# Patient Record
Sex: FEMALE | Race: WHITE | NOT HISPANIC OR LATINO | Employment: OTHER | URBAN - METROPOLITAN AREA
[De-identification: names, ages, dates, MRNs, and addresses within clinical notes are randomized per-mention and may not be internally consistent; named-entity substitution may affect disease eponyms.]

---

## 2022-10-08 ENCOUNTER — APPOINTMENT (OUTPATIENT)
Dept: CT IMAGING | Facility: HOSPITAL | Age: 67
DRG: 207 | End: 2022-10-08
Attending: EMERGENCY MEDICINE
Payer: COMMERCIAL

## 2022-10-08 ENCOUNTER — APPOINTMENT (OUTPATIENT)
Dept: RADIOLOGY | Facility: HOSPITAL | Age: 67
DRG: 207 | End: 2022-10-08
Attending: EMERGENCY MEDICINE
Payer: COMMERCIAL

## 2022-10-08 ENCOUNTER — HOSPITAL ENCOUNTER (INPATIENT)
Facility: HOSPITAL | Age: 67
LOS: 34 days | Discharge: NURSING, REHAB OR LONG-TERM CARE | DRG: 207 | End: 2022-11-11
Attending: EMERGENCY MEDICINE | Admitting: INTERNAL MEDICINE
Payer: COMMERCIAL

## 2022-10-08 ENCOUNTER — APPOINTMENT (OUTPATIENT)
Dept: RADIOLOGY | Facility: HOSPITAL | Age: 67
DRG: 207 | End: 2022-10-08
Attending: INTERNAL MEDICINE
Payer: COMMERCIAL

## 2022-10-08 DIAGNOSIS — I50.31 ACUTE HEART FAILURE WITH PRESERVED EJECTION FRACTION (HFPEF) (H): ICD-10-CM

## 2022-10-08 DIAGNOSIS — I95.9 HYPOTENSION, UNSPECIFIED HYPOTENSION TYPE: ICD-10-CM

## 2022-10-08 DIAGNOSIS — J44.9 CHRONIC OBSTRUCTIVE PULMONARY DISEASE, UNSPECIFIED COPD TYPE (H): Primary | ICD-10-CM

## 2022-10-08 DIAGNOSIS — F32.A DEPRESSION, UNSPECIFIED DEPRESSION TYPE: ICD-10-CM

## 2022-10-08 DIAGNOSIS — J96.01 ACUTE RESPIRATORY FAILURE WITH HYPOXIA (H): ICD-10-CM

## 2022-10-08 DIAGNOSIS — U07.1 INFECTION DUE TO 2019 NOVEL CORONAVIRUS: ICD-10-CM

## 2022-10-08 DIAGNOSIS — I26.93 SINGLE SUBSEGMENTAL PULMONARY EMBOLISM WITHOUT ACUTE COR PULMONALE (H): ICD-10-CM

## 2022-10-08 LAB
ALBUMIN SERPL BCG-MCNC: 2.6 G/DL (ref 3.5–5.2)
ALP SERPL-CCNC: 90 U/L (ref 35–104)
ALT SERPL W P-5'-P-CCNC: 193 U/L (ref 10–35)
ANION GAP SERPL CALCULATED.3IONS-SCNC: 13 MMOL/L (ref 7–15)
APTT PPP: 23 SECONDS (ref 22–38)
AST SERPL W P-5'-P-CCNC: 290 U/L (ref 10–35)
BASE EXCESS BLDA CALC-SCNC: 0.7 MMOL/L
BASE EXCESS BLDV CALC-SCNC: 3.9 MMOL/L
BASOPHILS # BLD AUTO: 0 10E3/UL (ref 0–0.2)
BASOPHILS NFR BLD AUTO: 0 %
BILIRUB DIRECT SERPL-MCNC: 0.8 MG/DL (ref 0–0.3)
BILIRUB SERPL-MCNC: 1.1 MG/DL
BUN SERPL-MCNC: 46.2 MG/DL (ref 8–23)
CALCIUM SERPL-MCNC: 7.6 MG/DL (ref 8.8–10.2)
CHLORIDE SERPL-SCNC: 100 MMOL/L (ref 98–107)
COHGB MFR BLD: >100 % (ref 95–96)
CREAT SERPL-MCNC: 0.92 MG/DL (ref 0.51–0.95)
D DIMER PPP FEU-MCNC: 6.94 UG/ML FEU (ref 0–0.5)
DEPRECATED HCO3 PLAS-SCNC: 29 MMOL/L (ref 22–29)
EOSINOPHIL # BLD AUTO: 0 10E3/UL (ref 0–0.7)
EOSINOPHIL NFR BLD AUTO: 0 %
ERYTHROCYTE [DISTWIDTH] IN BLOOD BY AUTOMATED COUNT: 13.9 % (ref 10–15)
FLUAV RNA SPEC QL NAA+PROBE: NEGATIVE
FLUBV RNA RESP QL NAA+PROBE: NEGATIVE
GFR SERPL CREATININE-BSD FRML MDRD: 67 ML/MIN/1.73M2
GLUCOSE SERPL-MCNC: 153 MG/DL (ref 70–99)
HBA1C MFR BLD: 6.5 %
HCO3 BLD-SCNC: ABNORMAL MMOL/L
HCO3 BLDV-SCNC: 25 MMOL/L (ref 24–30)
HCT VFR BLD AUTO: 51.8 % (ref 35–47)
HGB BLD-MCNC: 16.9 G/DL (ref 11.7–15.7)
IMM GRANULOCYTES # BLD: 0.1 10E3/UL
IMM GRANULOCYTES NFR BLD: 1 %
INR PPP: 1.41 (ref 0.85–1.15)
LACTATE SERPL-SCNC: 3.2 MMOL/L (ref 0.7–2)
LYMPHOCYTES # BLD AUTO: 0.5 10E3/UL (ref 0.8–5.3)
LYMPHOCYTES NFR BLD AUTO: 3 %
MAGNESIUM SERPL-MCNC: 1.8 MG/DL (ref 1.7–2.3)
MCH RBC QN AUTO: 31.5 PG (ref 26.5–33)
MCHC RBC AUTO-ENTMCNC: 32.6 G/DL (ref 31.5–36.5)
MCV RBC AUTO: 97 FL (ref 78–100)
MONOCYTES # BLD AUTO: 1.3 10E3/UL (ref 0–1.3)
MONOCYTES NFR BLD AUTO: 9 %
NEUTROPHILS # BLD AUTO: 12.4 10E3/UL (ref 1.6–8.3)
NEUTROPHILS NFR BLD AUTO: 87 %
NRBC # BLD AUTO: 0 10E3/UL
NRBC BLD AUTO-RTO: 0 /100
NT-PROBNP SERPL-MCNC: ABNORMAL PG/ML (ref 0–900)
O2/TOTAL GAS SETTING VFR VENT: 80 %
OXYHGB MFR BLD: >98.5 % (ref 95–96)
OXYHGB MFR BLDV: 64.3 % (ref 70–75)
PCO2 BLD: 43 MM HG (ref 35–45)
PCO2 BLDV: 57 MM HG (ref 35–50)
PEEP: 5 CM H2O
PH BLD: 7.39 [PH] (ref 7.37–7.44)
PH BLDV: 7.33 [PH] (ref 7.35–7.45)
PLATELET # BLD AUTO: 297 10E3/UL (ref 150–450)
PO2 BLD: 404 MM HG (ref 75–85)
PO2 BLDV: 38 MM HG (ref 25–47)
POTASSIUM SERPL-SCNC: 3.5 MMOL/L (ref 3.4–5.3)
PROT SERPL-MCNC: 5.1 G/DL (ref 6.4–8.3)
RADIOLOGIST FLAGS: ABNORMAL
RATE: 22 RR/MIN
RBC # BLD AUTO: 5.37 10E6/UL (ref 3.8–5.2)
RSV RNA SPEC NAA+PROBE: NEGATIVE
SAO2 % BLDV: 65.3 % (ref 70–75)
SARS-COV-2 RNA RESP QL NAA+PROBE: POSITIVE
SODIUM SERPL-SCNC: 142 MMOL/L (ref 136–145)
TEMPERATURE: 37 DEGREES C
TROPONIN T SERPL HS-MCNC: 52 NG/L
TROPONIN T SERPL HS-MCNC: 75 NG/L
UFH PPP CHRO-ACNC: <0.1 IU/ML
VENTILATION MODE: ABNORMAL
VENTILATOR TIDAL VOLUME: 400 ML
WBC # BLD AUTO: 14.2 10E3/UL (ref 4–11)

## 2022-10-08 PROCEDURE — 36556 INSERT NON-TUNNEL CV CATH: CPT

## 2022-10-08 PROCEDURE — 82248 BILIRUBIN DIRECT: CPT | Performed by: EMERGENCY MEDICINE

## 2022-10-08 PROCEDURE — 250N000011 HC RX IP 250 OP 636: Performed by: EMERGENCY MEDICINE

## 2022-10-08 PROCEDURE — 999N000065 XR CHEST PORT 1 VIEW

## 2022-10-08 PROCEDURE — 258N000003 HC RX IP 258 OP 636: Performed by: EMERGENCY MEDICINE

## 2022-10-08 PROCEDURE — 96368 THER/DIAG CONCURRENT INF: CPT

## 2022-10-08 PROCEDURE — 74018 RADEX ABDOMEN 1 VIEW: CPT

## 2022-10-08 PROCEDURE — 200N000001 HC R&B ICU

## 2022-10-08 PROCEDURE — 85730 THROMBOPLASTIN TIME PARTIAL: CPT | Performed by: EMERGENCY MEDICINE

## 2022-10-08 PROCEDURE — 87181 SC STD AGAR DILUTION PER AGT: CPT | Performed by: EMERGENCY MEDICINE

## 2022-10-08 PROCEDURE — 87185 SC STD ENZYME DETCJ PER NZM: CPT | Performed by: INTERNAL MEDICINE

## 2022-10-08 PROCEDURE — 999N000185 HC STATISTIC TRANSPORT TIME EA 15 MIN

## 2022-10-08 PROCEDURE — 250N000011 HC RX IP 250 OP 636: Performed by: INTERNAL MEDICINE

## 2022-10-08 PROCEDURE — 93005 ELECTROCARDIOGRAM TRACING: CPT | Performed by: EMERGENCY MEDICINE

## 2022-10-08 PROCEDURE — 87149 DNA/RNA DIRECT PROBE: CPT | Performed by: EMERGENCY MEDICINE

## 2022-10-08 PROCEDURE — 36620 INSERTION CATHETER ARTERY: CPT | Performed by: INTERNAL MEDICINE

## 2022-10-08 PROCEDURE — 82805 BLOOD GASES W/O2 SATURATION: CPT | Performed by: EMERGENCY MEDICINE

## 2022-10-08 PROCEDURE — 999N000157 HC STATISTIC RCP TIME EA 10 MIN

## 2022-10-08 PROCEDURE — 70450 CT HEAD/BRAIN W/O DYE: CPT

## 2022-10-08 PROCEDURE — 84484 ASSAY OF TROPONIN QUANT: CPT | Performed by: EMERGENCY MEDICINE

## 2022-10-08 PROCEDURE — 85610 PROTHROMBIN TIME: CPT | Performed by: EMERGENCY MEDICINE

## 2022-10-08 PROCEDURE — 83735 ASSAY OF MAGNESIUM: CPT | Performed by: EMERGENCY MEDICINE

## 2022-10-08 PROCEDURE — C9113 INJ PANTOPRAZOLE SODIUM, VIA: HCPCS | Performed by: INTERNAL MEDICINE

## 2022-10-08 PROCEDURE — 96376 TX/PRO/DX INJ SAME DRUG ADON: CPT

## 2022-10-08 PROCEDURE — 250N000009 HC RX 250: Performed by: INTERNAL MEDICINE

## 2022-10-08 PROCEDURE — 96365 THER/PROPH/DIAG IV INF INIT: CPT | Mod: 59

## 2022-10-08 PROCEDURE — 94002 VENT MGMT INPAT INIT DAY: CPT

## 2022-10-08 PROCEDURE — 76937 US GUIDE VASCULAR ACCESS: CPT

## 2022-10-08 PROCEDURE — 36415 COLL VENOUS BLD VENIPUNCTURE: CPT | Performed by: EMERGENCY MEDICINE

## 2022-10-08 PROCEDURE — 94640 AIRWAY INHALATION TREATMENT: CPT

## 2022-10-08 PROCEDURE — 71275 CT ANGIOGRAPHY CHEST: CPT

## 2022-10-08 PROCEDURE — 83605 ASSAY OF LACTIC ACID: CPT | Performed by: EMERGENCY MEDICINE

## 2022-10-08 PROCEDURE — 3E043XZ INTRODUCTION OF VASOPRESSOR INTO CENTRAL VEIN, PERCUTANEOUS APPROACH: ICD-10-PCS | Performed by: INTERNAL MEDICINE

## 2022-10-08 PROCEDURE — 99223 1ST HOSP IP/OBS HIGH 75: CPT | Mod: 25 | Performed by: GENERAL ACUTE CARE HOSPITAL

## 2022-10-08 PROCEDURE — 31500 INSERT EMERGENCY AIRWAY: CPT

## 2022-10-08 PROCEDURE — 85520 HEPARIN ASSAY: CPT | Performed by: EMERGENCY MEDICINE

## 2022-10-08 PROCEDURE — 99285 EMERGENCY DEPT VISIT HI MDM: CPT | Mod: 25

## 2022-10-08 PROCEDURE — 96375 TX/PRO/DX INJ NEW DRUG ADDON: CPT

## 2022-10-08 PROCEDURE — 85025 COMPLETE CBC W/AUTO DIFF WBC: CPT | Performed by: EMERGENCY MEDICINE

## 2022-10-08 PROCEDURE — 83880 ASSAY OF NATRIURETIC PEPTIDE: CPT | Performed by: EMERGENCY MEDICINE

## 2022-10-08 PROCEDURE — 250N000009 HC RX 250: Performed by: EMERGENCY MEDICINE

## 2022-10-08 PROCEDURE — 250N000013 HC RX MED GY IP 250 OP 250 PS 637: Performed by: EMERGENCY MEDICINE

## 2022-10-08 PROCEDURE — C9803 HOPD COVID-19 SPEC COLLECT: HCPCS

## 2022-10-08 PROCEDURE — 85379 FIBRIN DEGRADATION QUANT: CPT | Performed by: INTERNAL MEDICINE

## 2022-10-08 PROCEDURE — 96361 HYDRATE IV INFUSION ADD-ON: CPT

## 2022-10-08 PROCEDURE — 83036 HEMOGLOBIN GLYCOSYLATED A1C: CPT | Performed by: INTERNAL MEDICINE

## 2022-10-08 PROCEDURE — 87637 SARSCOV2&INF A&B&RSV AMP PRB: CPT | Performed by: EMERGENCY MEDICINE

## 2022-10-08 PROCEDURE — 96366 THER/PROPH/DIAG IV INF ADDON: CPT

## 2022-10-08 PROCEDURE — 82310 ASSAY OF CALCIUM: CPT | Performed by: EMERGENCY MEDICINE

## 2022-10-08 PROCEDURE — 250N000013 HC RX MED GY IP 250 OP 250 PS 637: Performed by: INTERNAL MEDICINE

## 2022-10-08 PROCEDURE — 87040 BLOOD CULTURE FOR BACTERIA: CPT | Performed by: EMERGENCY MEDICINE

## 2022-10-08 PROCEDURE — 5A1955Z RESPIRATORY VENTILATION, GREATER THAN 96 CONSECUTIVE HOURS: ICD-10-PCS | Performed by: INTERNAL MEDICINE

## 2022-10-08 PROCEDURE — XW043E5 INTRODUCTION OF REMDESIVIR ANTI-INFECTIVE INTO CENTRAL VEIN, PERCUTANEOUS APPROACH, NEW TECHNOLOGY GROUP 5: ICD-10-PCS | Performed by: INTERNAL MEDICINE

## 2022-10-08 PROCEDURE — 99291 CRITICAL CARE FIRST HOUR: CPT | Mod: 25 | Performed by: INTERNAL MEDICINE

## 2022-10-08 PROCEDURE — 85520 HEPARIN ASSAY: CPT | Performed by: INTERNAL MEDICINE

## 2022-10-08 RX ORDER — DEXTROSE MONOHYDRATE 25 G/50ML
25-50 INJECTION, SOLUTION INTRAVENOUS
Status: DISCONTINUED | OUTPATIENT
Start: 2022-10-08 | End: 2022-11-11 | Stop reason: HOSPADM

## 2022-10-08 RX ORDER — DEXMEDETOMIDINE HYDROCHLORIDE 4 UG/ML
.1-1.2 INJECTION, SOLUTION INTRAVENOUS CONTINUOUS
Status: DISCONTINUED | OUTPATIENT
Start: 2022-10-08 | End: 2022-10-08

## 2022-10-08 RX ORDER — NALOXONE HYDROCHLORIDE 0.4 MG/ML
0.2 INJECTION, SOLUTION INTRAMUSCULAR; INTRAVENOUS; SUBCUTANEOUS
Status: DISCONTINUED | OUTPATIENT
Start: 2022-10-08 | End: 2022-11-11 | Stop reason: HOSPADM

## 2022-10-08 RX ORDER — CHLORHEXIDINE GLUCONATE ORAL RINSE 1.2 MG/ML
15 SOLUTION DENTAL EVERY 12 HOURS
Status: DISCONTINUED | OUTPATIENT
Start: 2022-10-08 | End: 2022-10-20

## 2022-10-08 RX ORDER — NOREPINEPHRINE BITARTRATE 0.02 MG/ML
.01-.6 INJECTION, SOLUTION INTRAVENOUS CONTINUOUS
Status: DISCONTINUED | OUTPATIENT
Start: 2022-10-08 | End: 2022-10-17

## 2022-10-08 RX ORDER — NALOXONE HYDROCHLORIDE 0.4 MG/ML
0.4 INJECTION, SOLUTION INTRAMUSCULAR; INTRAVENOUS; SUBCUTANEOUS
Status: DISCONTINUED | OUTPATIENT
Start: 2022-10-08 | End: 2022-11-11 | Stop reason: HOSPADM

## 2022-10-08 RX ORDER — PROPOFOL 10 MG/ML
5-75 INJECTION, EMULSION INTRAVENOUS CONTINUOUS
Status: DISCONTINUED | OUTPATIENT
Start: 2022-10-08 | End: 2022-10-17

## 2022-10-08 RX ORDER — ETOMIDATE 2 MG/ML
10 INJECTION INTRAVENOUS ONCE
Status: COMPLETED | OUTPATIENT
Start: 2022-10-08 | End: 2022-10-08

## 2022-10-08 RX ORDER — NICOTINE POLACRILEX 4 MG
15-30 LOZENGE BUCCAL
Status: DISCONTINUED | OUTPATIENT
Start: 2022-10-08 | End: 2022-11-11 | Stop reason: HOSPADM

## 2022-10-08 RX ORDER — NOREPINEPHRINE BITARTRATE 0.02 MG/ML
INJECTION, SOLUTION INTRAVENOUS
Status: DISCONTINUED
Start: 2022-10-08 | End: 2022-10-08 | Stop reason: HOSPADM

## 2022-10-08 RX ORDER — DEXMEDETOMIDINE HYDROCHLORIDE 4 UG/ML
.1-1.2 INJECTION, SOLUTION INTRAVENOUS CONTINUOUS
Status: DISCONTINUED | OUTPATIENT
Start: 2022-10-08 | End: 2022-10-20

## 2022-10-08 RX ORDER — HEPARIN SODIUM 10000 [USP'U]/100ML
0-5000 INJECTION, SOLUTION INTRAVENOUS CONTINUOUS
Status: DISCONTINUED | OUTPATIENT
Start: 2022-10-08 | End: 2022-10-13

## 2022-10-08 RX ORDER — PIPERACILLIN SODIUM, TAZOBACTAM SODIUM 3; .375 G/15ML; G/15ML
3.38 INJECTION, POWDER, LYOPHILIZED, FOR SOLUTION INTRAVENOUS EVERY 8 HOURS
Status: DISCONTINUED | OUTPATIENT
Start: 2022-10-08 | End: 2022-10-11

## 2022-10-08 RX ORDER — PIPERACILLIN SODIUM, TAZOBACTAM SODIUM 3; .375 G/15ML; G/15ML
3.38 INJECTION, POWDER, LYOPHILIZED, FOR SOLUTION INTRAVENOUS ONCE
Status: COMPLETED | OUTPATIENT
Start: 2022-10-08 | End: 2022-10-08

## 2022-10-08 RX ORDER — IOPAMIDOL 755 MG/ML
75 INJECTION, SOLUTION INTRAVASCULAR ONCE
Status: COMPLETED | OUTPATIENT
Start: 2022-10-08 | End: 2022-10-08

## 2022-10-08 RX ORDER — DEXAMETHASONE SODIUM PHOSPHATE 4 MG/ML
6 INJECTION, SOLUTION INTRA-ARTICULAR; INTRALESIONAL; INTRAMUSCULAR; INTRAVENOUS; SOFT TISSUE DAILY
Status: COMPLETED | OUTPATIENT
Start: 2022-10-08 | End: 2022-10-17

## 2022-10-08 RX ORDER — ASPIRIN 300 MG/1
300 SUPPOSITORY RECTAL ONCE
Status: COMPLETED | OUTPATIENT
Start: 2022-10-08 | End: 2022-10-08

## 2022-10-08 RX ORDER — PROPOFOL 10 MG/ML
5-75 INJECTION, EMULSION INTRAVENOUS CONTINUOUS
Status: DISCONTINUED | OUTPATIENT
Start: 2022-10-08 | End: 2022-10-08

## 2022-10-08 RX ORDER — DEXMEDETOMIDINE HYDROCHLORIDE 4 UG/ML
INJECTION, SOLUTION INTRAVENOUS
Status: DISCONTINUED
Start: 2022-10-08 | End: 2022-10-08 | Stop reason: HOSPADM

## 2022-10-08 RX ADMIN — ASPIRIN 300 MG: 300 SUPPOSITORY RECTAL at 16:22

## 2022-10-08 RX ADMIN — MIDAZOLAM HYDROCHLORIDE 2 MG: 1 INJECTION, SOLUTION INTRAMUSCULAR; INTRAVENOUS at 15:33

## 2022-10-08 RX ADMIN — MIDAZOLAM HYDROCHLORIDE 2 MG: 1 INJECTION, SOLUTION INTRAMUSCULAR; INTRAVENOUS at 13:27

## 2022-10-08 RX ADMIN — VANCOMYCIN HYDROCHLORIDE 750 MG: 5 INJECTION, POWDER, LYOPHILIZED, FOR SOLUTION INTRAVENOUS at 14:18

## 2022-10-08 RX ADMIN — Medication 80 MG: at 09:43

## 2022-10-08 RX ADMIN — SODIUM CHLORIDE 1000 ML: 9 INJECTION, SOLUTION INTRAVENOUS at 15:12

## 2022-10-08 RX ADMIN — MIDAZOLAM HYDROCHLORIDE 2 MG: 1 INJECTION, SOLUTION INTRAMUSCULAR; INTRAVENOUS at 10:59

## 2022-10-08 RX ADMIN — Medication 0.03 MCG/KG/MIN: at 09:58

## 2022-10-08 RX ADMIN — IOPAMIDOL 75 ML: 755 INJECTION, SOLUTION INTRAVENOUS at 16:05

## 2022-10-08 RX ADMIN — MIDAZOLAM HYDROCHLORIDE 2 MG: 1 INJECTION, SOLUTION INTRAMUSCULAR; INTRAVENOUS at 10:11

## 2022-10-08 RX ADMIN — ALTEPLASE 2 MG: 2.2 INJECTION, POWDER, LYOPHILIZED, FOR SOLUTION INTRAVENOUS at 21:32

## 2022-10-08 RX ADMIN — IPRATROPIUM BROMIDE 0.5 MG: 0.5 SOLUTION RESPIRATORY (INHALATION) at 19:28

## 2022-10-08 RX ADMIN — DEXAMETHASONE SODIUM PHOSPHATE 6 MG: 4 INJECTION, SOLUTION INTRA-ARTICULAR; INTRALESIONAL; INTRAMUSCULAR; INTRAVENOUS; SOFT TISSUE at 20:25

## 2022-10-08 RX ADMIN — PROPOFOL 5 MCG/KG/MIN: 10 INJECTION, EMULSION INTRAVENOUS at 15:44

## 2022-10-08 RX ADMIN — HEPARIN SODIUM AND DEXTROSE 650 UNITS/HR: 10000; 5 INJECTION INTRAVENOUS at 16:41

## 2022-10-08 RX ADMIN — PIPERACILLIN AND TAZOBACTAM 3.38 G: 3; .375 INJECTION, POWDER, LYOPHILIZED, FOR SOLUTION INTRAVENOUS at 21:14

## 2022-10-08 RX ADMIN — SODIUM CHLORIDE 1000 ML: 9 INJECTION, SOLUTION INTRAVENOUS at 12:20

## 2022-10-08 RX ADMIN — DEXMEDETOMIDINE HYDROCHLORIDE 0.2 MCG/KG/HR: 400 INJECTION INTRAVENOUS at 10:11

## 2022-10-08 RX ADMIN — CHLORHEXIDINE GLUCONATE 15 ML: 1.2 SOLUTION ORAL at 21:25

## 2022-10-08 RX ADMIN — PANTOPRAZOLE SODIUM 40 MG: 40 INJECTION, POWDER, FOR SOLUTION INTRAVENOUS at 21:20

## 2022-10-08 RX ADMIN — PIPERACILLIN AND TAZOBACTAM 3.38 G: 3; .375 INJECTION, POWDER, LYOPHILIZED, FOR SOLUTION INTRAVENOUS at 14:17

## 2022-10-08 RX ADMIN — ETOMIDATE 10 MG: 2 INJECTION, SOLUTION INTRAVENOUS at 09:43

## 2022-10-08 RX ADMIN — DEXMEDETOMIDINE HYDROCHLORIDE 0.5 MCG/KG/HR: 400 INJECTION INTRAVENOUS at 21:19

## 2022-10-08 ASSESSMENT — ACTIVITIES OF DAILY LIVING (ADL)
ADLS_ACUITY_SCORE: 35

## 2022-10-08 NOTE — PROGRESS NOTES
Patient transported to ct and back to ed without incident. Patient placed back on vent withvbelow settings:  Vent Mode: CMV/AC  (Continuous Mandatory Ventilation/ Assist Control)  Resp Rate (Set): 22 breaths/min  Tidal Volume (Set, mL): 400 mL  PEEP (cm H2O): 5 cmH2O  Resp: 22    Toro Menchaca, RT

## 2022-10-08 NOTE — PHARMACY-ADMISSION MEDICATION HISTORY
Pharmacy Note - Admission Medication History    Pertinent Provider Information: Unable to communicate with patient as she is intubated.  RN spoke with child and they stated that she is on lasix, ASA, and sometimes takes caffeine.  When RN stated this the patient was able to acknowledge this that she does take them.  We are unable to determine her doses.     ______________________________________________________________________    Prior To Admission (PTA) med list completed and updated in EMR.       No outpatient medications have been marked as taking for the 10/8/22 encounter (Hospital Encounter).       Information source(s): Patient and Family member  Method of interview communication: in-person    Summary of Changes to PTA Med List  New: n/a  Discontinued: n/a  Changed: n/a    Patient was asked about OTC/herbal products specifically.  PTA med list reflects this.    In the past week, patient estimated taking medication this percent of the time:  Unable to assess this.    Allergies were reviewed, assessed, and updated with the patient.      Patient did not bring any medications to the hospital and can't retrieve from home. No multi-dose medications are available for use during hospital stay.     The information provided in this note is only as accurate as the sources available at the time of the update(s).    Thank you for the opportunity to participate in the care of this patient.    Sammie Wilkerson RPH  10/8/2022 12:25 PM

## 2022-10-08 NOTE — CONSULTS
"     We have been asked to see Lavonne Talbot at [facility] by Dr. Erich Claudio for evaluation of an abnormal electrocardiogram.    Impression and Plan     Assessment:  1. Abnormal electrocardiogram suggesting myocardial injury. Her initial presentation seems more respiratory in etiology. Her initial high-sensitivity troponin is only mild. Unclear significance of her markedly elevated brain natriuretic peptide as she does not appear to be in decompensated congestive heart failure. Myocarditis from COVID-19 infection or stress-induced cardiomyopathy are also possible.  2. Acute respiratory failure with hypoxemia. This may be precipitated by COVID-19 infection but she likely has underlying chronic obstructive pulmonary disease as well.  3. Shock, unspecified.   4. Tobacco use.    Plan:    We attempted twice to perform transthoracic echocardiography, but the patient was combative and did not allow for this. We will try again tomorrow    Recommend head CT to rule out intracranial hemorrhage given her unresponsiveness and CT angiogram of the chest to evaluate for pulmonary embolism    If she has no evidence of intracranial hemorrhage, empiric intravenous heparin could be initiated for possible acute coronary syndrome    She likely needs intravenous fluids despite her elevated brain natriuretic peptide and hypoxic respiratory failure, as she appears hypovolemic on examination    Primary Cardiologist: none    Clinically Significant Risk Factors Present on Admission      # Coagulation Defect: INR = 1.41 (Ref range: 0.85 - 1.15) and/or PTT = 23 Seconds (Ref range: 22 - 38 Seconds) on admission, will monitor for bleeding    # Circulatory Shock: currently requiring pressors for blood pressure support  # Cachexia: Estimated body mass index is 12.09 kg/m  as calculated from the following:    Height as of this encounter: 1.702 m (5' 7\").    Weight as of this encounter: 35 kg (77 lb 2.6 oz).      History of Present " Illness      Ms. Lavonne Talbot is a 67 year old female with no known significant past medical history besides being a heavy smoker who called EMS this morning for worsened shortness of breath. She reportedly tested positive for COVID-19 last week. She has been home alone, as her  is currently hospitalized for a stroke and his having some cognitive difficulties.    On arrival to the ED, she was unresponsive and subsequently intubated. She has become hypotensive and required vasopressors. ECG suggested anterior T wave inversions. Labs showed borderline elevated high-sensitive troponin of 75 and [place] [address]. CXR showed hyperinflated lungs but no evidence of fluid-overload. She has been agitated despite receiving sedation and had not allowed for two separate attempts at TTE.    Her son Frantz says that her and her  are very secretive about their health. He is unaware of any significant medical problems but says she was recently undergoing some medical testing. For the past week since the COVID-19 diagnosis, she has been more run down and irritable. No mention of chest pain.     Review of Systems:  Further review of systems is otherwise negative/noncontributory (based on review of medical record (admission H&P) and 13 point review of systems reviewed. Pertinent positives noted).    Cardiac Diagnostics     ECG 10/8/2022 (personally reviewed and interpreted): sinus tachycardia, incomplete RBBB, prolonged QTc 528 ms, anterior T wave inversions    Telemetry (personally reviewed): sinus tachycardia, no ventricular arrhythmias    Medical History  Surgical History Family History Social History   Past Medical History:   Diagnosis Date     Tobacco abuse      Past Surgical History:   Procedure Laterality Date     NO HISTORY OF SURGERY       Family History   Problem Relation Age of Onset     Diabetes Mother            Social History     Socioeconomic History     Marital status:      Spouse  "name: Not on file     Number of children: Not on file     Years of education: Not on file     Highest education level: Not on file   Occupational History     Not on file   Tobacco Use     Smoking status: Current Every Day Smoker     Types: Cigarettes     Smokeless tobacco: Not on file   Substance and Sexual Activity     Alcohol use: Not on file     Drug use: Not on file     Sexual activity: Not on file   Other Topics Concern     Not on file   Social History Narrative     Not on file     Social Determinants of Health     Financial Resource Strain: Not on file   Food Insecurity: Not on file   Transportation Needs: Not on file   Physical Activity: Not on file   Stress: Not on file   Social Connections: Not on file   Intimate Partner Violence: Not on file   Housing Stability: Not on file             Physical Examination   VITALS: /87   Pulse 76   Resp 22   Ht 1.702 m (5' 7\")   Wt 35 kg (77 lb 2.6 oz)   SpO2 100%   BMI 12.09 kg/m    BMI: Body mass index is 12.09 kg/m .  Wt Readings from Last 3 Encounters:   10/08/22 35 kg (77 lb 2.6 oz)     General Appearance:  Intubated, sedated.   Head:  Normocephalic, without obvious abnormality, atraumatic   Eyes:  Closed   Ears:  Normal external ear canals bilaterally   Nose: Nares normal, septum midline, no drainage   Throat: ET tube in place.   Neck: Supple, symmetrical, trachea midline, no adenopathy, no carotid bruit or JVD.   Back:   Symmetric, no abnormal curvature, ROM normal   Lungs:   Mechanical ventilation, respirations unlabored   Chest Wall:  No tenderness or deformity   Heart:  Regular rate and rhythm, S1, S2 normal,no murmur, rub or gallop   Abdomen:   Soft, non-tender, bowel sounds active all four quadrants,  no masses, no organomegaly   Extremities: Extremities normal, atraumatic, no cyanosis or edema   Skin: Skin color, texture, turgor normal, no rashes or lesions   Psychiatric: Sedated.   Neurologic: Sedated.            Imaging      CXR 10/8/2022 " (report reviewed):  EXAM: XR CHEST PORTABLE 1 VIEW  LOCATION: [facility]  DATE/TIME: 10/08/2022, 11:10 AM     INDICATION: Status post central line, right IJ.  COMPARISON: 10/08/2022 at 0953 hours.                                                                      IMPRESSION: New right IJ central venous catheter with tip in the mid SVC. No pneumothorax. Endotracheal and nasogastric tubes are unchanged. Lungs hyperinflated. Mild infiltrate left lung base.      This result has not been signed. Information might be incomplete.            Lab Results    Chemistry/lipid CBC Cardiac Enzymes/BNP/TSH/INR     Recent Labs   Lab Test 10/08/22  1132      POTASSIUM 3.5   CHLORIDE 100   CO2 29   *   BUN 46.2*   CR 0.92   GFRESTIMATED 67   JASON 7.6*     Recent Labs   Lab Test 10/08/22  1132   CR 0.92            Recent Labs   Lab Test 10/08/22  1132   WBC 14.2*   HGB 16.9*   HCT 51.8*   MCV 97        Recent Labs   Lab Test 10/08/22  1132   HGB 16.9*      Recent Labs   Lab Test 10/08/22  1132   NTBNPI 12,629*     No results for input(s): TSH in the last 01987 hours.  Recent Labs   Lab Test 10/08/22  1132   INR 1.41*           Current Inpatient Scheduled Medications   Scheduled Meds:    sodium chloride 0.9%  1,000 mL Intravenous Once     aspirin  300 mg Rectal Once     dexmedetomidine         norepinephrine         piperacillin-tazobactam  3.375 g Intravenous Once     vancomycin  750 mg Intravenous Once     Continuous Infusions:    dexmedetomidine 0.9 mcg/kg/hr (10/08/22 1301)     norepinephrine 0.09 mcg/kg/min (10/08/22 1300)          Medications Prior to Admission        Alton Grant MD [facility]  Non-Invasive Cardiologist  [facility]  Pager [phone]

## 2022-10-08 NOTE — ED PROVIDER NOTES
Emergency Department Encounter     Evaluation Date & Time:   10/8/2022  9:38 AM    CHIEF COMPLAINT:  Respiratory Distress      Triage Note:              ED COURSE & MEDICAL DECISION MAKING:     ED Course as of 10/08/22 1408   Sat Oct 08, 2022   1008 I spoke with cardiologist, Dr. Grant, who reviewed history, EKG with me.  Will not make STEMI alert at this point given uncertainty, recommends CT head to rule out ICH, CTA chest and if CT head neg, start heparin.     1200 Nursing to send over for CT imaging. Still waiting on this.     1207 Lactate 3.2.  WBC 14+. Broad spectrum antibx started empirically given presentation and hypotension.     1230 Awaiting imaging to call for hospitalization.     1349 Awaiting call from intensivist.   1401 I spoke with intensive, Dr. Mae, who accepted for hospitalization to ICU.  She's aware we are still waiting on CT head and CTA chest.  No heparin started until CT head returned.     Pt presenting by EMS from home for respiratory failure with associated recent covid diagnosis.  History limited as pt became unresponsive on arrival here, still breathing with pulse.  She was initially tried on CPAP by EMS, but quickly failed, so we proceeded with intubation shortly after arrival.  Pt's pressures low initially.  Resuscitation started on arrival. EKG concerning for possible ACS vs type 2 secondary to resp failure/hypoxia and covid.  EKG pattern consistent with a wellen's wave syndrome, so I spoke with cardiology regarding this. Will not activate cath lab at this point.      9:37 AM Patient was unresponsive when arrived. PPE worn includes N95 mask and gloves.   11:00 AM RN informed patient woke up. RN asked patient if she wants chest tube, she nod her head no. I contacted family through patients cell phone.     At the conclusion of the encounter I discussed the results of all the tests and the disposition. The questions were answered. The patient or family acknowledged understanding and  was agreeable with the care plan.      MEDICATIONS GIVEN IN THE EMERGENCY DEPARTMENT:  Medications   norepinephrine (LEVOPHED) 0.016 mg/mL 4 mg in  mL infusion PREMIX (  Not Given 10/8/22 1226)   aspirin (ASA) Suppository 300 mg (has no administration in time range)   dexmedetomidine (PRECEDEX) 400 MCG/100ML infusion (  Not Given 10/8/22 1226)   norepinephrine (LEVOPHED) 4 mg in  mL infusion PREMIX (0 mcg/kg/min × 35 kg Intravenous Paused 10/8/22 1333)   dexmedetomidine (PRECEDEX) 400 mcg in 0.9% sodium chloride 100 mL (0.9 mcg/kg/hr × 35 kg Intravenous Rate/Dose Change 10/8/22 1301)   0.9% sodium chloride BOLUS (has no administration in time range)   piperacillin-tazobactam (ZOSYN) 3.375 g vial to attach to  mL bag (has no administration in time range)   vancomycin (VANCOCIN) 750 mg in 0.9% NaCl 250 mL intermittent infusion (has no administration in time range)   fentaNYL (SUBLIMAZE) infusion (has no administration in time range)   etomidate (AMIDATE) injection 10 mg (10 mg Intravenous Given 10/8/22 0943)   succinylcholine (ANECTINE) injection 80 mg (80 mg Intravenous Given 10/8/22 0943)   0.9% sodium chloride BOLUS (1,000 mLs Intravenous New Bag 10/8/22 1220)   midazolam (VERSED) injection 2 mg (2 mg Intravenous Given 10/8/22 1011)   midazolam (VERSED) injection 2 mg (2 mg Intravenous Given 10/8/22 1059)   midazolam (VERSED) injection 2 mg (2 mg Intravenous Given 10/8/22 1327)       NEW PRESCRIPTIONS STARTED AT TODAY'S ED VISIT:  New Prescriptions    No medications on file       HPI   Per EMS, patient is positive for COVID-19. Patient was in respiratory distress. Patients O2 stat ws in the 50, EMS attempted CPAP. On arrival to ED patient was unresponsive.    Per son, patient had COVID-19 for about a week. He got COVID a week before patient. Stated that patient is a heavy chain smoker. Reports patient has been having trouble getting around.     The history is provided by medical records and the  "EMS personnel. No  was used.        Lavonne Talbot is a 67 year old female with no pertinent history who presents to this ED via EMS for evaluation of shortness of breath and hypoxia.     REVIEW OF SYSTEMS:  Review of Systems   Unable to perform ROS: Patient unresponsive       Medical History     Past Medical History:   Diagnosis Date     Tobacco abuse        Past Surgical History:   Procedure Laterality Date     NO HISTORY OF SURGERY         Family History   Problem Relation Age of Onset     Diabetes Mother        Social History     Tobacco Use     Smoking status: Current Every Day Smoker     Types: Cigarettes       No current outpatient medications on file.      Physical Exam     Vitals:  /70   Pulse 72   Resp 22   Ht 1.702 m (5' 7\")   Wt 35 kg (77 lb 2.6 oz)   SpO2 100%   BMI 12.09 kg/m      PHYSICAL EXAM:   Physical Exam  Vitals and nursing note reviewed.   Constitutional:       General: She is in acute distress.      Appearance: Normal appearance. She is ill-appearing.      Comments: Unresponsive arrived to ED  Frail appearing   HENT:      Head: Normocephalic and atraumatic.      Nose: Nose normal.      Mouth/Throat:      Mouth: Mucous membranes are moist.   Eyes:      Pupils: Pupils are equal, round, and reactive to light.   Neck:      Comments: No JVD  Cardiovascular:      Rate and Rhythm: Regular rhythm. Tachycardia present.      Pulses: Normal pulses.           Radial pulses are 2+ on the right side and 2+ on the left side.        Dorsalis pedis pulses are 2+ on the right side and 2+ on the left side.   Pulmonary:      Effort: Respiratory distress present.      Breath sounds: Rhonchi present.      Comments: Diminish breathing sound bilaterally   Abdominal:      Palpations: Abdomen is soft.      Tenderness: There is no abdominal tenderness.   Musculoskeletal:      Cervical back: Full passive range of motion without pain and neck supple.      Comments: No calf tenderness or " swelling b/l   Skin:     General: Skin is warm.      Capillary Refill: Capillary refill takes less than 2 seconds.      Findings: No rash.   Neurological:      General: No focal deficit present.      Mental Status: She is alert. Mental status is at baseline.      Comments: Fluent speech, no acute lateralizing deficits   Psychiatric:      Comments: Unresponsive         Results     LAB:  All pertinent labs reviewed and interpreted  Labs Ordered and Resulted from Time of ED Arrival to Time of ED Departure   BASIC METABOLIC PANEL - Abnormal       Result Value    Sodium 142      Potassium 3.5      Chloride 100      Carbon Dioxide (CO2) 29      Anion Gap 13      Urea Nitrogen 46.2 (*)     Creatinine 0.92      Calcium 7.6 (*)     Glucose 153 (*)     GFR Estimate 67     TROPONIN T, HIGH SENSITIVITY - Abnormal    Troponin T, High Sensitivity 75 (*)    NT PROBNP INPATIENT - Abnormal    N terminal Pro BNP Inpatient 12,629 (*)    HEPATIC FUNCTION PANEL - Abnormal    Protein Total 5.1 (*)     Albumin 2.6 (*)     Bilirubin Total 1.1      Alkaline Phosphatase 90       (*)      (*)     Bilirubin Direct 0.80 (*)    INR - Abnormal    INR 1.41 (*)    CBC WITH PLATELETS AND DIFFERENTIAL - Abnormal    WBC Count 14.2 (*)     RBC Count 5.37 (*)     Hemoglobin 16.9 (*)     Hematocrit 51.8 (*)     MCV 97      MCH 31.5      MCHC 32.6      RDW 13.9      Platelet Count 297      % Neutrophils 87      % Lymphocytes 3      % Monocytes 9      % Eosinophils 0      % Basophils 0      % Immature Granulocytes 1      NRBCs per 100 WBC 0      Absolute Neutrophils 12.4 (*)     Absolute Lymphocytes 0.5 (*)     Absolute Monocytes 1.3      Absolute Eosinophils 0.0      Absolute Basophils 0.0      Absolute Immature Granulocytes 0.1      Absolute NRBCs 0.0     LACTIC ACID WHOLE BLOOD - Abnormal    Lactic Acid 3.2 (*)    MAGNESIUM - Normal    Magnesium 1.8     PARTIAL THROMBOPLASTIN TIME - Normal    aPTT 23     BLOOD GAS ARTERIAL   INFLUENZA A/B  & SARS-COV2 PCR MULTIPLEX   TROPONIN T, HIGH SENSITIVITY   BLOOD CULTURE   BLOOD CULTURE       RADIOLOGY:  XR Chest Port 1 View   Preliminary Result   IMPRESSION: New right IJ central venous catheter with tip in the mid SVC. No pneumothorax. Endotracheal and nasogastric tubes are unchanged. Lungs hyperinflated. Mild infiltrate left lung base.         XR Chest Port 1 View   Final Result   IMPRESSION: ETT is 6.2 cm from the izabela. Enteric tube enters the stomach and out of the field-of-view. The lungs are hyperinflated. There may be trace pleural effusions versus pleural thickening. Patchy bibasilar reticular opacities likely represent    atelectasis and scarring. Superimposed infection is also in the differential. Heart size is normal. No acute osseous findings.      CT Head w/o Contrast    (Results Pending)   CT Chest Pulmonary Embolism w Contrast    (Results Pending)   Echocardiogram Complete    (Results Pending)                ECG:  Sinus tach, rate 116, ST elevation V3 and V4 with deeply inverted T waves, concerning for wellen's wave.  No priors for comparison    I have independently reviewed and interpreted the EKG(s) documented above     PROCEDURES:  [facility]    -Intubation    Date/Time: 10/8/2022 10:02 AM  Performed by: Erich Claudio MD  Authorized by: Erich Claudio MD     Emergent condition/consent implied      PRE-PROCEDURE DETAILS     Patient status:  Unresponsive    Mallampati score:  I    Pretreatment meds: etomidate.    Paralytics:  Succinylcholine      PROCEDURE DETAILS     Preoxygenation:  Bag valve mask    CPR in progress: no      Intubation method:  Oral    Oral intubation technique:  Video-assisted    Laryngoscope blade:  Mac 4    Tube size (mm):  7.5    Tube type:  Cuffed    Number of attempts:  1    Ventilation between attempts: no      Cricoid pressure: no      Tube visualized through cords: yes      PLACEMENT ASSESSMENT     ETT to teeth:  22    Tube secured  with:  ETT pastrana    Breath sounds:  Equal and absent over the epigastrium    Placement verification: chest rise, condensation, CXR verification, direct visualization, equal breath sounds and ETCO2 detector      CXR findings:  ETT in proper place    PROCEDURE    Patient Tolerance:  Patient tolerated the procedure well with no immediate complications  :      PROCEDURE: Central Venous Line Placement   TYPE: Triple Lumen   INDICATIONS: Central vascular access for Vasoactive medication administration   PROCEDURE PROVIDER: Dr Frantz Claudio   CONSENT: Emergent nature, consent implied   TIME OUT: Universal protocol was followed. TIME OUT conducted just prior to starting procedure confirmed patient identity, site/side, procedure, patient position, and availability of correct equipment. Yes   SITE: Right internal jugular   MEDICATIONS: Intubated, sedated   NOTE: Central line bundle elements were completed including preparatory hand cleansing, donning full barrier precautions, use of a full body drape and chlorhexidine gluconate skin prep.  The line insertion site was selected as lowest risk for mechanical complication after reviewing patient anatomy. A vascular ultrasound device was used to locate the vein. A finder needle was used to enter the vein, through which a guidewire was inserted without resistance. Venous placement of guidewire was confirmed again with ultrasound. The finder needle was then removed and the tract was dilated with a dilator. The central venous catheter was then inserted over the guidewire without difficulty. The guidewire was removed and the catheter was secured to the underlying skin. I verified removal of the wire and dilator: Yes. Blood was withdrawn from the each port and then flushed with sterile saline.  The line was secured in place and a dressing was placed over the site.    A post-procedure chest radiograph was ordered, reviewed, no pneumothorax seen and tip of line in good position near the  cavoatrial junction     COMPLICATIONS: Patient tolerated procedure well, without complication           FINAL IMPRESSION:    ICD-10-CM    1. Acute respiratory failure with hypoxia (H)  J96.01    2. Hypotension, unspecified hypotension type  I95.9    3. Infection due to 2019 novel coronavirus  U07.1      CRITICAL CARE  90 minutes of critical care time spent managing respiratory failure with hypoxia and hypotension.  Time spent interpreting labs, vitals, imaging.  Valerio spent documenting.  Independent of any procedures performed.        I, Krista Guzman, am serving as a scribe to document services personally performed by Dr. Erich Claudio, based on my observations and the provider's statements to me. I, Erich Claudio, DO attest that Krista Guzman is acting in a scribe capacity, has observed my performance of the services and has documented them in accordance with my direction.      Erich Claudio DO  Emergency Medicine  [facility] EMERGENCY DEPARTMENT  10/8/2022  9:50 AM        Erich Claudio MD  10/08/22 0732

## 2022-10-08 NOTE — ED NOTES
EMERGENCY DEPARTMENT SIGN OUT NOTE        ED COURSE AND MEDICAL DECISION MAKING  Patient was signed out to me by Dr Frantz Claudio at 1430.    In brief, Lavonne Talbot is a 67 year old female who initially presented with respiratory distress and altered mental status. Patient's oxygen saturations were in the 50s, and EMS attempted CPAP. Upon arrival, patient was unresponsive. Per son, patient had COVID-19 for about a week, and he stated that patient is a heavy chain smoker. Reports patient has been having trouble getting around.     At time of sign out, patient admitted to intensivist.     Repeat EKG had been performed and demonstrated sinus bradycardia with deep T wave inversion lead V3 and biphasic T waves leads V4 and V5.    I spoke with Dr. Grant with Cardiology who advised heparin drip if head CT unremarkable.    Spoke with Charge RN to expedite head CT imaging.    Head CT negative.    CTA chest demonstrated   1.  A single segmental left lower lobe pulmonary embolus. No evidence of right heart strain.  2.  Small left lower lobe consolidations which either represent pneumonia or aspiration (patient has been given broad-spectrum antibiotics - Zosyn and Vancomycin).  3.  Multiple foci of mucus plugging.  4.  Severe emphysema.  5.  Right internal jugular central venous catheter with a small amount of thrombus around the tip.    High intensity heparin ordered.    After patient returned from Radiology, she had worsening bradycardia into the 30s; T deep wave inversions and biphasic T waves V3-V5 still present, similar to prior.      FINAL IMPRESSION    1. Acute respiratory failure with hypoxia (H)    2. Hypotension, unspecified hypotension type    3. Infection due to 2019 novel coronavirus    4. Single subsegmental pulmonary embolism without acute cor pulmonale (H)        ED MEDS  Medications   norepinephrine (LEVOPHED) 0.016 mg/mL 4 mg in  mL infusion PREMIX (  Not Given 10/8/22 1226)   dexmedetomidine (PRECEDEX)  400 MCG/100ML infusion (  Not Given 10/8/22 1226)   norepinephrine (LEVOPHED) 4 mg in  mL infusion PREMIX (0 mcg/kg/min × 35 kg Intravenous Paused 10/8/22 1333)   dexmedetomidine (PRECEDEX) 400 mcg in 0.9% sodium chloride 100 mL (1 mcg/kg/hr × 35 kg Intravenous Rate/Dose Change 10/8/22 1535)   fentaNYL (SUBLIMAZE) infusion (has no administration in time range)   propofol (DIPRIVAN) infusion (5 mcg/kg/min × 35 kg Intravenous New Bag 10/8/22 1544)   aspirin (ASA) Suppository 300 mg (300 mg Rectal Given 10/8/22 1622)   etomidate (AMIDATE) injection 10 mg (10 mg Intravenous Given 10/8/22 0943)   succinylcholine (ANECTINE) injection 80 mg (80 mg Intravenous Given 10/8/22 0943)   0.9% sodium chloride BOLUS (0 mLs Intravenous Stopped 10/8/22 1513)   midazolam (VERSED) injection 2 mg (2 mg Intravenous Given 10/8/22 1011)   midazolam (VERSED) injection 2 mg (2 mg Intravenous Given 10/8/22 1059)   0.9% sodium chloride BOLUS (1,000 mLs Intravenous New Bag 10/8/22 1512)   piperacillin-tazobactam (ZOSYN) 3.375 g vial to attach to  mL bag (0 g Intravenous Stopped 10/8/22 1510)   vancomycin (VANCOCIN) 750 mg in 0.9% NaCl 250 mL intermittent infusion (750 mg Intravenous Given 10/8/22 1418)   midazolam (VERSED) injection 2 mg (2 mg Intravenous Given 10/8/22 1327)   midazolam (VERSED) injection 2 mg (2 mg Intravenous Given 10/8/22 1533)   iopamidol (ISOVUE-370) solution 75 mL (75 mLs Intravenous Given 10/8/22 1605)       LAB  Labs Ordered and Resulted from Time of ED Arrival to Time of ED Departure   BASIC METABOLIC PANEL - Abnormal       Result Value    Sodium 142      Potassium 3.5      Chloride 100      Carbon Dioxide (CO2) 29      Anion Gap 13      Urea Nitrogen 46.2 (*)     Creatinine 0.92      Calcium 7.6 (*)     Glucose 153 (*)     GFR Estimate 67     TROPONIN T, HIGH SENSITIVITY - Abnormal    Troponin T, High Sensitivity 75 (*)    NT PROBNP INPATIENT - Abnormal    N terminal Pro BNP Inpatient 12,629 (*)    HEPATIC  FUNCTION PANEL - Abnormal    Protein Total 5.1 (*)     Albumin 2.6 (*)     Bilirubin Total 1.1      Alkaline Phosphatase 90       (*)      (*)     Bilirubin Direct 0.80 (*)    INR - Abnormal    INR 1.41 (*)    CBC WITH PLATELETS AND DIFFERENTIAL - Abnormal    WBC Count 14.2 (*)     RBC Count 5.37 (*)     Hemoglobin 16.9 (*)     Hematocrit 51.8 (*)     MCV 97      MCH 31.5      MCHC 32.6      RDW 13.9      Platelet Count 297      % Neutrophils 87      % Lymphocytes 3      % Monocytes 9      % Eosinophils 0      % Basophils 0      % Immature Granulocytes 1      NRBCs per 100 WBC 0      Absolute Neutrophils 12.4 (*)     Absolute Lymphocytes 0.5 (*)     Absolute Monocytes 1.3      Absolute Eosinophils 0.0      Absolute Basophils 0.0      Absolute Immature Granulocytes 0.1      Absolute NRBCs 0.0     LACTIC ACID WHOLE BLOOD - Abnormal    Lactic Acid 3.2 (*)    MAGNESIUM - Normal    Magnesium 1.8     PARTIAL THROMBOPLASTIN TIME - Normal    aPTT 23     BLOOD GAS ARTERIAL   INFLUENZA A/B & SARS-COV2 PCR MULTIPLEX   TROPONIN T, HIGH SENSITIVITY   BLOOD CULTURE   BLOOD CULTURE       EKG  10/8/2022, 15:11; sinus bradycardia with rate of 52 bpm; short MA; deep T wave inversion lead V3 and biphasic T waves in leads V3-V4; no ST-T wave elevation    EKG independently reviewed and interpreted by Helena Willis MD    10/8/2022, 16:03; sinus bradycardia with rate of 39 bpm; short MA interval; deep T wave inversion lead V3 with biphasic T waves leads V3-V4 and inferior leads, no ST-T wave elevaion    EKG independently reviewed and interpreted by Helena Willis MD      RADIOLOGY    CT Head w/o Contrast   Final Result   IMPRESSION:   1.  No acute intracranial process.      XR Chest Port 1 View   Final Result   IMPRESSION: New right IJ central venous catheter with tip in the mid SVC. No pneumothorax. Endotracheal and nasogastric tubes are unchanged. Lungs hyperinflated. Mild infiltrate left lung base.         XR Chest Port  1 View   Final Result   IMPRESSION: ETT is 6.2 cm from the izabela. Enteric tube enters the stomach and out of the field-of-view. The lungs are hyperinflated. There may be trace pleural effusions versus pleural thickening. Patchy bibasilar reticular opacities likely represent    atelectasis and scarring. Superimposed infection is also in the differential. Heart size is normal. No acute osseous findings.      CT Chest Pulmonary Embolism w Contrast    (Results Pending)   Echocardiogram Complete    (Results Pending)       DISCHARGE MEDS  New Prescriptions    No medications on file     I, Johana Rouse, am serving as a scribe to document services personally performed by Dr. Willis, based on my observations and the provider's statements to me. I, Dr. Helena Willis MD attest that Johana Rouse is acting in a scribe capacity, has observed my performance of the services and has documented them in accordance with my direction.     Helena Willis M.D.  [facility] EMERGENCY DEPARTMENT  [address] [place] [id number] [phone]     Helena Willis MD  10/08/22 6910

## 2022-10-08 NOTE — ED NOTES
Writer spoke with son Frantz and daughter Jess on phone. They went through pt's purse and found bottles for lasix 20 mg, ASA 325mg, caffeine pills 200 mg. Also found evidence of current daily smoking. Both children planning to visit today. Frantz: [phone] Jess [phone]

## 2022-10-08 NOTE — ED NOTES
Son Frantz called with a message that on 10/21/21 the patient received a letter from [facility] stating that there was an order placed for an echo. Unclear if that order was ever completed.

## 2022-10-08 NOTE — H&P
[facility]:  CRITICAL CARE HISTORY & PHYSICAL NOTE     Date / Time of Admission:  10/8/2022  9:38 AM    ID: Lavonne Talbot is a 67 year old cachectic female (BMI 12.09 kg/m ), active smoker, with history of lymphedema on furosemide since 2021, metabolic alkalosis, polycythemia, and recent diagnosis of COVID-19 viral infection 1 week ago who presented to [facility] ED on 10/8/2022 due to respiratory distress now admitted to the ICU with acute respiratory failure requiring mechanical ventilation in the setting of possible community-acquired pneumonia or aspiration pneumonia with mucous plugging, small subsegmental pulmonary embolism, circulatory shock on vasopressors, and possible myocardial injury.         Changes for today: 1.   Admitted to the ICU for continued management..  2. Check ABG, modify vent settings.  3. Heparin gtt for pulmonary embolism care  4. Check ET aspirate.  Continue Zosyn IV for now.  5. Start dexamethasone IV for COVID19 infection, no Remdesivir given LFTs.        Assessment/Plan:   Neurologic: Acute metabolic encephalopathy.  In the setting of respiratory failure, suspected CO2 retention and metabolic derangements.  Agitation/delirium.  Son reports patient does have challenges with restlessness.    Propofol, dexmedetomidine, fentanyl gtt as needed for sedation.  RASS goal -1/-2.    Pain meds: PRNs     Pulmonary: Acute respiratory failure with hypoxia.  CT scan not typical of COVID-19 pneumonitis.  Has LLL consolidation and mucous plugging, suspect possible community-acquired pneumonia or aspiration pneumonia.  Possible also complications of emphysema/OVD exacerbation.  Not using inhalers at baseline.  Community-acquired pneumonia vs aspiration pneumonia.  CT scan not typical of COVID-19 pneumonitis.  Given left lower lobe consolidation, concern for community-acquired pneumonia.  In the setting of areas of mucous plugging, may also have aspiration pneumonia.  Severe  emphysema.  Centrilobular emphysema noted on CT scan.  Does not use inhalers at baseline.    Wean supplemental O2 as tolerated; goal O2 sat > 92%.  HOB > 30 degrees to limit aspiration risk.      Vent: CMV, RR 22,  mL (height pending), PEEP 5, FiO2 100%.     Check ABG, verify height. Adjust TV pending settings and IBW.     Given concern for possible ongoing mycocardial injury, will hold off on JENSEN/LABA therapy    Receiving dexmedetomidine for COVID 19 diagnosis, will consider switching to Solu-Medrol IV if develops severe COPD exacerbation.    Initiate atrovent nebs Q6H.      Check ET aspirate.  Abx as below.     Heparin infusion for pulmonary embolism care.    Encourage tobacco cessation.       Cardiovascular: Circulatory shock.  Hypotension likely vasoplegia induced with therapeutic sedation. No evidence of volume overload to suggest decompensated CHF, although NTproBNP and troponin T both elevated.  Per notes in ED (primary team, Cardiology) concern for hypovolemia, given IV fluids. Possible mycocardial injury.  NTproBNP 12,629. Troponin T 75, 52. Multiple attempts at echocardiogram made, due to combative nature unable to get images.     Cardiac monitoring.  SBP >= 90 mmHg, MAP >= 65 mmHg.  EKG PRN.    Norepinephrine gtt as needed for BP goals.    Follow-up echocardiogram.  Repeat lactic acid.    Heparin infusion for possible MI.    Appreciate cardiology recommendations.     GI/: Abnormal LFTs, transaminitis.  May be perfusion related, will trend.  No concern for biliary involvement, bilirubin and alkaline phosphatase are normal.  Hypoalbuminemia with severe malnutrition, cachexia.  BMI 12.09 kg/m .    Follow-up LFTs in the morning.    Nutrition: N.p.o. for now.  Plan for enteral feeding tomorrow if unable to liberate from ventilator.     Last BM: None.  Meds: None.    GI prophylaxis: PPI daily.     Renal: Elevated BUN.  Lactic acidosis.      Monitor I/O's.  Electrolyte repletion PRN.  Avoid/limit  "nephrotoxic agents.    IVFs: Saline lock.    We will give intermittent IV fluid boluses pending clinical status.     Heme/Onc: Leukocytosis, unspecified.  Polycythemia..  Coagulopathy    Check D-dimer.    DVT prophylaxis: Heparin infusion.     Endocrine: Hyperglycemia.  Likely stress-induced.  No history of diabetes.    FSBG Q4H, Aspart insulin SS (high resistance).  Hypoglycemia protocol.    Check hemoglobin A1c     Infectious diseases: COVID-19 viral infection.  Patient tested +10 days ago.  Pneumonia, organism unspecified.  Concern for community-acquired pneumonia versus aspiration event.    Check ET aspirate, viral panel.    Initiate dexamethasone 6mg IV daily x 10 day (start 10/8)    Initiate Zosyn IV empiric antibiotics for pneumonia.    Rheum/Musculoskeletal:     Activity:  Bedrest    Risk Factors Present on Admission:  Clinically Significant Risk Factors Present on Admission             # Hypoalbuminemia: Albumin = 2.6 g/dL (Ref range: 3.5 - 5.2 g/dL) on admission, will monitor as appropriate   # Coagulation Defect: INR = 1.41 (Ref range: 0.85 - 1.15) and/or PTT = 23 Seconds (Ref range: 22 - 38 Seconds) on admission, will monitor for bleeding    # Circulatory Shock: currently requiring pressors for blood pressure support   # Cachexia: Estimated body mass index is 12.09 kg/m  as calculated from the following:    Height as of this encounter: 1.702 m (5' 7\").    Weight as of this encounter: 35 kg (77 lb 2.6 oz).          Lines/Drains/Tubes:  7.5 mm endotracheal tube, placed 10/8  RIJ TLC central line, placed 10/8     Code Status:  Full Code.  Discussed with son, he is the primary contact.     The patient and/or the family was educated about the above plan of care and indicated understanding.  Updated the patient's son, Frantz Talbot, #[phone].     This patient is considered critically ill and requires ICU level of care due to acute respiratory failure requiring mechanical ventilation, circulatory shock on " vasopressors.     Total Critical Care time, not including separate billable procedure time: 60 minutes.    Monica Mae MD, MPH  Pulmonary/Critical Care Medicine  10/08/2022   6:32 PM         ICU DAILY CHECKLIST:   ICU DAILY CHECKLIST           Can patient transfer out of MICU? no    FAST HUG:    Feeding:  no.  Patient is receiving NPO    [place]: no  Analgesia/Sedation: yes; Propofol, Dexmedetomidine and Fentanyl   Thromboembolic prophylaxis: yes; Mode:  Heparin  HOB>30:  yes  Stress Ulcer Protocol Active: yes; Mode: PPI  Glycemic Control: Any glucose > 180 no; Mode of Insulin Therapy: Sliding Scale Insulin    INTUBATED:  Can patient have daily waking:  Yes  Can patient have spontaneous breathing trial:  Yes    Restraints? yes    PHYSICAL THERAPY AND MOBILITY:  Can patient have PT and mobility trial: no  Activity: Bedrest     Chief Complaint Chief Complaint   Patient presents with     Respiratory Distress             HPI:   Lavonne Talbot is a 67 year old cachectic female (BMI 12.09 kg/m ), active smoker, with history of lymphedema on furosemide since 2021, metabolic alkalosis, polycythemia, and recent diagnosis of COVID-19 viral infection 1 week ago who presented to [facility] ED on 10/8/2022 due to respiratory distress now admitted to the ICU with acute respiratory failure requiring mechanical ventilation in the setting of possible community-acquired pneumonia or aspiration pneumonia with mucous plugging, small subsegmental pulmonary embolism, circulatory shock on vasopressors, and possible myocardial injury.  History is provided by ED physician (Dr. Claudio) and review of medical records.    In brief, the patient was diagnosed with COVID about 10 days ago. Her  was diagnosed with COVID19 prior to this, hospitalized 10 days ago for a stroke.  This was the first time she was living alone, has been stressed with 's hospitalization.  She has had limited mobility and is a heavy smoker.  Last  "night, son spoke with mother, said she wasn't feeling well, and didn't really want to speak with him (at baseline, she is quite talkative). On 10/8/2022, EMS called to the home (by the patient) for shortness of breath and hypoxia.  EMS attempted CPAP, with limited improvement and intubated following ED arrival.  Initial vitals: BP 84/54, , RR 22, O2 sat 59%.  Exam demonstrated patient ill-appearing, in acute distress, frail, unresponsive upon ED arrival, diminished breath sounds bilaterally with rhonchi present.  Labs with serum CO2 29, albumin 2.6, , AST was 293, lactic acid 3.2, NT proBNP 12,629, troponin T: 75, then 52; WBC 14.2, hemoglobin 16.9, INR 1.41.  Post intubation chest x-ray demonstrated trace pleural effusions versus pleural thickening; patchy by basilar reticular opacities.  Head CT unremarkable.  CTA chest with single subsegmental LLL embolism, left lower lobe consolidation, mucous plugging, severe emphysema, and small amount of thrombus around central line tip.    EKG showing sinus bradycardia, deep T wave inversions.  Cardiology team consulted.  Ultimately, initiated on heparin infusion for cardiac disease and PE.    While in the ED, patient received: NS 2000 mL,  mg rectal, etomidate 10 mg, succinylcholine 80 mg IV, Versed 2 mg IV x4, Zosyn 3.375 g IV x1, vancomycin 750 mg IV.  Also initiated on dexmedetomidine gtt., fentanyl gtt., propofol gtt., and heparin gtt. Transiently required norepinephrine gtt.    Transferred to the ICU for continued care.   Son reports that patient lives at home with . Son uncertain of her clinical issues as \"parents are secretive about difficult\" diseases.         Review of Systems:   Review of Systems:  Review of systems is limited by patient factors - intubation and sedation        Medical/Surgical History:     Past Medical History:   Diagnosis Date     Tobacco abuse       Past Surgical History:   Procedure Laterality Date     NO HISTORY OF " "SURGERY              Allergies/Medications:   Allergies:   No Known Allergies    OUTPATIENT Medications:  (Not in a hospital admission)     INPATIENT Medications: Continuous Infusions:    dexmedetomidine 0.8 mcg/kg/hr (10/08/22 1712)     fentaNYL 25 mcg/hr (10/08/22 1636)     heparin 650 Units/hr (10/08/22 1641)     norepinephrine Stopped (10/08/22 1333)     propofol 10 mcg/kg/min (10/08/22 1739)     INPATIENT Medications: Scheduled Medications:  -    Family History:     Social History:   Reviewed:    Parents: Father was diabetic.     Siblings: 3 sisters, only 1 still living. One sister with severe depression, stabbed herself to death.    Children: 2 kids, son with diabetes.    Other: None.  Family History   Problem Relation Age of Onset     Diabetes Mother     Reviewed:    Tobacco: Heavy smoker, uncertain quantity.     Alcohol: Rare use.     Drugs: Son is not aware.     Other:  hospitalized with stroke (leaving hospital today 10/8).   Social History     Socioeconomic History     Marital status:      Spouse name: Not on file     Number of children: Not on file     Years of education: Not on file     Highest education level: Not on file   Occupational History     Not on file   Tobacco Use     Smoking status: Current Every Day Smoker     Types: Cigarettes     Smokeless tobacco: Not on file   Substance and Sexual Activity     Alcohol use: Not on file     Drug use: Not on file     Sexual activity: Not on file   Other Topics Concern     Not on file   Social History Narrative     Not on file     Social Determinants of Health     Financial Resource Strain: Not on file   Food Insecurity: Not on file   Transportation Needs: Not on file   Physical Activity: Not on file   Stress: Not on file   Social Connections: Not on file   Intimate Partner Violence: Not on file   Housing Stability: Not on file              Objective:   Vitals:  /81   Pulse (!) 45   Resp 22   Ht 1.702 m (5' 7\")   Wt 35 kg (77 lb 2.6 " oz)   SpO2 100%   BMI 12.09 kg/m    Vent: Vent Mode: CMV/AC  (Continuous Mandatory Ventilation/ Assist Control)  Resp Rate (Set): 22 breaths/min  Tidal Volume (Set, mL): 400 mL  PEEP (cm H2O): 5 cmH2O  Resp: 22    GEN: Intubated, sedated.   HEENT: Normocephalic, atraumatic.  Extraoccular eye movements intact, anicteric sclera. Moist mucous membranes. Endotracheal tube.  NECK: Supple.  RIJ TLC.   PULM: On mechanical ventilation. No use of accessory muscles.  Coarse breath sounds bilateral.  Barrel-chested.  CVS: Regular rate and rhythm.  Normal S1, S2.  No rubs, murmurs, or gallops.    ABDOMEN: Normoactive bowel sounds.  Non-tender to palpation.  Non-distended.    EXTREMITES:  Mild clubbing. Trace pitting edema at ankles.  No cyanosis.    NEURO:  Intubated, sedated     Intake/Output: No intake/output data recorded.        Pertinent Studies:   All laboratory data reviewed  Serum Glucose range:   Recent Labs   Lab 10/08/22  1132   *     ABG: No lab results found in last 7 days.  CBC:   Recent Labs   Lab 10/08/22  1132   WBC 14.2*   HGB 16.9*   HCT 51.8*   MCV 97      NEUTROPHIL 87   LYMPH 3   MONOCYTE 9   EOSINOPHIL 0     Chemistry:   Recent Labs   Lab 10/08/22  1132      POTASSIUM 3.5   CHLORIDE 100   CO2 29   BUN 46.2*   CR 0.92   GFRESTIMATED 67   JASON 7.6*   MAG 1.8   PROTTOTAL 5.1*   ALBUMIN 2.6*   *   *   ALKPHOS 90   BILITOTAL 1.1     Coags:  Recent Labs   Lab 10/08/22  1132   INR 1.41*   PTT 23     Cardiac Markers:  No results for input(s): CKTOTAL, TROPONINI in the last 168 hours.     Microbiology:  Blood cultures x2, 10/8: Collected          Cardiology/Radiology:   Cardiac: All cardiac studies reviewed by me.    EKG: Reviewed    TTE: Pending study    Radiology:  All imaging studies reviewed by me.    Chest X-Ray, 10/8/22:  New right IJ central venous catheter with tip in the mid SVC. No pneumothorax. Endotracheal and nasogastric tubes are unchanged. Lungs hyperinflated. Mild  infiltrate left lung base.    CTA Chest PE study, 10/8/22:  FINDINGS: Mild motion artifact. ANGIOGRAM CHEST: A single acute segmental pulmonary embolus in the left lower lobe, image 190:5.  Thoracic aorta is not well opacified and is indeterminate for dissection. No CT evidence of right heart strain.  LUNGS AND PLEURA: The endotracheal tube is 5.6 cm superior to the izabela. Severe emphysema. Mild diffuse bronchial wall thickening. Multiple foci of mucus plugging in the lower lobes, lingula and right middle lobe. Small consolidations in the left lower lobe. No pleural effusion.  MEDIASTINUM/AXILLAE: A right internal jugular central venous catheter with the tip in the superior vena cava. A small amount of thrombus around the tip of the catheter. No lymphadenopathy. Heterogeneous thyroid.  CORONARY ARTERY CALCIFICATION: Mild. UPPER ABDOMEN: Normal. MUSCULOSKELETAL: Mild degenerative changes of the spine. Osseous demineralization. A mild remote superior endplate compression fracture of L1.  IMPRESSION: 1.  A single segmental left lower lobe pulmonary embolus. No evidence of right heart strain. 2.  Small left lower lobe consolidations which either represent pneumonia or aspiration. 3.  Multiple foci of mucus plugging. 4.  Severe emphysema. 5.  Right internal jugular central venous catheter with a small amount of thrombus around the tip.    Head CT without contrast, 10/8/2022:   FINDINGS: INTRACRANIAL CONTENTS: No intracranial hemorrhage, extraaxial collection, or mass effect.  No CT evidence of acute infarct. Normal parenchymal attenuation. Normal ventricles and sulci.   VISUALIZED ORBITS/SINUSES/MASTOIDS: No intraorbital abnormality. No paranasal sinus mucosal disease. No middle ear or mastoid effusion.  BONES/SOFT TISSUES: No acute abnormality.  IMPRESSION: 1.  No acute intracranial process.

## 2022-10-08 NOTE — PHARMACY-VANCOMYCIN DOSING SERVICE
Pharmacy Vancomycin Initial Note  Date of Service 2022  Patient's  1955  67 year old, female    Indication: Sepsis    Current estimated CrCl = Estimated Creatinine Clearance: 32.3 mL/min (based on SCr of 0.92 mg/dL).    Creatinine for last 3 days  10/8/2022: 11:32 AM Creatinine 0.92 mg/dL    Recent Vancomycin Level(s) for last 3 days  No results found for requested labs within last 72 hours.      Vancomycin IV Administrations (past 72 hours)      No vancomycin orders with administrations in past 72 hours.                Nephrotoxins and other renal medications (From now, onward)    Start     Dose/Rate Route Frequency Ordered Stop    10/08/22 1230  piperacillin-tazobactam (ZOSYN) 3.375 g vial to attach to  mL bag         3.375 g  over 30 Minutes Intravenous ONCE 10/08/22 1207      10/08/22 1230  vancomycin (VANCOCIN) 750 mg in 0.9% NaCl 250 mL intermittent infusion         750 mg  over 60 Minutes Intravenous ONCE 10/08/22 1218      10/08/22 1000  norepinephrine (LEVOPHED) 4 mg in  mL infusion PREMIX         0.01-0.6 mcg/kg/min × 35 kg  1.3-78.8 mL/hr  Intravenous CONTINUOUS 10/08/22 0956            Contrast Orders - past 72 hours (72h ago, onward)    None                    Plan:  1. Give vancomycin  750 mg IV once in ER  2. Please consult pharmacy to dose Vancomycin for further dosing    Sammie Wilkerson RPH

## 2022-10-08 NOTE — ED NOTES
Writer asked to give IV versed to patient.  Entered room and patient awake but tolerating ET tube.  Patient with spontaneous eye opening and able to use hands to communicate.  Patient states she is hungry and wants to know if her 2 kids have been called.  Advised patient that she cannot eat at this time due to having tube in.  She shook her head as acknowledgement.  Patient informed that her children were updated on plan of care but unable to visit due to COVID restrictions.  Patient seemed upset but again shook her head as acknowledgement.    Patient on Precedex and Levophed gtt-- BP improved but patient not sedated at this time.  Dr. Claudio made aware- verbal order to turn off Levophed gtt and will reassess sedation options.  Awaiting new orders- primary RN made aware.

## 2022-10-08 NOTE — PROGRESS NOTES
Patient transported to [facility] on transport vent without problems. Placed back to vent.  Vent Mode: CMV/AC  (Continuous Mandatory Ventilation/ Assist Control)  Resp Rate (Set): 22 breaths/min  Tidal Volume (Set, mL): 400 mL  PEEP (cm H2O): 5 cmH2O  Resp: 22    Toro Menchaca, RT

## 2022-10-09 ENCOUNTER — APPOINTMENT (OUTPATIENT)
Dept: RADIOLOGY | Facility: HOSPITAL | Age: 67
DRG: 207 | End: 2022-10-09
Attending: INTERNAL MEDICINE
Payer: COMMERCIAL

## 2022-10-09 ENCOUNTER — APPOINTMENT (OUTPATIENT)
Dept: CARDIOLOGY | Facility: HOSPITAL | Age: 67
DRG: 207 | End: 2022-10-09
Attending: INTERNAL MEDICINE
Payer: COMMERCIAL

## 2022-10-09 LAB
ANION GAP SERPL CALCULATED.3IONS-SCNC: 10 MMOL/L (ref 7–15)
BASE EXCESS BLDA CALC-SCNC: 2.7 MMOL/L
BUN SERPL-MCNC: 42.1 MG/DL (ref 8–23)
CALCIUM SERPL-MCNC: 7.6 MG/DL (ref 8.8–10.2)
CHLORIDE SERPL-SCNC: 108 MMOL/L (ref 98–107)
COHGB MFR BLD: 91.6 % (ref 95–96)
CREAT SERPL-MCNC: 0.77 MG/DL (ref 0.51–0.95)
DEPRECATED HCO3 PLAS-SCNC: 26 MMOL/L (ref 22–29)
ERYTHROCYTE [DISTWIDTH] IN BLOOD BY AUTOMATED COUNT: 14.2 % (ref 10–15)
GFR SERPL CREATININE-BSD FRML MDRD: 84 ML/MIN/1.73M2
GLUCOSE BLDC GLUCOMTR-MCNC: 104 MG/DL (ref 70–99)
GLUCOSE BLDC GLUCOMTR-MCNC: 115 MG/DL (ref 70–99)
GLUCOSE BLDC GLUCOMTR-MCNC: 117 MG/DL (ref 70–99)
GLUCOSE BLDC GLUCOMTR-MCNC: 119 MG/DL (ref 70–99)
GLUCOSE BLDC GLUCOMTR-MCNC: 127 MG/DL (ref 70–99)
GLUCOSE BLDC GLUCOMTR-MCNC: 131 MG/DL (ref 70–99)
GLUCOSE BLDC GLUCOMTR-MCNC: 148 MG/DL (ref 70–99)
GLUCOSE SERPL-MCNC: 125 MG/DL (ref 70–99)
HCO3 BLD-SCNC: 26 MMOL/L (ref 23–29)
HCT VFR BLD AUTO: 49.8 % (ref 35–47)
HGB BLD-MCNC: 16.6 G/DL (ref 11.7–15.7)
LVEF ECHO: NORMAL
MAGNESIUM SERPL-MCNC: 1 MG/DL (ref 1.7–2.3)
MCH RBC QN AUTO: 31.4 PG (ref 26.5–33)
MCHC RBC AUTO-ENTMCNC: 33.3 G/DL (ref 31.5–36.5)
MCV RBC AUTO: 94 FL (ref 78–100)
O2/TOTAL GAS SETTING VFR VENT: 40 %
OXYHGB MFR BLD: 90.5 % (ref 95–96)
PCO2 BLD: 43 MM HG (ref 35–45)
PEEP: 5 CM H2O
PH BLD: 7.41 [PH] (ref 7.37–7.44)
PLATELET # BLD AUTO: 282 10E3/UL (ref 150–450)
PO2 BLD: 63 MM HG (ref 75–85)
POTASSIUM SERPL-SCNC: 3.3 MMOL/L (ref 3.4–5.3)
POTASSIUM SERPL-SCNC: 4.1 MMOL/L (ref 3.4–5.3)
RATE: 20 RR/MIN
RBC # BLD AUTO: 5.29 10E6/UL (ref 3.8–5.2)
SODIUM SERPL-SCNC: 144 MMOL/L (ref 136–145)
TEMPERATURE: 37 DEGREES C
TROPONIN T SERPL HS-MCNC: 29 NG/L
UFH PPP CHRO-ACNC: 0.18 IU/ML
UFH PPP CHRO-ACNC: 0.31 IU/ML
UFH PPP CHRO-ACNC: 0.62 IU/ML
UFH PPP CHRO-ACNC: <0.1 IU/ML
VENTILATION MODE: ABNORMAL
VENTILATOR TIDAL VOLUME: 400 ML
WBC # BLD AUTO: 13.2 10E3/UL (ref 4–11)

## 2022-10-09 PROCEDURE — 85027 COMPLETE CBC AUTOMATED: CPT | Performed by: INTERNAL MEDICINE

## 2022-10-09 PROCEDURE — 258N000003 HC RX IP 258 OP 636: Performed by: INTERNAL MEDICINE

## 2022-10-09 PROCEDURE — 84484 ASSAY OF TROPONIN QUANT: CPT | Performed by: INTERNAL MEDICINE

## 2022-10-09 PROCEDURE — 80048 BASIC METABOLIC PNL TOTAL CA: CPT | Performed by: INTERNAL MEDICINE

## 2022-10-09 PROCEDURE — 94640 AIRWAY INHALATION TREATMENT: CPT | Mod: 76

## 2022-10-09 PROCEDURE — 250N000013 HC RX MED GY IP 250 OP 250 PS 637: Performed by: INTERNAL MEDICINE

## 2022-10-09 PROCEDURE — 255N000002 HC RX 255 OP 636: Performed by: INTERNAL MEDICINE

## 2022-10-09 PROCEDURE — 82805 BLOOD GASES W/O2 SATURATION: CPT | Performed by: INTERNAL MEDICINE

## 2022-10-09 PROCEDURE — 250N000011 HC RX IP 250 OP 636: Performed by: INTERNAL MEDICINE

## 2022-10-09 PROCEDURE — 99291 CRITICAL CARE FIRST HOUR: CPT | Mod: 25 | Performed by: GENERAL ACUTE CARE HOSPITAL

## 2022-10-09 PROCEDURE — 999N000157 HC STATISTIC RCP TIME EA 10 MIN

## 2022-10-09 PROCEDURE — 94003 VENT MGMT INPAT SUBQ DAY: CPT

## 2022-10-09 PROCEDURE — 99291 CRITICAL CARE FIRST HOUR: CPT | Performed by: INTERNAL MEDICINE

## 2022-10-09 PROCEDURE — 85014 HEMATOCRIT: CPT | Performed by: INTERNAL MEDICINE

## 2022-10-09 PROCEDURE — 94640 AIRWAY INHALATION TREATMENT: CPT

## 2022-10-09 PROCEDURE — 93306 TTE W/DOPPLER COMPLETE: CPT | Mod: 26 | Performed by: GENERAL ACUTE CARE HOSPITAL

## 2022-10-09 PROCEDURE — 85520 HEPARIN ASSAY: CPT | Performed by: INTERNAL MEDICINE

## 2022-10-09 PROCEDURE — 93005 ELECTROCARDIOGRAM TRACING: CPT

## 2022-10-09 PROCEDURE — 999N000208 ECHOCARDIOGRAM COMPLETE

## 2022-10-09 PROCEDURE — 71045 X-RAY EXAM CHEST 1 VIEW: CPT | Mod: 77

## 2022-10-09 PROCEDURE — C9113 INJ PANTOPRAZOLE SODIUM, VIA: HCPCS | Performed by: INTERNAL MEDICINE

## 2022-10-09 PROCEDURE — 83735 ASSAY OF MAGNESIUM: CPT | Performed by: INTERNAL MEDICINE

## 2022-10-09 PROCEDURE — 71045 X-RAY EXAM CHEST 1 VIEW: CPT

## 2022-10-09 PROCEDURE — 250N000009 HC RX 250: Performed by: INTERNAL MEDICINE

## 2022-10-09 PROCEDURE — 93010 ELECTROCARDIOGRAM REPORT: CPT | Performed by: INTERNAL MEDICINE

## 2022-10-09 PROCEDURE — 250N000012 HC RX MED GY IP 250 OP 636 PS 637: Performed by: INTERNAL MEDICINE

## 2022-10-09 PROCEDURE — 200N000001 HC R&B ICU

## 2022-10-09 PROCEDURE — 93005 ELECTROCARDIOGRAM TRACING: CPT | Performed by: INTERNAL MEDICINE

## 2022-10-09 PROCEDURE — 84132 ASSAY OF SERUM POTASSIUM: CPT | Performed by: INTERNAL MEDICINE

## 2022-10-09 RX ORDER — DEXTROSE MONOHYDRATE 100 MG/ML
INJECTION, SOLUTION INTRAVENOUS CONTINUOUS PRN
Status: DISCONTINUED | OUTPATIENT
Start: 2022-10-09 | End: 2022-11-11 | Stop reason: HOSPADM

## 2022-10-09 RX ORDER — MAGNESIUM SULFATE 4 G/50ML
4 INJECTION INTRAVENOUS ONCE
Status: COMPLETED | OUTPATIENT
Start: 2022-10-09 | End: 2022-10-09

## 2022-10-09 RX ORDER — POTASSIUM CHLORIDE 7.45 MG/ML
10 INJECTION INTRAVENOUS
Status: COMPLETED | OUTPATIENT
Start: 2022-10-09 | End: 2022-10-09

## 2022-10-09 RX ORDER — IPRATROPIUM BROMIDE AND ALBUTEROL SULFATE 2.5; .5 MG/3ML; MG/3ML
3 SOLUTION RESPIRATORY (INHALATION)
Status: DISCONTINUED | OUTPATIENT
Start: 2022-10-09 | End: 2022-11-11 | Stop reason: HOSPADM

## 2022-10-09 RX ADMIN — IPRATROPIUM BROMIDE AND ALBUTEROL SULFATE 3 ML: 2.5; .5 SOLUTION RESPIRATORY (INHALATION) at 15:55

## 2022-10-09 RX ADMIN — POTASSIUM CHLORIDE 10 MEQ: 7.46 INJECTION, SOLUTION INTRAVENOUS at 10:57

## 2022-10-09 RX ADMIN — Medication 10 MG: at 10:19

## 2022-10-09 RX ADMIN — HEPARIN SODIUM AND DEXTROSE 1200 UNITS/HR: 10000; 5 INJECTION INTRAVENOUS at 15:40

## 2022-10-09 RX ADMIN — PIPERACILLIN AND TAZOBACTAM 3.38 G: 3; .375 INJECTION, POWDER, LYOPHILIZED, FOR SOLUTION INTRAVENOUS at 04:00

## 2022-10-09 RX ADMIN — IPRATROPIUM BROMIDE AND ALBUTEROL SULFATE 3 ML: 2.5; .5 SOLUTION RESPIRATORY (INHALATION) at 20:26

## 2022-10-09 RX ADMIN — Medication 50 MCG: at 10:15

## 2022-10-09 RX ADMIN — THIAMINE HCL TAB 100 MG 100 MG: 100 TAB at 15:40

## 2022-10-09 RX ADMIN — CHLORHEXIDINE GLUCONATE 15 ML: 1.2 SOLUTION ORAL at 20:02

## 2022-10-09 RX ADMIN — MAGNESIUM SULFATE HEPTAHYDRATE 4 G: 80 INJECTION, SOLUTION INTRAVENOUS at 19:53

## 2022-10-09 RX ADMIN — VASOPRESSIN 2.4 UNITS/HR: 20 INJECTION INTRAVENOUS at 19:34

## 2022-10-09 RX ADMIN — IPRATROPIUM BROMIDE 0.5 MG: 0.5 SOLUTION RESPIRATORY (INHALATION) at 07:53

## 2022-10-09 RX ADMIN — PERFLUTREN 3 ML: 6.52 INJECTION, SUSPENSION INTRAVENOUS at 09:32

## 2022-10-09 RX ADMIN — IPRATROPIUM BROMIDE 0.5 MG: 0.5 SOLUTION RESPIRATORY (INHALATION) at 01:34

## 2022-10-09 RX ADMIN — PROPOFOL 30 MCG/KG/MIN: 10 INJECTION, EMULSION INTRAVENOUS at 19:03

## 2022-10-09 RX ADMIN — POTASSIUM CHLORIDE 10 MEQ: 7.46 INJECTION, SOLUTION INTRAVENOUS at 09:53

## 2022-10-09 RX ADMIN — PIPERACILLIN AND TAZOBACTAM 3.38 G: 3; .375 INJECTION, POWDER, LYOPHILIZED, FOR SOLUTION INTRAVENOUS at 12:02

## 2022-10-09 RX ADMIN — PROPOFOL 30 MCG/KG/MIN: 10 INJECTION, EMULSION INTRAVENOUS at 06:43

## 2022-10-09 RX ADMIN — PANTOPRAZOLE SODIUM 40 MG: 40 INJECTION, POWDER, FOR SOLUTION INTRAVENOUS at 06:35

## 2022-10-09 RX ADMIN — INSULIN ASPART 1 UNITS: 100 INJECTION, SOLUTION INTRAVENOUS; SUBCUTANEOUS at 19:52

## 2022-10-09 RX ADMIN — DEXAMETHASONE SODIUM PHOSPHATE 6 MG: 4 INJECTION, SOLUTION INTRA-ARTICULAR; INTRALESIONAL; INTRAMUSCULAR; INTRAVENOUS; SOFT TISSUE at 08:13

## 2022-10-09 RX ADMIN — CHLORHEXIDINE GLUCONATE 15 ML: 1.2 SOLUTION ORAL at 08:13

## 2022-10-09 RX ADMIN — PIPERACILLIN AND TAZOBACTAM 3.38 G: 3; .375 INJECTION, POWDER, LYOPHILIZED, FOR SOLUTION INTRAVENOUS at 20:00

## 2022-10-09 RX ADMIN — Medication 0.03 MCG/KG/MIN: at 01:09

## 2022-10-09 ASSESSMENT — ACTIVITIES OF DAILY LIVING (ADL)
CHANGE_IN_FUNCTIONAL_STATUS_SINCE_ONSET_OF_CURRENT_ILLNESS/INJURY: YES
ADLS_ACUITY_SCORE: 41
DIFFICULTY_EATING/SWALLOWING: NO
TRANSFERRING: 0-->INDEPENDENT
ADLS_ACUITY_SCORE: 30
ADLS_ACUITY_SCORE: 30
TRANSFERRING: 0-->INDEPENDENT
ADLS_ACUITY_SCORE: 41
DOING_ERRANDS_INDEPENDENTLY_DIFFICULTY: YES
ADLS_ACUITY_SCORE: 39
FALL_HISTORY_WITHIN_LAST_SIX_MONTHS: NO
ADLS_ACUITY_SCORE: 41
WALKING_OR_CLIMBING_STAIRS_DIFFICULTY: YES
WALKING_OR_CLIMBING_STAIRS: OTHER (SEE COMMENTS)
ADLS_ACUITY_SCORE: 41
DRESSING/BATHING_DIFFICULTY: NO
ADLS_ACUITY_SCORE: 30
WEAR_GLASSES_OR_BLIND: NO
TOILETING_ISSUES: NO
ADLS_ACUITY_SCORE: 30
ADLS_ACUITY_SCORE: 30
CONCENTRATING,_REMEMBERING_OR_MAKING_DECISIONS_DIFFICULTY: NO
ADLS_ACUITY_SCORE: 30
ADLS_ACUITY_SCORE: 30

## 2022-10-09 NOTE — PLAN OF CARE
Problem: Plan of Care - These are the overarching goals to be used throughout the patient stay.    Goal: Absence of Hospital-Acquired Illness or Injury  Intervention: Prevent Skin Injury  Recent Flowsheet Documentation  Taken 10/9/2022 1400 by Alejandra Earl RN  Body Position:   turned   right  Taken 10/9/2022 1200 by Alejandra Earl RN  Body Position:   turned   supine, legs elevated   supine, head elevated  Taken 10/9/2022 1000 by Alejandra Earl RN  Body Position:   left   turned  Taken 10/9/2022 0800 by Alejandra Earl RN  Body Position:   turned   right     Problem: Plan of Care - These are the overarching goals to be used throughout the patient stay.    Goal: Absence of Hospital-Acquired Illness or Injury  Intervention: Prevent and Manage VTE (Venous Thromboembolism) Risk  Recent Flowsheet Documentation  Taken 10/9/2022 1200 by Alejandra Earl RN  VTE Prevention/Management: SCDs (sequential compression devices) on  Taken 10/9/2022 0800 by Alejandra Earl RN  VTE Prevention/Management: SCDs (sequential compression devices) on     [facility] - ICU    RN Progress Note:            Pertinent Assessments:      Please refer to flowsheet rows for full assessment     Lungs remain very diminished.  50% FIO2 with sats in the mid to upper 90%'s.  Hypothermic this morning and cooling blanket applied but then warmed up quickly to a temp of 100.  [place] off since noon and temps normalizing.          Mobility Level:     1        Key Events - This Shift:     Did have an episode of agitation this morning, titrated drips to get back to goal and patient has been calm throughout the day.  Intermittently following commands such as squeezing hands and wiggling toes.  Opening eyes spontaneously.  Replaced potassium per orders and after bumps within desired range and recheck in AM.  AntiXa out of range at recheck this afternoon, bolused and increased rate.  Recheck ordered for 1945.   Tube feeding to start this afternoon. ECHO completed this morning with EF 30-35%. No plans for breathing trials today.              Plan:     Continue current management per ICU team and cardiology.          Point of Contact Update YES-OR-NO: Yes  If No, reason:   Name:Frantz  Phone Number:see chart   Summary of Conversation: gave update on mom and plan of care for the day.  He is currently moving his father into transitional care following a stroke 10 days ago and very concerned what the discharge plan will be for his mom once we get to that stage.  Will need care management/social work assistance.

## 2022-10-09 NOTE — PROGRESS NOTES
Impression and Plan     Impression:   1. Acute respiratory failure with hypoxemia. This may be due to community-acquired pneumonia and/or COVID-19 pneumonitis superimposed on chronic obstructive pulmonary disease. She does not appear to be in decompensated heart failure.  2. Shock of unclear etiology. This seems to be improving.  3. Segmental left lower lobe pulmonary embolism of unclear chronicity, probably not a major contributor to her acute illness.   4. Cardiomyopathy with left ventricular ejection fraction 30-35%. This may be stress-induced from acute illness. Mild degree of troponin elevation makes myocarditis or an acute coronary syndrome unlikely. She does not appear fluid-overloaded.  5. Mild coronary artery calcification seen on chest CT 10/8/2022.  6. History of extensive smoking.    Plan:    Continue intravenous heparin    If she remains hemodynamically stable and no longer requires vasopressors, we may be able to add a low dose of oral metoprolol    Once extubated, she should have an ischemic evaluation possibly with a stress cardiac MRI which will also allow for further evaluation of her cardiomyopathy    If she has established enteral access and her liver transaminases normalize, I would start rosuvastatin 40 mg daily    Aspirin 81 mg daily if she is able to take oral medication    Primary Cardiologist: none. Can establish with me if needed    Total critical care time 45 minutes    Subjective     - intubated, sedated    Cardiac Diagnostics   Telemetry (personally reviewed): SR, no arrhythmias    ECG 10/8/2022 (personally reviewed and interpreted): sinus bradycardia HR 39 bpm, anterior and inferior T wave inversions    Echocardiogram 10/9/2022 (results reviewed):   1. Left ventricular size and wall thickness are normal. Systolic function is moderately reduced with global hypokinesis. The estimated left ventricular ejection fraction is 30-35%.  2. Right ventricle is not well visualized. Limited  "views suggest possible enlargement and reduced systolic function.  3. No hemodynamically significant valvular abnormalities.  4. No prior study available for comparison.    Physical Examination       /77   Pulse 61   Temp 97.2  F (36.2  C)   Resp 20   Ht 1.702 m (5' 7\")   Wt 43 kg (94 lb 11.2 oz)   SpO2 100%   BMI 14.83 kg/m          Wt Readings from Last 3 Encounters:   10/09/22 43 kg (94 lb 11.2 oz)     Intake/Output Summary (Last 24 hours) at 10/9/2022 1013  Last data filed at 10/9/2022 1000  Gross per 24 hour   Intake 678.62 ml   Output 672 ml   Net 6.62 ml       General: intubated, sedated.  HENT: ET tube in place  Eyes: closed  Neck: No JVD  Lungs: mechanical breath sounds  COR: regular rate and rhythm, No murmurs, rubs, or gallops  Abd: nondistended, BS present  Extrem: No edema         Imaging      CTA chest 10/8/2022 (report reviewed):  1.  A single segmental left lower lobe pulmonary embolus. No evidence of right heart strain.  2.  Small left lower lobe consolidations which either represent pneumonia or aspiration.  3.  Multiple foci of mucus plugging.  4.  Severe emphysema.  5.  Right internal jugular central venous catheter with a small amount of thrombus around the tip.  6.  Mild coronary artery calcification.    Lab Results   Lab Results   Component Value Date     10/09/2022    CO2 26 10/09/2022    BUN 42.1 10/09/2022     Lab Results   Component Value Date    WBC 13.2 10/09/2022    HGB 16.6 10/09/2022    HCT 49.8 10/09/2022    MCV 94 10/09/2022     10/09/2022       Lab Results   Component Value Date    INR 1.41 10/08/2022           Current Inpatient Scheduled Medications   Scheduled Meds:    chlorhexidine  15 mL Mouth/Throat Q12H     dexamethasone  6 mg Intravenous Daily     insulin aspart  1-12 Units Subcutaneous Q4H     ipratropium  0.5 mg Nebulization Q6H     pantoprazole  40 mg Per Feeding Tube QAM AC    Or     pantoprazole  40 mg Intravenous QAM AC     " piperacillin-tazobactam  3.375 g Intravenous Q8H     potassium chloride  10 mEq Intravenous Q1H     Continuous Infusions:    dexmedetomidine 0.3 mcg/kg/hr (10/09/22 1000)     fentaNYL 50 mcg/hr (10/09/22 1000)     heparin 1,050 Units/hr (10/09/22 1000)     propofol 30 mcg/kg/min (10/09/22 1000)    And     - MEDICATION INSTRUCTIONS -       norepinephrine 0.03 mcg/kg/min (10/09/22 1000)            Medications Prior to Admission        Alton Grant MD [facility]  Non-Invasive Cardiologist  [facility]  Pager [phone]

## 2022-10-09 NOTE — PROGRESS NOTES
Occupational Therapy:     10/09/22 0900   Appointment Info   Signing Clinician's Name / Credentials (OT) Caro Frias OTR/L   Appointment Canceled Reason (OT) Medical status  (Pt intubated; not appropriate to for OT eval/tx. Spoke w/pt's nurse & will discontinue current OT order; please re-order if/when pt is appropriate to see. Thank you.)

## 2022-10-09 NOTE — PROGRESS NOTES
Cardiology on-call  Called by nursing secondary to some R on T and PVCs.  Levo dose has been increased.  Heart rates have been in the 80s to 90s.  Chart note reviewed.  Seen by Dr. Grant earlier in the day.  Echocardiogram completed a reported EF of 30 to 35%.  EKGs from yesterday show significant bradycardia and marked T wave changes.  Question stress-induced cardiomyopathy.  We will plan to check an EKG and a stat magnesium.  Creatinine earlier in the day adequate with marked elevation of proBNP and elevated high-sensitivity troponin x2.  Question myocarditis from COVID noted in the notes from Dr. Grant.  Head CT completed yesterday was negative for acute intracranial process by report.  CT PE run demonstrated mild coronary calcification by report.  Positive segmental pulmonary embolism of the left lower lobe was noted.  Significant emphysema was described as well.     ECG reviewed sinus rhythm, QTC increased, anterior lateral ST-T changes with anterior T wave inversion.  When compared to the prior EKG from October 8 heart rate has increased, anterior ST-T changes were noted on that examination as well.  Recheck troponin  Magnesium returned at 1.0 with plans to replace.  Nursing will ask intensivist to place the order for the appropriate mag replacement protocol.Trop trending down.

## 2022-10-09 NOTE — CONSULTS
CLINICAL NUTRITION SERVICES - ASSESSMENT NOTE     Nutrition Prescription    RECOMMENDATIONS FOR MDs/PROVIDERS TO ORDER:  Monitor electrolytes and replace if low    Malnutrition Status:    Severe in acute and chronic.    Recommendations already ordered by Registered Dietitian (RD):  Initiate Promote with fiber at 15 ml/hr increase 10 ml every 12 hrs to goal 45 ml/hr continuous.  FWF 45 ml every 6 hrs.  Promote with Fiber @ goal of  45ml/hr  (1080ml/day)  will provide: 1080 kcals, 67 g PRO, 897 ml free H20, with 180 ml flushes, 149 g CHO, 15 g fiber daily.  Pt is at risk for refeeding syndrome, will order phos and mag check.  Start 100 mg thiamine/day with malnutrition and suspected deficiency.  Future/Additional Recommendations:  TF tolerance     REASON FOR ASSESSMENT  Lavonne Talbot is a/an 67 year old female assessed by the dietitian for Provider Order - Registered Dietitian to Assess and Order TF per Medical Nutrition Therapy Protocol    NUTRITION HISTORY  Pt admitted after COVID diagnosis a week ago.  Hx smoker, lymphedema.  Pt intubated 10/8 for respiratory failure.  RN states that pt's spouse recently discharged from the hospital so patient was alone at home for a time.      CURRENT NUTRITION ORDERS  Diet: NPO  Propofol at current rate provides ~195 calories/day.    LABS  ROUTINE ICU LABS (Last four results)  CMPRecent Labs   Lab 10/09/22  1302 10/09/22  1203 10/09/22  0814 10/09/22  0535 10/09/22  0442 10/08/22  2016 10/08/22  1132   NA  --   --   --  144  --   --  142   POTASSIUM 4.1  --   --  3.3*  --   --  3.5   CHLORIDE  --   --   --  108*  --   --  100   CO2  --   --   --  26  --   --  29   ANIONGAP  --   --   --  10  --   --  13   GLC  --  119* 104* 125* 115*   < > 153*   BUN  --   --   --  42.1*  --   --  46.2*   CR  --   --   --  0.77  --   --  0.92   GFRESTIMATED  --   --   --  84  --   --  67   JASON  --   --   --  7.6*  --   --  7.6*   MAG  --   --   --   --   --   --  1.8   PROTTOTAL  --   --   --    "--   --   --  5.1*   ALBUMIN  --   --   --   --   --   --  2.6*   BILITOTAL  --   --   --   --   --   --  1.1   ALKPHOS  --   --   --   --   --   --  90   AST  --   --   --   --   --   --  290*   ALT  --   --   --   --   --   --  193*    < > = values in this interval not displayed.     CBC  Recent Labs   Lab 10/09/22  0535 10/08/22  1132   WBC 13.2* 14.2*   RBC 5.29* 5.37*   HGB 16.6* 16.9*   HCT 49.8* 51.8*   MCV 94 97   MCH 31.4 31.5   MCHC 33.3 32.6   RDW 14.2 13.9    297     INR  Recent Labs   Lab 10/08/22  1132   INR 1.41*     Arterial Blood Gas  Recent Labs   Lab 10/09/22  0536 10/08/22  2015   PH 7.41 7.39   PCO2 43 43   PO2 63* 404*   HCO3 26  --    O2PER 40 80     Labs reviewed    MEDICATIONS    chlorhexidine  15 mL Mouth/Throat Q12H     dexamethasone  6 mg Intravenous Daily     [START ON 10/10/2022] influenza vac high-dose quad  0.7 mL Intramuscular Prior to discharge     insulin aspart  1-12 Units Subcutaneous Q4H     ipratropium - albuterol 0.5 mg/2.5 mg/3 mL  3 mL Nebulization 4x daily     pantoprazole  40 mg Per Feeding Tube QAM AC    Or     pantoprazole  40 mg Intravenous QAM AC     piperacillin-tazobactam  3.375 g Intravenous Q8H        dexmedetomidine 0.4 mcg/kg/hr (10/09/22 1200)     fentaNYL 100 mcg/hr (10/09/22 1200)     heparin 1,200 Units/hr (10/09/22 1350)     propofol 35 mcg/kg/min (10/09/22 1200)    And     - MEDICATION INSTRUCTIONS -       norepinephrine 0.035 mcg/kg/min (10/09/22 1254)      glucose **OR** dextrose **OR** glucagon, fentaNYL, propofol **AND** propofol **AND** CK total **AND** Triglycerides **AND** - MEDICATION INSTRUCTIONS - **AND** Notify Physician, naloxone **OR** naloxone **OR** naloxone **OR** naloxone   Medications reviewed    ANTHROPOMETRICS  Height: 170.2 cm (5' 7\")  Most Recent Weight: 43 kg (94 lb 11.2 oz)    BMI: Underweight BMI <18.5  Weight History:   Wt Readings from Last 8 Encounters:   10/09/22 43 kg (94 lb 11.2 oz)   131 lb 9/3/21, clinic note " indicates pt with 2+ pitting edema at this wt. 144 lb 8/22/21, clinic note indicates LE edema.. Chart review    Dosing Weight: 43 kg    ASSESSED NUTRITION NEEDS  Estimated Energy Needs: 6319-5983 kcals/day (25 - 30 kcals/kg)  Justification: Critically ill, Underweight and Vented  Estimated Protein Needs: 51-86 grams protein/day (1.2-2)  Justification: critically ill, COVID 19.  Estimated Fluid Needs: 1075+ mL/day (25+)   Justification: Maintenance    PHYSICAL FINDINGS  See malnutrition section below.  Pt intubated.  Pt has OG, confirmed by x-ray 10/8. 100 ml out OG x 1 shift (LIS)  Pt in COVID isolation precautions.  RN reports pt very thin, MD notes pt cachectic.    MALNUTRITION:  % Weight Loss:  > 20% in 1 year (severe malnutrition) 28% loss in 13 months.  (some fluid wt loss with LE edema. Previously)   % Intake:  </= 50% for >/= 5 days (severe malnutrition) suspect decreased intake with COVID x 1 week, maybe decreased longer.  Subcutaneous Fat Loss:  Pt in COVID isolation precautions though MD documents pt cachectic  Muscle Loss:  Pt in isolation precautions though MD documents pt Cachectic  Fluid Retention:  None noted per chart    Malnutrition Diagnosis: Severe malnutrition  In Context of:  Acute illness or injury  Chronic illness or disease    NUTRITION DIAGNOSIS  Swallowing difficulty related to acute illness as evidenced by intubation    INTERVENTIONS  Implementation  Nutrition Education: Not appropriate at this time due to patient condition   Enteral Nutrition - Initiate Promote with fiber at 15 ml/hr increase 10 ml every 12 hrs to goal 45 ml/hr continuous.  FWF 45 ml every 6 hrs.  Promote with Fiber @ goal of  45ml/hr  (1080ml/day)  will provide: 1080 kcals, 67 g PRO, 897 ml free H20, with 180 ml flushes, 149 g CHO, 15 g fiber daily.  Gradual increase to goal d/t malnutrition and refeeding risk.    Pt is at risk for refeeding syndrome, will order phos and mag check.  Start 100 mg thiamine/day with  malnutrition and suspected deficiency.    Goals  Tolerate tube feeding  Meet nutrition needs  Electrolytes WNL     Monitoring/Evaluation  Progress toward goals will be monitored and evaluated per protocol.

## 2022-10-09 NOTE — PROGRESS NOTES
10/09/22 0900   Appointment Info   Signing Clinician's Name / Credentials (PT) Raul Danielle, PT   Appointment Canceled Reason (PT) Medical status   Appointment Cancel Comments (PT) Per OT and RN, pt intubated and not medically appropriate. Please re-order PT when pt is able to participate.

## 2022-10-09 NOTE — PROGRESS NOTES
RT PROGRESS NOTE    VENT DAY# 2    CURRENT SETTINGS:   Vent Mode: CMV/AC  (Continuous Mandatory Ventilation/ Assist Control)  FiO2 (%): 40 %  Resp Rate (Set): 20 breaths/min  Tidal Volume (Set, mL): 400 mL  PEEP (cm H2O): 5 cmH2O  Resp: 20      PATIENT PARAMETERS:  PIP 24  Pplat:  14  Pmean:  9.2  Secretions:  Scant to small yellow thin  02 Sats:  95%  BS: Diminished    ETT SIZE 7.5 Secured at 22 cm at teeth/gums    Respiratory Medications: Atrovent Q6      Latest Reference Range & Units 10/09/22 05:36   pH Arterial 7.37 - 7.44  7.41   pCO2 Arterial 35 - 45 mm Hg 43   PO2 Arterial 75 - 85 mm Hg 63 (L)   Bicarbonate Arterial 23 - 29 mmol/L 26   Base Excess Art mmol/L 2.7   FIO2  40   Oxyhemoglobin 95.0 - 96.0 % 90.5 (L)       NOTE / SHIFT SUMMARY:   Pt remained on full vent support overnight. Rate decreased from 22 to 20 after 2015 ABG. Fi02 weaned from 100 to 40%. No other ventilator changes were made. Sputum sample sent. Pt stayed synchronous with the vent riding the set rate.     Matty Ronquillo, RRT

## 2022-10-09 NOTE — PROGRESS NOTES
Patient arrived to ICU on full vent support- 100%/400/22/5. Patient was agitated and attempting to grab at IV pole; titrated drips-see MAR and bilateral wrist restraints in place.  Patient now resting comfortably; has been bradycardic as low as 40 bpm.  -3 for RASS.      Report given to oncoming RNLary.

## 2022-10-09 NOTE — PROGRESS NOTES
"Critical Care Progress Note      10/09/2022    Name: Lavonne Talbot MRN#: [id number]   Age: 67 year old YOB: 1955     Hsptl Day# 1  ICU DAY #    MV DAY #             Problem List:   Active Problems:    Acute respiratory failure with hypoxia (H)    Hypotension, unspecified hypotension type    Infection due to 2019 novel coronavirus    Clinically Significant Risk Factors Present on Admission             # Hypoalbuminemia: Albumin = 2.6 g/dL (Ref range: 3.5 - 5.2 g/dL) on admission, will monitor as appropriate   # Coagulation Defect: INR = 1.41 (Ref range: 0.85 - 1.15) and/or PTT = 23 Seconds (Ref range: 22 - 38 Seconds) on admission, will monitor for bleeding    # Circulatory Shock: currently requiring pressors for blood pressure support   # Cachexia: Estimated body mass index is 14.83 kg/m  as calculated from the following:    Height as of this encounter: 1.702 m (5' 7\").    Weight as of this encounter: 43 kg (94 lb 11.2 oz).                       Summary/Hospital Course:     Lavonne Talbot is a 67 year old cachectic female (BMI 12.09 kg/m ), active smoker, with history of lymphedema on furosemide since 2021, metabolic alkalosis, polycythemia, and recent diagnosis of COVID-19 viral infection 1 week ago who presented to [facility] ED on 10/8/2022 due to respiratory distress now admitted to the ICU with acute respiratory failure requiring mechanical ventilation in the setting of possible community-acquired pneumonia or aspiration pneumonia with mucous plugging, small subsegmental pulmonary embolism, circulatory shock on vasopressors.      Assessment and plan :       I have personally reviewed the daily labs, imaging studies, cultures and discussed the case with referring physician and consulting physicians.     My assessment and plan by system for this patient is as follows:    Neurology/Psychiatry:   Sedated with Precedex, fentanyl and propofol    Cardiovascular:   Vasoplegia secondary to " sedation.  Requiring norepinephrine, 0.02 mcg/kg/min  Slightly elevated troponin, echo shows stress-induced cardiomyopathy global dysfunction and EF of 30%  Small PE  Patient continues on heparin drip.  Cardiology following.      Pulmonary/Ventilator Management:   Acute respiratory failure in a patient with severe emphysema  Concern for community-acquired pneumonia with left lower lobe consolidation.  Patient is also been diagnosed with COVID-19 but she does not appear to be having COVID-pneumonia.    On tidal volume 400 mL, rate of 20, FiO2 50% and PEEP of 5.  Switch Atrovent to DuoNeb's  Continue steroids, she is on dexamethasone for COVID  Sputum culture pending.  Infiltrate is not that impressive      GI and Nutrition :   RD consult and initiate tube feeds      Renal/Fluids/Electrolytes:     - monitor function and electrolytes as needed with replacement per ICU protocols.  - generally avoid nephrotoxic agents such as NSAID, IV contrast unless specifically required  - adjust medications as needed for renal clearance  - follow I/O's as appropriate.    Infectious Disease:   COVID-19.  Currently on dexamethasone.  CT does not look like COVID-pneumonia.  On antibiotics for possible aspiration versus community-acquired pneumonia.      Endocrine:     - ICU insulin protocol, goal sugar <180      ICU Prophylaxis:   1. DVT: Hep drip  2. VAP: HOB 30 degrees, chlorhexidine rinse  3. Stress Ulcer: PPI  4. Restraints: Nonviolent soft two point restraints required and necessary for patient safety and continued cares and good effect as patient continues to pull at necessary lines, tubes despite education and distraction. Will readdress daily.     Category: Non-violent   Type of Restraint: Soft limb x2.   Behavior: Pulling at IVand black catheter tubings.   Root cause of behavior: Critical illness.   Less-restrictive methods that have failed: Redirection, reorientation. 1:1 NA at bedside.   Response to restraint: Not actively  pulling atcurrent restraints.   Criteria for release from restraint: Responds to redirection. Leaves medical devices in place.           Key Medications:       chlorhexidine  15 mL Mouth/Throat Q12H     dexamethasone  6 mg Intravenous Daily     [START ON 10/10/2022] influenza vac high-dose quad  0.7 mL Intramuscular Prior to discharge     insulin aspart  1-12 Units Subcutaneous Q4H     ipratropium  0.5 mg Nebulization Q6H     pantoprazole  40 mg Per Feeding Tube QAM AC    Or     pantoprazole  40 mg Intravenous QAM AC     piperacillin-tazobactam  3.375 g Intravenous Q8H       dexmedetomidine 0.4 mcg/kg/hr (10/09/22 1200)     fentaNYL 100 mcg/hr (10/09/22 1200)     heparin 1,050 Units/hr (10/09/22 1200)     propofol 35 mcg/kg/min (10/09/22 1200)    And     - MEDICATION INSTRUCTIONS -       norepinephrine 0.02 mcg/kg/min (10/09/22 1200)               Physical Examination:   Temp:  [96.8  F (36  C)-99.3  F (37.4  C)] 98.2  F (36.8  C)  Pulse:  [38-87] 61  Resp:  [18-27] 20  BP: ()/(64-89) 116/77  MAP:  [65 mmHg-175 mmHg] 77 mmHg  Arterial Line BP: ()/() 104/59  FiO2 (%):  [40 %-100 %] 50 %  SpO2:  [90 %-100 %] 92 %    Intake/Output Summary (Last 24 hours) at 10/9/2022 1224  Last data filed at 10/9/2022 1200  Gross per 24 hour   Intake 947.77 ml   Output 902 ml   Net 45.77 ml     Wt Readings from Last 4 Encounters:   10/09/22 43 kg (94 lb 11.2 oz)     Arterial Line BP: ()/() 104/59  MAP:  [65 mmHg-175 mmHg] 77 mmHg  BP - Mean:  [] 91  Vent Mode: CMV/AC  (Continuous Mandatory Ventilation/ Assist Control)  FiO2 (%): 50 %  Resp Rate (Set): 20 breaths/min  Tidal Volume (Set, mL): 400 mL  PEEP (cm H2O): 5 cmH2O  Resp: 20    Recent Labs   Lab 10/09/22  0536 10/08/22  2015   PH 7.41 7.39   PCO2 43 43   PO2 63* 404*   HCO3 26  --    O2PER 40 80       GEN: no acute distress   HEENT: head ncat, sclera anicteric, OP patent, trachea midline   PULM: unlabored synchronous with vent, clear anteriorly     CV/COR: RRR S1S2 no gallop,  No rub, no murmur  ABD: soft nontender, hypoactive bowel sounds, no mass  EXT: No significant edema  NEURO: Sedated  SKIN: no obvious rash  LINES: clean, dry intact         Data:   All data and imaging reviewed     ROUTINE ICU LABS (Last four results)  CMP  Recent Labs   Lab 10/09/22  0814 10/09/22  0535 10/09/22  0442 10/09/22  0023 10/08/22  2016 10/08/22  1132   NA  --  144  --   --   --  142   POTASSIUM  --  3.3*  --   --   --  3.5   CHLORIDE  --  108*  --   --   --  100   CO2  --  26  --   --   --  29   ANIONGAP  --  10  --   --   --  13   * 125* 115* 117*   < > 153*   BUN  --  42.1*  --   --   --  46.2*   CR  --  0.77  --   --   --  0.92   GFRESTIMATED  --  84  --   --   --  67   JASON  --  7.6*  --   --   --  7.6*   MAG  --   --   --   --   --  1.8   PROTTOTAL  --   --   --   --   --  5.1*   ALBUMIN  --   --   --   --   --  2.6*   BILITOTAL  --   --   --   --   --  1.1   ALKPHOS  --   --   --   --   --  90   AST  --   --   --   --   --  290*   ALT  --   --   --   --   --  193*    < > = values in this interval not displayed.     CBC  Recent Labs   Lab 10/09/22  0535 10/08/22  1132   WBC 13.2* 14.2*   RBC 5.29* 5.37*   HGB 16.6* 16.9*   HCT 49.8* 51.8*   MCV 94 97   MCH 31.4 31.5   MCHC 33.3 32.6   RDW 14.2 13.9    297     INR  Recent Labs   Lab 10/08/22  1132   INR 1.41*     Arterial Blood Gas  Recent Labs   Lab 10/09/22  0536 10/08/22  2015   PH 7.41 7.39   PCO2 43 43   PO2 63* 404*   HCO3 26  --    O2PER 40 80       All cultures:  No results for input(s): CULT in the last 168 hours.  Recent Results (from the past 24 hour(s))   CT Head w/o Contrast    Narrative    EXAM: CT HEAD W/O CONTRAST  LOCATION: [facility]  DATE/TIME: 10/8/2022 4:04 PM    INDICATION: Confusion  COMPARISON: None.  TECHNIQUE: Routine CT Head without IV contrast. Multiplanar reformats. Dose reduction techniques were used.    FINDINGS:  INTRACRANIAL CONTENTS: No  intracranial hemorrhage, extraaxial collection, or mass effect.  No CT evidence of acute infarct. Normal parenchymal attenuation. Normal ventricles and sulci.     VISUALIZED ORBITS/SINUSES/MASTOIDS: No intraorbital abnormality. No paranasal sinus mucosal disease. No middle ear or mastoid effusion.    BONES/SOFT TISSUES: No acute abnormality.      Impression    IMPRESSION:  1.  No acute intracranial process.   CT Chest Pulmonary Embolism w Contrast   Result Value    Radiologist flags New diagnosis of pulmonary embolism (AA)    Narrative    EXAM: CT CHEST PULMONARY EMBOLISM W CONTRAST  LOCATION: [facility]  DATE/TIME: 10/8/2022 4:04 PM    INDICATION: respiratory failure, hypoxia, Covid  COMPARISON: None.  TECHNIQUE: CT chest pulmonary angiogram during arterial phase injection of IV contrast. Multiplanar reformats and MIP reconstructions were performed. Dose reduction techniques were used.   CONTRAST: isovue 370  75ml    FINDINGS: Mild motion artifact.    ANGIOGRAM CHEST: A single acute segmental pulmonary embolus in the left lower lobe, image 190:5.  Thoracic aorta is not well opacified and is indeterminate for dissection. No CT evidence of right heart strain.    LUNGS AND PLEURA: The endotracheal tube is 5.6 cm superior to the izabela. Severe emphysema. Mild diffuse bronchial wall thickening. Multiple foci of mucus plugging in the lower lobes, lingula and right middle lobe. Small consolidations in the left lower   lobe. No pleural effusion.    MEDIASTINUM/AXILLAE: A right internal jugular central venous catheter with the tip in the superior vena cava. A small amount of thrombus around the tip of the catheter. No lymphadenopathy. Heterogeneous thyroid.    CORONARY ARTERY CALCIFICATION: Mild.    UPPER ABDOMEN: Normal.    MUSCULOSKELETAL: Mild degenerative changes of the spine. Osseous demineralization. A mild remote superior endplate compression fracture of L1.      Impression    IMPRESSION:  1.   A single segmental left lower lobe pulmonary embolus. No evidence of right heart strain.  2.  Small left lower lobe consolidations which either represent pneumonia or aspiration.  3.  Multiple foci of mucus plugging.  4.  Severe emphysema.  5.  Right internal jugular central venous catheter with a small amount of thrombus around the tip.      [Critical Result: New diagnosis of pulmonary embolism]    Finding was identified on 10/8/2022 4:26 PM.     Dr. Simons was contacted by me on 10/8/2022 4:26 PM and verbalized understanding of the critical result.    XR Abdomen Port 1 View    Narrative    EXAM: XR ABDOMEN PORT 1 VIEW  LOCATION: [facility]  DATE/TIME: 10/8/2022 10:03 PM    INDICATION: evaluate og tube position  COMPARISON: None.      Impression    IMPRESSION: Orogastric tube terminates over the stomach. Side-port is above the GE junction and advancement by approximately 7 cm is suggested. Excreted contrast is noted in the renal collecting systems.   XR Chest Port 1 View    Narrative    EXAM: XR CHEST PORT 1 VIEW  LOCATION: [facility]  DATE/TIME: 10/9/2022 6:45 AM    INDICATION: eval ET tube position.  COMPARISON: 10/8/2022      Impression    IMPRESSION: The ET tube, right IJ, and NG tube are all unchanged in position. The lungs are hyperinflated bilaterally, unchanged from prior exam. Persistent bibasilar reticular opacities are again noted, favored reflect fibrotic changes   Echocardiogram Complete   Result Value    LVEF  30-35%    Narrative    [id number]  [id number]  [id number]  [id number]^JUSTIN^MARIO^[place] [address]     Name: ANGEL FORTUNE  MRN: [id number]  : 1955  Study Date: 10/09/2022 09:03 AM  Age: 67 yrs  Gender: Female  Patient Location: [facility]  Reason For Study: Shock  Ordering Physician: MARIO ANDERSON  Performed By: ACE     BSA: 1.5 m2  Height: 67 in  Weight: 97 lb  HR: 61  BP: 129/74  mmHg  ______________________________________________________________________________  Procedure  Complete Echo Adult. Definity (NDC #38531-135) given intravenously. 3mL given.  Technically difficult study.Extremely difficult acoustic windows despite the  use of contrast for endcardial border definition. There is no comparison study  available.  ______________________________________________________________________________  Interpretation Summary     1. Left ventricular size and wall thickness are normal. Systolic function is  moderately reduced with global hypokinesis. The estimated left ventricular  ejection fraction is 30-35%.  2. Right ventricle is not well visualized. Limited views suggest possible  enlargement and reduced systolic function.  3. No hemodynamically significant valvular abnormalities.  4. No prior study available for comparison.  ______________________________________________________________________________  I      WMSI = 2.00     % Normal = 0     X - Cannot   0 -                      (2) - Mildly 2 -          Segments  Size  Interpret    Hyperkinetic 1 - Normal  Hypokinetic  Hypokinetic  1-2     small                                                     7 -          3-5      moderate  3 - Akinetic 4 -          5 -         6 - Akinetic Dyskinetic   6-14    large               Dyskinetic   Aneurysmal  w/scar       w/scar       15-16   diffuse     Left Ventricle  The left ventricle is normal in size. There is normal left ventricular wall  thickness. The visual ejection fraction is 30-35%. Left ventricular diastolic  function is indeterminate. There is moderate global hypokinesia of the left  ventricle.     Right Ventricle  The right ventricle is not well visualized. Limited views suggest possible  enlargement and reduced systolic function.     Atria  Normal left atrial size. Right atrial size is normal.     Mitral Valve  Mitral valve leaflets appear normal. There is trace mitral regurgitation.  There  is no mitral valve stenosis.     Tricuspid Valve  Tricuspid valve leaflets appear normal. There is trace tricuspid  regurgitation. Right ventricular systolic pressure could not be approximated  due to inadequate tricuspid regurgitation. There is no tricuspid stenosis.     Aortic Valve  The aortic valve is not well visualized. Mild aortic valve calcification is  present. No aortic regurgitation is present. No aortic stenosis is present.     Pulmonic Valve  Normal pulmonic valve. There is trace pulmonic valvular regurgitation. There  is no pulmonic valvular stenosis.     Vessels  The aorta root is normal. The thoracic aorta cannot be assessed. Unable to  assess mean RA pressure given the patient is on a ventilator.     Pericardium  There is no pericardial effusion.     Rhythm  Sinus rhythm was noted.     ______________________________________________________________________________  MMode/2D Measurements & Calculations  IVSd: 0.56 cm  LVIDd: 3.7 cm  LVIDs: 2.8 cm  LVPWd: 0.59 cm  FS: 25.1 %     LV mass(C)d: 53.3 grams  LV mass(C)dI: 35.8 grams/m2  Ao root diam: 3.0 cm  LA dimension: 2.6 cm  LA/Ao: 0.87  LVOT diam: 1.8 cm  LVOT area: 2.6 cm2  RWT: 0.32     Doppler Measurements & Calculations  MV E max jamarcus: 58.7 cm/sec  MV A max jamarcus: 54.3 cm/sec  MV E/A: 1.1  MV dec slope: 238.1 cm/sec2  MV dec time: 0.25 sec  Ao V2 max: 124.3 cm/sec  Ao max P.0 mmHg  Ao V2 mean: 77.2 cm/sec  Ao mean P.9 mmHg  Ao V2 VTI: 22.8 cm  JOLANTA(I,D): 1.5 cm2  JOLANTA(V,D): 1.5 cm2  LV V1 max P.2 mmHg  LV V1 max: 74.2 cm/sec  LV V1 VTI: 13.2 cm  SV(LVOT): 34.1 ml  SI(LVOT): 23.0 ml/m2  PA acc time: 0.08 sec  AV Jamarcus Ratio (DI): 0.60  JOLANTA Index (cm2/m2): 1.0  E/E': 11.7  E/E' av.0     Lateral E/e': 11.7  Medial E/e': 12.3  Peak E' Jamarcus: 5.0 cm/sec     ______________________________________________________________________________  Report approved by: Ruperto Hahn 10/09/2022 10:12 AM               Billing: This patient is critically  ill: Yes. Total critical care time today 38 min exclusive of procedures or teaching.

## 2022-10-09 NOTE — SIGNIFICANT EVENT
Patient has been having some R on T phenomenon and some PVC's.  Notified intensivist and paged out to give cardiology an FYI if they want any further actions taken.

## 2022-10-09 NOTE — PROCEDURES
ARTERIAL LINE INSERTION PROCEDURE NOTE  (NON-OR)    Procedure Date:  10/8/2022   Performing Physician:  Clovis Correa MD    Pre-Procedure Diagnosis:     RESPIRATORY FAILURE  Post-Procedure Diagnosis:  Same as Pre-Procedure Diagnosis    Procedure:  Arterial line insertion  Right axillary  Indications:  HYPOTENSION, RESPIRATORY FAILURE and HEMODYNAMIC SHOCK      Estimated Blood Loss: Minimal   Complications: none      Procedure Details: emergent procedure due to inability to get an ABG and need for vent setting confirmation.   The site of the procedure was properly noted/marked. The patient was identified as Lavonne Talbot with Date of Birth 1955 and the procedure verified as Arterial Line Insertion.  A Time Out was held and the above information confirmed.    An Solis test was, was not and not indicated  done before the procedure.  The Solis test results were  n/a .  In sterile fashion, the line site was prepped with Chlorhexidine.  Strict sterile conditions were maintained,  Cap, mask, and sterile gloves were worn by all participants.  The arterial line was placed in the Right axillary artery percutaneously,  without  difficulty.  The linewas  sutured in place  and an occlusive sterile dressing was applied.  The total number of needle stick attempts was 2.      Condition: critical and unchanged    Clovis Correa MD, 10/8/2022, 8:13 PM

## 2022-10-09 NOTE — PLAN OF CARE
Problem: Mechanical Ventilation Invasive  Goal: Optimal Nutrition Delivery  10/9/2022 1841 by Alejandra Earl RN  Outcome: Progressing  10/9/2022 1440 by Alejandra Earl RN  Outcome: Progressing     Problem: Restraint, Nonbehavioral (Nonviolent)  Goal: Absence of Harm or Injury  10/9/2022 1841 by Alejandra Earl RN  Outcome: Progressing  10/9/2022 1440 by Alejandra Earl RN  Outcome: Progressing  Intervention: Implement Least Restrictive Safety Strategies  Recent Flowsheet Documentation  Taken 10/9/2022 1600 by Alejandra Earl RN  Medical Device Protection: IV pole/bag removed from visual field  Taken 10/9/2022 1200 by Alejandra Earl RN  Medical Device Protection: IV pole/bag removed from visual field  Taken 10/9/2022 0800 by Alejandra Earl RN  Medical Device Protection: IV pole/bag removed from visual field  Intervention: Protect Dignity, Rights, and Personal Wellbeing  Recent Flowsheet Documentation  Taken 10/9/2022 1600 by Alejandra Earl RN  Trust Relationship/Rapport:   reassurance provided   care explained  Taken 10/9/2022 1200 by Alejandra Earl RN  Trust Relationship/Rapport:   reassurance provided   care explained  Taken 10/9/2022 0800 by Alejandra Earl RN  Trust Relationship/Rapport:   reassurance provided   care explained  Intervention: Protect Skin and Joint Integrity  Recent Flowsheet Documentation  Taken 10/9/2022 1615 by Alejandra Earl RN  Body Position:   turned   supine, head elevated   supine, legs elevated  Taken 10/9/2022 1600 by Alejandra Earl RN  Body Position:   turned   supine, legs elevated   supine, head elevated  Range of Motion: ROM (range of motion) performed  Taken 10/9/2022 1400 by Alejandra Earl RN  Body Position:   turned   right  Taken 10/9/2022 1200 by Alejandra Earl RN  Body Position:   turned   supine, legs elevated   supine, head elevated  Range of Motion: ROM (range of motion) performed  Taken 10/9/2022 1000 by Alejandra Earl  R, RN  Body Position:   left   turned  Taken 10/9/2022 0800 by Alejandra Earl RN  Body Position:   turned   right  Range of Motion: ROM (range of motion) performed     [facility] - ICU    RN Progress Note:            Pertinent Assessments:      Please refer to flowsheet rows for full assessment     See flowsheet.         Mobility Level:     1        Key Events - This Shift:     Blood pressures dropped this afternoon and patient has needed increased dosing of levophed, currently at 0.15.  started on tube feedings at 1630.  Patient started experiencing R on T, MD notified and stat EKG, and magnesium drawn.              Plan:     Continue current regimen with ICU team and cardiology.          Point of Contact Update YES-OR-NO: Yes and No  If No, reason: spoke with son earlier in the day and stated he would call back later tonight.  Name:  Phone Number:  Summary of Conversation:

## 2022-10-10 LAB
ANION GAP SERPL CALCULATED.3IONS-SCNC: 9 MMOL/L (ref 7–15)
ATRIAL RATE - MUSE: 95 BPM
BUN SERPL-MCNC: 38.6 MG/DL (ref 8–23)
CALCIUM SERPL-MCNC: 8.3 MG/DL (ref 8.8–10.2)
CALCIUM, IONIZED MEASURED: 1.14 MMOL/L (ref 1.11–1.3)
CHLORIDE SERPL-SCNC: 108 MMOL/L (ref 98–107)
CREAT SERPL-MCNC: 0.66 MG/DL (ref 0.51–0.95)
DEPRECATED HCO3 PLAS-SCNC: 28 MMOL/L (ref 22–29)
DIASTOLIC BLOOD PRESSURE - MUSE: NORMAL MMHG
ENTEROCOCCUS FAECALIS: NOT DETECTED
ENTEROCOCCUS FAECIUM: NOT DETECTED
ERYTHROCYTE [DISTWIDTH] IN BLOOD BY AUTOMATED COUNT: 14.6 % (ref 10–15)
GFR SERPL CREATININE-BSD FRML MDRD: >90 ML/MIN/1.73M2
GLUCOSE BLDC GLUCOMTR-MCNC: 127 MG/DL (ref 70–99)
GLUCOSE BLDC GLUCOMTR-MCNC: 156 MG/DL (ref 70–99)
GLUCOSE BLDC GLUCOMTR-MCNC: 173 MG/DL (ref 70–99)
GLUCOSE BLDC GLUCOMTR-MCNC: 178 MG/DL (ref 70–99)
GLUCOSE BLDC GLUCOMTR-MCNC: 185 MG/DL (ref 70–99)
GLUCOSE BLDC GLUCOMTR-MCNC: 195 MG/DL (ref 70–99)
GLUCOSE SERPL-MCNC: 180 MG/DL (ref 70–99)
HCT VFR BLD AUTO: 50 % (ref 35–47)
HGB BLD-MCNC: 16.3 G/DL (ref 11.7–15.7)
INTERPRETATION ECG - MUSE: NORMAL
ION CA PH 7.4: 1.12 MMOL/L (ref 1.11–1.3)
LISTERIA SPECIES (DETECTED/NOT DETECTED): NOT DETECTED
MAGNESIUM SERPL-MCNC: 2.7 MG/DL (ref 1.7–2.3)
MAGNESIUM SERPL-MCNC: 3.1 MG/DL (ref 1.7–2.3)
MCH RBC QN AUTO: 31.3 PG (ref 26.5–33)
MCHC RBC AUTO-ENTMCNC: 32.6 G/DL (ref 31.5–36.5)
MCV RBC AUTO: 96 FL (ref 78–100)
P AXIS - MUSE: 85 DEGREES
PH: 7.37 (ref 7.35–7.45)
PHOSPHATE SERPL-MCNC: 2.7 MG/DL (ref 2.5–4.5)
PHOSPHATE SERPL-MCNC: 3.7 MG/DL (ref 2.5–4.5)
PLATELET # BLD AUTO: 305 10E3/UL (ref 150–450)
POTASSIUM SERPL-SCNC: 3.9 MMOL/L (ref 3.4–5.3)
POTASSIUM SERPL-SCNC: 3.9 MMOL/L (ref 3.4–5.3)
PR INTERVAL - MUSE: 114 MS
QRS DURATION - MUSE: 98 MS
QT - MUSE: 420 MS
QTC - MUSE: 527 MS
R AXIS - MUSE: 97 DEGREES
RBC # BLD AUTO: 5.2 10E6/UL (ref 3.8–5.2)
SODIUM SERPL-SCNC: 145 MMOL/L (ref 136–145)
STAPHYLOCOCCUS AUREUS: NOT DETECTED
STAPHYLOCOCCUS EPIDERMIDIS: NOT DETECTED
STAPHYLOCOCCUS LUGDUNENSIS: NOT DETECTED
STAPHYLOCOCCUS SPECIES: NOT DETECTED
STREPTOCOCCUS AGALACTIAE: NOT DETECTED
STREPTOCOCCUS ANGINOSUS GROUP: NOT DETECTED
STREPTOCOCCUS PNEUMONIAE: NOT DETECTED
STREPTOCOCCUS PYOGENES: NOT DETECTED
STREPTOCOCCUS SPECIES: NOT DETECTED
SYSTOLIC BLOOD PRESSURE - MUSE: NORMAL MMHG
T AXIS - MUSE: -72 DEGREES
UFH PPP CHRO-ACNC: 0.6 IU/ML
VENTRICULAR RATE- MUSE: 95 BPM
WBC # BLD AUTO: 15.6 10E3/UL (ref 4–11)

## 2022-10-10 PROCEDURE — 258N000003 HC RX IP 258 OP 636: Performed by: INTERNAL MEDICINE

## 2022-10-10 PROCEDURE — 99291 CRITICAL CARE FIRST HOUR: CPT | Performed by: INTERNAL MEDICINE

## 2022-10-10 PROCEDURE — 84100 ASSAY OF PHOSPHORUS: CPT | Performed by: INTERNAL MEDICINE

## 2022-10-10 PROCEDURE — 250N000011 HC RX IP 250 OP 636: Performed by: INTERNAL MEDICINE

## 2022-10-10 PROCEDURE — 80048 BASIC METABOLIC PNL TOTAL CA: CPT | Performed by: INTERNAL MEDICINE

## 2022-10-10 PROCEDURE — 82330 ASSAY OF CALCIUM: CPT | Performed by: INTERNAL MEDICINE

## 2022-10-10 PROCEDURE — 94003 VENT MGMT INPAT SUBQ DAY: CPT

## 2022-10-10 PROCEDURE — 250N000013 HC RX MED GY IP 250 OP 250 PS 637: Performed by: INTERNAL MEDICINE

## 2022-10-10 PROCEDURE — 83735 ASSAY OF MAGNESIUM: CPT | Performed by: INTERNAL MEDICINE

## 2022-10-10 PROCEDURE — 200N000001 HC R&B ICU

## 2022-10-10 PROCEDURE — 250N000009 HC RX 250: Performed by: INTERNAL MEDICINE

## 2022-10-10 PROCEDURE — 999N000009 HC STATISTIC AIRWAY CARE

## 2022-10-10 PROCEDURE — 84132 ASSAY OF SERUM POTASSIUM: CPT | Performed by: INTERNAL MEDICINE

## 2022-10-10 PROCEDURE — 999N000157 HC STATISTIC RCP TIME EA 10 MIN

## 2022-10-10 PROCEDURE — 94640 AIRWAY INHALATION TREATMENT: CPT | Mod: 76

## 2022-10-10 PROCEDURE — 85520 HEPARIN ASSAY: CPT | Performed by: INTERNAL MEDICINE

## 2022-10-10 PROCEDURE — 85027 COMPLETE CBC AUTOMATED: CPT | Performed by: INTERNAL MEDICINE

## 2022-10-10 PROCEDURE — 94640 AIRWAY INHALATION TREATMENT: CPT

## 2022-10-10 RX ORDER — POTASSIUM CHLORIDE 7.45 MG/ML
10 INJECTION INTRAVENOUS ONCE
Status: COMPLETED | OUTPATIENT
Start: 2022-10-10 | End: 2022-10-10

## 2022-10-10 RX ADMIN — INSULIN ASPART 1 UNITS: 100 INJECTION, SOLUTION INTRAVENOUS; SUBCUTANEOUS at 08:03

## 2022-10-10 RX ADMIN — INSULIN ASPART 2 UNITS: 100 INJECTION, SOLUTION INTRAVENOUS; SUBCUTANEOUS at 15:12

## 2022-10-10 RX ADMIN — Medication 40 MG: at 06:30

## 2022-10-10 RX ADMIN — INSULIN ASPART 3 UNITS: 100 INJECTION, SOLUTION INTRAVENOUS; SUBCUTANEOUS at 21:35

## 2022-10-10 RX ADMIN — Medication 50 MCG: at 04:45

## 2022-10-10 RX ADMIN — IPRATROPIUM BROMIDE AND ALBUTEROL SULFATE 3 ML: 2.5; .5 SOLUTION RESPIRATORY (INHALATION) at 19:38

## 2022-10-10 RX ADMIN — INSULIN ASPART 2 UNITS: 100 INJECTION, SOLUTION INTRAVENOUS; SUBCUTANEOUS at 04:42

## 2022-10-10 RX ADMIN — CHLORHEXIDINE GLUCONATE 15 ML: 1.2 SOLUTION ORAL at 07:43

## 2022-10-10 RX ADMIN — PROPOFOL 20 MCG/KG/MIN: 10 INJECTION, EMULSION INTRAVENOUS at 07:56

## 2022-10-10 RX ADMIN — THIAMINE HCL TAB 100 MG 100 MG: 100 TAB at 08:21

## 2022-10-10 RX ADMIN — PIPERACILLIN AND TAZOBACTAM 3.38 G: 3; .375 INJECTION, POWDER, LYOPHILIZED, FOR SOLUTION INTRAVENOUS at 11:05

## 2022-10-10 RX ADMIN — Medication 0.09 MCG/KG/MIN: at 08:36

## 2022-10-10 RX ADMIN — VASOPRESSIN 2.4 UNITS/HR: 20 INJECTION INTRAVENOUS at 10:19

## 2022-10-10 RX ADMIN — CHLORHEXIDINE GLUCONATE 15 ML: 1.2 SOLUTION ORAL at 21:35

## 2022-10-10 RX ADMIN — POTASSIUM CHLORIDE 10 MEQ: 7.46 INJECTION, SOLUTION INTRAVENOUS at 22:55

## 2022-10-10 RX ADMIN — PIPERACILLIN AND TAZOBACTAM 3.38 G: 3; .375 INJECTION, POWDER, LYOPHILIZED, FOR SOLUTION INTRAVENOUS at 04:33

## 2022-10-10 RX ADMIN — IPRATROPIUM BROMIDE AND ALBUTEROL SULFATE 3 ML: 2.5; .5 SOLUTION RESPIRATORY (INHALATION) at 11:19

## 2022-10-10 RX ADMIN — DEXAMETHASONE SODIUM PHOSPHATE 6 MG: 4 INJECTION, SOLUTION INTRA-ARTICULAR; INTRALESIONAL; INTRAMUSCULAR; INTRAVENOUS; SOFT TISSUE at 08:20

## 2022-10-10 RX ADMIN — Medication 50 MCG/HR: at 07:58

## 2022-10-10 RX ADMIN — IPRATROPIUM BROMIDE AND ALBUTEROL SULFATE 3 ML: 2.5; .5 SOLUTION RESPIRATORY (INHALATION) at 15:59

## 2022-10-10 RX ADMIN — VASOPRESSIN 2.4 UNITS/HR: 20 INJECTION INTRAVENOUS at 16:39

## 2022-10-10 RX ADMIN — IPRATROPIUM BROMIDE AND ALBUTEROL SULFATE 3 ML: 2.5; .5 SOLUTION RESPIRATORY (INHALATION) at 08:04

## 2022-10-10 RX ADMIN — INSULIN ASPART 2 UNITS: 100 INJECTION, SOLUTION INTRAVENOUS; SUBCUTANEOUS at 00:24

## 2022-10-10 RX ADMIN — VASOPRESSIN 2.4 UNITS/HR: 20 INJECTION INTRAVENOUS at 03:37

## 2022-10-10 RX ADMIN — HEPARIN SODIUM AND DEXTROSE 1200 UNITS/HR: 10000; 5 INJECTION INTRAVENOUS at 11:05

## 2022-10-10 RX ADMIN — PIPERACILLIN AND TAZOBACTAM 3.38 G: 3; .375 INJECTION, POWDER, LYOPHILIZED, FOR SOLUTION INTRAVENOUS at 21:35

## 2022-10-10 ASSESSMENT — ACTIVITIES OF DAILY LIVING (ADL)
ADLS_ACUITY_SCORE: 30
DEPENDENT_IADLS:: INDEPENDENT
ADLS_ACUITY_SCORE: 30

## 2022-10-10 NOTE — PROGRESS NOTES
"Critical Care Progress Note      10/10/2022    Name: Lavonne Talbot MRN#: [id number]   Age: 67 year old YOB: 1955     Hsptl Day# 2  ICU DAY #    MV DAY #             Problem List:   Active Problems:    Acute respiratory failure with hypoxia (H)    Hypotension, unspecified hypotension type    Infection due to 2019 novel coronavirus    Clinically Significant Risk Factors Present on Admission               # Severe Malnutrition: based on nutrition assessment  # Cachexia: Estimated body mass index is 14.83 kg/m  as calculated from the following:    Height as of this encounter: 1.702 m (5' 7\").    Weight as of this encounter: 43 kg (94 lb 11.2 oz).                       Summary/Hospital Course:     Lavonne Talbot is a 67 year old cachectic female (BMI 12.09 kg/m ), active smoker, with history of lymphedema on furosemide since 2021, metabolic alkalosis, polycythemia, and recent diagnosis of COVID-19 viral infection 1 week ago who presented to [facility] ED on 10/8/2022 due to respiratory distress now admitted to the ICU with acute respiratory failure requiring mechanical ventilation in the setting of possible community-acquired pneumonia or aspiration pneumonia with mucous plugging, small subsegmental pulmonary embolism, circulatory shock on vasopressors.      Assessment and plan :       I have personally reviewed the daily labs, imaging studies, cultures and discussed the case with referring physician and consulting physicians.     My assessment and plan by system for this patient is as follows:    Neurology/Psychiatry:   Sedated with Precedex, fentanyl and propofol    Cardiovascular:   Vasoplegia secondary to sedation.  Requiring norepinephrine, 0.02 mcg/kg/min  Slightly elevated troponin, echo shows stress-induced cardiomyopathy global dysfunction and EF of 30%  Small PE  Patient continues on heparin drip.  Cardiology following.    Hematuria today.  We might have to stop her heparin " drip.  Ultrasound of lower extremities to evaluate for DVT.      Pulmonary/Ventilator Management:   Acute respiratory failure in a patient with severe emphysema  Concern for community-acquired pneumonia with left lower lobe consolidation.  Patient is also been diagnosed with COVID-19 but she does not appear to be having COVID-pneumonia.    On tidal volume 400 mL, rate of 20, FiO2 50% and PEEP of 5.  Switch Atrovent to DuoNeb's  Continue steroids, she is on dexamethasone for COVID    Sputum growing haemophilus so we will treat as a haemophilus pneumonia      GI and Nutrition :   Increase tube feeds as tolerated      Renal/Fluids/Electrolytes:   1 L positive  - monitor function and electrolytes as needed with replacement per ICU protocols.  - generally avoid nephrotoxic agents such as NSAID, IV contrast unless specifically required  - adjust medications as needed for renal clearance  - follow I/O's as appropriate.    Infectious Disease:   COVID-19.  Currently on dexamethasone.  CT does not look like COVID-pneumonia.  Haemophilus pneumonia.  We will continue Zosyn for now and then de-escalate probably tomorrow to Rocephin.      Endocrine:     - ICU insulin protocol, goal sugar <180      ICU Prophylaxis:   1. DVT: Hep drip  2. VAP: HOB 30 degrees, chlorhexidine rinse  3. Stress Ulcer: PPI  4. Restraints: Nonviolent soft two point restraints required and necessary for patient safety and continued cares and good effect as patient continues to pull at necessary lines, tubes despite education and distraction. Will readdress daily.     Category: Non-violent   Type of Restraint: Soft limb x2.   Behavior: Pulling at IVand black catheter tubings.   Root cause of behavior: Critical illness.   Less-restrictive methods that have failed: Redirection, reorientation. 1:1 NA at bedside.   Response to restraint: Not actively pulling atcurrent restraints.   Criteria for release from restraint: Responds to redirection. Leaves medical  devices in place.           Key Medications:       chlorhexidine  15 mL Mouth/Throat Q12H     dexamethasone  6 mg Intravenous Daily     influenza vac high-dose quad  0.7 mL Intramuscular Prior to discharge     insulin aspart  1-12 Units Subcutaneous Q4H     ipratropium - albuterol 0.5 mg/2.5 mg/3 mL  3 mL Nebulization 4x daily     pantoprazole  40 mg Per Feeding Tube QAM AC    Or     pantoprazole  40 mg Intravenous QAM AC     piperacillin-tazobactam  3.375 g Intravenous Q8H     thiamine  100 mg Per Feeding Tube Daily       dexmedetomidine 0.4 mcg/kg/hr (10/10/22 0800)     dextrose       fentaNYL 50 mcg/hr (10/10/22 0800)     heparin 1,200 Units/hr (10/10/22 1105)     propofol 20 mcg/kg/min (10/10/22 0800)    And     - MEDICATION INSTRUCTIONS -       norepinephrine 0.1 mcg/kg/min (10/10/22 1110)     vasopressin 2.4 Units/hr (10/10/22 1019)               Physical Examination:   Temp:  [95.9  F (35.5  C)-99.7  F (37.6  C)] 97.3  F (36.3  C)  Pulse:  [59-92] 66  Resp:  [16-27] 20  BP: ()/(51-80) 121/80  MAP:  [32 mmHg-268 mmHg] 83 mmHg  Arterial Line BP: (1-301)/(-) 111/65  FiO2 (%):  [45 %-70 %] 50 %  SpO2:  [86 %-99 %] 92 %    Intake/Output Summary (Last 24 hours) at 10/9/2022 1224  Last data filed at 10/9/2022 1200  Gross per 24 hour   Intake 947.77 ml   Output 902 ml   Net 45.77 ml     Wt Readings from Last 4 Encounters:   10/09/22 43 kg (94 lb 11.2 oz)     Arterial Line BP: (1-301)/(-) 111/65  MAP:  [32 mmHg-268 mmHg] 83 mmHg  BP - Mean:  [59-95] 95  Vent Mode: CMV/AC  (Continuous Mandatory Ventilation/ Assist Control)  FiO2 (%): 50 %  Resp Rate (Set): 20 breaths/min  Tidal Volume (Set, mL): 400 mL  PEEP (cm H2O): 8 cmH2O  Resp: 20    Recent Labs   Lab 10/09/22  0536 10/08/22  2015   PH 7.41 7.39   PCO2 43 43   PO2 63* 404*   HCO3 26  --    O2PER 40 80       GEN: no acute distress   HEENT: head ncat, sclera anicteric, OP patent, trachea midline   PULM: unlabored synchronous with vent, clear  anteriorly    CV/COR: RRR S1S2 no gallop,  No rub, no murmur  ABD: soft nontender, hypoactive bowel sounds, no mass  EXT: No significant edema  NEURO: Sedated  SKIN: no obvious rash  LINES: clean, dry intact         Data:   All data and imaging reviewed     ROUTINE ICU LABS (Last four results)  CMP  Recent Labs   Lab 10/10/22  1108 10/10/22  0802 10/10/22  0441 10/10/22  0440 10/10/22  0130 10/09/22  1951 10/09/22  1812 10/09/22  1624 10/09/22  1302 10/09/22  0814 10/09/22  0535 10/08/22  2016 10/08/22  1132   NA  --   --   --  145  --   --   --   --   --   --  144  --  142   POTASSIUM  --   --   --  3.9  --   --   --   --  4.1  --  3.3*  --  3.5   CHLORIDE  --   --   --  108*  --   --   --   --   --   --  108*  --  100   CO2  --   --   --  28  --   --   --   --   --   --  26  --  29   ANIONGAP  --   --   --  9  --   --   --   --   --   --  10  --  13   * 156* 173* 180*  --    < >  --    < >  --    < > 125*   < > 153*   BUN  --   --   --  38.6*  --   --   --   --   --   --  42.1*  --  46.2*   CR  --   --   --  0.66  --   --   --   --   --   --  0.77  --  0.92   GFRESTIMATED  --   --   --  >90  --   --   --   --   --   --  84  --  67   JASON  --   --   --  8.3*  --   --   --   --   --   --  7.6*  --  7.6*   MAG  --   --   --   --  3.1*  --  1.0*  --   --   --   --   --  1.8   PHOS  --   --   --  3.7  --   --   --   --   --   --   --   --   --    PROTTOTAL  --   --   --   --   --   --   --   --   --   --   --   --  5.1*   ALBUMIN  --   --   --   --   --   --   --   --   --   --   --   --  2.6*   BILITOTAL  --   --   --   --   --   --   --   --   --   --   --   --  1.1   ALKPHOS  --   --   --   --   --   --   --   --   --   --   --   --  90   AST  --   --   --   --   --   --   --   --   --   --   --   --  290*   ALT  --   --   --   --   --   --   --   --   --   --   --   --  193*    < > = values in this interval not displayed.     CBC  Recent Labs   Lab 10/10/22  7210 10/09/22  0535 10/08/22  1132   WBC 15.6*  13.2* 14.2*   RBC 5.20 5.29* 5.37*   HGB 16.3* 16.6* 16.9*   HCT 50.0* 49.8* 51.8*   MCV 96 94 97   MCH 31.3 31.4 31.5   MCHC 32.6 33.3 32.6   RDW 14.6 14.2 13.9    282 297     INR  Recent Labs   Lab 10/08/22  1132   INR 1.41*     Arterial Blood Gas  Recent Labs   Lab 10/09/22  0536 10/08/22  2015   PH 7.41 7.39   PCO2 43 43   PO2 63* 404*   HCO3 26  --    O2PER 40 80       All cultures:  No results for input(s): CULT in the last 168 hours.  Recent Results (from the past 24 hour(s))   XR Chest Port 1 View    Narrative    EXAM: XR CHEST PORT 1 VIEW  LOCATION: [facility]  DATE/TIME: 10/9/2022 9:31 PM    INDICATION: desaturating on vent. eval for interval changes.  COMPARISON: Portable one view chest x-ray 10/09/2022 at 6:45 AM      Impression    IMPRESSION: The ET tube, right IJ, and NG tube are all unchanged in position. The lungs remain hyperinflated bilaterally, unchanged. The lower most right lung base is excluded by collimation, however there are no obvious pleural effusions. Persistent   bibasilar opacities are again noted, favored to represent fibrotic changes.         Billing: This patient is critically ill: Yes. Total critical care time today 35 min exclusive of procedures or teaching.

## 2022-10-10 NOTE — PLAN OF CARE
Problem: Mechanical Ventilation Invasive  Goal: Mechanical Ventilation Liberation  Outcome: Progressing  Goal: Absence of Ventilator-Induced Lung Injury  Outcome: Progressing    Catrina Lewis RT

## 2022-10-10 NOTE — PLAN OF CARE
Problem: Plan of Care - These are the overarching goals to be used throughout the patient stay.    Goal: Plan of Care Review/Shift Note  Description: The Plan of Care Review/Shift note should be completed every shift.  The Outcome Evaluation is a brief statement about your assessment that the patient is improving, declining, or no change.  This information will be displayed automatically on your shift note.  Outcome: Progressing     Problem: Malnutrition  Goal: Improved Nutritional Intake  Outcome: Progressing     Problem: Enteral Nutrition  Goal: Feeding Tolerance  Outcome: Progressing   Goal Outcome Evaluation:    Promote with fiber currently at 25 ml/hr and increasing 10 ml every 12 hrs to goal 45 ml/hr continuous.  FWF 45 ml Q6hrs. Pt tolerating TF well, however, will not advance rate past 30 mL/hr due to current rate of propofol. RN notified. RD will reassess ability to advance TF rate on 10/11 pending propofol rate. Propofol at 20 mL/hr provides an additional 528 kcal daily.    TF @ 30 mL/hr + Propofol @ 20 mL/hr provides 1248 kcals, 45 gm protein.    Estimated Energy Needs: 3460-1767 kcals/day (25 - 30 kcals/kg)  Justification: Critically ill, Underweight and Vented  Estimated Protein Needs: 51-86 grams protein/day (1.2-2)  Justification: critically ill, COVID 19.    Labs 10/10  K: 3.9  Mg: 3.1 (H) - received Mg replacement 10/9  P: 3.7  Glucose: 104-185 last 24 hrs - receives Novolog    Pt receiving 100 mg Thiamine daily.

## 2022-10-10 NOTE — PROGRESS NOTES
RT PROGRESS NOTE    VENT DAY# 3    CURRENT SETTINGS:   Vent Mode: CMV/AC  (Continuous Mandatory Ventilation/ Assist Control)  FiO2 (%): 50 %  Resp Rate (Set): 20 breaths/min  Tidal Volume (Set, mL): 400 mL  PEEP (cm H2O): 8 cmH2O  Resp: 20      PATIENT PARAMETERS:  PIP 31  Pplat:  21  Pmean:  13  Secretions:  Scant white thick  02 Sats:  94%  BS: Diminished/occasional wheezes    ETT SIZE 7.5 Secured at 22 cm at teeth/gums    Respiratory Medications: Duoneb QID      NOTE / SHIFT SUMMARY:   Pt remained on full vent support overnight. Had a handful of desats to the high 80's without repositioning or coughing. Fi02 increased from 50 to 70% and then PEEP increased from 5 to 8. Pt has not had further desats per RN and Fi02 weaned back to 50%. No other ventilator changes were made.    Matty Ronquillo, RRT

## 2022-10-10 NOTE — PLAN OF CARE
[facility] - ICU    RN Progress Note:            Pertinent Assessments:      Please refer to flowsheet rows for full assessment     Labile blood pressure (70 to 80 systolic, mean arterial pressure between 60-65)  On and off following commands (squeezing the writer's hand, open eyes spontaneously)  Hematuria while on heparin drip         Mobility Level:     2         Key Events - This Shift:     Tried hold off the vasopressin, but the systolic went down after 2 minutes (70s) and mean arterial blood pressure is at 60s.  At 1409H, the patient had hematuria. Dr. Romo (intensivist) and Dr. Amin (cardiologist) were informed. Dr. Romo held the heparin infusion.                 Plan:     Try weaning off the vasopressin if systolic and mean arterial pressure will permit.  Will wait for the official ultrasound of bilateral legs to check if there will be blood clots and will decide if we need to restart heparin or not.          Point of Contact Update Name: Frantz (son)  Summary of Conversation: updated regarding the blood pressure, breathing tube and current IV medications (sedation, vasopressors and antibiotics)        [facility] SAT / SBT Record:      SAT Safety Screen FAILED   If FAILED why? Unstable hemodynamics, labile blood pressure   SAT Performed    If FAILED why? Not performed   SBT Safety Screen FAILED   If FAILED why? Failed SAT        Problem: Gas Exchange Impaired  Goal: Optimal Gas Exchange  Outcome: Not Progressing  Intervention: Optimize Oxygenation and Ventilation  Recent Flowsheet Documentation  Taken 10/10/2022 0800 by Kanika Rees, RN  Head of Bed (HOB) Positioning: HOB at 30 degrees     Problem: Mechanical Ventilation Invasive  Goal: Mechanical Ventilation Liberation  Outcome: Not Progressing  Intervention: Promote Extubation and Mechanical Ventilation Liberation  Recent Flowsheet Documentation  Taken 10/10/2022 0800 by Kanika Rees, RN  Medication Review/Management:  medications reviewed     Problem: Enteral Nutrition  Goal: Absence of Aspiration Signs and Symptoms  Outcome: Not Progressing  Intervention: Minimize Aspiration Risk  Recent Flowsheet Documentation  Taken 10/10/2022 0800 by Kanika Rees, RN  Oral Care:    lip/mouth moisturizer applied    oral rinse provided    suction provided    swabbed with antiseptic solution    teeth brushed    tongue brushed  Head of Bed (HOB) Positioning: HOB at 30 degrees  Goal: Safe, Effective Therapy Delivery  Outcome: Not Progressing  Goal: Feeding Tolerance  Outcome: Not Progressing

## 2022-10-10 NOTE — PLAN OF CARE
Patient remains on full vent support. No weaning or vent changes made this shift. Scheduled nebs given via inline. B.S diminished suctioned scant via ETT.    Peak press 26  Plat press   15    Current vent settings,    Vent Mode: CMV/AC  (Continuous Mandatory Ventilation/ Assist Control)  FiO2 (%): 50 %  Resp Rate (Set): 20 breaths/min  Tidal Volume (Set, mL): 400 mL  PEEP (cm H2O): 5 cmH2O  Resp: 20      Ramakrishna Bahena, RT

## 2022-10-10 NOTE — PROGRESS NOTES
RT PROGRESS NOTE    VENT DAY# 3    CURRENT SETTINGS:   Vent Mode: CMV/AC  (Continuous Mandatory Ventilation/ Assist Control)  FiO2 (%): 50 %  Resp Rate (Set): 20 breaths/min  Tidal Volume (Set, mL): 400 mL  PEEP (cm H2O): 8 cmH2O  Resp: 20      PATIENT PARAMETERS:  PIP 29  Pplat:  21  Pmean:  13  Compliance: 35  SBT: no   Secretions:  Small amount of white/creamy secretions via ETT  02 Sats:  94%  BS: diminished     ETT SIZE 7.5 Secured at 22 cm at teeth/gums    Respiratory Medications: Duonebs QID     NOTE / SHIFT SUMMARY:   Pt. remains on full vent support, settings above. Nebulizer treatment given as scheduled, ETT repositioned Q2 for skin integrity, wean on hold, titrate oxygen as tolerated. RT following      Catrina Lewis RT

## 2022-10-10 NOTE — PROGRESS NOTES
HEART CARE NOTE          Assessment/Recommendations     1. HFrEF c/b shock  Assessment / Plan    New diagnosis - ? Stress induced in the setting of hypoxic respiratory failure - repeat echo once underlying stressors have been resolved; will likely need ischemic evaluation for further assessment     Near euvolemia on physical exam - no changes to regimen at this time    GDMT on hold given the above    2. Shock  Assessment / Plan    Likely mixed picture of cardiogenic and distributive      Continue pressor support and supportive care    3. PE  Assessment / Plan    ?signs of RV strain -  heparin gtt and management per primary team      4. COVID-19 positive  Assessment / Plan    Supportive care per primary team    Patient remains critically ill in the ICU requiring mechanical ventilation support as well as multiple vasopressor requirements for hemodynamic support. 60 minutes spent on critical care.    History of Present Illness/Subjective    Ms. Lavonne Talbot is a 67 year old female with a PMHx significant for (per Tu Price's note) active smoker, with history of lymphedema on furosemide since 2021, metabolic alkalosis, polycythemia, and recent diagnosis of COVID-19 viral infection 1 week ago who presented to [facility] ED on 10/8/2022 due to respiratory distress now admitted to the ICU with acute respiratory failure requiring mechanical ventilation in the setting of possible community-acquired pneumonia or aspiration pneumonia with mucous plugging, small subsegmental pulmonary embolism, circulatory shock on vasopressors, and possible myocardial injury.    Today, Patient is intubated and sedated; Management plan as detailed above    ECG: Personally reviewed. normal sinus rhythm, nonspecific ST and T waves changes.    ECHO (personnaly Reviewed):  1. Left ventricular size and wall thickness are normal. Systolic function is  moderately reduced with global hypokinesis. The estimated left ventricular  ejection  "fraction is 30-35%.  2. Right ventricle is not well visualized. Limited views suggest possible  enlargement and reduced systolic function.  3. No hemodynamically significant valvular abnormalities.  4. No prior study available for comparison.        Physical Examination Review of Systems   /80 (BP Location: Left arm)   Pulse 65   Temp 99  F (37.2  C)   Resp 20   Ht 1.702 m (5' 7\")   Wt 43 kg (94 lb 11.2 oz)   SpO2 94%   BMI 14.83 kg/m    Body mass index is 14.83 kg/m .  Wt Readings from Last 3 Encounters:   10/09/22 43 kg (94 lb 11.2 oz)     General Appearance:   no distress, normal body habitus   ENT/Mouth: membranes moist, no oral lesions or bleeding gums.      EYES:  no scleral icterus, normal conjunctivae   Neck: no carotid bruits or thyromegaly   Chest/Lungs:   lungs are clear to auscultation, no rales or wheezing, equal chest wall expansion    Cardiovascular:   Regular. Normal first and second heart sounds with no murmurs, rubs, or gallops; the carotid, radial and posterior tibial pulses are intact, no JVD or LE edema bilaterally    Abdomen:  no organomegaly, masses, bruits, or tenderness; bowel sounds are present   Extremities: no cyanosis or clubbing   Skin: no xanthelasma, warm.    Neurologic: Intubated/sedated     Psychiatric: Intubated/sedated     A complete 10 systems ROS was reviewed  And is negative except what is listed in the HPI.          Medical History  Surgical History Family History Social History   Past Medical History:   Diagnosis Date     Tobacco abuse     Past Surgical History:   Procedure Laterality Date     NO HISTORY OF SURGERY      no family history of premature coronary artery disease Social History     Socioeconomic History     Marital status:      Spouse name: Not on file     Number of children: Not on file     Years of education: Not on file     Highest education level: Not on file   Occupational History     Not on file   Tobacco Use     Smoking status: Every Day "     Types: Cigarettes     Smokeless tobacco: Not on file   Substance and Sexual Activity     Alcohol use: Not on file     Drug use: Not on file     Sexual activity: Not on file   Other Topics Concern     Not on file   Social History Narrative     Not on file     Social Determinants of Health     Financial Resource Strain: Not on file   Food Insecurity: Not on file   Transportation Needs: Not on file   Physical Activity: Not on file   Stress: Not on file   Social Connections: Not on file   Intimate Partner Violence: Not on file   Housing Stability: Not on file           Lab Results    Chemistry/lipid CBC Cardiac Enzymes/BNP/TSH/INR   Lab Results   Component Value Date    BUN 38.6 (H) 10/10/2022     10/10/2022    CO2 28 10/10/2022    Lab Results   Component Value Date    WBC 15.6 (H) 10/10/2022    HGB 16.3 (H) 10/10/2022    HCT 50.0 (H) 10/10/2022    MCV 96 10/10/2022     10/10/2022    Lab Results   Component Value Date    INR 1.41 (H) 10/08/2022     No results found for: CKTOTAL, CKMB, TROPONINI       Weight:    Wt Readings from Last 3 Encounters:   10/09/22 43 kg (94 lb 11.2 oz)       Allergies  No Known Allergies      Surgical History  Past Surgical History:   Procedure Laterality Date     NO HISTORY OF SURGERY         Social History  Tobacco:   History   Smoking Status     Every Day     Types: Cigarettes   Smokeless Tobacco     Not on file    Alcohol:   Social History    Substance and Sexual Activity      Alcohol use: Not on file   Illicit Drugs:   History   Drug Use Not on file       Family History  Family History   Problem Relation Age of Onset     Diabetes Mother           Nisreen Amin MD on 10/10/2022      cc: No Ref-Primary, Physician

## 2022-10-10 NOTE — CONSULTS
Care Management Initial Consult    General Information  Assessment completed with: Children, son-Dan  Type of CM/SW Visit: Initial Assessment    Primary Care Provider verified and updated as needed: Yes   Readmission within the last 30 days: no previous admission in last 30 days         Advance Care Planning: Advance Care Planning Reviewed:  (No HCD)          Communication Assessment  Patient's communication style: spoken language (English or Bilingual)    Hearing Difficulty or Deaf: no   Wear Glasses or Blind: no    Cognitive  Cognitive/Neuro/Behavioral: .WDL except, speech, orientation, motor response, mood/behavior, level of consciousness  Level of Consciousness: sedated  Arousal Level: opens eyes spontaneously  Orientation:  (ALYSON = intubated + sedated)  Mood/Behavior: calm  Best Language:  (ALYSON = intubated + sedated)  Speech: endotracheal tube    Living Environment:   People in home: spouse (spouse currently in long term due to stroke)     Current living Arrangements: house      Able to return to prior arrangements: yes       Family/Social Support:  Care provided by: self  Provides care for: no one  Marital Status:   Children          Description of Support System: Supportive, Involved         Current Resources:   Patient receiving home care services: No     Community Resources: None  Equipment currently used at home: none  Supplies currently used at home: None    Employment/Financial:  Employment Status:          Financial Concerns:             Lifestyle & Psychosocial Needs:  Social Determinants of Health     Tobacco Use: High Risk     Smoking Tobacco Use: Every Day     Smokeless Tobacco Use: Unknown     Passive Exposure: Not on file   Alcohol Use: Not on file   Financial Resource Strain: Not on file   Food Insecurity: Not on file   Transportation Needs: Not on file   Physical Activity: Not on file   Stress: Not on file   Social Connections: Not on file   Intimate Partner Violence: Not on file   Depression:  Not on file   Housing Stability: Not on file       Functional Status:  Prior to admission patient needed assistance:   Dependent ADLs:: Independent  Dependent IADLs:: Independent                Additional Information:    Assessment completed with patient's son Frantz. Patient lives in a split level house with spouse. Spouse suffered a recent stroke and was discharged from [facility] to [facility]. Cognitively he is not able to participate in decision making or planning for patient. Patient has been independent with ADLs and IADLs.  She ambulates without devices and drives. Frantz and pcp clinic state no HCD on file. Frantz will be primary family contact.     Covid vaccinated times 3.     Final discharge plan pending progression and recommendations.     Trixie Joshi RN

## 2022-10-10 NOTE — PLAN OF CARE
[facility] - ICU    RN Progress Note:            Pertinent Assessments:      Please refer to flowsheet rows for full assessment     VS labile much of shift. Temps 95.9-99.8 this shift, use of citlaly hugger as needed. Levophed titrated per orders for BP, vasopressin added per order. Pt noted to desat at times despite increase in FiO2. Intensivist noted, see changed vent orders, chest xray obtained.          Mobility Level:     2         Key Events - This Shift:     See above              Plan:     Continue current orders

## 2022-10-11 ENCOUNTER — APPOINTMENT (OUTPATIENT)
Dept: ULTRASOUND IMAGING | Facility: HOSPITAL | Age: 67
DRG: 207 | End: 2022-10-11
Attending: INTERNAL MEDICINE
Payer: COMMERCIAL

## 2022-10-11 LAB
ANION GAP SERPL CALCULATED.3IONS-SCNC: 5 MMOL/L (ref 7–15)
ATRIAL RATE - MUSE: 116 BPM
ATRIAL RATE - MUSE: 52 BPM
BACTERIA SPT CULT: ABNORMAL
BACTERIA SPT CULT: ABNORMAL
BUN SERPL-MCNC: 35.9 MG/DL (ref 8–23)
CALCIUM SERPL-MCNC: 8.3 MG/DL (ref 8.8–10.2)
CHLORIDE SERPL-SCNC: 111 MMOL/L (ref 98–107)
CREAT SERPL-MCNC: 0.48 MG/DL (ref 0.51–0.95)
DEPRECATED HCO3 PLAS-SCNC: 29 MMOL/L (ref 22–29)
DIASTOLIC BLOOD PRESSURE - MUSE: 50 MMHG
DIASTOLIC BLOOD PRESSURE - MUSE: 81 MMHG
ERYTHROCYTE [DISTWIDTH] IN BLOOD BY AUTOMATED COUNT: 15.2 % (ref 10–15)
GFR SERPL CREATININE-BSD FRML MDRD: >90 ML/MIN/1.73M2
GLUCOSE BLDC GLUCOMTR-MCNC: 113 MG/DL (ref 70–99)
GLUCOSE BLDC GLUCOMTR-MCNC: 147 MG/DL (ref 70–99)
GLUCOSE BLDC GLUCOMTR-MCNC: 156 MG/DL (ref 70–99)
GLUCOSE BLDC GLUCOMTR-MCNC: 184 MG/DL (ref 70–99)
GLUCOSE BLDC GLUCOMTR-MCNC: 196 MG/DL (ref 70–99)
GLUCOSE BLDC GLUCOMTR-MCNC: 203 MG/DL (ref 70–99)
GLUCOSE SERPL-MCNC: 211 MG/DL (ref 70–99)
HCT VFR BLD AUTO: 48.9 % (ref 35–47)
HGB BLD-MCNC: 15.8 G/DL (ref 11.7–15.7)
INTERPRETATION ECG - MUSE: NORMAL
INTERPRETATION ECG - MUSE: NORMAL
MAGNESIUM SERPL-MCNC: 2.2 MG/DL (ref 1.7–2.3)
MCH RBC QN AUTO: 31.5 PG (ref 26.5–33)
MCHC RBC AUTO-ENTMCNC: 32.3 G/DL (ref 31.5–36.5)
MCV RBC AUTO: 97 FL (ref 78–100)
P AXIS - MUSE: 41 DEGREES
P AXIS - MUSE: 79 DEGREES
PHOSPHATE SERPL-MCNC: 2.4 MG/DL (ref 2.5–4.5)
PLATELET # BLD AUTO: 244 10E3/UL (ref 150–450)
POTASSIUM SERPL-SCNC: 4.2 MMOL/L (ref 3.4–5.3)
PR INTERVAL - MUSE: 126 MS
PR INTERVAL - MUSE: 94 MS
QRS DURATION - MUSE: 102 MS
QRS DURATION - MUSE: 90 MS
QT - MUSE: 380 MS
QT - MUSE: 552 MS
QTC - MUSE: 513 MS
QTC - MUSE: 528 MS
R AXIS - MUSE: 100 DEGREES
R AXIS - MUSE: 106 DEGREES
RBC # BLD AUTO: 5.02 10E6/UL (ref 3.8–5.2)
SODIUM SERPL-SCNC: 145 MMOL/L (ref 136–145)
SYSTOLIC BLOOD PRESSURE - MUSE: 111 MMHG
SYSTOLIC BLOOD PRESSURE - MUSE: 75 MMHG
T AXIS - MUSE: -62 DEGREES
T AXIS - MUSE: 268 DEGREES
VENTRICULAR RATE- MUSE: 116 BPM
VENTRICULAR RATE- MUSE: 52 BPM
WBC # BLD AUTO: 14.2 10E3/UL (ref 4–11)

## 2022-10-11 PROCEDURE — 250N000013 HC RX MED GY IP 250 OP 250 PS 637: Performed by: INTERNAL MEDICINE

## 2022-10-11 PROCEDURE — 250N000009 HC RX 250: Performed by: INTERNAL MEDICINE

## 2022-10-11 PROCEDURE — 999N000009 HC STATISTIC AIRWAY CARE

## 2022-10-11 PROCEDURE — 93970 EXTREMITY STUDY: CPT

## 2022-10-11 PROCEDURE — 250N000011 HC RX IP 250 OP 636: Performed by: INTERNAL MEDICINE

## 2022-10-11 PROCEDURE — 80048 BASIC METABOLIC PNL TOTAL CA: CPT | Performed by: INTERNAL MEDICINE

## 2022-10-11 PROCEDURE — 83735 ASSAY OF MAGNESIUM: CPT | Performed by: INTERNAL MEDICINE

## 2022-10-11 PROCEDURE — 94003 VENT MGMT INPAT SUBQ DAY: CPT

## 2022-10-11 PROCEDURE — 84100 ASSAY OF PHOSPHORUS: CPT | Performed by: INTERNAL MEDICINE

## 2022-10-11 PROCEDURE — 85027 COMPLETE CBC AUTOMATED: CPT | Performed by: INTERNAL MEDICINE

## 2022-10-11 PROCEDURE — 99291 CRITICAL CARE FIRST HOUR: CPT | Performed by: INTERNAL MEDICINE

## 2022-10-11 PROCEDURE — 94640 AIRWAY INHALATION TREATMENT: CPT | Mod: 76

## 2022-10-11 PROCEDURE — 200N000001 HC R&B ICU

## 2022-10-11 PROCEDURE — 999N000157 HC STATISTIC RCP TIME EA 10 MIN

## 2022-10-11 PROCEDURE — 258N000003 HC RX IP 258 OP 636: Performed by: INTERNAL MEDICINE

## 2022-10-11 PROCEDURE — 94640 AIRWAY INHALATION TREATMENT: CPT

## 2022-10-11 RX ORDER — HEPARIN SODIUM 5000 [USP'U]/.5ML
5000 INJECTION, SOLUTION INTRAVENOUS; SUBCUTANEOUS EVERY 8 HOURS
Status: DISCONTINUED | OUTPATIENT
Start: 2022-10-11 | End: 2022-10-13

## 2022-10-11 RX ORDER — CEFTRIAXONE 1 G/1
1 INJECTION, POWDER, FOR SOLUTION INTRAMUSCULAR; INTRAVENOUS EVERY 24 HOURS
Status: DISCONTINUED | OUTPATIENT
Start: 2022-10-11 | End: 2022-10-13

## 2022-10-11 RX ADMIN — PROPOFOL 20 MCG/KG/MIN: 10 INJECTION, EMULSION INTRAVENOUS at 21:34

## 2022-10-11 RX ADMIN — CHLORHEXIDINE GLUCONATE 15 ML: 1.2 SOLUTION ORAL at 20:02

## 2022-10-11 RX ADMIN — DEXMEDETOMIDINE HYDROCHLORIDE 1.2 MCG/KG/HR: 400 INJECTION INTRAVENOUS at 15:32

## 2022-10-11 RX ADMIN — HEPARIN SODIUM 5000 UNITS: 10000 INJECTION, SOLUTION INTRAVENOUS; SUBCUTANEOUS at 21:34

## 2022-10-11 RX ADMIN — INSULIN ASPART 1 UNITS: 100 INJECTION, SOLUTION INTRAVENOUS; SUBCUTANEOUS at 20:02

## 2022-10-11 RX ADMIN — DEXMEDETOMIDINE HYDROCHLORIDE 0.5 MCG/KG/HR: 400 INJECTION INTRAVENOUS at 06:47

## 2022-10-11 RX ADMIN — PROPOFOL 15 MCG/KG/MIN: 10 INJECTION, EMULSION INTRAVENOUS at 04:51

## 2022-10-11 RX ADMIN — IPRATROPIUM BROMIDE AND ALBUTEROL SULFATE 3 ML: 2.5; .5 SOLUTION RESPIRATORY (INHALATION) at 07:58

## 2022-10-11 RX ADMIN — VASOPRESSIN 2.4 UNITS/HR: 20 INJECTION INTRAVENOUS at 02:07

## 2022-10-11 RX ADMIN — Medication 50 MCG: at 08:05

## 2022-10-11 RX ADMIN — INSULIN ASPART 2 UNITS: 100 INJECTION, SOLUTION INTRAVENOUS; SUBCUTANEOUS at 00:55

## 2022-10-11 RX ADMIN — HEPARIN SODIUM 5000 UNITS: 10000 INJECTION, SOLUTION INTRAVENOUS; SUBCUTANEOUS at 15:33

## 2022-10-11 RX ADMIN — VASOPRESSIN 2.4 UNITS/HR: 20 INJECTION INTRAVENOUS at 08:46

## 2022-10-11 RX ADMIN — PIPERACILLIN AND TAZOBACTAM 3.38 G: 3; .375 INJECTION, POWDER, LYOPHILIZED, FOR SOLUTION INTRAVENOUS at 04:49

## 2022-10-11 RX ADMIN — INSULIN ASPART 1 UNITS: 100 INJECTION, SOLUTION INTRAVENOUS; SUBCUTANEOUS at 23:48

## 2022-10-11 RX ADMIN — PIPERACILLIN AND TAZOBACTAM 3.38 G: 3; .375 INJECTION, POWDER, LYOPHILIZED, FOR SOLUTION INTRAVENOUS at 11:25

## 2022-10-11 RX ADMIN — IPRATROPIUM BROMIDE AND ALBUTEROL SULFATE 3 ML: 2.5; .5 SOLUTION RESPIRATORY (INHALATION) at 11:25

## 2022-10-11 RX ADMIN — IPRATROPIUM BROMIDE AND ALBUTEROL SULFATE 3 ML: 2.5; .5 SOLUTION RESPIRATORY (INHALATION) at 15:32

## 2022-10-11 RX ADMIN — CEFTRIAXONE SODIUM 1 G: 1 INJECTION, POWDER, FOR SOLUTION INTRAMUSCULAR; INTRAVENOUS at 15:32

## 2022-10-11 RX ADMIN — Medication 40 MG: at 06:47

## 2022-10-11 RX ADMIN — DEXAMETHASONE SODIUM PHOSPHATE 6 MG: 4 INJECTION, SOLUTION INTRA-ARTICULAR; INTRALESIONAL; INTRAMUSCULAR; INTRAVENOUS; SOFT TISSUE at 08:26

## 2022-10-11 RX ADMIN — INSULIN ASPART 1 UNITS: 100 INJECTION, SOLUTION INTRAVENOUS; SUBCUTANEOUS at 15:48

## 2022-10-11 RX ADMIN — CHLORHEXIDINE GLUCONATE 15 ML: 1.2 SOLUTION ORAL at 08:26

## 2022-10-11 RX ADMIN — Medication 50 MCG/HR: at 21:34

## 2022-10-11 RX ADMIN — IPRATROPIUM BROMIDE AND ALBUTEROL SULFATE 3 ML: 2.5; .5 SOLUTION RESPIRATORY (INHALATION) at 19:43

## 2022-10-11 RX ADMIN — DEXMEDETOMIDINE HYDROCHLORIDE 1.2 MCG/KG/HR: 400 INJECTION INTRAVENOUS at 21:34

## 2022-10-11 RX ADMIN — Medication 10 MG: at 08:10

## 2022-10-11 RX ADMIN — INSULIN ASPART 3 UNITS: 100 INJECTION, SOLUTION INTRAVENOUS; SUBCUTANEOUS at 08:27

## 2022-10-11 RX ADMIN — INSULIN ASPART 3 UNITS: 100 INJECTION, SOLUTION INTRAVENOUS; SUBCUTANEOUS at 05:03

## 2022-10-11 RX ADMIN — Medication 0.04 MCG/KG/MIN: at 06:47

## 2022-10-11 RX ADMIN — THIAMINE HCL TAB 100 MG 100 MG: 100 TAB at 08:27

## 2022-10-11 ASSESSMENT — ACTIVITIES OF DAILY LIVING (ADL)
ADLS_ACUITY_SCORE: 34
ADLS_ACUITY_SCORE: 30

## 2022-10-11 NOTE — PLAN OF CARE
[facility] - ICU    RN Progress Note:            Pertinent Assessments:      Please refer to flowsheet rows for full assessment     RASS of -1 to -2. Tolerating t.feeding at 30ml/hr. Able to titrated down levophed drip and fi02 with steady vital signs. Fi02 now at 50% with oxygen saturation of 93% to 95%.          Mobility Level:     2         Key Events - This Shift:     Fi02 of 45%. RASS of -1 to -2. Able to titrated down levophed drip. Negative for DVT. M.D aware. No hematuria noted.               Plan:     Maintain vital signs stable. Titrate down pressors as able.

## 2022-10-11 NOTE — PLAN OF CARE
Problem: Malnutrition  Goal: Improved Nutritional Intake  Outcome: Progressing     Problem: Enteral Nutrition  Goal: Feeding Tolerance  Outcome: Progressing   Goal Outcome Evaluation:     Adjusted TF goal rate up to 45 mL/hr, continuing same advancement schedule.  Propofol rate is 4.2 mL/hr which provides 111 kcal/day.     Promote with fiber at 45 mL/hr provides: 1080 kcal, 67 gm protein, 149 gm cho, 30 gm fat, 15 gm fiber, and 897 mL free water + 180 mL water flushes = 1077 mL water/day.    Added phosphorous to tomorrow morning labs - may need replacement - was slightly low today at 2.4.

## 2022-10-11 NOTE — PLAN OF CARE
Problem: Gas Exchange Impaired  Goal: Optimal Gas Exchange  Outcome: Progressing     Problem: Mechanical Ventilation Invasive  Goal: Absence of Ventilator-Induced Lung Injury  Outcome: Progressing    Catrina Lewis RT

## 2022-10-11 NOTE — PROGRESS NOTES
HEART CARE NOTE          Assessment/Recommendations     1. HFrEF c/b shock  Assessment / Plan    New diagnosis + mildly positive troponin - ? Stress induced in the setting of hypoxic respiratory failure - repeat echo once underlying stressors have been resolved; will likely need ischemic evaluation for further assessment     Near euvolemia on physical exam - no changes to regimen at this time; continue to wean pressors as tolerated    GDMT on hold given the above     2. Shock  Assessment / Plan    Likely mixed picture of cardiogenic and distributive      Continue pressor support and supportive care     3. PE  Assessment / Plan    ?signs of RV strain -  heparin gtt and management per primary team        4. COVID-19 positive  Assessment / Plan    Supportive care per primary team     Patient remains critically ill in the ICU requiring mechanical ventilation support as well as multiple vasopressor requirements for hemodynamic support. 40 minutes spent on critical care.    History of Present Illness/Subjective      Ms. Lavonne Talbot is a 67 year old female with a PMHx significant for (per Tu Price's note) active smoker, with history of lymphedema on furosemide since 2021, metabolic alkalosis, polycythemia, and recent diagnosis of COVID-19 viral infection 1 week ago who presented to [facility] ED on 10/8/2022 due to respiratory distress now admitted to the ICU with acute respiratory failure requiring mechanical ventilation in the setting of possible community-acquired pneumonia or aspiration pneumonia with mucous plugging, small subsegmental pulmonary embolism, circulatory shock on vasopressors, and possible myocardial injury.     Today, Patient is intubated and sedated; Management plan as detailed above     ECG: Personally reviewed. normal sinus rhythm, nonspecific ST and T waves changes.     ECHO (personnaly Reviewed):  1. Left ventricular size and wall thickness are normal. Systolic function is  moderately  "reduced with global hypokinesis. The estimated left ventricular  ejection fraction is 30-35%.  2. Right ventricle is not well visualized. Limited views suggest possible  enlargement and reduced systolic function.  3. No hemodynamically significant valvular abnormalities.  4. No prior study available for comparison.          Physical Examination Review of Systems   /85 (BP Location: Left arm)   Pulse 65   Temp 97.7  F (36.5  C)   Resp 20   Ht 1.702 m (5' 7\")   Wt 43 kg (94 lb 11.2 oz)   SpO2 93%   BMI 14.83 kg/m    Body mass index is 14.83 kg/m .  Wt Readings from Last 3 Encounters:   10/09/22 43 kg (94 lb 11.2 oz)     General Appearance:   Intubated/sedated   ENT/Mouth: membranes moist, no oral lesions or bleeding gums.      EYES:  no scleral icterus, normal conjunctivae   Neck: no carotid bruits or thyromegaly   Chest/Lungs:   lungs are clear to auscultation, no rales or wheezing, equal chest wall expansion    Cardiovascular:   Regular. Normal first and second heart sounds with no murmurs, rubs, or gallops; the carotid, radial and posterior tibial pulses are intact, no JVD or LE edema bilaterally    Abdomen:  no organomegaly, masses, bruits, or tenderness; bowel sounds are present   Extremities: no cyanosis or clubbing   Skin: no xanthelasma, warm.    Neurologic: Intubated/sedated     Psychiatric: Intubated/sedated     A complete 10 systems ROS was reviewed  And is negative except what is listed in the HPI.          Medical History  Surgical History Family History Social History   Past Medical History:   Diagnosis Date     Tobacco abuse     Past Surgical History:   Procedure Laterality Date     NO HISTORY OF SURGERY      no family history of premature coronary artery disease Social History     Socioeconomic History     Marital status:      Spouse name: Not on file     Number of children: Not on file     Years of education: Not on file     Highest education level: Not on file   Occupational " History     Not on file   Tobacco Use     Smoking status: Every Day     Types: Cigarettes     Smokeless tobacco: Not on file   Substance and Sexual Activity     Alcohol use: Not on file     Drug use: Not on file     Sexual activity: Not on file   Other Topics Concern     Not on file   Social History Narrative     Not on file     Social Determinants of Health     Financial Resource Strain: Not on file   Food Insecurity: Not on file   Transportation Needs: Not on file   Physical Activity: Not on file   Stress: Not on file   Social Connections: Not on file   Intimate Partner Violence: Not on file   Housing Stability: Not on file           Lab Results    Chemistry/lipid CBC Cardiac Enzymes/BNP/TSH/INR   Lab Results   Component Value Date    BUN 35.9 (H) 10/11/2022     10/11/2022    CO2 29 10/11/2022    Lab Results   Component Value Date    WBC 14.2 (H) 10/11/2022    HGB 15.8 (H) 10/11/2022    HCT 48.9 (H) 10/11/2022    MCV 97 10/11/2022     10/11/2022    Lab Results   Component Value Date    INR 1.41 (H) 10/08/2022     No results found for: CKTOTAL, CKMB, TROPONINI       Weight:    Wt Readings from Last 3 Encounters:   10/09/22 43 kg (94 lb 11.2 oz)       Allergies  No Known Allergies      Surgical History  Past Surgical History:   Procedure Laterality Date     NO HISTORY OF SURGERY         Social History  Tobacco:   History   Smoking Status     Every Day     Types: Cigarettes   Smokeless Tobacco     Not on file    Alcohol:   Social History    Substance and Sexual Activity      Alcohol use: Not on file   Illicit Drugs:   History   Drug Use Not on file       Family History  Family History   Problem Relation Age of Onset     Diabetes Mother           Nisreen Amin MD on 10/11/2022      cc: Cinthya Cabral

## 2022-10-11 NOTE — PROGRESS NOTES
RT PROGRESS NOTE    VENT DAY# 4    CURRENT SETTINGS:   Vent Mode: CMV/AC  (Continuous Mandatory Ventilation/ Assist Control)  FiO2 (%): 55 %  Resp Rate (Set): 20 breaths/min  Tidal Volume (Set, mL): 400 mL  PEEP (cm H2O): 8 cmH2O  Resp: 20      PATIENT PARAMETERS:  PIP 30  Pplat:  19  Pmean:  14  Compliance: 55  SBT: No     Secretions:  Small amount of white secretions via ETT  02 Sats:  91-94%  BS: diminished     ETT SIZE 7.5 Secured at 22 cm at teeth/gums    Respiratory Medications: Duoneb QID     NOTE / SHIFT SUMMARY:   Pt. remains on full vent support, settings above. BS diminished, nebulizer treatment given as scheduled, ETT repositioned Q2 for skin integrity, titrate oxygen as tolerated, wean on hold, RT following       Catrina Lewis RT

## 2022-10-11 NOTE — PROGRESS NOTES
PROVIDER RESTRAINT FOR NON-VIOLENT BEHAVIOR FACE TO FACE EVALUATION    Patient's Immediate Situation:  Patient demonstrated the following behaviors: impulsive; pulls at critical lines and tubes. Does not respond to redirection.     Patient's Reaction to the intervention:  Does patient understand the reason for restraint/seclusion? Unable to express     Medical Condition:  Is there any evidence of compromise of Skin integrity, Respiratory, Cardiovascular, Musculoskeletal, Hydration?   No    Behavioral Condition:  In consultation with the RN, is there a need to continue this restraint or seclusion? Yes    See Restraint Flowsheet for complete restraint documentation and assessment.    Kiera Hale, CNP  [facility]

## 2022-10-11 NOTE — PLAN OF CARE
[facility] - ICU    RN Progress Note:            Pertinent Assessments:      Please refer to flowsheet rows for full assessment     Very agitated when sedation were paused. Kicking, sat up and tried to grab for the endotracheal tube.           Mobility Level:     2         Key Events - This Shift:     [facility] SAT / SBT Record: Delete if N/A    SAT Safety Screen {PASSED   If FAILED why?    SAT Performed FAILED   If FAILED why? Combative, kicking, sat up and tried to grab the endotracheal tube   SBT Safety Screen FAILED   If FAILED why? SAT failed      Restarted the fentanyl and propofol infusions. Vasopressin was placed on hold and titrated Levophed to maintain mean arterial pressure goal.              Plan:     Monitor for restlessness.         Point of Contact Update Name: Fratnz (son)  Summary of Conversation: patient's status and reaction when sedation was held

## 2022-10-11 NOTE — PROGRESS NOTES
RT PROGRESS NOTE     VENT DAY# 4     CURRENT SETTINGS:  Vent Mode: CMV/AC  (Continuous Mandatory Ventilation/ Assist Control)  FiO2 (%): 50 %  Resp Rate (Set): 20 breaths/min  Tidal Volume (Set, mL): 400 mL  PEEP (cm H2O): 8 cmH2O  Resp: 20        PATIENT PARAMETERS:  PIP 28  Pplat:  19  Pmean:  13  Compliance: 35.3  SBT: no             Secretions:  Scant amount of clear secretions via ETT  02 Sats:  96%  BS: diminished      ETT SIZE 7.5 Secured at 22 cm at teeth/gums     Respiratory Medications: Duonebs QID      NOTE / SHIFT SUMMARY:   Pt. remains on full vent support, settings above. Nebulizer treatment given as scheduled, ETT repositioned Q2 for skin integrity, wean on hold, titrate oxygen as tolerated. RT following      Paul Nolasco RT on 10/11/2022 at 3:12 AM

## 2022-10-11 NOTE — PROGRESS NOTES
"Critical Care Progress Note      10/11/2022    Name: Lavonne Talbot MRN#: [id number]   Age: 67 year old YOB: 1955     Hsptl Day# 3  ICU DAY #    MV DAY #             Problem List:   Active Problems:    Acute respiratory failure with hypoxia (H)    Hypotension, unspecified hypotension type    Infection due to 2019 novel coronavirus    Clinically Significant Risk Factors Present on Admission               # Severe Malnutrition: based on nutrition assessment  # Cachexia: Estimated body mass index is 14.83 kg/m  as calculated from the following:    Height as of this encounter: 1.702 m (5' 7\").    Weight as of this encounter: 43 kg (94 lb 11.2 oz).                       Summary/Hospital Course:     Lavonne Talbot is a 67 year old cachectic female (BMI 12.09 kg/m ), active smoker, with history of lymphedema on furosemide since 2021, metabolic alkalosis, polycythemia, and recent diagnosis of COVID-19 viral infection 1 week ago who presented to [facility] ED on 10/8/2022 due to respiratory distress now admitted to the ICU with acute respiratory failure requiring mechanical ventilation in the setting of possible community-acquired pneumonia or aspiration pneumonia with mucous plugging, small subsegmental pulmonary embolism, circulatory shock on vasopressors.      Assessment and plan :       I have personally reviewed the daily labs, imaging studies, cultures and discussed the case with referring physician and consulting physicians.     My assessment and plan by system for this patient is as follows:    Neurology/Psychiatry:   Sedated with Precedex, fentanyl and propofol    Cardiovascular:   Vasoplegia secondary to sedation.  Requiring norepinephrine  Slightly elevated troponin, echo shows stress-induced cardiomyopathy global dysfunction and EF of 30%  Small PE, subsegmental.  No DVT.  Hematuria with heparin drip so stopped.  Currently will start subcutaneous heparin and then maybe we can transition back " to heparin drip in a day or 2    Cardiology following.  Patient is elevated troponin.  Will need work-up for ischemic heart disease once extubated.      Pulmonary/Ventilator Management:   Acute respiratory failure in a patient with severe emphysema  Concern for community-acquired pneumonia with left lower lobe consolidation.  Patient is also been diagnosed with COVID-19 but she does not appear to be having COVID-pneumonia.    On tidal volume 400 mL, rate of 20, FiO2 50% and PEEP of 5.  Switch Atrovent to DuoNeb's  Continue steroids, she is on dexamethasone for COVID    Haemophilus pneumonia.  Switch Zosyn to Rocephin      GI and Nutrition :   Increase tube feeds as tolerated      Renal/Fluids/Electrolytes:   2 L positive  - monitor function and electrolytes as needed with replacement per ICU protocols.  - generally avoid nephrotoxic agents such as NSAID, IV contrast unless specifically required  - adjust medications as needed for renal clearance  - follow I/O's as appropriate.    Infectious Disease:   COVID-19.  Currently on dexamethasone.  CT does not look like COVID-pneumonia.  Haemophilus pneumonia.  Switch Zosyn to Rocephin    Endocrine:     - ICU insulin protocol, goal sugar <180      ICU Prophylaxis:   1. DVT: Hep drip  2. VAP: HOB 30 degrees, chlorhexidine rinse  3. Stress Ulcer: PPI  4. Restraints: Nonviolent soft two point restraints required and necessary for patient safety and continued cares and good effect as patient continues to pull at necessary lines, tubes despite education and distraction. Will readdress daily.     Category: Non-violent   Type of Restraint: Soft limb x2.   Behavior: Pulling at IVand black catheter tubings.   Root cause of behavior: Critical illness.   Less-restrictive methods that have failed: Redirection, reorientation. 1:1 NA at bedside.   Response to restraint: Not actively pulling atcurrent restraints.   Criteria for release from restraint: Responds to redirection. Leaves  medical devices in place.           Key Medications:       cefTRIAXone  1 g Intravenous Q24H     chlorhexidine  15 mL Mouth/Throat Q12H     dexamethasone  6 mg Intravenous Daily     heparin ANTICOAGULANT  5,000 Units Subcutaneous Q8H     influenza vac high-dose quad  0.7 mL Intramuscular Prior to discharge     insulin aspart  1-12 Units Subcutaneous Q4H     ipratropium - albuterol 0.5 mg/2.5 mg/3 mL  3 mL Nebulization 4x daily     pantoprazole  40 mg Per Feeding Tube QAM AC    Or     pantoprazole  40 mg Intravenous QAM AC     thiamine  100 mg Per Feeding Tube Daily       dexmedetomidine 1.2 mcg/kg/hr (10/11/22 1157)     dextrose       fentaNYL 75 mcg/hr (10/11/22 1215)     [Held by provider] heparin Stopped (10/10/22 1409)     propofol 20 mcg/kg/min (10/11/22 1210)    And     - MEDICATION INSTRUCTIONS -       norepinephrine 0.14 mcg/kg/min (10/11/22 1224)     vasopressin Stopped (10/11/22 0920)               Physical Examination:   Temp:  [96.8  F (36  C)-99  F (37.2  C)] 98.2  F (36.8  C)  Pulse:  [] 67  Resp:  [19-24] 20  BP: (109-128)/(67-85) 111/67  MAP:  [73 mmHg-165 mmHg] 92 mmHg  Arterial Line BP: ()/() 105/80  FiO2 (%):  [45 %-60 %] 55 %  SpO2:  [84 %-100 %] 94 %    Intake/Output Summary (Last 24 hours) at 10/9/2022 1224  Last data filed at 10/9/2022 1200  Gross per 24 hour   Intake 947.77 ml   Output 902 ml   Net 45.77 ml     Wt Readings from Last 4 Encounters:   10/09/22 43 kg (94 lb 11.2 oz)     Arterial Line BP: ()/() 105/80  MAP:  [73 mmHg-165 mmHg] 92 mmHg  BP - Mean:  [] 85  Vent Mode: CMV/AC  (Continuous Mandatory Ventilation/ Assist Control)  FiO2 (%): 55 %  Resp Rate (Set): 20 breaths/min  Tidal Volume (Set, mL): 400 mL  PEEP (cm H2O): 8 cmH2O  Resp: 20    Recent Labs   Lab 10/10/22  1904 10/09/22  0536 10/08/22  2015   PH 7.37 7.41 7.39   PCO2  --  43 43   PO2  --  63* 404*   HCO3  --  26  --    O2PER  --  40 80       GEN: no acute distress   HEENT: head ncat,  sclera anicteric, OP patent, trachea midline   PULM: unlabored synchronous with vent, clear anteriorly    CV/COR: RRR S1S2 no gallop,  No rub, no murmur  ABD: soft nontender, hypoactive bowel sounds, no mass  EXT: No significant edema  NEURO: Sedated  SKIN: no obvious rash  LINES: clean, dry intact         Data:   All data and imaging reviewed     ROUTINE ICU LABS (Last four results)  CMP  Recent Labs   Lab 10/11/22  1128 10/11/22  0741 10/11/22  0503 10/11/22  0501 10/10/22  2134 10/10/22  1904 10/10/22  0441 10/10/22  0440 10/10/22  0130 10/09/22  1951 10/09/22  1812 10/09/22  1624 10/09/22  1302 10/09/22  0814 10/09/22  0535 10/08/22  2016 10/08/22  1132   NA  --   --   --  145  --   --   --  145  --   --   --   --   --   --  144  --  142   POTASSIUM  --   --   --  4.2  --  3.9  --  3.9  --   --   --   --  4.1  --  3.3*  --  3.5   CHLORIDE  --   --   --  111*  --   --   --  108*  --   --   --   --   --   --  108*  --  100   CO2  --   --   --  29  --   --   --  28  --   --   --   --   --   --  26  --  29   ANIONGAP  --   --   --  5*  --   --   --  9  --   --   --   --   --   --  10  --  13   * 203* 196* 211*   < >  --    < > 180*  --    < >  --    < >  --    < > 125*   < > 153*   BUN  --   --   --  35.9*  --   --   --  38.6*  --   --   --   --   --   --  42.1*  --  46.2*   CR  --   --   --  0.48*  --   --   --  0.66  --   --   --   --   --   --  0.77  --  0.92   GFRESTIMATED  --   --   --  >90  --   --   --  >90  --   --   --   --   --   --  84  --  67   JASON  --   --   --  8.3*  --   --   --  8.3*  --   --   --   --   --   --  7.6*  --  7.6*   MAG  --   --   --  2.2  --  2.7*  --   --  3.1*  --  1.0*  --   --   --   --   --  1.8   PHOS  --   --   --  2.4*  --  2.7  --  3.7  --   --   --   --   --   --   --   --   --    PROTTOTAL  --   --   --   --   --   --   --   --   --   --   --   --   --   --   --   --  5.1*   ALBUMIN  --   --   --   --   --   --   --   --   --   --   --   --   --   --   --   --  2.6*    BILITOTAL  --   --   --   --   --   --   --   --   --   --   --   --   --   --   --   --  1.1   ALKPHOS  --   --   --   --   --   --   --   --   --   --   --   --   --   --   --   --  90   AST  --   --   --   --   --   --   --   --   --   --   --   --   --   --   --   --  290*   ALT  --   --   --   --   --   --   --   --   --   --   --   --   --   --   --   --  193*    < > = values in this interval not displayed.     CBC  Recent Labs   Lab 10/11/22  0501 10/10/22  0440 10/09/22  0535 10/08/22  1132   WBC 14.2* 15.6* 13.2* 14.2*   RBC 5.02 5.20 5.29* 5.37*   HGB 15.8* 16.3* 16.6* 16.9*   HCT 48.9* 50.0* 49.8* 51.8*   MCV 97 96 94 97   MCH 31.5 31.3 31.4 31.5   MCHC 32.3 32.6 33.3 32.6   RDW 15.2* 14.6 14.2 13.9    305 282 297     INR  Recent Labs   Lab 10/08/22  1132   INR 1.41*     Arterial Blood Gas  Recent Labs   Lab 10/10/22  1904 10/09/22  0536 10/08/22  2015   PH 7.37 7.41 7.39   PCO2  --  43 43   PO2  --  63* 404*   HCO3  --  26  --    O2PER  --  40 80       All cultures:  No results for input(s): CULT in the last 168 hours.  Recent Results (from the past 24 hour(s))   US Lower Extremity Venous Duplex Bilateral    Narrative    EXAM: US LOWER EXTREMITY VENOUS DUPLEX BILATERAL  LOCATION: [facility]  DATE/TIME: 10/11/2022 4:46 AM    INDICATION: Pulmonary embolism on CTA.  COMPARISON: None.  TECHNIQUE: Venous Duplex ultrasound of bilateral lower extremities with and without compression, augmentation and duplex. Color flow and spectral Doppler with waveform analysis performed.    FINDINGS: Exam includes the common femoral, femoral, popliteal veins as well as segmentally visualized deep calf veins and greater saphenous vein.     RIGHT: No deep vein thrombosis. No superficial thrombophlebitis. No popliteal cyst.    LEFT: No deep vein thrombosis. No superficial thrombophlebitis. No popliteal cyst.      Impression    IMPRESSION:  1.  No deep venous thrombosis in the bilateral lower  extremities.         Billing: This patient is critically ill: Yes. Total critical care time today 36 min exclusive of procedures or teaching.

## 2022-10-12 LAB
ALBUMIN SERPL BCG-MCNC: 2.7 G/DL (ref 3.5–5.2)
ALP SERPL-CCNC: 86 U/L (ref 35–104)
ALT SERPL W P-5'-P-CCNC: 55 U/L (ref 10–35)
ANION GAP SERPL CALCULATED.3IONS-SCNC: 5 MMOL/L (ref 7–15)
AST SERPL W P-5'-P-CCNC: 18 U/L (ref 10–35)
ATRIAL RATE - MUSE: 55 BPM
BASE EXCESS BLDA CALC-SCNC: 8 MMOL/L
BILIRUB DIRECT SERPL-MCNC: 0.27 MG/DL (ref 0–0.3)
BILIRUB SERPL-MCNC: 0.6 MG/DL
BUN SERPL-MCNC: 30.5 MG/DL (ref 8–23)
CALCIUM SERPL-MCNC: 8.4 MG/DL (ref 8.8–10.2)
CHLORIDE SERPL-SCNC: 112 MMOL/L (ref 98–107)
CK SERPL-CCNC: 70 U/L (ref 26–192)
COHGB MFR BLD: 91.5 % (ref 95–96)
CREAT SERPL-MCNC: 0.4 MG/DL (ref 0.51–0.95)
DEPRECATED HCO3 PLAS-SCNC: 31 MMOL/L (ref 22–29)
DIASTOLIC BLOOD PRESSURE - MUSE: NORMAL MMHG
ERYTHROCYTE [DISTWIDTH] IN BLOOD BY AUTOMATED COUNT: 15 % (ref 10–15)
GFR SERPL CREATININE-BSD FRML MDRD: >90 ML/MIN/1.73M2
GLUCOSE BLDC GLUCOMTR-MCNC: 149 MG/DL (ref 70–99)
GLUCOSE BLDC GLUCOMTR-MCNC: 157 MG/DL (ref 70–99)
GLUCOSE BLDC GLUCOMTR-MCNC: 172 MG/DL (ref 70–99)
GLUCOSE BLDC GLUCOMTR-MCNC: 172 MG/DL (ref 70–99)
GLUCOSE BLDC GLUCOMTR-MCNC: 192 MG/DL (ref 70–99)
GLUCOSE BLDC GLUCOMTR-MCNC: 198 MG/DL (ref 70–99)
GLUCOSE BLDC GLUCOMTR-MCNC: 70 MG/DL (ref 70–99)
GLUCOSE SERPL-MCNC: 177 MG/DL (ref 70–99)
HCO3 BLD-SCNC: 30 MMOL/L (ref 23–29)
HCT VFR BLD AUTO: 45.6 % (ref 35–47)
HGB BLD-MCNC: 14.8 G/DL (ref 11.7–15.7)
INTERPRETATION ECG - MUSE: NORMAL
MAGNESIUM SERPL-MCNC: 2.2 MG/DL (ref 1.7–2.3)
MCH RBC QN AUTO: 31.4 PG (ref 26.5–33)
MCHC RBC AUTO-ENTMCNC: 32.5 G/DL (ref 31.5–36.5)
MCV RBC AUTO: 97 FL (ref 78–100)
O2/TOTAL GAS SETTING VFR VENT: 50 %
OXYHGB MFR BLD: 90.5 % (ref 95–96)
P AXIS - MUSE: 82 DEGREES
PCO2 BLD: 52 MM HG (ref 35–45)
PEEP: 8 CM H2O
PH BLD: 7.41 [PH] (ref 7.37–7.44)
PHOSPHATE SERPL-MCNC: 2.4 MG/DL (ref 2.5–4.5)
PLATELET # BLD AUTO: 190 10E3/UL (ref 150–450)
PO2 BLD: 60 MM HG (ref 75–85)
POTASSIUM SERPL-SCNC: 4.4 MMOL/L (ref 3.4–5.3)
POTASSIUM SERPL-SCNC: 4.5 MMOL/L (ref 3.4–5.3)
PR INTERVAL - MUSE: 124 MS
PROT SERPL-MCNC: 6.3 G/DL (ref 6.4–8.3)
QRS DURATION - MUSE: 98 MS
QT - MUSE: 446 MS
QTC - MUSE: 426 MS
R AXIS - MUSE: 98 DEGREES
RATE: 20 RR/MIN
RBC # BLD AUTO: 4.71 10E6/UL (ref 3.8–5.2)
SODIUM SERPL-SCNC: 145 MMOL/L (ref 136–145)
SODIUM SERPL-SCNC: 148 MMOL/L (ref 136–145)
SYSTOLIC BLOOD PRESSURE - MUSE: NORMAL MMHG
T AXIS - MUSE: 81 DEGREES
TEMPERATURE: 37 DEGREES C
TRIGL SERPL-MCNC: 113 MG/DL
VENTILATION MODE: ABNORMAL
VENTILATOR TIDAL VOLUME: 400 ML
VENTRICULAR RATE- MUSE: 55 BPM
WBC # BLD AUTO: 12.2 10E3/UL (ref 4–11)

## 2022-10-12 PROCEDURE — 250N000011 HC RX IP 250 OP 636: Performed by: INTERNAL MEDICINE

## 2022-10-12 PROCEDURE — 82550 ASSAY OF CK (CPK): CPT | Performed by: INTERNAL MEDICINE

## 2022-10-12 PROCEDURE — 84478 ASSAY OF TRIGLYCERIDES: CPT | Performed by: NURSE PRACTITIONER

## 2022-10-12 PROCEDURE — 82805 BLOOD GASES W/O2 SATURATION: CPT | Performed by: INTERNAL MEDICINE

## 2022-10-12 PROCEDURE — 250N000009 HC RX 250: Performed by: INTERNAL MEDICINE

## 2022-10-12 PROCEDURE — 87040 BLOOD CULTURE FOR BACTERIA: CPT | Performed by: INTERNAL MEDICINE

## 2022-10-12 PROCEDURE — 36415 COLL VENOUS BLD VENIPUNCTURE: CPT | Performed by: INTERNAL MEDICINE

## 2022-10-12 PROCEDURE — 999N000158 HC STATISTIC RCP TIME ED VENT EA 10 MIN

## 2022-10-12 PROCEDURE — 83735 ASSAY OF MAGNESIUM: CPT | Performed by: INTERNAL MEDICINE

## 2022-10-12 PROCEDURE — 93005 ELECTROCARDIOGRAM TRACING: CPT | Performed by: INTERNAL MEDICINE

## 2022-10-12 PROCEDURE — 82248 BILIRUBIN DIRECT: CPT | Performed by: INTERNAL MEDICINE

## 2022-10-12 PROCEDURE — 999N000157 HC STATISTIC RCP TIME EA 10 MIN

## 2022-10-12 PROCEDURE — 93005 ELECTROCARDIOGRAM TRACING: CPT

## 2022-10-12 PROCEDURE — 85027 COMPLETE CBC AUTOMATED: CPT | Performed by: INTERNAL MEDICINE

## 2022-10-12 PROCEDURE — 250N000013 HC RX MED GY IP 250 OP 250 PS 637: Performed by: INTERNAL MEDICINE

## 2022-10-12 PROCEDURE — 84295 ASSAY OF SERUM SODIUM: CPT | Performed by: INTERNAL MEDICINE

## 2022-10-12 PROCEDURE — 94003 VENT MGMT INPAT SUBQ DAY: CPT

## 2022-10-12 PROCEDURE — 99291 CRITICAL CARE FIRST HOUR: CPT | Performed by: INTERNAL MEDICINE

## 2022-10-12 PROCEDURE — 94640 AIRWAY INHALATION TREATMENT: CPT | Mod: 76

## 2022-10-12 PROCEDURE — 84132 ASSAY OF SERUM POTASSIUM: CPT | Performed by: INTERNAL MEDICINE

## 2022-10-12 PROCEDURE — 94640 AIRWAY INHALATION TREATMENT: CPT

## 2022-10-12 PROCEDURE — C9113 INJ PANTOPRAZOLE SODIUM, VIA: HCPCS | Performed by: INTERNAL MEDICINE

## 2022-10-12 PROCEDURE — 200N000001 HC R&B ICU

## 2022-10-12 PROCEDURE — 999N000009 HC STATISTIC AIRWAY CARE

## 2022-10-12 PROCEDURE — 93010 ELECTROCARDIOGRAM REPORT: CPT | Performed by: INTERNAL MEDICINE

## 2022-10-12 PROCEDURE — 84100 ASSAY OF PHOSPHORUS: CPT | Performed by: INTERNAL MEDICINE

## 2022-10-12 PROCEDURE — 82310 ASSAY OF CALCIUM: CPT | Performed by: INTERNAL MEDICINE

## 2022-10-12 RX ORDER — FUROSEMIDE 10 MG/ML
40 INJECTION INTRAMUSCULAR; INTRAVENOUS ONCE
Status: COMPLETED | OUTPATIENT
Start: 2022-10-12 | End: 2022-10-12

## 2022-10-12 RX ORDER — QUETIAPINE FUMARATE 25 MG/1
25 TABLET, FILM COATED ORAL 2 TIMES DAILY
Status: DISCONTINUED | OUTPATIENT
Start: 2022-10-12 | End: 2022-10-15

## 2022-10-12 RX ORDER — AMOXICILLIN 250 MG
1 CAPSULE ORAL 2 TIMES DAILY
Status: DISCONTINUED | OUTPATIENT
Start: 2022-10-12 | End: 2022-10-16

## 2022-10-12 RX ORDER — FUROSEMIDE 10 MG/ML
20 INJECTION INTRAMUSCULAR; INTRAVENOUS ONCE
Status: COMPLETED | OUTPATIENT
Start: 2022-10-12 | End: 2022-10-12

## 2022-10-12 RX ORDER — METOLAZONE 5 MG/1
5 TABLET ORAL
Status: DISCONTINUED | OUTPATIENT
Start: 2022-10-12 | End: 2022-10-17

## 2022-10-12 RX ORDER — POLYETHYLENE GLYCOL 3350 17 G/17G
17 POWDER, FOR SOLUTION ORAL DAILY PRN
Status: DISCONTINUED | OUTPATIENT
Start: 2022-10-12 | End: 2022-10-13

## 2022-10-12 RX ADMIN — QUETIAPINE FUMARATE 25 MG: 25 TABLET ORAL at 20:31

## 2022-10-12 RX ADMIN — Medication 50 MCG: at 08:13

## 2022-10-12 RX ADMIN — PROPOFOL 20 MCG/KG/MIN: 10 INJECTION, EMULSION INTRAVENOUS at 20:01

## 2022-10-12 RX ADMIN — PANTOPRAZOLE SODIUM 40 MG: 40 INJECTION, POWDER, FOR SOLUTION INTRAVENOUS at 06:57

## 2022-10-12 RX ADMIN — SENNOSIDES AND DOCUSATE SODIUM 1 TABLET: 50; 8.6 TABLET ORAL at 20:31

## 2022-10-12 RX ADMIN — QUETIAPINE FUMARATE 25 MG: 25 TABLET ORAL at 12:59

## 2022-10-12 RX ADMIN — Medication 0.08 MCG/KG/MIN: at 14:31

## 2022-10-12 RX ADMIN — DEXMEDETOMIDINE HYDROCHLORIDE 1.2 MCG/KG/HR: 400 INJECTION INTRAVENOUS at 14:31

## 2022-10-12 RX ADMIN — METOLAZONE 5 MG: 5 TABLET ORAL at 11:23

## 2022-10-12 RX ADMIN — HEPARIN SODIUM 5000 UNITS: 10000 INJECTION, SOLUTION INTRAVENOUS; SUBCUTANEOUS at 06:12

## 2022-10-12 RX ADMIN — IPRATROPIUM BROMIDE AND ALBUTEROL SULFATE 3 ML: 2.5; .5 SOLUTION RESPIRATORY (INHALATION) at 12:11

## 2022-10-12 RX ADMIN — HEPARIN SODIUM 5000 UNITS: 10000 INJECTION, SOLUTION INTRAVENOUS; SUBCUTANEOUS at 21:45

## 2022-10-12 RX ADMIN — METOLAZONE 5 MG: 5 TABLET ORAL at 18:20

## 2022-10-12 RX ADMIN — IPRATROPIUM BROMIDE AND ALBUTEROL SULFATE 3 ML: 2.5; .5 SOLUTION RESPIRATORY (INHALATION) at 19:09

## 2022-10-12 RX ADMIN — INSULIN ASPART 3 UNITS: 100 INJECTION, SOLUTION INTRAVENOUS; SUBCUTANEOUS at 08:09

## 2022-10-12 RX ADMIN — IPRATROPIUM BROMIDE AND ALBUTEROL SULFATE 3 ML: 2.5; .5 SOLUTION RESPIRATORY (INHALATION) at 08:07

## 2022-10-12 RX ADMIN — INSULIN ASPART 1 UNITS: 100 INJECTION, SOLUTION INTRAVENOUS; SUBCUTANEOUS at 20:23

## 2022-10-12 RX ADMIN — DEXMEDETOMIDINE HYDROCHLORIDE 1.2 MCG/KG/HR: 400 INJECTION INTRAVENOUS at 06:31

## 2022-10-12 RX ADMIN — THIAMINE HCL TAB 100 MG 100 MG: 100 TAB at 08:10

## 2022-10-12 RX ADMIN — CHLORHEXIDINE GLUCONATE 15 ML: 1.2 SOLUTION ORAL at 08:10

## 2022-10-12 RX ADMIN — HEPARIN SODIUM 5000 UNITS: 10000 INJECTION, SOLUTION INTRAVENOUS; SUBCUTANEOUS at 14:30

## 2022-10-12 RX ADMIN — INSULIN ASPART 2 UNITS: 100 INJECTION, SOLUTION INTRAVENOUS; SUBCUTANEOUS at 04:22

## 2022-10-12 RX ADMIN — DEXAMETHASONE SODIUM PHOSPHATE 6 MG: 4 INJECTION, SOLUTION INTRA-ARTICULAR; INTRALESIONAL; INTRAMUSCULAR; INTRAVENOUS; SOFT TISSUE at 08:10

## 2022-10-12 RX ADMIN — INSULIN ASPART 3 UNITS: 100 INJECTION, SOLUTION INTRAVENOUS; SUBCUTANEOUS at 16:17

## 2022-10-12 RX ADMIN — FUROSEMIDE 40 MG: 10 INJECTION, SOLUTION INTRAMUSCULAR; INTRAVENOUS at 11:24

## 2022-10-12 RX ADMIN — CHLORHEXIDINE GLUCONATE 15 ML: 1.2 SOLUTION ORAL at 20:25

## 2022-10-12 RX ADMIN — DEXMEDETOMIDINE HYDROCHLORIDE 1.2 MCG/KG/HR: 400 INJECTION INTRAVENOUS at 22:50

## 2022-10-12 RX ADMIN — CEFTRIAXONE SODIUM 1 G: 1 INJECTION, POWDER, FOR SOLUTION INTRAMUSCULAR; INTRAVENOUS at 14:31

## 2022-10-12 RX ADMIN — IPRATROPIUM BROMIDE AND ALBUTEROL SULFATE 3 ML: 2.5; .5 SOLUTION RESPIRATORY (INHALATION) at 15:58

## 2022-10-12 RX ADMIN — FUROSEMIDE 20 MG: 10 INJECTION, SOLUTION INTRAMUSCULAR; INTRAVENOUS at 10:30

## 2022-10-12 ASSESSMENT — ACTIVITIES OF DAILY LIVING (ADL)
ADLS_ACUITY_SCORE: 30

## 2022-10-12 NOTE — PROGRESS NOTES
Respiratory Care    Vent day 5    Vent Mode: CMV/AC  (Continuous Mandatory Ventilation/ Assist Control)  FiO2 (%): 50 %  Resp Rate (Set): 20 breaths/min  Tidal Volume (Set, mL): 400 mL  PEEP (cm H2O): 8 cmH2O  Resp: 20    PIP: 24  Pplat: 19  Secretions: small tan     ABG:      Latest Reference Range & Units 10/12/22 09:42   pH Arterial 7.37 - 7.44  7.41   pCO2 Arterial 35 - 45 mm Hg 52 (H)   PO2 Arterial 75 - 85 mm Hg 60 (L)   Bicarbonate Arterial 23 - 29 mmol/L 30 (H)   (H): Data is abnormally high  (L): Data is abnormally low    Notes/plan: Nebs given. BS diminished/coarse pre and post. Weaning O2 as able. Sats have been 91-93% on 50% FiO2 . No SBT yet due to not following commands. Will continue to follow.      Paulo Delgado, RT

## 2022-10-12 NOTE — PLAN OF CARE
[facility] - ICU    RN Progress Note:            Pertinent Assessments:      Please refer to flowsheet rows for full assessment     Very agitated when spontaneous awakening trial was performed.  Dark bonnie urine output           Mobility Level:     2         Key Events - This Shift:     [facility] SAT / SBT Record: Delete if N/A    SAT Safety Screen Failed   If FAILED why? Agitated, not following commands   SAT Performed FAILED   If FAILED why? Failed SAT screen   SBT Safety Screen FAILED   If FAILED why? Failed SAT screen      After 5 minutes of pausing the fentanyl and Propofol, patient opened her eyes, squeezed the writer's hands. But, all of the sudden, the patient sat up, very agitated, restless, non redirectable. She was trying to reach for the endotracheal tube. Fentanyl and Propofol were restarted. Fentanyl bolus was given too. Dr. Chu was informed and ordered to hold off the awakening trial.   Blood cultures were sent.  Total of 60 mg of IV lasix were given, total of 1,250ml post lasix output.              Plan:     Check potassium and sodium level. Monitor for restlessness and agitation     Problem: COPD (Chronic Obstructive Pulmonary Disease) Comorbidity  Goal: Maintenance of COPD Symptom Control  Outcome: Progressing  Intervention: Maintain COPD-Symptom Control  Recent Flowsheet Documentation  Taken 10/12/2022 0800 by Kanika Rees, RN  Medication Review/Management: medications reviewed     Problem: Gas Exchange Impaired  Goal: Optimal Gas Exchange  Outcome: Progressing  Intervention: Optimize Oxygenation and Ventilation  Recent Flowsheet Documentation  Taken 10/12/2022 0800 by Kanika Rees, RN  Head of Bed (HOB) Positioning: HOB at 30 degrees     Problem: Anxiety Signs/Symptoms  Goal: Optimized Energy Level (Anxiety Signs/Symptoms)  Outcome: Progressing  Goal: Optimized Cognitive Function (Anxiety Signs/Symptoms)  Outcome: Progressing  Goal: Improved Mood Symptoms  (Anxiety Signs/Symptoms)  Outcome: Progressing  Goal: Improved Sleep (Anxiety Signs/Symptoms)  Outcome: Progressing  Goal: Enhanced Social, Occupational or Functional Skills (Anxiety Signs/Symptoms)  Outcome: Progressing  Goal: Improved Somatic Symptoms (Anxiety Signs/Symptoms)  Outcome: Progressing   Goal Outcome Evaluation:

## 2022-10-12 NOTE — PROGRESS NOTES
Care Management Follow Up    Length of Stay (days): 4    Expected Discharge Date: pending     Concerns to be Addressed: pulm status, intubated        Patient plan of care discussed at interdisciplinary rounds: Yes    Anticipated Discharge Disposition:  TBD     Anticipated Discharge Services:  TBD    Anticipated Discharge DME:  TBD         Additional Information:  Patient admitted for respiratory distress, acute respiratory failure requiring intubation 10/8 in the setting of community-acquired pneumonia or aspiration pneumonia with mucous plugging, small subsegmental pulmonary embolism, circulatory shock on vasopressors.      Social history:  Patient lives in a split level house with spouse. Spouse suffered a recent stroke and was discharged from [facility] to [facility]. Cognitively he is not able to participate in decision making or planning for patient. Patient has been independent with ADLs and IADLs.  She ambulates without devices and drives. Frantz and pcp clinic state no HCD on file. Frantz will be primary family contact.      Covid vaccinated times 3.      Final discharge plan pending progression and recommendations.       Trixie Joshi RN

## 2022-10-12 NOTE — PLAN OF CARE
[facility] - ICU    RN Progress Note:            Pertinent Assessments:      Please refer to flowsheet rows for full assessment     Pt RASS - 2 (goal -1 to -2). Lung sounds diminished & coarse. SpO2 92%+. Map >75 (goal >65). Telemetry reads Normal Sinus Rhythm. Urine output for my shift: 2100 mL. Total urine output for day 3540 mL.    Infusions running continuously:    Norepinephrine    Dexmedetomidine    Propofol    Fentanyl    Protocols:  K+: 4.5, am recheck, 10/13.  Magnesium: 2.2, am recheck, 10/13.           Mobility Level:     Turn and Reposition q 2 hr.          Key Events - This Shift:     Reduced levophed from 0.08 to 0.06 mcg/kg/min, Then had to move it back up to 0.08 r/t SBP 78 to 82 (map >67).              Plan:     Continue to reduce vasopressors, extubate, cardiac workup when extubated.         Point of Contact Update YES-OR-NO: Yes  If No, reason:   Name: Jess  Phone Number:[phone]  Summary of Conversation: Updated daughter on pts vital signs stable, pt comfortable and plan. She asked when they can visit, I informed that we keep covid19+ patients on precautions, ID doctors/physicians decide when it is removed. Informed her we can set up Ipad if she wanted, she declined for now.            Problem: Mechanical Ventilation Invasive  Goal: Effective Communication  Outcome: Progressing  Intervention: Ensure Effective Communication  Recent Flowsheet Documentation  Taken 10/12/2022 1600 by Elida Castro RN  Trust Relationship/Rapport:    care explained    reassurance provided     Problem: Mechanical Ventilation Invasive  Goal: Optimal Device Function  Intervention: Optimize Device Care and Function  Recent Flowsheet Documentation  Taken 10/12/2022 1600 by Elida Castro, RN  Airway Safety Measures:    all equipment/monitors on and audible    oxygen flowmeter    suction at bedside    suction equipment  Oral Care:    lip/mouth moisturizer applied    suction provided      Problem: Mechanical Ventilation Invasive  Goal: Mechanical Ventilation Liberation  Intervention: Promote Extubation and Mechanical Ventilation Liberation  Recent Flowsheet Documentation  Taken 10/12/2022 1600 by Elida Castro RN  Medication Review/Management: medications reviewed     Problem: Enteral Nutrition  Goal: Absence of Aspiration Signs and Symptoms  Outcome: Progressing  Intervention: Minimize Aspiration Risk  Recent Flowsheet Documentation  Taken 10/12/2022 1600 by Elida Castro RN  Oral Care:    lip/mouth moisturizer applied    suction provided  Head of Bed (HOB) Positioning: HOB at 30 degrees     Problem: Malnutrition  Goal: Improved Nutritional Intake  Outcome: Progressing

## 2022-10-12 NOTE — PROGRESS NOTES
RT PROGRESS NOTE     VENT DAY# 5     CURRENT SETTINGS:  Vent Mode: CMV/AC  (Continuous Mandatory Ventilation/ Assist Control)  FiO2 (%): 45 %  Resp Rate (Set): 20 breaths/min  Tidal Volume (Set, mL): 400 mL  PEEP (cm H2O): 8 cmH2O  Resp: 20        PATIENT PARAMETERS:  PIP 26  Pplat:  19  Pmean:  13  Compliance: 31  SBT: No              Secretions:  Small amount of thick yellow secretions via ETT  02 Sats:  93-95%  BS: diminished      ETT SIZE 7.5 Secured at 22 cm at teeth/gums     Respiratory Medications: Duoneb QID      NOTE / SHIFT SUMMARY:   Pt. remains on full vent support, settings above. BS diminished, nebulizer treatment given as scheduled, ETT repositioned Q2 for skin integrity, titrate oxygen as tolerated, wean on hold, RT following       Paul Nolasco, RT on 10/12/2022 at 4:48 AM

## 2022-10-12 NOTE — PLAN OF CARE
Problem: Malnutrition  Goal: Improved Nutritional Intake  Outcome: Progressing     Problem: Enteral Nutrition  Goal: Feeding Tolerance  Outcome: Progressing   Goal Outcome Evaluation:       TF at goal rate, pt tolerating.  Hypernatremia noted, pt on diuretic.  MD increased FWF this am to 150 ml every 4 hrs.  Will increase further to 200 ml every 4 hrs and monitor hydration.

## 2022-10-12 NOTE — PLAN OF CARE
[facility] - ICU    RN Progress Note:            Pertinent Assessments:      Please refer to flowsheet rows for full assessment     RASS -1 to -2. Pt at goal rate of 45ml/hr on tube feeding. Gastric residuals were 220 and 180 this shift. Urine output total this shift 247. Creatinine lab result 0.40, BUN 30.5, and sodium 148. Phosphorus was 2.4, however pt not on protocol.          Mobility Level:     1; pt desat with mobility level 2 activities.          Key Events - This Shift:     Pt on FiO2 of 45% for most of the night. At 0500 pt began to desat to 87. At 0530 went up to FiO2 of 50%, after repositioning did not help to raise O2 sat. Came down on propofol to 15 and O2 sat recovered to low 90s.   Titrated down on levo to 0.01. BP and MAP remained above goal. HR slowed to mid to low 50s.               Plan:     I updated  on urine output and phosphorus labs. Continue to titrate levo and sedation meds down as tolerated. Change levo and propofol tubing with next bag change.          Point of Contact Update YES-OR-NO: No  If No, reason: No update this shift           Goal Outcome Evaluation:         Problem: Mechanical Ventilation Invasive  Goal: Mechanical Ventilation Liberation  Intervention: Promote Extubation and Mechanical Ventilation Liberation  Recent Flowsheet Documentation  Taken 10/12/2022 0400 by Yuki Goel, RN  Sleep/Rest Enhancement:    awakenings minimized    noise level reduced    room darkened  Medication Review/Management:    medications reviewed    infusion titrated  Taken 10/12/2022 0000 by Yuki Goel, RN  Sleep/Rest Enhancement:    awakenings minimized    noise level reduced    room darkened  Medication Review/Management:    medications reviewed    infusion titrated   Pt needed additional FiO2 support. Decreased FiO2 to 45% at 0000 and increased again at 0500 to 50%.

## 2022-10-12 NOTE — PROGRESS NOTES
ICU PROGRESS NOTE:    Assessment/Plan:  67 year old cachectic female (BMI 12.09 kg/m ), active smoker, with history of lymphedema on furosemide since 2021, metabolic alkalosis, polycythemia, and recent diagnosis of COVID-19 viral infection 1 week ago who presented to [facility] ED on 10/8/2022 due to respiratory distress now admitted to the ICU with acute respiratory failure requiring mechanical ventilation in the setting of community-acquired pneumonia or aspiration pneumonia with mucous plugging, small subsegmental pulmonary embolism, circulatory shock on vasopressors.    CV:  Vasoplegia secondary to sedation.  Requiring norepinephrine  Cardiology following.  Patient is elevated troponin.  Will need work-up for ischemic heart disease once extubated.    RESP:  Acute respiratory failure in a patient with severe COPD/emphysema  Concern for community-acquired pneumonia with left lower lobe consolidation.  Patient is also been diagnosed with COVID-19 but she does not appear to be having COVID-pneumonia.  Continue duoneb  Continue steroids, she is on dexamethasone for COVID  Haemophilus pneumonia on abx  Small PE, subsegmental.  No DVT. Anti-coagulation complicated by hematuria and on hold.  Moderate vent settings still, has not tolerated sedation vacation  Need to try to keep I/O in negative balance    RENAL:  2 L positive  Hypernatremia, uptrending  Free water flushes increased. Needs ongoing diuresis, but also Na management. Will resume lasix at low dose and start metolazone given hyperNa    ID:  COVID-19.  Currently on dexamethasone.  CT does not look like COVID-pneumonia.  Haemophilus pneumonia, on ceftriaxone  Positive Bcx on 10/8 - repeat     GI:  Tolerating tube feedings  Repeat LFTs as these were abnormal 10/8  Add bowel regimen    NEURO:  Sedated with Precedex, fentanyl and propofol  Has been unable to tolerate any sedation vacation due to agitation.  QTc improved, start BID seroquel today and titrate  "up   Check TG level     HEMATOLOGIC:  Stable counts, continue to monitor  Tolerating HSQ without recurrence of hematuria. Will re-attempt heparin drip tomorrow    ENDOCRINE:  - ICU insulin protocol, goal sugar <180      ICU Checklist:  Feeding: tube feeds    Lines/Tubes:  [place] 10/8  Arterial line 10/8  R internal jugular TLC 10/7  ETT (10/7)  PIVs    Prophylaxis:    Thromboembolic: HSQ     Stress Ulcer: PPI    VAP: peridex    Restraints? yes    DISPOSITION: ICU    CODE STATUS: Full Code:    FAMILY COMMUNICATION: Will update later    Total Critical Care Time: 50 minutes    Upon evaluation, this patient is critically ill with a high probability of imminent life threatening deterioration which required my direct personal management.      Paradise Chu MD  Pulmonary and Critical Care Medicine  [facility]  Office: [phone]      Clinically Significant Risk Factors Present on Admission              # Severe Malnutrition: based on nutrition assessment  # Cachexia: Estimated body mass index is 16.12 kg/m  as calculated from the following:    Height as of this encounter: 1.702 m (5' 7\").    Weight as of this encounter: 46.7 kg (102 lb 14.4 oz).        ----------------------------    Overnight events:  No acute events  Low UOP overnight  Increasing Na    Subjective:  Unable to obtain    Objective:  Physical Exam:  Vent Mode: CMV/AC  (Continuous Mandatory Ventilation/ Assist Control)  FiO2 (%): 45 %  Resp Rate (Set): 20 breaths/min  Tidal Volume (Set, mL): 400 mL  PEEP (cm H2O): 8 cmH2O  Resp: 20    /72 (BP Location: Left arm)   Pulse 58   Temp 97.5  F (36.4  C)   Resp 20   Ht 1.702 m (5' 7\")   Wt 46.7 kg (102 lb 14.4 oz)   SpO2 96%   BMI 16.12 kg/m      Intake/Output last 3 shifts:  I/O last 3 completed shifts:  In: 1909.2 [I.V.:861.2; NG/GT:260]  Out: 1622 [Urine:1622]  Intake/Output this shift:  I/O this shift:  In: 317.1 [I.V.:32.1; NG/GT:195]  Out: 30 [Urine:30]    Physical Exam  Gen: Intubated, " sedated, no distress, cachectic  HEENT: no OP lesions, no TARUN  CV: RRR, no m/g/r  Resp: Diminished, clear  Abd: soft, nontender, BS+  Neuro: PERRL, nonfocal  Ext: no edema, warm    LABS:  Reviewed in detail    IMAGING/STUDIES:    CT PE:  ANGIOGRAM CHEST: A single acute segmental pulmonary embolus in the left lower lobe, image 190:5.  Thoracic aorta is not well opacified and is indeterminate for dissection. No CT evidence of right heart strain.     LUNGS AND PLEURA: The endotracheal tube is 5.6 cm superior to the izabela. Severe emphysema. Mild diffuse bronchial wall thickening. Multiple foci of mucus plugging in the lower lobes, lingula and right middle lobe. Small consolidations in the left lower   lobe. No pleural effusion.     MEDIASTINUM/AXILLAE: A right internal jugular central venous catheter with the tip in the superior vena cava. A small amount of thrombus around the tip of the catheter. No lymphadenopathy. Heterogeneous thyroid.     CORONARY ARTERY CALCIFICATION: Mild.     UPPER ABDOMEN: Normal.     MUSCULOSKELETAL: Mild degenerative changes of the spine. Osseous demineralization. A mild remote superior endplate compression fracture of L1.                                                                IMPRESSION:  1.  A single segmental left lower lobe pulmonary embolus. No evidence of right heart strain.  2.  Small left lower lobe consolidations which either represent pneumonia or aspiration.  3.  Multiple foci of mucus plugging.  4.  Severe emphysema.  5.  Right internal jugular central venous catheter with a small amount of thrombus around the tip.      PROVIDER RESTRAINT FOR NON-VIOLENT BEHAVIOR FACE TO FACE EVALUATION    Patient's Immediate Situation:  Patient demonstrated the following behaviors: pulling lines/tubes    Patient's Reaction to the intervention:  Does patient understand the reason for restraint/seclusion? Unable to understand    Medical Condition:  Is there any evidence of compromise of  Skin integrity, Respiratory, Cardiovascular, Musculoskeletal, Hydration? No    Behavioral Condition:  In consultation with the RN, is there a need to continue this restraint or seclusion? Yes    See Restraint Flowsheet for complete restraint documentation and assessment.

## 2022-10-13 ENCOUNTER — APPOINTMENT (OUTPATIENT)
Dept: RADIOLOGY | Facility: HOSPITAL | Age: 67
DRG: 207 | End: 2022-10-13
Attending: NURSE PRACTITIONER
Payer: COMMERCIAL

## 2022-10-13 ENCOUNTER — APPOINTMENT (OUTPATIENT)
Dept: CT IMAGING | Facility: HOSPITAL | Age: 67
DRG: 207 | End: 2022-10-13
Attending: INTERNAL MEDICINE
Payer: COMMERCIAL

## 2022-10-13 LAB
ANION GAP SERPL CALCULATED.3IONS-SCNC: 8 MMOL/L (ref 7–15)
ATRIAL RATE - MUSE: 126 BPM
BACTERIA BLD CULT: NO GROWTH
BASE EXCESS BLDA CALC-SCNC: 16.4 MMOL/L
BASE EXCESS BLDA CALC-SCNC: 18.1 MMOL/L
BUN SERPL-MCNC: 28 MG/DL (ref 8–23)
CALCIUM SERPL-MCNC: 8.6 MG/DL (ref 8.8–10.2)
CHLORIDE SERPL-SCNC: 99 MMOL/L (ref 98–107)
COHGB MFR BLD: 87.1 % (ref 95–96)
COHGB MFR BLD: 96.1 % (ref 95–96)
CREAT SERPL-MCNC: 0.42 MG/DL (ref 0.51–0.95)
DEPRECATED HCO3 PLAS-SCNC: 35 MMOL/L (ref 22–29)
DIASTOLIC BLOOD PRESSURE - MUSE: NORMAL MMHG
ERYTHROCYTE [DISTWIDTH] IN BLOOD BY AUTOMATED COUNT: 15.2 % (ref 10–15)
ERYTHROCYTE [DISTWIDTH] IN BLOOD BY AUTOMATED COUNT: 15.9 % (ref 10–15)
GFR SERPL CREATININE-BSD FRML MDRD: >90 ML/MIN/1.73M2
GLUCOSE BLDC GLUCOMTR-MCNC: 125 MG/DL (ref 70–99)
GLUCOSE BLDC GLUCOMTR-MCNC: 148 MG/DL (ref 70–99)
GLUCOSE BLDC GLUCOMTR-MCNC: 151 MG/DL (ref 70–99)
GLUCOSE BLDC GLUCOMTR-MCNC: 177 MG/DL (ref 70–99)
GLUCOSE BLDC GLUCOMTR-MCNC: 184 MG/DL (ref 70–99)
GLUCOSE BLDC GLUCOMTR-MCNC: 229 MG/DL (ref 70–99)
GLUCOSE SERPL-MCNC: 136 MG/DL (ref 70–99)
HCO3 BLD-SCNC: 37 MMOL/L (ref 23–29)
HCO3 BLD-SCNC: 39 MMOL/L (ref 23–29)
HCT VFR BLD AUTO: 45.1 % (ref 35–47)
HCT VFR BLD AUTO: 45.8 % (ref 35–47)
HGB BLD-MCNC: 14.9 G/DL (ref 11.7–15.7)
HGB BLD-MCNC: 15.6 G/DL (ref 11.7–15.7)
INTERPRETATION ECG - MUSE: NORMAL
LACTATE SERPL-SCNC: 0.7 MMOL/L (ref 0.7–2)
MAGNESIUM SERPL-MCNC: 2 MG/DL (ref 1.7–2.3)
MCH RBC QN AUTO: 31.8 PG (ref 26.5–33)
MCH RBC QN AUTO: 33.3 PG (ref 26.5–33)
MCHC RBC AUTO-ENTMCNC: 33 G/DL (ref 31.5–36.5)
MCHC RBC AUTO-ENTMCNC: 34.1 G/DL (ref 31.5–36.5)
MCV RBC AUTO: 96 FL (ref 78–100)
MCV RBC AUTO: 98 FL (ref 78–100)
O2/TOTAL GAS SETTING VFR VENT: 0.55 %
O2/TOTAL GAS SETTING VFR VENT: 60 %
OXYHGB MFR BLD: 86.2 % (ref 95–96)
OXYHGB MFR BLD: 95.4 % (ref 95–96)
P AXIS - MUSE: 86 DEGREES
PCO2 BLD: 55 MM HG (ref 35–45)
PCO2 BLD: 58 MM HG (ref 35–45)
PEEP: 12 CM H2O
PEEP: 8 CM H2O
PH BLD: 7.47 [PH] (ref 7.37–7.44)
PH BLD: 7.47 [PH] (ref 7.37–7.44)
PHOSPHATE SERPL-MCNC: 3.5 MG/DL (ref 2.5–4.5)
PLATELET # BLD AUTO: 180 10E3/UL (ref 150–450)
PLATELET # BLD AUTO: 184 10E3/UL (ref 150–450)
PO2 BLD: 52 MM HG (ref 75–85)
PO2 BLD: 81 MM HG (ref 75–85)
POTASSIUM SERPL-SCNC: 4.2 MMOL/L (ref 3.4–5.3)
PR INTERVAL - MUSE: 118 MS
QRS DURATION - MUSE: 84 MS
QT - MUSE: 320 MS
QTC - MUSE: 463 MS
R AXIS - MUSE: 266 DEGREES
RATE: 20 RR/MIN
RATE: 20 RR/MIN
RBC # BLD AUTO: 4.68 10E6/UL (ref 3.8–5.2)
RBC # BLD AUTO: 4.69 10E6/UL (ref 3.8–5.2)
SODIUM SERPL-SCNC: 142 MMOL/L (ref 136–145)
SYSTOLIC BLOOD PRESSURE - MUSE: NORMAL MMHG
T AXIS - MUSE: 83 DEGREES
TEMPERATURE: 37 DEGREES C
TEMPERATURE: 37 DEGREES C
UFH PPP CHRO-ACNC: 0.29 IU/ML
VENTILATION MODE: AC
VENTILATOR TIDAL VOLUME: 400 ML
VENTILATOR TIDAL VOLUME: 400 ML
VENTRICULAR RATE- MUSE: 126 BPM
WBC # BLD AUTO: 11.2 10E3/UL (ref 4–11)
WBC # BLD AUTO: 11.2 10E3/UL (ref 4–11)

## 2022-10-13 PROCEDURE — 250N000013 HC RX MED GY IP 250 OP 250 PS 637: Performed by: INTERNAL MEDICINE

## 2022-10-13 PROCEDURE — 200N000001 HC R&B ICU

## 2022-10-13 PROCEDURE — 83735 ASSAY OF MAGNESIUM: CPT | Performed by: INTERNAL MEDICINE

## 2022-10-13 PROCEDURE — 93005 ELECTROCARDIOGRAM TRACING: CPT

## 2022-10-13 PROCEDURE — 250N000011 HC RX IP 250 OP 636: Performed by: INTERNAL MEDICINE

## 2022-10-13 PROCEDURE — 80048 BASIC METABOLIC PNL TOTAL CA: CPT | Performed by: INTERNAL MEDICINE

## 2022-10-13 PROCEDURE — 83605 ASSAY OF LACTIC ACID: CPT | Performed by: NURSE PRACTITIONER

## 2022-10-13 PROCEDURE — 85027 COMPLETE CBC AUTOMATED: CPT | Performed by: NURSE PRACTITIONER

## 2022-10-13 PROCEDURE — 94003 VENT MGMT INPAT SUBQ DAY: CPT

## 2022-10-13 PROCEDURE — 999N000009 HC STATISTIC AIRWAY CARE

## 2022-10-13 PROCEDURE — C9113 INJ PANTOPRAZOLE SODIUM, VIA: HCPCS | Performed by: INTERNAL MEDICINE

## 2022-10-13 PROCEDURE — 94640 AIRWAY INHALATION TREATMENT: CPT | Mod: 76

## 2022-10-13 PROCEDURE — 84100 ASSAY OF PHOSPHORUS: CPT | Performed by: INTERNAL MEDICINE

## 2022-10-13 PROCEDURE — 99291 CRITICAL CARE FIRST HOUR: CPT | Performed by: INTERNAL MEDICINE

## 2022-10-13 PROCEDURE — 250N000009 HC RX 250: Performed by: INTERNAL MEDICINE

## 2022-10-13 PROCEDURE — 85520 HEPARIN ASSAY: CPT | Performed by: NURSE PRACTITIONER

## 2022-10-13 PROCEDURE — 71275 CT ANGIOGRAPHY CHEST: CPT

## 2022-10-13 PROCEDURE — 82805 BLOOD GASES W/O2 SATURATION: CPT | Performed by: NURSE PRACTITIONER

## 2022-10-13 PROCEDURE — 85027 COMPLETE CBC AUTOMATED: CPT | Performed by: INTERNAL MEDICINE

## 2022-10-13 PROCEDURE — 87070 CULTURE OTHR SPECIMN AEROBIC: CPT | Performed by: INTERNAL MEDICINE

## 2022-10-13 PROCEDURE — 999N000157 HC STATISTIC RCP TIME EA 10 MIN

## 2022-10-13 PROCEDURE — 94640 AIRWAY INHALATION TREATMENT: CPT

## 2022-10-13 PROCEDURE — 82805 BLOOD GASES W/O2 SATURATION: CPT | Performed by: INTERNAL MEDICINE

## 2022-10-13 PROCEDURE — 93010 ELECTROCARDIOGRAM REPORT: CPT | Performed by: INTERNAL MEDICINE

## 2022-10-13 PROCEDURE — 258N000003 HC RX IP 258 OP 636: Performed by: INTERNAL MEDICINE

## 2022-10-13 PROCEDURE — 71045 X-RAY EXAM CHEST 1 VIEW: CPT

## 2022-10-13 RX ORDER — POLYETHYLENE GLYCOL 3350 17 G/17G
17 POWDER, FOR SOLUTION ORAL DAILY
Status: DISCONTINUED | OUTPATIENT
Start: 2022-10-13 | End: 2022-11-11 | Stop reason: HOSPADM

## 2022-10-13 RX ORDER — PIPERACILLIN SODIUM, TAZOBACTAM SODIUM 3; .375 G/15ML; G/15ML
3.38 INJECTION, POWDER, LYOPHILIZED, FOR SOLUTION INTRAVENOUS EVERY 8 HOURS
Status: DISCONTINUED | OUTPATIENT
Start: 2022-10-13 | End: 2022-10-18

## 2022-10-13 RX ORDER — FUROSEMIDE 10 MG/ML
40 INJECTION INTRAMUSCULAR; INTRAVENOUS EVERY 12 HOURS
Status: DISCONTINUED | OUTPATIENT
Start: 2022-10-13 | End: 2022-10-17

## 2022-10-13 RX ORDER — HEPARIN SODIUM 10000 [USP'U]/100ML
0-5000 INJECTION, SOLUTION INTRAVENOUS CONTINUOUS
Status: DISCONTINUED | OUTPATIENT
Start: 2022-10-13 | End: 2022-10-23

## 2022-10-13 RX ORDER — IOPAMIDOL 755 MG/ML
100 INJECTION, SOLUTION INTRAVASCULAR ONCE
Status: COMPLETED | OUTPATIENT
Start: 2022-10-13 | End: 2022-10-13

## 2022-10-13 RX ORDER — FUROSEMIDE 10 MG/ML
40 INJECTION INTRAMUSCULAR; INTRAVENOUS 2 TIMES DAILY
Status: DISCONTINUED | OUTPATIENT
Start: 2022-10-13 | End: 2022-10-13

## 2022-10-13 RX ORDER — MAGNESIUM SULFATE HEPTAHYDRATE 40 MG/ML
2 INJECTION, SOLUTION INTRAVENOUS ONCE
Status: COMPLETED | OUTPATIENT
Start: 2022-10-13 | End: 2022-10-13

## 2022-10-13 RX ORDER — PIPERACILLIN SODIUM, TAZOBACTAM SODIUM 3; .375 G/15ML; G/15ML
3.38 INJECTION, POWDER, LYOPHILIZED, FOR SOLUTION INTRAVENOUS ONCE
Status: COMPLETED | OUTPATIENT
Start: 2022-10-13 | End: 2022-10-13

## 2022-10-13 RX ORDER — VANCOMYCIN HYDROCHLORIDE 1 G/200ML
1000 INJECTION, SOLUTION INTRAVENOUS EVERY 12 HOURS
Status: DISCONTINUED | OUTPATIENT
Start: 2022-10-14 | End: 2022-10-15

## 2022-10-13 RX ORDER — VANCOMYCIN HYDROCHLORIDE 1 G/200ML
1000 INJECTION, SOLUTION INTRAVENOUS ONCE
Status: COMPLETED | OUTPATIENT
Start: 2022-10-13 | End: 2022-10-13

## 2022-10-13 RX ADMIN — MAGNESIUM SULFATE HEPTAHYDRATE 2 G: 40 INJECTION, SOLUTION INTRAVENOUS at 07:02

## 2022-10-13 RX ADMIN — PROPOFOL 30 MCG/KG/MIN: 10 INJECTION, EMULSION INTRAVENOUS at 18:30

## 2022-10-13 RX ADMIN — PIPERACILLIN AND TAZOBACTAM 3.38 G: 3; .375 INJECTION, POWDER, LYOPHILIZED, FOR SOLUTION INTRAVENOUS at 14:38

## 2022-10-13 RX ADMIN — Medication 50 MCG: at 08:23

## 2022-10-13 RX ADMIN — VANCOMYCIN HYDROCHLORIDE 1000 MG: 1 INJECTION, SOLUTION INTRAVENOUS at 14:51

## 2022-10-13 RX ADMIN — DEXAMETHASONE SODIUM PHOSPHATE 6 MG: 4 INJECTION, SOLUTION INTRA-ARTICULAR; INTRALESIONAL; INTRAMUSCULAR; INTRAVENOUS; SOFT TISSUE at 08:07

## 2022-10-13 RX ADMIN — Medication 50 MCG: at 16:35

## 2022-10-13 RX ADMIN — IOPAMIDOL 100 ML: 755 INJECTION, SOLUTION INTRAVENOUS at 13:04

## 2022-10-13 RX ADMIN — THIAMINE HCL TAB 100 MG 100 MG: 100 TAB at 08:09

## 2022-10-13 RX ADMIN — IPRATROPIUM BROMIDE AND ALBUTEROL SULFATE 3 ML: 2.5; .5 SOLUTION RESPIRATORY (INHALATION) at 08:13

## 2022-10-13 RX ADMIN — SENNOSIDES AND DOCUSATE SODIUM 1 TABLET: 50; 8.6 TABLET ORAL at 08:08

## 2022-10-13 RX ADMIN — DEXMEDETOMIDINE HYDROCHLORIDE 1.2 MCG/KG/HR: 400 INJECTION INTRAVENOUS at 07:01

## 2022-10-13 RX ADMIN — INSULIN ASPART 1 UNITS: 100 INJECTION, SOLUTION INTRAVENOUS; SUBCUTANEOUS at 12:11

## 2022-10-13 RX ADMIN — INSULIN ASPART 1 UNITS: 100 INJECTION, SOLUTION INTRAVENOUS; SUBCUTANEOUS at 04:26

## 2022-10-13 RX ADMIN — IPRATROPIUM BROMIDE AND ALBUTEROL SULFATE 3 ML: 2.5; .5 SOLUTION RESPIRATORY (INHALATION) at 19:59

## 2022-10-13 RX ADMIN — IPRATROPIUM BROMIDE AND ALBUTEROL SULFATE 3 ML: 2.5; .5 SOLUTION RESPIRATORY (INHALATION) at 11:19

## 2022-10-13 RX ADMIN — PANTOPRAZOLE SODIUM 40 MG: 40 INJECTION, POWDER, FOR SOLUTION INTRAVENOUS at 07:04

## 2022-10-13 RX ADMIN — DEXMEDETOMIDINE HYDROCHLORIDE 1.2 MCG/KG/HR: 400 INJECTION INTRAVENOUS at 16:05

## 2022-10-13 RX ADMIN — VASOPRESSIN 2.4 UNITS/HR: 20 INJECTION INTRAVENOUS at 10:46

## 2022-10-13 RX ADMIN — HEPARIN SODIUM 5000 UNITS: 10000 INJECTION, SOLUTION INTRAVENOUS; SUBCUTANEOUS at 07:04

## 2022-10-13 RX ADMIN — SENNOSIDES AND DOCUSATE SODIUM 1 TABLET: 50; 8.6 TABLET ORAL at 20:48

## 2022-10-13 RX ADMIN — INSULIN ASPART 2 UNITS: 100 INJECTION, SOLUTION INTRAVENOUS; SUBCUTANEOUS at 20:49

## 2022-10-13 RX ADMIN — CHLORHEXIDINE GLUCONATE 15 ML: 1.2 SOLUTION ORAL at 07:02

## 2022-10-13 RX ADMIN — QUETIAPINE FUMARATE 25 MG: 25 TABLET ORAL at 20:48

## 2022-10-13 RX ADMIN — QUETIAPINE FUMARATE 25 MG: 25 TABLET ORAL at 08:08

## 2022-10-13 RX ADMIN — CHLORHEXIDINE GLUCONATE 15 ML: 1.2 SOLUTION ORAL at 20:48

## 2022-10-13 RX ADMIN — POLYETHYLENE GLYCOL 3350 17 G: 17 POWDER, FOR SOLUTION ORAL at 13:24

## 2022-10-13 RX ADMIN — METOLAZONE 5 MG: 5 TABLET ORAL at 08:09

## 2022-10-13 RX ADMIN — FUROSEMIDE 40 MG: 10 INJECTION, SOLUTION INTRAMUSCULAR; INTRAVENOUS at 08:07

## 2022-10-13 RX ADMIN — CEFTRIAXONE SODIUM 1 G: 1 INJECTION, POWDER, FOR SOLUTION INTRAMUSCULAR; INTRAVENOUS at 13:24

## 2022-10-13 RX ADMIN — INSULIN ASPART 4 UNITS: 100 INJECTION, SOLUTION INTRAVENOUS; SUBCUTANEOUS at 16:30

## 2022-10-13 RX ADMIN — PIPERACILLIN AND TAZOBACTAM 3.38 G: 3; .375 INJECTION, POWDER, LYOPHILIZED, FOR SOLUTION INTRAVENOUS at 20:49

## 2022-10-13 RX ADMIN — HEPARIN SODIUM 5000 UNITS: 10000 INJECTION, SOLUTION INTRAVENOUS; SUBCUTANEOUS at 13:24

## 2022-10-13 RX ADMIN — Medication 50 MCG/HR: at 18:30

## 2022-10-13 RX ADMIN — INSULIN ASPART 2 UNITS: 100 INJECTION, SOLUTION INTRAVENOUS; SUBCUTANEOUS at 08:05

## 2022-10-13 RX ADMIN — IPRATROPIUM BROMIDE AND ALBUTEROL SULFATE 3 ML: 2.5; .5 SOLUTION RESPIRATORY (INHALATION) at 16:27

## 2022-10-13 ASSESSMENT — ACTIVITIES OF DAILY LIVING (ADL)
ADLS_ACUITY_SCORE: 30

## 2022-10-13 NOTE — PROGRESS NOTES
RT PROGRESS NOTE     VENT DAY# 6     CURRENT SETTINGS:  Vent Mode: CMV/AC  (Continuous Mandatory Ventilation/ Assist Control)  FiO2 (%): 50 %  Resp Rate (Set): 20 breaths/min  Tidal Volume (Set, mL): 400 mL  PEEP (cm H2O): 8 cmH2O  Resp: 20        PATIENT PARAMETERS:  PIP 24  Pplat:  13  Pmean:  13  Compliance: 48  SBT: No              Secretions:  Scant amount of thick clear secretions via ETT  02 Sats:  91-93%  BS: diminished      ETT SIZE 7.5 Secured at 22 cm at teeth/gums     Respiratory Medications: Duoneb QID      NOTE / SHIFT SUMMARY:   Pt. remains on full vent support, settings above. BS diminished, nebulizer treatment given as scheduled, ETT repositioned Q2 for skin integrity, titrate oxygen as tolerated, wean on hold, RT following

## 2022-10-13 NOTE — PROGRESS NOTES
HEART CARE NOTE          Assessment/Recommendations     1. HFrEF c/b shock  Assessment / Plan    New diagnosis + mildly positive troponin - ? Stress induced in the setting of hypoxic respiratory failure - repeat echo once underlying stressors have been resolved; will likely need ischemic evaluation for further assessment if persistent.    Good response to furosemide bolus - will start standing dosing and continue to monitor     GDMT on hold given the above     2. Shock  Assessment / Plan    Likely mixed picture of cardiogenic and distributive      Continue pressor support (wean as tolerated) and supportive care     3. PE  Assessment / Plan    ?signs of RV strain -  heparin gtt and management per primary team        4. COVID-19 positive  Assessment / Plan    Supportive care per primary team     Patient remains critically ill in the ICU requiring mechanical ventilation support as well as multiple vasopressor requirements for hemodynamic support. 40 minutes spent on critical care.    History of Present Illness/Subjective      Ms. Lavonne Talbot is a 67 year old female with a PMHx significant for (per Tu Price's note) active smoker, with history of lymphedema on furosemide since 2021, metabolic alkalosis, polycythemia, and recent diagnosis of COVID-19 viral infection 1 week ago who presented to [facility] ED on 10/8/2022 due to respiratory distress now admitted to the ICU with acute respiratory failure requiring mechanical ventilation in the setting of possible community-acquired pneumonia or aspiration pneumonia with mucous plugging, small subsegmental pulmonary embolism, circulatory shock on vasopressors, and possible myocardial injury.     Today, Patient is intubated and sedated; Management plan as detailed above     ECG: Personally reviewed. normal sinus rhythm, nonspecific ST and T waves changes.     ECHO (personnaly Reviewed):  1. Left ventricular size and wall thickness are normal. Systolic function  "is  moderately reduced with global hypokinesis. The estimated left ventricular  ejection fraction is 30-35%.  2. Right ventricle is not well visualized. Limited views suggest possible  enlargement and reduced systolic function.  3. No hemodynamically significant valvular abnormalities.  4. No prior study available for comparison.          Physical Examination Review of Systems   /75   Pulse 57   Temp 97  F (36.1  C)   Resp 20   Ht 1.702 m (5' 7\")   Wt 47.2 kg (104 lb 1.6 oz)   SpO2 91%   BMI 16.30 kg/m    Body mass index is 16.3 kg/m .  Wt Readings from Last 3 Encounters:   10/13/22 47.2 kg (104 lb 1.6 oz)     General Appearance:   Intubated/sedated   ENT/Mouth: membranes moist, no oral lesions or bleeding gums.      EYES:  no scleral icterus, normal conjunctivae   Neck: no carotid bruits or thyromegaly   Chest/Lungs:   lungs are clear to auscultation, no rales or wheezing, equal chest wall expansion    Cardiovascular:   Regular. Normal first and second heart sounds with no murmurs, rubs, or gallops; the carotid, radial and posterior tibial pulses are intact, no JVD or LE edema bilaterally    Abdomen:  no organomegaly, masses, bruits, or tenderness; bowel sounds are present   Extremities: no cyanosis or clubbing   Skin: no xanthelasma, warm.    Neurologic: Intubated/sedated     Psychiatric: Intubated/sedated     A complete 10 systems ROS was reviewed  And is negative except what is listed in the HPI.          Medical History  Surgical History Family History Social History   Past Medical History:   Diagnosis Date     Tobacco abuse     Past Surgical History:   Procedure Laterality Date     NO HISTORY OF SURGERY      no family history of premature coronary artery disease Social History     Socioeconomic History     Marital status:      Spouse name: Not on file     Number of children: Not on file     Years of education: Not on file     Highest education level: Not on file   Occupational History     " Not on file   Tobacco Use     Smoking status: Every Day     Types: Cigarettes     Smokeless tobacco: Not on file   Substance and Sexual Activity     Alcohol use: Not on file     Drug use: Not on file     Sexual activity: Not on file   Other Topics Concern     Not on file   Social History Narrative     Not on file     Social Determinants of Health     Financial Resource Strain: Not on file   Food Insecurity: Not on file   Transportation Needs: Not on file   Physical Activity: Not on file   Stress: Not on file   Social Connections: Not on file   Intimate Partner Violence: Not on file   Housing Stability: Not on file           Lab Results    Chemistry/lipid CBC Cardiac Enzymes/BNP/TSH/INR   Lab Results   Component Value Date    TRIG 113 10/12/2022    BUN 28.0 (H) 10/13/2022     10/13/2022    CO2 35 (H) 10/13/2022    Lab Results   Component Value Date    WBC 11.2 (H) 10/13/2022    HGB 14.9 10/13/2022    HCT 45.1 10/13/2022    MCV 96 10/13/2022     10/13/2022    Lab Results   Component Value Date    INR 1.41 (H) 10/08/2022     No results found for: CKTOTAL, CKMB, TROPONINI       Weight:    Wt Readings from Last 3 Encounters:   10/13/22 47.2 kg (104 lb 1.6 oz)       Allergies  No Known Allergies      Surgical History  Past Surgical History:   Procedure Laterality Date     NO HISTORY OF SURGERY         Social History  Tobacco:   History   Smoking Status     Every Day     Types: Cigarettes   Smokeless Tobacco     Not on file    Alcohol:   Social History    Substance and Sexual Activity      Alcohol use: Not on file   Illicit Drugs:   History   Drug Use Not on file       Family History  Family History   Problem Relation Age of Onset     Diabetes Mother           Nisreen Amin MD on 10/13/2022      cc: Cinthya Cabral

## 2022-10-13 NOTE — PROGRESS NOTES
ICU PROGRESS NOTE:    Assessment/Plan:  67 year old cachectic female (BMI 12.09 kg/m ), active smoker, with history of lymphedema on furosemide since 2021, metabolic alkalosis, polycythemia, and recent diagnosis of COVID-19 viral infection 1 week ago who presented to [facility] ED on 10/8/2022 due to respiratory distress now admitted to the ICU with acute respiratory failure requiring mechanical ventilation in the setting of community-acquired pneumonia or aspiration pneumonia with mucous plugging, small subsegmental pulmonary embolism, circulatory shock on vasopressors.    CV:  Vasoplegia secondary to sedation. Requiring norepinephrine  Cardiology following.  Patient is elevated troponin.  Will need work-up for ischemic heart disease once extubated.  Increasing pressor needs this am on levophed. Add vasopressin  Hold further lasix doses  Check lactate    RESP: Vent day 6  Acute respiratory failure in a patient with severe COPD/emphysema  Concern for community-acquired pneumonia with left lower lobe consolidation.  Patient is also been diagnosed with COVID-19 but she does not appear to be having severe COVID-pneumonia.  Continue duoneb, continue steroids, she is on dexamethasone for COVID, no evidence of AECOPD.  Haemophilus pneumonia on abx  Small PE, subsegmental.  No DVT. Anti-coagulation complicated by hematuria and on hold.   Acutely worsening hypoxemia and dyssynchrony this am. PaO2 50s on ABG  Stat CXR without PNX and no changes  PEEP/FIO2 increased. PEEP limit reached at 12 in terms of airway pressures  Has been off anticoagulation on ppx due to hematuria. Planned to resume today. Will re-scan given worsening clinical status.  If no other contraindications/neede procedures, start heparin without bolus today    RENAL:  Have been attempting to keep net neg. Hold lasix this am due to hypotension  Hypernatremia  Free water flushes increased. Needs ongoing diuresis, but also Na management.   Na  improving  Back down on free water tomorrow    ID:  COVID-19.  Currently on dexamethasone.  CT does not look like COVID-pneumonia.  Haemophilus pneumonia, on ceftriaxone. Tomorrow is day 7  Positive Bcx on 10/8 - repeat NGTD  No evidence for new infectious process     GI:  Tolerating tube feedings  Last BM 10/11 - increase regimen    NEURO:  Sedated with Precedex, fentanyl and propofol  Has been unable to tolerate any sedation vacation due to agitation.  QTc improved, started BID seroquel today and titrate up   Check TG level - 113     HEMATOLOGIC:  Stable counts, continue to monitor  Tolerating HSQ without recurrence of hematuria. Will re-attempt heparin drip    ENDOCRINE:  - ICU insulin protocol, goal sugar <180      ICU Checklist:  Feeding: tube feeds    Lines/Tubes:  [place] 10/8  Arterial line 10/8  R internal jugular TLC 10/7  ETT (10/7)  PIVs    Prophylaxis:    Thromboembolic: HSQ     Stress Ulcer: PPI    VAP: peridex    Restraints? yes    DISPOSITION: ICU    CODE STATUS: Full Code    FAMILY COMMUNICATION: Son was called today with medical update. Almost 1 week on vent without much progress certainly a decline today. Reviewed these concerns with him today.    Total Critical Care Time: 60 minutes    Upon evaluation, this patient is critically ill with a high probability of imminent life threatening deterioration which required my direct personal management.      Paradise Chu MD  Pulmonary and Critical Care Medicine  [facility]  Office: [phone]      Clinically Significant Risk Factors Present on Admission               # Severe Malnutrition: based on nutrition assessment       ----------------------------    Overnight events:  No overnight events    This am, worsening hypoxemia, sinus tachycardia and increased pressor needs   Appears more dyssynchronous with the vent     Subjective:  Unable to obtain    Objective:  Physical Exam:  Vent Mode: CMV/AC  (Continuous Mandatory Ventilation/ Assist  "Control)  FiO2 (%): 50 %  Resp Rate (Set): 20 breaths/min  Tidal Volume (Set, mL): 400 mL  PEEP (cm H2O): 8 cmH2O  Resp: 20    BP 97/52   Pulse 67   Temp 99.1  F (37.3  C)   Resp 20   Ht 1.702 m (5' 7\")   Wt 47.2 kg (104 lb 1.6 oz)   SpO2 91%   BMI 16.30 kg/m      Intake/Output last 3 shifts:  I/O last 3 completed shifts:  In: 2891.21 [I.V.:548.21; NG/GT:1309]  Out: 4390 [Urine:4390]  Intake/Output this shift:  No intake/output data recorded.    Physical Exam  Gen: Intubated, sedated, cachectic  HEENT: no OP lesions, no TARUN  CV: RRR, no m/g/r  Resp: Diminished, clear  Abd: soft, nontender, BS+  Neuro: PERRL, nonfocal  Ext: no edema, warm    LABS:  Reviewed in detail    IMAGING/STUDIES:    CT PE:  ANGIOGRAM CHEST: A single acute segmental pulmonary embolus in the left lower lobe, image 190:5.  Thoracic aorta is not well opacified and is indeterminate for dissection. No CT evidence of right heart strain.     LUNGS AND PLEURA: The endotracheal tube is 5.6 cm superior to the izabela. Severe emphysema. Mild diffuse bronchial wall thickening. Multiple foci of mucus plugging in the lower lobes, lingula and right middle lobe. Small consolidations in the left lower   lobe. No pleural effusion.     MEDIASTINUM/AXILLAE: A right internal jugular central venous catheter with the tip in the superior vena cava. A small amount of thrombus around the tip of the catheter. No lymphadenopathy. Heterogeneous thyroid.     CORONARY ARTERY CALCIFICATION: Mild.     UPPER ABDOMEN: Normal.     MUSCULOSKELETAL: Mild degenerative changes of the spine. Osseous demineralization. A mild remote superior endplate compression fracture of L1.                                                                IMPRESSION:  1.  A single segmental left lower lobe pulmonary embolus. No evidence of right heart strain.  2.  Small left lower lobe consolidations which either represent pneumonia or aspiration.  3.  Multiple foci of mucus plugging.  4.  Severe " emphysema.  5.  Right internal jugular central venous catheter with a small amount of thrombus around the tip.      PROVIDER RESTRAINT FOR NON-VIOLENT BEHAVIOR FACE TO FACE EVALUATION    Patient's Immediate Situation:  Patient demonstrated the following behaviors: pulling lines/tubes    Patient's Reaction to the intervention:  Does patient understand the reason for restraint/seclusion? Unable to understand    Medical Condition:  Is there any evidence of compromise of Skin integrity, Respiratory, Cardiovascular, Musculoskeletal, Hydration? No    Behavioral Condition:  In consultation with the RN, is there a need to continue this restraint or seclusion? Yes    See Restraint Flowsheet for complete restraint documentation and assessment.

## 2022-10-13 NOTE — PLAN OF CARE
[facility] - ICU    RN Progress Note:            Pertinent Assessments:      Please refer to flowsheet rows for full assessment     Pt RASS - 2 (goal -1 to -2). Lung sounds diminished & coarse. SpO2 96%+. FiO2 at 60% at 2015 from 80% all day. PRN fentanyl 50 mcg given at 1635 for +2 agitation with RT in room giving duoneb, pt trying to sit up and pulling at lines. Urine output for my shift: 1350 mL. Total urine output for day 3450 mL. Pt maintaining temp of 99.  Telemetry reads Normal Sinus Rhythm.   0940 ABG's: pH 7.47, pCO2 55, pO2 52, Bicarb 37.  2100 ABG's: pH 7.47, pCO2 58, pO2 881, Bicarb 39      Infusions running continuously:    Norepinephrine    Dexmedetomidine    Propofol    Fentanyl    Heparin (started 1438; 10/13 r/t CT chest new PE's, worsening emphysema and opacities)     Protocols:  K+: 4.2, am recheck, 10/14.  Magnesium: 2.0, am recheck, 10/14.  AntiXa: 0.29, 850 units + 150 units = 1000 units/hr, recheck at 0430, 10/14.         Mobility Level:     Bedrest, turn and reposition q 2 hr.         Key Events - This Shift:     Pt became agitated at 1635, gave prn fentanyl 50 mcg bolus. At 2000 SBP <80, increased levo up to 0.2 mcg, then able to titrate back down to 0.08 at 2120. Levo at 0.10 r/t sbp.              Plan:     Continue to monitor pt VS, stabilize SBP and Map, reduce vasopressors.         Point of Contact Update YES-OR-NO: Yes and No  If No, reason: Son updated in am and at 1430.  Name:  Phone Number:  Summary of Conversation:              Problem: Gas Exchange Impaired  Goal: Optimal Gas Exchange  Intervention: Optimize Oxygenation and Ventilation  Recent Flowsheet Documentation  Taken 10/13/2022 1600 by Elida Castro RN  Head of Bed (HOB) Positioning: HOB at 30 degrees     Problem: COPD (Chronic Obstructive Pulmonary Disease) Comorbidity  Goal: Maintenance of COPD Symptom Control  Outcome: Progressing  Intervention: Maintain COPD-Symptom Control  Recent Flowsheet  Documentation  Taken 10/13/2022 1600 by Elida Castro RN  Supportive Measures: positive reinforcement provided  Medication Review/Management: high-risk medications identified     Problem: Heart Failure Comorbidity  Goal: Maintenance of Heart Failure Symptom Control  Outcome: Progressing  Intervention: Maintain Heart Failure Management  Recent Flowsheet Documentation  Taken 10/13/2022 1600 by Elida Castro RN  Medication Review/Management: high-risk medications identified     Problem: Mechanical Ventilation Invasive  Goal: Mechanical Ventilation Liberation  Intervention: Promote Extubation and Mechanical Ventilation Liberation  Recent Flowsheet Documentation  Taken 10/13/2022 1600 by Elida Castro RN  Medication Review/Management: high-risk medications identified     Problem: Malnutrition  Goal: Improved Nutritional Intake  Outcome: Progressing     Problem: Enteral Nutrition  Goal: Absence of Aspiration Signs and Symptoms  Outcome: Progressing  Intervention: Minimize Aspiration Risk  Recent Flowsheet Documentation  Taken 10/13/2022 1600 by Elida Castro RN  Oral Care: lip/mouth moisturizer applied  Head of Bed (HOB) Positioning: HOB at 30 degrees

## 2022-10-13 NOTE — PROGRESS NOTES
RT PROGRESS NOTE    VENT DAY # 6    CURRENT SETTINGS:   Vent Mode: CMV/AC  (Continuous Mandatory Ventilation/ Assist Control)  FiO2 (%): 50 %  Resp Rate (Set): 20 breaths/min  Tidal Volume (Set, mL): 400 mL  PEEP (cm H2O): 8 cmH2O  Resp: 20      PATIENT PARAMETERS:  PIP: 30  Pplat: 21  Pmean:  15  Compliance: 35  SBT: No   Secretions: Mod thick white secretions.   02 Sats: 90-93%%  BS: coarse rhonchi and decreased @ bases.     ETT SIZE 7.5 Secured at 22 cm at teeth/gums    Respiratory Medications: Duo neb QID     ABG: @09:03 am, 7.47,55, 52,37, 86.2% on 80%    NOTE / SHIFT SUMMARY:  Pt continue on vent full support with no weaning trial scheduled at this time. Changes have been made on vent settings based on recent blood gas values. Peep has been augmented to 12 and FIO2 80% for better oxygenations. Will continue to monitor closely.     Joisah Mcmahon, RRT

## 2022-10-13 NOTE — PROGRESS NOTES
RT PROGRESS NOTE    VENT DAY# 6    CURRENT SETTINGS:  Vent Mode: CMV/AC  (Continuous Mandatory Ventilation/ Assist Control)  FiO2 (%): 60 %  Resp Rate (Set): 20 breaths/min  Tidal Volume (Set, mL): 400 mL  PEEP (cm H2O): 12 cmH2O  Resp: 20     PATIENT PARAMETERS:  PIP 28  Pplat:  22  Pmean:  17  Compliance: 40.5  SBT: no     Secretions:  Small amount white  02 Sats:  99%  BS: diminished    ETT SIZE 7.5 Secured at 22 cm at teeth/gums    Respiratory Medications: Duoneb QID      Latest Reference Range & Units 10/13/22 20:31   pH Arterial 7.37 - 7.44  7.47 (H)   pCO2 Arterial 35 - 45 mm Hg 58 (H)   PO2 Arterial 75 - 85 mm Hg 81   Bicarbonate Arterial 23 - 29 mmol/L 39 (H)   Base Excess Art mmol/L 18.1   FIO2  60   Oxyhemoglobin 95.0 - 96.0 % 95.4   (H): Data is abnormally high    NOTE / SHIFT SUMMARY:   Pt remains on full vent support.  Duoneb given X2 on schedule; FiO2 reduced from 80% to 60%    Lazaro Sequeira, RT

## 2022-10-13 NOTE — PLAN OF CARE
[facility] - ICU    RN Progress Note:    SaO2 maintained within ordered limits with breathing assist via mechanical ventilator set to 50 % FiO2 with a PEEP of 8 cmH2O.Mechanical ventilation well tolerated with sedation (propofol, fentanyl, Precedex) titrated to RASS Goal -1 to -2. With sedation vacation, Pt follows commands (squeezing hands, and nodding appropriately). Blood pressure maintained within ordered parameters with intermittent low-dose norepinephrine. Blood Cxs (line) collected 10/12 show no growth x 12 hrs. Tube feeding infusing at goal appears weill tolerated. Pt continues free water flushes 200 mL Q4H. Na 142. Blood glucose well managed with ordered sliding scale insulin. Below the knee sequential compression devices in place bilaterally. Will continue to monitor. Brody Vera RN          Pertinent Assessments:      Please refer to flowsheet rows for full assessment     Vital signs          Mobility Level:     2         Key Events - This Shift:     No overnight events             Plan:     Ween sedation and advance activity, as tolerated.

## 2022-10-13 NOTE — PROGRESS NOTES
ICU Follow Up Note:    New subsegmental PE and worsening lung infiltrate. Start heparin drip, no boluses as planned. Broaden abx to hospital coverage with vanc/zosyn, repeat sputum cx. Repeat ABG and titrate down FiO2 as able.        Paradise Chu MD  Pulmonary and Critical Care Medicine  [facility]  Office: [phone]

## 2022-10-13 NOTE — PHARMACY-VANCOMYCIN DOSING SERVICE
"Pharmacy Vancomycin Initial Note  Date of Service 2022  Patient's  1955  67 year old, female    Indication: Ventilator-Associated Pneumonia    Current estimated CrCl = Estimated Creatinine Clearance: 95.5 mL/min (A) (based on SCr of 0.42 mg/dL (L)).    Creatinine for last 3 days  10/11/2022:  5:01 AM Creatinine 0.48 mg/dL  10/12/2022:  4:22 AM Creatinine 0.40 mg/dL  10/13/2022:  4:13 AM Creatinine 0.42 mg/dL    Recent Vancomycin Level(s) for last 3 days  No results found for requested labs within last 72 hours.      Vancomycin IV Administrations (past 72 hours)      No vancomycin orders with administrations in past 72 hours.                Nephrotoxins and other renal medications (From now, onward)    Start     Dose/Rate Route Frequency Ordered Stop    10/14/22 0300  vancomycin (VANCOCIN) 1000 mg in dextrose 5% 200 mL PREMIX         1,000 mg  200 mL/hr over 1 Hours Intravenous EVERY 12 HOURS 10/13/22 1427      10/13/22 2100  piperacillin-tazobactam (ZOSYN) 3.375 g vial to attach to  mL bag        Note to Pharmacy: For [facility], [facility] and [facility]: For Zosyn-naive patients, use the \"Zosyn initial dose + extended infusion\" order panel.    3.375 g  over 240 Minutes Intravenous EVERY 8 HOURS 10/13/22 1417      10/13/22 1430  vancomycin (VANCOCIN) 1000 mg in dextrose 5% 200 mL PREMIX         1,000 mg  200 mL/hr over 1 Hours Intravenous ONCE 10/13/22 1423      10/13/22 1430  piperacillin-tazobactam (ZOSYN) 3.375 g vial to attach to  mL bag        Note to Pharmacy: For [facility], [facility] and [facility]: For Zosyn-naive patients, use the \"Zosyn initial dose + extended infusion\" order panel.    3.375 g  over 30 Minutes Intravenous ONCE 10/13/22 1424      10/13/22 1000  vasopressin (VASOSTRICT) 20 Units in sodium chloride 0.9 % 100 mL standard conc infusion         2.4 Units/hr  12 mL/hr  Intravenous CONTINUOUS 10/13/22 0954      10/13/22 0800  [Held by provider]  furosemide (LASIX) injection 40 mg        (Held by provider " since Thu 10/13/2022 at 1209 by Paradise Chu MD.Hold Reason: Other)    40 mg  over 1-3 Minutes Intravenous EVERY 12 HOURS 10/13/22 0741      10/08/22 1000  norepinephrine (LEVOPHED) 4 mg in  mL infusion PREMIX         0.01-0.6 mcg/kg/min × 35 kg  1.3-78.8 mL/hr  Intravenous CONTINUOUS 10/08/22 0956            Contrast Orders - past 72 hours (72h ago, onward)    Start     Dose/Rate Route Frequency Stop    10/13/22 1300  iopamidol (ISOVUE-370) solution 100 mL         100 mL Intravenous ONCE 10/13/22 1304          Insight[employer] Prediction of Planned Initial Vancomycin Regimen  Regimen: 1000 mg IV every 12 hours.  Start time: 15:26 on 10/13/2022  Exposure target: AUC24 (range)400-600 mg/L.hr   AUC24,ss: 528 mg/L.hr  Probability of AUC24 > 400: 78 %  Ctrough,ss: 14.8 mg/L  Probability of Ctrough,ss > 20: 28 %  Probability of nephrotoxicity (Lodise KALEB 2009): 10 %          Plan:  1. Start vancomycin  1000 mg IV q12h.   2. Vancomycin monitoring method: AUC  3. Vancomycin therapeutic monitoring goal: 400-600 mg*h/L  4. Pharmacy will check vancomycin levels as appropriate in 1-3 Days.    5. Serum creatinine levels will be ordered every 48 hours.      Caro Fox [facility]

## 2022-10-14 LAB
ANION GAP SERPL CALCULATED.3IONS-SCNC: 7 MMOL/L (ref 7–15)
BACTERIA BLD CULT: ABNORMAL
BACTERIA BLD CULT: ABNORMAL
BASE EXCESS BLDA CALC-SCNC: 15.4 MMOL/L
BUN SERPL-MCNC: 21.8 MG/DL (ref 8–23)
CALCIUM SERPL-MCNC: 8.9 MG/DL (ref 8.8–10.2)
CHLORIDE SERPL-SCNC: 91 MMOL/L (ref 98–107)
COHGB MFR BLD: 95 % (ref 95–96)
CREAT SERPL-MCNC: 0.44 MG/DL (ref 0.51–0.95)
DEPRECATED HCO3 PLAS-SCNC: 37 MMOL/L (ref 22–29)
ERYTHROCYTE [DISTWIDTH] IN BLOOD BY AUTOMATED COUNT: 14.7 % (ref 10–15)
GFR SERPL CREATININE-BSD FRML MDRD: >90 ML/MIN/1.73M2
GLUCOSE BLDC GLUCOMTR-MCNC: 141 MG/DL (ref 70–99)
GLUCOSE BLDC GLUCOMTR-MCNC: 145 MG/DL (ref 70–99)
GLUCOSE BLDC GLUCOMTR-MCNC: 151 MG/DL (ref 70–99)
GLUCOSE BLDC GLUCOMTR-MCNC: 170 MG/DL (ref 70–99)
GLUCOSE BLDC GLUCOMTR-MCNC: 177 MG/DL (ref 70–99)
GLUCOSE BLDC GLUCOMTR-MCNC: 230 MG/DL (ref 70–99)
GLUCOSE SERPL-MCNC: 131 MG/DL (ref 70–99)
HCO3 BLD-SCNC: 37 MMOL/L (ref 23–29)
HCT VFR BLD AUTO: 45.3 % (ref 35–47)
HGB BLD-MCNC: 15.1 G/DL (ref 11.7–15.7)
MAGNESIUM SERPL-MCNC: 2.2 MG/DL (ref 1.7–2.3)
MCH RBC QN AUTO: 31.7 PG (ref 26.5–33)
MCHC RBC AUTO-ENTMCNC: 33.3 G/DL (ref 31.5–36.5)
MCV RBC AUTO: 95 FL (ref 78–100)
O2/TOTAL GAS SETTING VFR VENT: 0.55 %
OXYHGB MFR BLD: 93.7 % (ref 95–96)
PCO2 BLD: 54 MM HG (ref 35–45)
PEEP: 12 CM H2O
PH BLD: 7.47 [PH] (ref 7.37–7.44)
PHOSPHATE SERPL-MCNC: 4.6 MG/DL (ref 2.5–4.5)
PLATELET # BLD AUTO: 187 10E3/UL (ref 150–450)
PO2 BLD: 69 MM HG (ref 75–85)
POTASSIUM SERPL-SCNC: 4.2 MMOL/L (ref 3.4–5.3)
RATE: 20 RR/MIN
RBC # BLD AUTO: 4.76 10E6/UL (ref 3.8–5.2)
SODIUM SERPL-SCNC: 135 MMOL/L (ref 136–145)
TEMPERATURE: 37 DEGREES C
UFH PPP CHRO-ACNC: 0.17 IU/ML
UFH PPP CHRO-ACNC: 0.35 IU/ML
UFH PPP CHRO-ACNC: 0.45 IU/ML
VENTILATION MODE: AC
VENTILATOR TIDAL VOLUME: 400 ML
WBC # BLD AUTO: 11.3 10E3/UL (ref 4–11)

## 2022-10-14 PROCEDURE — 250N000011 HC RX IP 250 OP 636: Performed by: INTERNAL MEDICINE

## 2022-10-14 PROCEDURE — 99233 SBSQ HOSP IP/OBS HIGH 50: CPT | Performed by: INTERNAL MEDICINE

## 2022-10-14 PROCEDURE — 250N000013 HC RX MED GY IP 250 OP 250 PS 637: Performed by: INTERNAL MEDICINE

## 2022-10-14 PROCEDURE — 94640 AIRWAY INHALATION TREATMENT: CPT

## 2022-10-14 PROCEDURE — 85520 HEPARIN ASSAY: CPT | Performed by: NURSE PRACTITIONER

## 2022-10-14 PROCEDURE — 94003 VENT MGMT INPAT SUBQ DAY: CPT

## 2022-10-14 PROCEDURE — 999N000157 HC STATISTIC RCP TIME EA 10 MIN

## 2022-10-14 PROCEDURE — 84100 ASSAY OF PHOSPHORUS: CPT | Performed by: INTERNAL MEDICINE

## 2022-10-14 PROCEDURE — 99291 CRITICAL CARE FIRST HOUR: CPT | Performed by: INTERNAL MEDICINE

## 2022-10-14 PROCEDURE — 94640 AIRWAY INHALATION TREATMENT: CPT | Mod: 76

## 2022-10-14 PROCEDURE — 85520 HEPARIN ASSAY: CPT | Performed by: INTERNAL MEDICINE

## 2022-10-14 PROCEDURE — 83735 ASSAY OF MAGNESIUM: CPT | Performed by: INTERNAL MEDICINE

## 2022-10-14 PROCEDURE — 82805 BLOOD GASES W/O2 SATURATION: CPT | Performed by: INTERNAL MEDICINE

## 2022-10-14 PROCEDURE — 250N000009 HC RX 250: Performed by: INTERNAL MEDICINE

## 2022-10-14 PROCEDURE — 80048 BASIC METABOLIC PNL TOTAL CA: CPT | Performed by: INTERNAL MEDICINE

## 2022-10-14 PROCEDURE — 200N000001 HC R&B ICU

## 2022-10-14 PROCEDURE — 85027 COMPLETE CBC AUTOMATED: CPT | Performed by: INTERNAL MEDICINE

## 2022-10-14 RX ADMIN — Medication 50 MCG: at 14:47

## 2022-10-14 RX ADMIN — Medication 0.09 MCG/KG/MIN: at 01:30

## 2022-10-14 RX ADMIN — VANCOMYCIN HYDROCHLORIDE 1000 MG: 1 INJECTION, SOLUTION INTRAVENOUS at 19:45

## 2022-10-14 RX ADMIN — Medication 0.13 MCG/KG/MIN: at 20:36

## 2022-10-14 RX ADMIN — INSULIN ASPART 2 UNITS: 100 INJECTION, SOLUTION INTRAVENOUS; SUBCUTANEOUS at 20:30

## 2022-10-14 RX ADMIN — INSULIN ASPART 1 UNITS: 100 INJECTION, SOLUTION INTRAVENOUS; SUBCUTANEOUS at 00:29

## 2022-10-14 RX ADMIN — PIPERACILLIN AND TAZOBACTAM 3.38 G: 3; .375 INJECTION, POWDER, LYOPHILIZED, FOR SOLUTION INTRAVENOUS at 12:32

## 2022-10-14 RX ADMIN — QUETIAPINE FUMARATE 25 MG: 25 TABLET ORAL at 09:06

## 2022-10-14 RX ADMIN — VANCOMYCIN HYDROCHLORIDE 1000 MG: 1 INJECTION, SOLUTION INTRAVENOUS at 03:57

## 2022-10-14 RX ADMIN — DEXMEDETOMIDINE HYDROCHLORIDE 1.2 MCG/KG/HR: 400 INJECTION INTRAVENOUS at 11:30

## 2022-10-14 RX ADMIN — IPRATROPIUM BROMIDE AND ALBUTEROL SULFATE 3 ML: 2.5; .5 SOLUTION RESPIRATORY (INHALATION) at 11:31

## 2022-10-14 RX ADMIN — INSULIN ASPART 2 UNITS: 100 INJECTION, SOLUTION INTRAVENOUS; SUBCUTANEOUS at 09:10

## 2022-10-14 RX ADMIN — PROPOFOL 20 MCG/KG/MIN: 10 INJECTION, EMULSION INTRAVENOUS at 05:42

## 2022-10-14 RX ADMIN — QUETIAPINE FUMARATE 25 MG: 25 TABLET ORAL at 20:53

## 2022-10-14 RX ADMIN — DEXAMETHASONE SODIUM PHOSPHATE 6 MG: 4 INJECTION, SOLUTION INTRA-ARTICULAR; INTRALESIONAL; INTRAMUSCULAR; INTRAVENOUS; SOFT TISSUE at 09:04

## 2022-10-14 RX ADMIN — POLYETHYLENE GLYCOL 3350 17 G: 17 POWDER, FOR SOLUTION ORAL at 09:06

## 2022-10-14 RX ADMIN — DEXMEDETOMIDINE HYDROCHLORIDE 1.2 MCG/KG/HR: 400 INJECTION INTRAVENOUS at 19:53

## 2022-10-14 RX ADMIN — Medication 40 MG: at 09:04

## 2022-10-14 RX ADMIN — Medication 50 MCG: at 15:58

## 2022-10-14 RX ADMIN — IPRATROPIUM BROMIDE AND ALBUTEROL SULFATE 3 ML: 2.5; .5 SOLUTION RESPIRATORY (INHALATION) at 19:58

## 2022-10-14 RX ADMIN — DEXMEDETOMIDINE HYDROCHLORIDE 1.2 MCG/KG/HR: 400 INJECTION INTRAVENOUS at 01:28

## 2022-10-14 RX ADMIN — INSULIN ASPART 4 UNITS: 100 INJECTION, SOLUTION INTRAVENOUS; SUBCUTANEOUS at 12:40

## 2022-10-14 RX ADMIN — SENNOSIDES AND DOCUSATE SODIUM 1 TABLET: 50; 8.6 TABLET ORAL at 20:52

## 2022-10-14 RX ADMIN — INSULIN ASPART 1 UNITS: 100 INJECTION, SOLUTION INTRAVENOUS; SUBCUTANEOUS at 04:20

## 2022-10-14 RX ADMIN — IPRATROPIUM BROMIDE AND ALBUTEROL SULFATE 3 ML: 2.5; .5 SOLUTION RESPIRATORY (INHALATION) at 15:50

## 2022-10-14 RX ADMIN — CHLORHEXIDINE GLUCONATE 15 ML: 1.2 SOLUTION ORAL at 20:32

## 2022-10-14 RX ADMIN — IPRATROPIUM BROMIDE AND ALBUTEROL SULFATE 3 ML: 2.5; .5 SOLUTION RESPIRATORY (INHALATION) at 07:51

## 2022-10-14 RX ADMIN — INSULIN ASPART 1 UNITS: 100 INJECTION, SOLUTION INTRAVENOUS; SUBCUTANEOUS at 15:55

## 2022-10-14 RX ADMIN — PIPERACILLIN AND TAZOBACTAM 3.38 G: 3; .375 INJECTION, POWDER, LYOPHILIZED, FOR SOLUTION INTRAVENOUS at 05:32

## 2022-10-14 RX ADMIN — THIAMINE HCL TAB 100 MG 100 MG: 100 TAB at 09:06

## 2022-10-14 RX ADMIN — PROPOFOL 25 MCG/KG/MIN: 10 INJECTION, EMULSION INTRAVENOUS at 22:16

## 2022-10-14 RX ADMIN — CHLORHEXIDINE GLUCONATE 15 ML: 1.2 SOLUTION ORAL at 09:04

## 2022-10-14 RX ADMIN — PIPERACILLIN AND TAZOBACTAM 3.38 G: 3; .375 INJECTION, POWDER, LYOPHILIZED, FOR SOLUTION INTRAVENOUS at 21:01

## 2022-10-14 RX ADMIN — SENNOSIDES AND DOCUSATE SODIUM 1 TABLET: 50; 8.6 TABLET ORAL at 09:06

## 2022-10-14 ASSESSMENT — ACTIVITIES OF DAILY LIVING (ADL)
ADLS_ACUITY_SCORE: 34
ADLS_ACUITY_SCORE: 30
ADLS_ACUITY_SCORE: 34
ADLS_ACUITY_SCORE: 30
ADLS_ACUITY_SCORE: 34
ADLS_ACUITY_SCORE: 30
ADLS_ACUITY_SCORE: 34

## 2022-10-14 NOTE — PLAN OF CARE
Goal Outcome Evaluation:       Spoke with pts dtr this am with update-- aware of the events of yesterday and today-- hoping soon she will be able to come visit for she speaks with her mom on a regular basis and is very worried  I told her it may be 10 days or 21 days depending on how the pt is doing.-- pt has been relatively stable have the vasopresson on for awhile  and at times Mira found her HR high 1 teens and Bp somewhat labile but over all unchanged.   She is awake enough to squeeze my hand when asked and does nod her head yes/no appropriately

## 2022-10-14 NOTE — PROGRESS NOTES
"Vent Mode: Other (see comments) (VC AC)  FiO2 (%): 45 %  Resp Rate (Set): 18 breaths/min  Tidal Volume (Set, mL): 400 mL  PEEP (cm H2O): 12 cmH2O  Resp: 19    BP (!) 142/83   Pulse 96   Temp 99.1  F (37.3  C)   Resp 19   Ht 1.702 m (5' 7\")   Wt 46.7 kg (103 lb)   SpO2 93%   BMI 16.13 kg/m      RT to follow, titrate FiO2 as tolerated. Weaning on hold per MD.   "

## 2022-10-14 NOTE — PROGRESS NOTES
"CLINICAL NUTRITION SERVICES - REASSESSMENT NOTE     Nutrition Prescription    RECOMMENDATIONS FOR MDs/PROVIDERS TO ORDER:      Malnutrition Status:    Severe in acute and chronic    Recommendations already ordered by Registered Dietitian (RD):  Decrease FWF to 90 ml every 6 hrs.  Total free water (TF+FWF) 1257 mls.  Monitor hydration    Future/Additional Recommendations:  Monitor hydration/TF tolerance.      EVALUATION OF THE PROGRESS TOWARD GOALS   Diet: NPO  Nutrition Support: Promote with fiber at goal rate 45 ml/hr continuous   ml every 4 hrs.  Intake: .Promote with Fiber @ goal of  45ml/hr  (1080ml/day): 1080 kcals, 67 g PRO, 897 ml free H20, with 1200 ml flushes, 149 g CHO, 15 g fiber daily    Propofol providing ~165 calories/day.  Nutrition needs met with above.    NEW FINDINGS   Hyponatremia noted today.  Lasix on  Hold.    ANTHROPOMETRICS  Height: 170.2 cm (5' 7\")  Admit wt 77 lb 2.6 oz 10/8, 94 lb 11.2 oz 10/9/22.  Most Recent Weight: 46.7 kg (103 lb)        PHYSICAL FINDINGS  See malnutrition section below.  Pt remains intubated  Pt in COVID isolation precautions.     GI CONCERNS  LBM 10/11/22, pt getting scheduled bowel meds.  Gastric residuals are minimal, 155 ml or less.  Hypo bowel sounds per charting.    LABS  ROUTINE ICU LABS (Last four results)  CMPRecent Labs   Lab 10/14/22  0909 10/14/22  0419 10/14/22  0416 10/14/22  0021 10/13/22  0426 10/13/22  0413 10/12/22  1616 10/12/22  1440 10/12/22  0807 10/12/22  0422 10/11/22  0503 10/11/22  0501 10/08/22  2016 10/08/22  1132   NA  --   --  135*  --   --  142  --  145  --  148*  --  145   < > 142   POTASSIUM  --   --  4.2  --   --  4.2  --  4.5  --  4.4  --  4.2   < > 3.5   CHLORIDE  --   --  91*  --   --  99  --   --   --  112*  --  111*   < > 100   CO2  --   --  37*  --   --  35*  --   --   --  31*  --  29   < > 29   ANIONGAP  --   --  7  --   --  8  --   --   --  5*  --  5*   < > 13   * 151* 131* 141*   < > 136*   < >  --    < > 177*   " < > 211*   < > 153*   BUN  --   --  21.8  --   --  28.0*  --   --   --  30.5*  --  35.9*   < > 46.2*   CR  --   --  0.44*  --   --  0.42*  --   --   --  0.40*  --  0.48*   < > 0.92   GFRESTIMATED  --   --  >90  --   --  >90  --   --   --  >90  --  >90   < > 67   JASON  --   --  8.9  --   --  8.6*  --   --   --  8.4*  --  8.3*   < > 7.6*   MAG  --   --  2.2  --   --  2.0  --   --   --  2.2  --  2.2   < > 1.8   PHOS  --   --  4.6*  --   --  3.5  --   --   --  2.4*  --  2.4*   < >  --    PROTTOTAL  --   --   --   --   --   --   --  6.3*  --   --   --   --   --  5.1*   ALBUMIN  --   --   --   --   --   --   --  2.7*  --   --   --   --   --  2.6*   BILITOTAL  --   --   --   --   --   --   --  0.6  --   --   --   --   --  1.1   ALKPHOS  --   --   --   --   --   --   --  86  --   --   --   --   --  90   AST  --   --   --   --   --   --   --  18  --   --   --   --   --  290*   ALT  --   --   --   --   --   --   --  55*  --   --   --   --   --  193*    < > = values in this interval not displayed.     CBC  Recent Labs   Lab 10/14/22  0416 10/13/22  1779 10/13/22  0413 10/12/22  0969   WBC 11.3* 11.2* 11.2* 12.2*   RBC 4.76 4.69 4.68 4.71   HGB 15.1 15.6 14.9 14.8   HCT 45.3 45.8 45.1 45.6   MCV 95 98 96 97   MCH 31.7 33.3* 31.8 31.4   MCHC 33.3 34.1 33.0 32.5   RDW 14.7 15.9* 15.2* 15.0    184 180 190     INR  Recent Labs   Lab 10/08/22  1132   INR 1.41*     Arterial Blood Gas  Recent Labs   Lab 10/14/22  0746 10/13/22  2031 10/13/22  0903 10/12/22  0942   PH 7.47* 7.47* 7.47* 7.41   PCO2 54* 58* 55* 52*   PO2 69* 81 52* 60*   HCO3 37* 39* 37* 30*   O2PER 0.55 60 0.55 50     Reviewed    MEDICATIONS    chlorhexidine  15 mL Mouth/Throat Q12H     dexamethasone  6 mg Intravenous Daily     [Held by provider] furosemide  40 mg Intravenous Q12H     influenza vac high-dose quad  0.7 mL Intramuscular Prior to discharge     insulin aspart  1-12 Units Subcutaneous Q4H     ipratropium - albuterol 0.5 mg/2.5 mg/3 mL  3 mL  Nebulization 4x daily     [Held by provider] metolazone  5 mg Oral or Feeding Tube BID     pantoprazole  40 mg Per Feeding Tube QAM AC    Or     pantoprazole  40 mg Intravenous QAM AC     piperacillin-tazobactam  3.375 g Intravenous Q8H     polyethylene glycol  17 g Oral Daily     QUEtiapine  25 mg Oral or Feeding Tube BID     senna-docusate  1 tablet Oral BID     thiamine  100 mg Per Feeding Tube Daily     vancomycin  1,000 mg Intravenous Q12H        dexmedetomidine 1.2 mcg/kg/hr (10/14/22 0736)     dextrose       fentaNYL 50 mcg/hr (10/14/22 0736)     heparin 1,300 Units/hr (10/14/22 0737)     propofol 20 mcg/kg/min (10/14/22 0737)    And     - MEDICATION INSTRUCTIONS -       norepinephrine 0.1 mcg/kg/min (10/14/22 0832)     vasopressin Stopped (10/13/22 1116)      dextrose, glucose **OR** dextrose **OR** glucagon, fentaNYL, propofol **AND** propofol **AND** CK total **AND** [CANCELED] Triglycerides **AND** - MEDICATION INSTRUCTIONS - **AND** Notify Physician, naloxone **OR** naloxone **OR** naloxone **OR** naloxone   Reviewed    Malnutrition Diagnosis: Severe malnutrition  In Context of:  Acute illness or injury  Chronic illness or disease    Goals   Tolerate tube feeding-met, ongoing  Meet nutrition needs-met, ongoing  Electrolytes WNL-progressing    CURRENT NUTRITION DIAGNOSIS  Swallowing difficulty related to acute illness as evidenced by intubation  -continues    INTERVENTIONS  Implementation  Adjust FWF to 90 ml every 6 hrs due to hyponatremia.  Continue to monitor hydration.    Monitoring/Evaluation  Progress toward goals will be monitored and evaluated per protocol.

## 2022-10-14 NOTE — PROGRESS NOTES
HEART CARE NOTE          Assessment/Recommendations     1. HFrEF c/b shock  Assessment / Plan    New diagnosis + mildly positive troponin - ? Stress induced in the setting of hypoxic respiratory failure - repeat echo once underlying stressors have been resolved; will likely need ischemic evaluation for further assessment if persistent.     GDMT on hold given the above     2. Shock  Assessment / Plan    Likely mixed picture of cardiogenic and distributive      Continue pressor support (wean as tolerated) and supportive care     3. PE  Assessment / Plan    ?signs of RV strain -  heparin gtt and management per primary team        4. COVID-19 positive  Assessment / Plan    Supportive care per primary team    Patient remains critically ill in the ICU requiring mechanical ventilation support as well as multiple vasopressor requirements for hemodynamic support. 35 minutes spent on critical care.    Cardiology team will sign-off for now. Please do not hesitate toconsult us again if new questions or concerns arise and/or when patient is hemodynamically stable and willing to proceed with coronary angiogram . Follow-up appointment will be arranged by CORE/HF clinic.       History of Present Illness/Subjective    Ms. Lavonne Talbot is a 67 year old female with a PMHx significant for (per Tu Price's note) active smoker, with history of lymphedema on furosemide since 2021, metabolic alkalosis, polycythemia, and recent diagnosis of COVID-19 viral infection 1 week ago who presented to [facility] ED on 10/8/2022 due to respiratory distress now admitted to the ICU with acute respiratory failure requiring mechanical ventilation in the setting of possible community-acquired pneumonia or aspiration pneumonia with mucous plugging, small subsegmental pulmonary embolism, circulatory shock on vasopressors, and possible myocardial injury.     Today, Patient is intubated and sedated; Management plan as detailed above     ECG:  "Personally reviewed. normal sinus rhythm, nonspecific ST and T waves changes.     ECHO (personnaly Reviewed):  1. Left ventricular size and wall thickness are normal. Systolic function is  moderately reduced with global hypokinesis. The estimated left ventricular  ejection fraction is 30-35%.  2. Right ventricle is not well visualized. Limited views suggest possible  enlargement and reduced systolic function.  3. No hemodynamically significant valvular abnormalities.  4. No prior study available for comparison.            Physical Examination Review of Systems   BP 94/66   Pulse 84   Temp 99.1  F (37.3  C)   Resp 20   Ht 1.702 m (5' 7\")   Wt 46.7 kg (103 lb)   SpO2 94%   BMI 16.13 kg/m    Body mass index is 16.13 kg/m .  Wt Readings from Last 3 Encounters:   10/14/22 46.7 kg (103 lb)     General Appearance:   no distress, normal body habitus   ENT/Mouth: membranes moist, no oral lesions or bleeding gums.      EYES:  no scleral icterus, normal conjunctivae   Neck: no carotid bruits or thyromegaly   Chest/Lungs:   lungs are clear to auscultation, no rales or wheezing, equal chest wall expansion    Cardiovascular:   Regular. Normal first and second heart sounds with no murmurs, rubs, or gallops; the carotid, radial and posterior tibial pulses are intact, no JVD or LE edema bilaterally    Abdomen:  no organomegaly, masses, bruits, or tenderness; bowel sounds are present   Extremities: no cyanosis or clubbing   Skin: no xanthelasma, warm.    Neurologic: Intubated/sedated     Psychiatric: Intubated/sedated    A complete 10 systems ROS was reviewed  And is negative except what is listed in the HPI.          Medical History  Surgical History Family History Social History   Past Medical History:   Diagnosis Date     Tobacco abuse     Past Surgical History:   Procedure Laterality Date     NO HISTORY OF SURGERY      no family history of premature coronary artery disease Social History     Socioeconomic History     " Marital status:      Spouse name: Not on file     Number of children: Not on file     Years of education: Not on file     Highest education level: Not on file   Occupational History     Not on file   Tobacco Use     Smoking status: Every Day     Types: Cigarettes     Smokeless tobacco: Not on file   Substance and Sexual Activity     Alcohol use: Not on file     Drug use: Not on file     Sexual activity: Not on file   Other Topics Concern     Not on file   Social History Narrative     Not on file     Social Determinants of Health     Financial Resource Strain: Not on file   Food Insecurity: Not on file   Transportation Needs: Not on file   Physical Activity: Not on file   Stress: Not on file   Social Connections: Not on file   Intimate Partner Violence: Not on file   Housing Stability: Not on file           Lab Results    Chemistry/lipid CBC Cardiac Enzymes/BNP/TSH/INR   Lab Results   Component Value Date    TRIG 113 10/12/2022    BUN 21.8 10/14/2022     (L) 10/14/2022    CO2 37 (H) 10/14/2022    Lab Results   Component Value Date    WBC 11.3 (H) 10/14/2022    HGB 15.1 10/14/2022    HCT 45.3 10/14/2022    MCV 95 10/14/2022     10/14/2022    Lab Results   Component Value Date    INR 1.41 (H) 10/08/2022     No results found for: CKTOTAL, CKMB, TROPONINI       Weight:    Wt Readings from Last 3 Encounters:   10/14/22 46.7 kg (103 lb)       Allergies  No Known Allergies      Surgical History  Past Surgical History:   Procedure Laterality Date     NO HISTORY OF SURGERY         Social History  Tobacco:   History   Smoking Status     Every Day     Types: Cigarettes   Smokeless Tobacco     Not on file    Alcohol:   Social History    Substance and Sexual Activity      Alcohol use: Not on file   Illicit Drugs:   History   Drug Use Not on file       Family History  Family History   Problem Relation Age of Onset     Diabetes Mother           Nisreen Amin MD on 10/14/2022      cc: Cinthya Cabral  H

## 2022-10-14 NOTE — PLAN OF CARE
[facility] - ICU    RN Progress Note:            Pertinent Assessments:      Please refer to flowsheet rows for full assessment     - RASS score of -3 or +1 on this shift. Fi02 of 55%. Oxygen saturation >92%.  - MAP drops <65 when she moves in bed. Increased levophed drip on this shift to maintain MAP >65.         Mobility Level:     2         Key Events - This Shift:   - Fi02 of 55% with oxygen saturation >92%.  - RASS score of -3 or +1 on this shift.  - Arterial line is very sensitive when patient needs to be repositioned in bed.             Plan:     Wean pressors and sedation as able.

## 2022-10-14 NOTE — PROGRESS NOTES
Care Management Follow Up    Length of Stay (days): 6    Expected Discharge Date: pending     Concerns to be Addressed: pulm status, intubated        Patient plan of care discussed at interdisciplinary rounds: Yes     Anticipated Discharge Disposition:  TBD     Anticipated Discharge Services:  TBD     Anticipated Discharge DME:  TBD           Additional Information:  Patient admitted for respiratory distress, acute respiratory failure requiring intubation 10/8 in the setting of community-acquired pneumonia or aspiration pneumonia with mucous plugging, small subsegmental pulmonary embolism, circulatory shock on vasopressors.        Social history:  Patient lives in a split level house with spouse. Spouse suffered a recent stroke and was discharged from [facility] to [facility]. Cognitively he is not able to participate in decision making or planning for patient. Patient has been independent with ADLs and IADLs.  She ambulates without devices and drives. Frantz and pcp clinic state no HCD on file. Frantz will be primary family contact.      Covid vaccinated times 3.      Final discharge plan pending progression and recommendations.     Trixie Joshi RN

## 2022-10-14 NOTE — PROGRESS NOTES
"RT PROGRESS NOTE    VENT DAY# 7    CURRENT SETTINGS:   Vent Mode: Other (see comments) (VC AC)  FiO2 (%): (S) 50 %  Resp Rate (Set): 20 breaths/min  Tidal Volume (Set, mL): 400 mL  PEEP (cm H2O): 12 cmH2O  Resp: 21    BP 99/66   Pulse 86   Temp 99.5  F (37.5  C)   Resp 21   Ht 1.702 m (5' 7\")   Wt 46.7 kg (103 lb)   SpO2 98%   BMI 16.13 kg/m        PATIENT PARAMETERS:  PIP 27  Pplat:  23  Pmean:  16  Compliance: 32  SBT: no      Secretions:  none  02 Sats:  95%  BS: clear diminished bilat    ETT SIZE 7.35 Secured at 21 cm at teeth/gums    Respiratory Medications: Duoneb QID, given. Tolerated well      Latest Reference Range & Units 10/14/22 07:46   pH Arterial 7.37 - 7.44  7.47 (H)   pCO2 Arterial 35 - 45 mm Hg 54 (H)   PO2 Arterial 75 - 85 mm Hg 69 (L)   Bicarbonate Arterial 23 - 29 mmol/L 37 (H)   Base Excess Art mmol/L 15.4   FIO2  0.55   Oxyhemoglobin 95.0 - 96.0 % 93.7 (L)   (H): Data is abnormally high  (L): Data is abnormally low    NOTE / SHIFT SUMMARY:   Continues on mechanical ventilation, FiO2 decreased to .50. Suctioned inline, no returned secretions. RT to monitor    Matthew Elizabeth, RT    "

## 2022-10-14 NOTE — PROGRESS NOTES
"Vent Mode: Other (see comments) (VC AC)  FiO2 (%): (S) 45 %  Resp Rate (Set): 18 breaths/min (decreased by RN)  Tidal Volume (Set, mL): 400 mL  PEEP (cm H2O): 12 cmH2O  Resp: 19    BP (!) 87/58   Pulse 104   Temp 99.7  F (37.6  C)   Resp 19   Ht 1.702 m (5' 7\")   Wt 46.7 kg (103 lb)   SpO2 92%   BMI 16.13 kg/m      RT to monitor, titrate FiO2 as tolerated.   "

## 2022-10-14 NOTE — PROGRESS NOTES
Critical Care Progress Note      10/14/2022    Name: Lavonne Talbot MRN#: [id number]   Age: 67 year old YOB: 1955     Hsptl Day# 6  ICU DAY #    MV DAY #             Problem List:   Active Problems:    Acute respiratory failure with hypoxia (H)    Hypotension, unspecified hypotension type    Infection due to 2019 novel coronavirus    Clinically Significant Risk Factors Present on Admission                # Severe Malnutrition: based on nutrition assessment                      Summary/Hospital Course:     Lavonne Talbot is a 67 year old cachectic female (BMI 12.09 kg/m ), active smoker, with history of lymphedema on furosemide since 2021, metabolic alkalosis, polycythemia, and recent diagnosis of COVID-19 viral infection 1 week ago who presented to [facility] ED on 10/8/2022 due to respiratory distress now admitted to the ICU with acute respiratory failure requiring mechanical ventilation in the setting of possible community-acquired pneumonia or aspiration pneumonia with mucous plugging, small subsegmental pulmonary embolism, circulatory shock on vasopressors.      Assessment and plan :       I have personally reviewed the daily labs, imaging studies, cultures and discussed the case with referring physician and consulting physicians.     My assessment and plan by system for this patient is as follows:    Neurology/Psychiatry:   Sedated with Precedex, fentanyl and propofol    Cardiovascular:   Vasoplegia secondary to sedation.  Requiring norepinephrine  Slightly elevated troponin, echo shows stress-induced cardiomyopathy global dysfunction and EF of 30%  Small PE, subsegmental.  No DVT.   Repeat CTA shows resolution of the original small embolus but to new emboli.  Restarted on heparin drip.  So far no further hematuria    Diuresed with Lasix, currently on hold    Pulmonary/Ventilator Management:   Acute respiratory failure in a patient with severe emphysema  Concern for community-acquired  pneumonia with left lower lobe consolidation.  Patient is also been diagnosed with COVID-19 but she does not appear to be having COVID-pneumonia.    On tidal volume 400 mL, rate of 20, FiO2 45% and PEEP of 12.  Decreased RR to 18  DuoNeb's  Continue dexamethasone for COVID    Worsening infiltrate on CT.  Sputum culture with haemophilus.  She was switched to vancomycin and Zosyn for possible VAP  If no growth then will de-escalate antibiotics again      GI and Nutrition :   Increase tube feeds as tolerated      Renal/Fluids/Electrolytes:   Net even  Hyponatremic.  Decrease free water.  Lasix on hold    - monitor function and electrolytes as needed with replacement per ICU protocols.  - generally avoid nephrotoxic agents such as NSAID, IV contrast unless specifically required  - adjust medications as needed for renal clearance  - follow I/O's as appropriate.    Infectious Disease:   COVID-19.  Currently on dexamethasone.  CT does not look like COVID-pneumonia.  Haemophilus pneumonia.  Switch Zosyn to Rocephin    Endocrine:     - ICU insulin protocol, goal sugar <180      ICU Prophylaxis:   1. DVT: Hep drip  2. VAP: HOB 30 degrees, chlorhexidine rinse  3. Stress Ulcer: PPI  4. Restraints: Nonviolent soft two point restraints required and necessary for patient safety and continued cares and good effect as patient continues to pull at necessary lines, tubes despite education and distraction. Will readdress daily.     Category: Non-violent   Type of Restraint: Soft limb x2.   Behavior: Pulling at IVand black catheter tubings.   Root cause of behavior: Critical illness.   Less-restrictive methods that have failed: Redirection, reorientation. 1:1 NA at bedside.   Response to restraint: Not actively pulling atcurrent restraints.   Criteria for release from restraint: Responds to redirection. Leaves medical devices in place.           Key Medications:       chlorhexidine  15 mL Mouth/Throat Q12H     dexamethasone  6 mg  Intravenous Daily     [Held by provider] furosemide  40 mg Intravenous Q12H     influenza vac high-dose quad  0.7 mL Intramuscular Prior to discharge     insulin aspart  1-12 Units Subcutaneous Q4H     ipratropium - albuterol 0.5 mg/2.5 mg/3 mL  3 mL Nebulization 4x daily     [Held by provider] metolazone  5 mg Oral or Feeding Tube BID     pantoprazole  40 mg Per Feeding Tube QAM AC    Or     pantoprazole  40 mg Intravenous QAM AC     piperacillin-tazobactam  3.375 g Intravenous Q8H     polyethylene glycol  17 g Oral Daily     QUEtiapine  25 mg Oral or Feeding Tube BID     senna-docusate  1 tablet Oral BID     thiamine  100 mg Per Feeding Tube Daily     vancomycin  1,000 mg Intravenous Q12H       dexmedetomidine 1.2 mcg/kg/hr (10/14/22 1130)     dextrose       fentaNYL 50 mcg/hr (10/14/22 0736)     heparin 1,300 Units/hr (10/14/22 1229)     propofol 25 mcg/kg/min (10/14/22 0938)    And     - MEDICATION INSTRUCTIONS -       norepinephrine 0.09 mcg/kg/min (10/14/22 1050)     vasopressin Stopped (10/14/22 1051)               Physical Examination:   Temp:  [97.9  F (36.6  C)-100.2  F (37.9  C)] 99.7  F (37.6  C)  Pulse:  [] 104  Resp:  [18-26] 19  BP: ()/(50-75) 87/58  MAP:  [58 mmHg-225 mmHg] 74 mmHg  Arterial Line BP: ()/() 90/62  FiO2 (%):  [45 %-80 %] 45 %  SpO2:  [90 %-100 %] 92 %    Intake/Output Summary (Last 24 hours) at 10/9/2022 1224  Last data filed at 10/9/2022 1200  Gross per 24 hour   Intake 947.77 ml   Output 902 ml   Net 45.77 ml     Wt Readings from Last 4 Encounters:   10/14/22 46.7 kg (103 lb)     Arterial Line BP: ()/() 90/62  MAP:  [58 mmHg-225 mmHg] 74 mmHg  BP - Mean:  [61-92] 67  Vent Mode: Other (see comments) (VC AC)  FiO2 (%): (S) 45 %  Resp Rate (Set): 18 breaths/min (decreased by RN)  Tidal Volume (Set, mL): 400 mL  PEEP (cm H2O): 12 cmH2O  Resp: 19    Recent Labs   Lab 10/14/22  0746 10/13/22  2031 10/13/22  0903 10/12/22  0942   PH 7.47* 7.47* 7.47* 7.41    PCO2 54* 58* 55* 52*   PO2 69* 81 52* 60*   HCO3 37* 39* 37* 30*   O2PER 0.55 60 0.55 50       GEN: no acute distress   HEENT: head ncat, sclera anicteric, OP patent, trachea midline   PULM: unlabored synchronous with vent, clear anteriorly    CV/COR: RRR S1S2 no gallop,  No rub, no murmur  ABD: soft nontender, hypoactive bowel sounds, no mass  EXT: No significant edema  NEURO: Sedated  SKIN: no obvious rash  LINES: clean, dry intact         Data:   All data and imaging reviewed     ROUTINE ICU LABS (Last four results)  CMP  Recent Labs   Lab 10/14/22  1239 10/14/22  0909 10/14/22  0419 10/14/22  0416 10/13/22  0426 10/13/22  0413 10/12/22  1616 10/12/22  1440 10/12/22  0807 10/12/22  0422 10/11/22  0503 10/11/22  0501 10/08/22  2016 10/08/22  1132   NA  --   --   --  135*  --  142  --  145  --  148*  --  145   < > 142   POTASSIUM  --   --   --  4.2  --  4.2  --  4.5  --  4.4  --  4.2   < > 3.5   CHLORIDE  --   --   --  91*  --  99  --   --   --  112*  --  111*   < > 100   CO2  --   --   --  37*  --  35*  --   --   --  31*  --  29   < > 29   ANIONGAP  --   --   --  7  --  8  --   --   --  5*  --  5*   < > 13   * 170* 151* 131*   < > 136*   < >  --    < > 177*   < > 211*   < > 153*   BUN  --   --   --  21.8  --  28.0*  --   --   --  30.5*  --  35.9*   < > 46.2*   CR  --   --   --  0.44*  --  0.42*  --   --   --  0.40*  --  0.48*   < > 0.92   GFRESTIMATED  --   --   --  >90  --  >90  --   --   --  >90  --  >90   < > 67   JASON  --   --   --  8.9  --  8.6*  --   --   --  8.4*  --  8.3*   < > 7.6*   MAG  --   --   --  2.2  --  2.0  --   --   --  2.2  --  2.2   < > 1.8   PHOS  --   --   --  4.6*  --  3.5  --   --   --  2.4*  --  2.4*   < >  --    PROTTOTAL  --   --   --   --   --   --   --  6.3*  --   --   --   --   --  5.1*   ALBUMIN  --   --   --   --   --   --   --  2.7*  --   --   --   --   --  2.6*   BILITOTAL  --   --   --   --   --   --   --  0.6  --   --   --   --   --  1.1   ALKPHOS  --   --   --   --    --   --   --  86  --   --   --   --   --  90   AST  --   --   --   --   --   --   --  18  --   --   --   --   --  290*   ALT  --   --   --   --   --   --   --  55*  --   --   --   --   --  193*    < > = values in this interval not displayed.     CBC  Recent Labs   Lab 10/14/22  0416 10/13/22  1609 10/13/22  0413 10/12/22  0422   WBC 11.3* 11.2* 11.2* 12.2*   RBC 4.76 4.69 4.68 4.71   HGB 15.1 15.6 14.9 14.8   HCT 45.3 45.8 45.1 45.6   MCV 95 98 96 97   MCH 31.7 33.3* 31.8 31.4   MCHC 33.3 34.1 33.0 32.5   RDW 14.7 15.9* 15.2* 15.0    184 180 190     INR  Recent Labs   Lab 10/08/22  1132   INR 1.41*     Arterial Blood Gas  Recent Labs   Lab 10/14/22  0746 10/13/22  2031 10/13/22  0903 10/12/22  0942   PH 7.47* 7.47* 7.47* 7.41   PCO2 54* 58* 55* 52*   PO2 69* 81 52* 60*   HCO3 37* 39* 37* 30*   O2PER 0.55 60 0.55 50       All cultures:  No results for input(s): CULT in the last 168 hours.  No results found for this or any previous visit (from the past 24 hour(s)).      Billing: This patient is critically ill: Yes. Total critical care time today 34 min exclusive of procedures or teaching.

## 2022-10-15 LAB
ANION GAP SERPL CALCULATED.3IONS-SCNC: 7 MMOL/L (ref 7–15)
BACTERIA ASPIRATE CULT: NORMAL
BASE EXCESS BLDA CALC-SCNC: 15.7 MMOL/L
BUN SERPL-MCNC: 20.4 MG/DL (ref 8–23)
CALCIUM SERPL-MCNC: 8.9 MG/DL (ref 8.8–10.2)
CHLORIDE SERPL-SCNC: 94 MMOL/L (ref 98–107)
COHGB MFR BLD: 93.7 % (ref 95–96)
CREAT SERPL-MCNC: 0.49 MG/DL (ref 0.51–0.95)
DEPRECATED HCO3 PLAS-SCNC: 36 MMOL/L (ref 22–29)
ERYTHROCYTE [DISTWIDTH] IN BLOOD BY AUTOMATED COUNT: 14.8 % (ref 10–15)
GFR SERPL CREATININE-BSD FRML MDRD: >90 ML/MIN/1.73M2
GLUCOSE BLDC GLUCOMTR-MCNC: 129 MG/DL (ref 70–99)
GLUCOSE BLDC GLUCOMTR-MCNC: 129 MG/DL (ref 70–99)
GLUCOSE BLDC GLUCOMTR-MCNC: 141 MG/DL (ref 70–99)
GLUCOSE BLDC GLUCOMTR-MCNC: 146 MG/DL (ref 70–99)
GLUCOSE BLDC GLUCOMTR-MCNC: 156 MG/DL (ref 70–99)
GLUCOSE BLDC GLUCOMTR-MCNC: 219 MG/DL (ref 70–99)
GLUCOSE BLDC GLUCOMTR-MCNC: 83 MG/DL (ref 70–99)
GLUCOSE SERPL-MCNC: 137 MG/DL (ref 70–99)
HCO3 BLD-SCNC: 37 MMOL/L (ref 23–29)
HCT VFR BLD AUTO: 42.7 % (ref 35–47)
HGB BLD-MCNC: 14.9 G/DL (ref 11.7–15.7)
MAGNESIUM SERPL-MCNC: 2 MG/DL (ref 1.7–2.3)
MCH RBC QN AUTO: 33.3 PG (ref 26.5–33)
MCHC RBC AUTO-ENTMCNC: 34.9 G/DL (ref 31.5–36.5)
MCV RBC AUTO: 96 FL (ref 78–100)
O2/TOTAL GAS SETTING VFR VENT: 45 %
OXYHGB MFR BLD: 92 % (ref 95–96)
PCO2 BLD: 49 MM HG (ref 35–45)
PEEP: 12 CM H2O
PH BLD: 7.51 [PH] (ref 7.37–7.44)
PHOSPHATE SERPL-MCNC: 3.5 MG/DL (ref 2.5–4.5)
PLATELET # BLD AUTO: 191 10E3/UL (ref 150–450)
PO2 BLD: 64 MM HG (ref 75–85)
POTASSIUM SERPL-SCNC: 3.9 MMOL/L (ref 3.4–5.3)
RATE: 18 RR/MIN
RBC # BLD AUTO: 4.47 10E6/UL (ref 3.8–5.2)
SODIUM SERPL-SCNC: 137 MMOL/L (ref 136–145)
TEMPERATURE: 37 DEGREES C
UFH PPP CHRO-ACNC: 0.47 IU/ML
VENTILATOR TIDAL VOLUME: 400 ML
WBC # BLD AUTO: 12 10E3/UL (ref 4–11)

## 2022-10-15 PROCEDURE — C9113 INJ PANTOPRAZOLE SODIUM, VIA: HCPCS | Performed by: INTERNAL MEDICINE

## 2022-10-15 PROCEDURE — 250N000011 HC RX IP 250 OP 636: Performed by: INTERNAL MEDICINE

## 2022-10-15 PROCEDURE — 83735 ASSAY OF MAGNESIUM: CPT | Performed by: INTERNAL MEDICINE

## 2022-10-15 PROCEDURE — 94003 VENT MGMT INPAT SUBQ DAY: CPT

## 2022-10-15 PROCEDURE — P9047 ALBUMIN (HUMAN), 25%, 50ML: HCPCS | Performed by: INTERNAL MEDICINE

## 2022-10-15 PROCEDURE — 999N000157 HC STATISTIC RCP TIME EA 10 MIN

## 2022-10-15 PROCEDURE — 250N000009 HC RX 250: Performed by: INTERNAL MEDICINE

## 2022-10-15 PROCEDURE — 85520 HEPARIN ASSAY: CPT | Performed by: INTERNAL MEDICINE

## 2022-10-15 PROCEDURE — 200N000001 HC R&B ICU

## 2022-10-15 PROCEDURE — 82947 ASSAY GLUCOSE BLOOD QUANT: CPT | Performed by: INTERNAL MEDICINE

## 2022-10-15 PROCEDURE — 82805 BLOOD GASES W/O2 SATURATION: CPT | Performed by: INTERNAL MEDICINE

## 2022-10-15 PROCEDURE — 250N000013 HC RX MED GY IP 250 OP 250 PS 637: Performed by: INTERNAL MEDICINE

## 2022-10-15 PROCEDURE — 250N000011 HC RX IP 250 OP 636: Performed by: NURSE PRACTITIONER

## 2022-10-15 PROCEDURE — 84100 ASSAY OF PHOSPHORUS: CPT | Performed by: INTERNAL MEDICINE

## 2022-10-15 PROCEDURE — 85027 COMPLETE CBC AUTOMATED: CPT | Performed by: INTERNAL MEDICINE

## 2022-10-15 PROCEDURE — 94640 AIRWAY INHALATION TREATMENT: CPT | Mod: 76

## 2022-10-15 PROCEDURE — 94640 AIRWAY INHALATION TREATMENT: CPT

## 2022-10-15 PROCEDURE — 99291 CRITICAL CARE FIRST HOUR: CPT | Performed by: INTERNAL MEDICINE

## 2022-10-15 RX ORDER — MINERAL OIL AND WHITE PETROLATUM 150; 830 MG/G; MG/G
OINTMENT OPHTHALMIC 4 TIMES DAILY PRN
Status: DISCONTINUED | OUTPATIENT
Start: 2022-10-15 | End: 2022-11-11 | Stop reason: HOSPADM

## 2022-10-15 RX ORDER — QUETIAPINE FUMARATE 25 MG/1
75 TABLET, FILM COATED ORAL AT BEDTIME
Status: DISCONTINUED | OUTPATIENT
Start: 2022-10-15 | End: 2022-10-17

## 2022-10-15 RX ORDER — QUETIAPINE FUMARATE 25 MG/1
25 TABLET, FILM COATED ORAL 3 TIMES DAILY
Status: DISCONTINUED | OUTPATIENT
Start: 2022-10-15 | End: 2022-10-15

## 2022-10-15 RX ORDER — QUETIAPINE FUMARATE 25 MG/1
50 TABLET, FILM COATED ORAL 3 TIMES DAILY
Status: DISCONTINUED | OUTPATIENT
Start: 2022-10-15 | End: 2022-10-16

## 2022-10-15 RX ORDER — ACETAZOLAMIDE 500 MG/5ML
250 INJECTION, POWDER, LYOPHILIZED, FOR SOLUTION INTRAVENOUS ONCE
Status: DISCONTINUED | OUTPATIENT
Start: 2022-10-15 | End: 2022-10-15 | Stop reason: CLARIF

## 2022-10-15 RX ORDER — ALBUMIN (HUMAN) 12.5 G/50ML
25 SOLUTION INTRAVENOUS ONCE
Status: COMPLETED | OUTPATIENT
Start: 2022-10-15 | End: 2022-10-15

## 2022-10-15 RX ORDER — MAGNESIUM SULFATE HEPTAHYDRATE 40 MG/ML
2 INJECTION, SOLUTION INTRAVENOUS ONCE
Status: COMPLETED | OUTPATIENT
Start: 2022-10-15 | End: 2022-10-15

## 2022-10-15 RX ADMIN — PROPOFOL 40 MCG/KG/MIN: 10 INJECTION, EMULSION INTRAVENOUS at 21:47

## 2022-10-15 RX ADMIN — INSULIN ASPART 1 UNITS: 100 INJECTION, SOLUTION INTRAVENOUS; SUBCUTANEOUS at 16:40

## 2022-10-15 RX ADMIN — THIAMINE HCL TAB 100 MG 100 MG: 100 TAB at 08:05

## 2022-10-15 RX ADMIN — QUETIAPINE FUMARATE 50 MG: 25 TABLET ORAL at 14:27

## 2022-10-15 RX ADMIN — IPRATROPIUM BROMIDE AND ALBUTEROL SULFATE 3 ML: 2.5; .5 SOLUTION RESPIRATORY (INHALATION) at 19:35

## 2022-10-15 RX ADMIN — DEXMEDETOMIDINE HYDROCHLORIDE 1.2 MCG/KG/HR: 400 INJECTION INTRAVENOUS at 14:19

## 2022-10-15 RX ADMIN — Medication 0.15 MCG/KG/MIN: at 14:21

## 2022-10-15 RX ADMIN — ALBUMIN HUMAN 25 G: 0.25 SOLUTION INTRAVENOUS at 14:19

## 2022-10-15 RX ADMIN — DEXAMETHASONE SODIUM PHOSPHATE 6 MG: 4 INJECTION, SOLUTION INTRA-ARTICULAR; INTRALESIONAL; INTRAMUSCULAR; INTRAVENOUS; SOFT TISSUE at 08:05

## 2022-10-15 RX ADMIN — IPRATROPIUM BROMIDE AND ALBUTEROL SULFATE 3 ML: 2.5; .5 SOLUTION RESPIRATORY (INHALATION) at 12:03

## 2022-10-15 RX ADMIN — QUETIAPINE FUMARATE 50 MG: 25 TABLET ORAL at 16:40

## 2022-10-15 RX ADMIN — IPRATROPIUM BROMIDE AND ALBUTEROL SULFATE 3 ML: 2.5; .5 SOLUTION RESPIRATORY (INHALATION) at 15:00

## 2022-10-15 RX ADMIN — POLYETHYLENE GLYCOL 3350 17 G: 17 POWDER, FOR SOLUTION ORAL at 08:05

## 2022-10-15 RX ADMIN — QUETIAPINE FUMARATE 25 MG: 25 TABLET ORAL at 08:05

## 2022-10-15 RX ADMIN — INSULIN ASPART 4 UNITS: 100 INJECTION, SOLUTION INTRAVENOUS; SUBCUTANEOUS at 12:25

## 2022-10-15 RX ADMIN — PIPERACILLIN AND TAZOBACTAM 3.38 G: 3; .375 INJECTION, POWDER, LYOPHILIZED, FOR SOLUTION INTRAVENOUS at 20:01

## 2022-10-15 RX ADMIN — HEPARIN SODIUM AND DEXTROSE 1300 UNITS/HR: 10000; 5 INJECTION INTRAVENOUS at 08:32

## 2022-10-15 RX ADMIN — Medication 10 MG: at 12:26

## 2022-10-15 RX ADMIN — PIPERACILLIN AND TAZOBACTAM 3.38 G: 3; .375 INJECTION, POWDER, LYOPHILIZED, FOR SOLUTION INTRAVENOUS at 05:20

## 2022-10-15 RX ADMIN — IPRATROPIUM BROMIDE AND ALBUTEROL SULFATE 3 ML: 2.5; .5 SOLUTION RESPIRATORY (INHALATION) at 07:43

## 2022-10-15 RX ADMIN — PROPOFOL 35 MCG/KG/MIN: 10 INJECTION, EMULSION INTRAVENOUS at 12:25

## 2022-10-15 RX ADMIN — CHLORHEXIDINE GLUCONATE 15 ML: 1.2 SOLUTION ORAL at 08:05

## 2022-10-15 RX ADMIN — INSULIN ASPART 1 UNITS: 100 INJECTION, SOLUTION INTRAVENOUS; SUBCUTANEOUS at 04:57

## 2022-10-15 RX ADMIN — DEXMEDETOMIDINE HYDROCHLORIDE 1.2 MCG/KG/HR: 400 INJECTION INTRAVENOUS at 05:31

## 2022-10-15 RX ADMIN — VANCOMYCIN HYDROCHLORIDE 1000 MG: 1 INJECTION, SOLUTION INTRAVENOUS at 04:03

## 2022-10-15 RX ADMIN — PIPERACILLIN AND TAZOBACTAM 3.38 G: 3; .375 INJECTION, POWDER, LYOPHILIZED, FOR SOLUTION INTRAVENOUS at 12:29

## 2022-10-15 RX ADMIN — INSULIN ASPART 1 UNITS: 100 INJECTION, SOLUTION INTRAVENOUS; SUBCUTANEOUS at 23:51

## 2022-10-15 RX ADMIN — QUETIAPINE FUMARATE 75 MG: 25 TABLET ORAL at 20:02

## 2022-10-15 RX ADMIN — DEXMEDETOMIDINE HYDROCHLORIDE 1.2 MCG/KG/HR: 400 INJECTION INTRAVENOUS at 23:48

## 2022-10-15 RX ADMIN — SENNOSIDES AND DOCUSATE SODIUM 1 TABLET: 50; 8.6 TABLET ORAL at 20:02

## 2022-10-15 RX ADMIN — SENNOSIDES AND DOCUSATE SODIUM 1 TABLET: 50; 8.6 TABLET ORAL at 08:05

## 2022-10-15 RX ADMIN — MAGNESIUM SULFATE HEPTAHYDRATE 2 G: 40 INJECTION, SOLUTION INTRAVENOUS at 06:58

## 2022-10-15 RX ADMIN — Medication 50 MCG: at 04:21

## 2022-10-15 RX ADMIN — Medication 100 MCG/HR: at 12:25

## 2022-10-15 RX ADMIN — CHLORHEXIDINE GLUCONATE 15 ML: 1.2 SOLUTION ORAL at 19:58

## 2022-10-15 RX ADMIN — PANTOPRAZOLE SODIUM 40 MG: 40 INJECTION, POWDER, FOR SOLUTION INTRAVENOUS at 06:54

## 2022-10-15 ASSESSMENT — ACTIVITIES OF DAILY LIVING (ADL)
ADLS_ACUITY_SCORE: 30

## 2022-10-15 NOTE — PROGRESS NOTES
"Vent Mode: Other (see comments) (VC AC)  FiO2 (%): 35 %  Resp Rate (Set): 18 breaths/min  Tidal Volume (Set, mL): 400 mL  PEEP (cm H2O): 12 cmH2O  Resp: 18    /62   Pulse 80   Temp 97.5  F (36.4  C)   Resp 18   Ht 1.702 m (5' 7\")   Wt 44.6 kg (98 lb 6.4 oz)   SpO2 93%   BMI 15.41 kg/m      1505 PEEP decreased to 120 per MD request    Duoneb tx given inline, tolerated well, RT to monitor.  "

## 2022-10-15 NOTE — PLAN OF CARE
[facility] - ICU    RN Progress Note:            Pertinent Assessments:      Please refer to flowsheet rows for full assessment     Restlessness/agitation cont'd this am, fentanyl and propofol increased. Pt also needed more Levophed as well.         Mobility Level:     mobility 1         Key Events - This Shift:     [facility] SAT / SBT Record: Delete if N/A    SAT Safety Screen Not Applicable   If FAILED why? NA, no wean   SAT Performed Not Applicable   If FAILED why? NA, pt following commands w/sedation   SBT Safety Screen Not Applicable   If FAILED why? NA, no wean      Pt restless but able to follow commands and RUSSELL w/o decreasing sedation, no wean planned for today.              Plan:     Maintain sedation and BP        Point of Contact Update YES-OR-NO: Yes    Name:son and daughter  Phone Number:in chart  Summary of Conversation: MD explained state of pt's illness and declining status, palliative care offered, questions answered.      Goal Outcome Evaluation:      Plan of Care Reviewed With: child    Overall Patient Progress: decliningOverall Patient Progress: declining

## 2022-10-15 NOTE — PROGRESS NOTES
Critical Care Progress Note      10/15/2022    Name: Lavonne Talbot MRN#: [id number]   Age: 67 year old YOB: 1955     Hsptl Day# 7  ICU DAY #    MV DAY #             Problem List:   Active Problems:    Acute respiratory failure with hypoxia (H)    Hypotension, unspecified hypotension type    Infection due to 2019 novel coronavirus    Clinically Significant Risk Factors Present on Admission                # Severe Malnutrition: based on nutrition assessment                      Summary/Hospital Course:     Lavonne Talbot is a 67 year old cachectic female (BMI 12.09 kg/m ), active smoker, with history of lymphedema on furosemide since 2021, metabolic alkalosis, polycythemia, and recent diagnosis of COVID-19 viral infection 1 week ago who presented to [facility] ED on 10/8/2022 due to respiratory distress now admitted to the ICU with acute respiratory failure requiring mechanical ventilation in the setting of possible community-acquired pneumonia or aspiration pneumonia with mucous plugging, small subsegmental pulmonary embolism, circulatory shock on vasopressors.      Assessment and plan :       I have personally reviewed the daily labs, imaging studies, cultures and discussed the case with referring physician and consulting physicians.     My assessment and plan by system for this patient is as follows:    Neurology/Psychiatry:   Sedated with Precedex, fentanyl and propofol  Agitated.  Increase Seroquel to 50 mg 3 times daily and 75 mg nightly    Cardiovascular:   Vasoplegia secondary to sedation.  Requiring norepinephrine, up to 0.15 mcg/kg/min  She appears to be over diuresed.  We will give her 25% albumin.    Slightly elevated troponin, echo shows stress-induced cardiomyopathy global dysfunction and EF of 30%  Small PE, subsegmental.  No DVT.   Repeat CTA shows resolution of the original small embolus but to new emboli.  Restarted on heparin drip.  So far no further  hematuria      Pulmonary/Ventilator Management:   Acute respiratory failure in a patient with severe emphysema  Concern for community-acquired pneumonia with left lower lobe consolidation.  Patient is also been diagnosed with COVID-19 but she does not appear to be having COVID-pneumonia.    On tidal volume 400 mL, rate of 20, FiO2 45% and PEEP of 12.  Decreased RR to 18  DuoNeb's  Continue dexamethasone for COVID    Worsening infiltrate on CT.  DC vancomycin continue Zosyn for H. influenzae pneumonia with worsening left lower lobe infiltrate      GI and Nutrition :   Increase tube feeds as tolerated      Renal/Fluids/Electrolytes:   Net even  Hyponatremic.  Decrease free water.  Lasix on hold    - monitor function and electrolytes as needed with replacement per ICU protocols.  - generally avoid nephrotoxic agents such as NSAID, IV contrast unless specifically required  - adjust medications as needed for renal clearance  - follow I/O's as appropriate.    Infectious Disease:   COVID-19.  Currently on dexamethasone.  CT does not look like COVID-pneumonia.  Haemophilus pneumonia.  Currently on Zosyn.  DC vancomycin    Endocrine:     - ICU insulin protocol, goal sugar <180      ICU Prophylaxis:   1. DVT: Hep drip  2. VAP: HOB 30 degrees, chlorhexidine rinse  3. Stress Ulcer: PPI  4. Restraints: Nonviolent soft two point restraints required and necessary for patient safety and continued cares and good effect as patient continues to pull at necessary lines, tubes despite education and distraction. Will readdress daily.     Category: Non-violent   Type of Restraint: Soft limb x2.   Behavior: Pulling at IVand black catheter tubings.   Root cause of behavior: Critical illness.   Less-restrictive methods that have failed: Redirection, reorientation. 1:1 NA at bedside.   Response to restraint: Not actively pulling atcurrent restraints.   Criteria for release from restraint: Responds to redirection. Leaves medical devices in  place.           Key Medications:       albumin human         albumin human  25 g Intravenous Once     chlorhexidine  15 mL Mouth/Throat Q12H     dexamethasone  6 mg Intravenous Daily     [Held by provider] furosemide  40 mg Intravenous Q12H     influenza vac high-dose quad  0.7 mL Intramuscular Prior to discharge     insulin aspart  1-12 Units Subcutaneous Q4H     ipratropium - albuterol 0.5 mg/2.5 mg/3 mL  3 mL Nebulization 4x daily     [Held by provider] metolazone  5 mg Oral or Feeding Tube BID     pantoprazole  40 mg Per Feeding Tube QAM AC    Or     pantoprazole  40 mg Intravenous QAM AC     piperacillin-tazobactam  3.375 g Intravenous Q8H     polyethylene glycol  17 g Oral Daily     QUEtiapine  50 mg Oral TID     QUEtiapine  75 mg Oral or Feeding Tube At Bedtime     senna-docusate  1 tablet Oral BID     thiamine  100 mg Per Feeding Tube Daily       dexmedetomidine 1.2 mcg/kg/hr (10/15/22 1236)     dextrose       fentaNYL 100 mcg/hr (10/15/22 1225)     heparin 1,300 Units/hr (10/15/22 0832)     propofol 35 mcg/kg/min (10/15/22 1225)    And     - MEDICATION INSTRUCTIONS -       norepinephrine 0.15 mcg/kg/min (10/15/22 1236)     vasopressin Stopped (10/14/22 1051)               Physical Examination:   Temp:  [96.6  F (35.9  C)-100.4  F (38  C)] 99.7  F (37.6  C)  Pulse:  [] 117  Resp:  [18-33] 18  BP: ()/(61-87) 102/62  MAP:  [0 mmHg-145 mmHg] 64 mmHg  Arterial Line BP: (0-186)/(0-85) 90/51  FiO2 (%):  [30 %-45 %] 40 %  SpO2:  [69 %-100 %] 91 %    Intake/Output Summary (Last 24 hours) at 10/9/2022 1224  Last data filed at 10/9/2022 1200  Gross per 24 hour   Intake 947.77 ml   Output 902 ml   Net 45.77 ml     Wt Readings from Last 4 Encounters:   10/15/22 44.6 kg (98 lb 6.4 oz)     Arterial Line BP: (0-186)/(0-85) 90/51  MAP:  [0 mmHg-145 mmHg] 64 mmHg  BP - Mean:  [] 88  Vent Mode: Other (see comments) (VC AC)  FiO2 (%): 40 %  Resp Rate (Set): 18 breaths/min  Tidal Volume (Set, mL): 400  mL  PEEP (cm H2O): 12 cmH2O  Resp: 18    Recent Labs   Lab 10/15/22  0414 10/14/22  0746 10/13/22  2031 10/13/22  0903   PH 7.51* 7.47* 7.47* 7.47*   PCO2 49* 54* 58* 55*   PO2 64* 69* 81 52*   HCO3 37* 37* 39* 37*   O2PER 45 0.55 60 0.55       GEN: no acute distress   HEENT: head ncat, sclera anicteric, OP patent, trachea midline   PULM: unlabored synchronous with vent, clear anteriorly    CV/COR: RRR S1S2 no gallop,  No rub, no murmur  ABD: soft nontender, hypoactive bowel sounds, no mass  EXT: No significant edema  NEURO: Sedated  SKIN: no obvious rash  LINES: clean, dry intact         Data:   All data and imaging reviewed     ROUTINE ICU LABS (Last four results)  CMP  Recent Labs   Lab 10/15/22  1213 10/15/22  0808 10/15/22  0414 10/15/22  0410 10/14/22  0419 10/14/22  0416 10/13/22  0426 10/13/22  0413 10/12/22  1616 10/12/22  1440 10/12/22  0807 10/12/22  0422   NA  --   --  137  --   --  135*  --  142  --  145  --  148*   POTASSIUM  --   --  3.9  --   --  4.2  --  4.2  --  4.5  --  4.4   CHLORIDE  --   --  94*  --   --  91*  --  99  --   --   --  112*   CO2  --   --  36*  --   --  37*  --  35*  --   --   --  31*   ANIONGAP  --   --  7  --   --  7  --  8  --   --   --  5*   * 83 137* 141*   < > 131*   < > 136*   < >  --    < > 177*   BUN  --   --  20.4  --   --  21.8  --  28.0*  --   --   --  30.5*   CR  --   --  0.49*  --   --  0.44*  --  0.42*  --   --   --  0.40*   GFRESTIMATED  --   --  >90  --   --  >90  --  >90  --   --   --  >90   JASON  --   --  8.9  --   --  8.9  --  8.6*  --   --   --  8.4*   MAG  --   --  2.0  --   --  2.2  --  2.0  --   --   --  2.2   PHOS  --   --  3.5  --   --  4.6*  --  3.5  --   --   --  2.4*   PROTTOTAL  --   --   --   --   --   --   --   --   --  6.3*  --   --    ALBUMIN  --   --   --   --   --   --   --   --   --  2.7*  --   --    BILITOTAL  --   --   --   --   --   --   --   --   --  0.6  --   --    ALKPHOS  --   --   --   --   --   --   --   --   --  86  --   --     AST  --   --   --   --   --   --   --   --   --  18  --   --    ALT  --   --   --   --   --   --   --   --   --  55*  --   --     < > = values in this interval not displayed.     CBC  Recent Labs   Lab 10/15/22  0414 10/14/22  0416 10/13/22  1609 10/13/22  0413   WBC 12.0* 11.3* 11.2* 11.2*   RBC 4.47 4.76 4.69 4.68   HGB 14.9 15.1 15.6 14.9   HCT 42.7 45.3 45.8 45.1   MCV 96 95 98 96   MCH 33.3* 31.7 33.3* 31.8   MCHC 34.9 33.3 34.1 33.0   RDW 14.8 14.7 15.9* 15.2*    187 184 180     INR  No lab results found in last 7 days.  Arterial Blood Gas  Recent Labs   Lab 10/15/22  0414 10/14/22  0746 10/13/22  2031 10/13/22  0903   PH 7.51* 7.47* 7.47* 7.47*   PCO2 49* 54* 58* 55*   PO2 64* 69* 81 52*   HCO3 37* 37* 39* 37*   O2PER 45 0.55 60 0.55       All cultures:  No results for input(s): CULT in the last 168 hours.  No results found for this or any previous visit (from the past 24 hour(s)).      Billing: This patient is critically ill: Yes. Total critical care time today 40 min exclusive of procedures or teaching.

## 2022-10-15 NOTE — PLAN OF CARE
Problem: COPD (Chronic Obstructive Pulmonary Disease) Comorbidity  Goal: Maintenance of COPD Symptom Control  Outcome: Progressing     Problem: Enteral Nutrition  Goal: Safe, Effective Therapy Delivery  Outcome: Progressing  Goal: Feeding Tolerance  Outcome: Progressing     Goal Outcome Evaluation:    [facility] - ICU    RN Progress Note:            Pertinent Assessments:      Please refer to flowsheet rows for full assessment     Blood pressure labile. Continues on levophed for support. Maintaining adequate oxygen saturations on 45% FiO2. Follows commands, appropriate with yes/no questions. Tolerating tube feeding with minimal residuals. No BM this shift. Extremities are cool and pale.          Mobility Level:     1         Key Events - This Shift:        Second Anti Xa within parameters, recheck in the AM. No signs/symptoms of bleeding.               Plan:     Continue current cares, titrate continuous medications as needed per parameters.          Point of Contact Update YES-OR-NO: Yes  If No, reason: na  Name:Frantz  Phone Number:son  Summary of Conversation: updated        Judy Raines RN

## 2022-10-15 NOTE — PROGRESS NOTES
"RT PROGRESS NOTE    VENT DAY# 8    CURRENT SETTINGS:   Vent Mode: Other (see comments) (VC AC)  FiO2 (%): 30 %  Resp Rate (Set): 18 breaths/min  Tidal Volume (Set, mL): 400 mL  PEEP (cm H2O): 12 cmH2O  Resp: 18    /62   Pulse 91   Temp (!) 96.6  F (35.9  C)   Resp 18   Ht 1.702 m (5' 7\")   Wt 44.6 kg (98 lb 6.4 oz)   SpO2 90%   BMI 15.41 kg/m        PATIENT PARAMETERS:  PIP 25  Pplat:  22  Pmean:  16  Compliance: 37  SBT: no    Weaning on hold per MD  Secretions:  none  02 Sats:  98 % on .40 FiO2, decreased to .30  BS: Diminished bilat.     ETT SIZE 7.5 Secured at 22 cm at teeth/gums    Respiratory Medications: Duoneb QID, tx given inline, tolerated well.       Latest Reference Range & Units 10/15/22 04:14   pH Arterial 7.37 - 7.44  7.51 (H)   pCO2 Arterial 35 - 45 mm Hg 49 (H)   PO2 Arterial 75 - 85 mm Hg 64 (L)   Bicarbonate Arterial 23 - 29 mmol/L 37 (H)   Base Excess Art mmol/L 15.7   FIO2  45   Oxyhemoglobin 95.0 - 96.0 % 92.0 (L)   (H): Data is abnormally high  (L): Data is abnormally low    NOTE / SHIFT SUMMARY:   Continue on mechanical ventilation, RT to monitor    Matthew Elizabeth, RT    "

## 2022-10-15 NOTE — PROGRESS NOTES
RT PROGRESS NOTE    VENT DAY# 8    CURRENT SETTINGS:   Vent Mode: CMV/AC  (Continuous Mandatory Ventilation/ Assist Control)  FiO2 (%): 45 %  Resp Rate (Set): 18 breaths/min  Tidal Volume (Set, mL): 400 mL  PEEP (cm H2O): 12 cmH2O  Resp: 18      PATIENT PARAMETERS:  PIP 26  Pplat:  22      ETT SIZE 7.5 Secured at 22 cm at teeth/gums    Respiratory Medications: Duoneb QID      pH 7.51; pCO2 49; pO2 64; HCO3 37, %O2 Sat 92,

## 2022-10-15 NOTE — PLAN OF CARE
[facility] - ICU    RN Progress Note:            Pertinent Assessments:      Please refer to flowsheet rows for full assessment     Pt follows commands, but is no tracking. BP was in normal range until 0500, then BP began to fall with SBP in the 80s. Lung sounds were diminished and coarse in the lower lobes. O2 sat has been high at 99%, goal being 88-92%. FiO2 was decreased to 40% and no change in FiO2. Pt had hyperactive bowel sounds residuals of 195 and 210 this shift.           Mobility Level:     1         Key Events - This Shift:     A-line dressing and internal jugular dressing change performed. Rt and lt PIV removed due to infiltration. Levo was decreased to 0.1. Mg needed replacement with a value of 2              Plan:     Continue to titrate levo. Attempt to get O2 sat in range of 88-92%.          Point of Contact Update YES-OR-NO: No  If No, reason: no update           Goal Outcome Evaluation:

## 2022-10-15 NOTE — PROGRESS NOTES
"Vent Mode: Other (see comments) (VC AC)  FiO2 (%): 40 %  Resp Rate (Set): 18 breaths/min  Tidal Volume (Set, mL): 400 mL  PEEP (cm H2O): 12 cmH2O  Resp: 22    /62   Pulse 120   Temp 99.9  F (37.7  C)   Resp 22   Ht 1.702 m (5' 7\")   Wt 44.6 kg (98 lb 6.4 oz)   SpO2 91%   BMI 15.41 kg/m  ;ls    Duoneb tx given inline, tolerated well, BS unchanged after. RT to monitor.     "

## 2022-10-16 LAB
ANION GAP SERPL CALCULATED.3IONS-SCNC: 6 MMOL/L (ref 7–15)
ATRIAL RATE - MUSE: 125 BPM
BUN SERPL-MCNC: 20.1 MG/DL (ref 8–23)
CALCIUM SERPL-MCNC: 9.1 MG/DL (ref 8.8–10.2)
CHLORIDE SERPL-SCNC: 100 MMOL/L (ref 98–107)
CREAT SERPL-MCNC: 0.59 MG/DL (ref 0.51–0.95)
DEPRECATED HCO3 PLAS-SCNC: 34 MMOL/L (ref 22–29)
DIASTOLIC BLOOD PRESSURE - MUSE: NORMAL MMHG
ERYTHROCYTE [DISTWIDTH] IN BLOOD BY AUTOMATED COUNT: 14.8 % (ref 10–15)
GFR SERPL CREATININE-BSD FRML MDRD: >90 ML/MIN/1.73M2
GLUCOSE BLDC GLUCOMTR-MCNC: 116 MG/DL (ref 70–99)
GLUCOSE BLDC GLUCOMTR-MCNC: 119 MG/DL (ref 70–99)
GLUCOSE BLDC GLUCOMTR-MCNC: 125 MG/DL (ref 70–99)
GLUCOSE BLDC GLUCOMTR-MCNC: 151 MG/DL (ref 70–99)
GLUCOSE BLDC GLUCOMTR-MCNC: 194 MG/DL (ref 70–99)
GLUCOSE SERPL-MCNC: 112 MG/DL (ref 70–99)
HCT VFR BLD AUTO: 38.8 % (ref 35–47)
HGB BLD-MCNC: 12.9 G/DL (ref 11.7–15.7)
HOLD SPECIMEN: NORMAL
INTERPRETATION ECG - MUSE: NORMAL
MAGNESIUM SERPL-MCNC: 2.3 MG/DL (ref 1.7–2.3)
MCH RBC QN AUTO: 31.7 PG (ref 26.5–33)
MCHC RBC AUTO-ENTMCNC: 33.2 G/DL (ref 31.5–36.5)
MCV RBC AUTO: 95 FL (ref 78–100)
P AXIS - MUSE: 84 DEGREES
PHOSPHATE SERPL-MCNC: 3.5 MG/DL (ref 2.5–4.5)
PLATELET # BLD AUTO: 198 10E3/UL (ref 150–450)
POTASSIUM SERPL-SCNC: 4.3 MMOL/L (ref 3.4–5.3)
PR INTERVAL - MUSE: 164 MS
QRS DURATION - MUSE: 84 MS
QT - MUSE: 300 MS
QTC - MUSE: 433 MS
R AXIS - MUSE: 97 DEGREES
RBC # BLD AUTO: 4.07 10E6/UL (ref 3.8–5.2)
SODIUM SERPL-SCNC: 140 MMOL/L (ref 136–145)
SYSTOLIC BLOOD PRESSURE - MUSE: NORMAL MMHG
T AXIS - MUSE: 68 DEGREES
UFH PPP CHRO-ACNC: 0.25 IU/ML
UFH PPP CHRO-ACNC: 0.28 IU/ML
UFH PPP CHRO-ACNC: 0.49 IU/ML
VENTRICULAR RATE- MUSE: 125 BPM
WBC # BLD AUTO: 8.9 10E3/UL (ref 4–11)

## 2022-10-16 PROCEDURE — 250N000013 HC RX MED GY IP 250 OP 250 PS 637: Performed by: INTERNAL MEDICINE

## 2022-10-16 PROCEDURE — 93010 ELECTROCARDIOGRAM REPORT: CPT | Performed by: INTERNAL MEDICINE

## 2022-10-16 PROCEDURE — 80048 BASIC METABOLIC PNL TOTAL CA: CPT | Performed by: INTERNAL MEDICINE

## 2022-10-16 PROCEDURE — 250N000011 HC RX IP 250 OP 636: Performed by: INTERNAL MEDICINE

## 2022-10-16 PROCEDURE — 85520 HEPARIN ASSAY: CPT | Performed by: INTERNAL MEDICINE

## 2022-10-16 PROCEDURE — 83735 ASSAY OF MAGNESIUM: CPT | Performed by: INTERNAL MEDICINE

## 2022-10-16 PROCEDURE — 999N000157 HC STATISTIC RCP TIME EA 10 MIN

## 2022-10-16 PROCEDURE — 85027 COMPLETE CBC AUTOMATED: CPT | Performed by: INTERNAL MEDICINE

## 2022-10-16 PROCEDURE — 200N000001 HC R&B ICU

## 2022-10-16 PROCEDURE — 94640 AIRWAY INHALATION TREATMENT: CPT | Mod: 76

## 2022-10-16 PROCEDURE — 250N000011 HC RX IP 250 OP 636: Performed by: NURSE PRACTITIONER

## 2022-10-16 PROCEDURE — 94640 AIRWAY INHALATION TREATMENT: CPT

## 2022-10-16 PROCEDURE — 99291 CRITICAL CARE FIRST HOUR: CPT | Performed by: INTERNAL MEDICINE

## 2022-10-16 PROCEDURE — 250N000009 HC RX 250: Performed by: INTERNAL MEDICINE

## 2022-10-16 PROCEDURE — 84100 ASSAY OF PHOSPHORUS: CPT | Performed by: INTERNAL MEDICINE

## 2022-10-16 PROCEDURE — 94003 VENT MGMT INPAT SUBQ DAY: CPT

## 2022-10-16 PROCEDURE — 93005 ELECTROCARDIOGRAM TRACING: CPT

## 2022-10-16 RX ORDER — QUETIAPINE FUMARATE 25 MG/1
50 TABLET, FILM COATED ORAL 3 TIMES DAILY
Status: DISCONTINUED | OUTPATIENT
Start: 2022-10-16 | End: 2022-10-17

## 2022-10-16 RX ORDER — LACTULOSE 10 G/15ML
10 SOLUTION ORAL 2 TIMES DAILY
Status: DISCONTINUED | OUTPATIENT
Start: 2022-10-16 | End: 2022-10-16

## 2022-10-16 RX ORDER — LACTULOSE 10 G/15ML
10 SOLUTION ORAL 2 TIMES DAILY
Status: COMPLETED | OUTPATIENT
Start: 2022-10-16 | End: 2022-10-17

## 2022-10-16 RX ORDER — AMOXICILLIN 250 MG
1 CAPSULE ORAL 2 TIMES DAILY
Status: DISCONTINUED | OUTPATIENT
Start: 2022-10-16 | End: 2022-10-19

## 2022-10-16 RX ADMIN — IPRATROPIUM BROMIDE AND ALBUTEROL SULFATE 3 ML: 2.5; .5 SOLUTION RESPIRATORY (INHALATION) at 19:56

## 2022-10-16 RX ADMIN — PIPERACILLIN AND TAZOBACTAM 3.38 G: 3; .375 INJECTION, POWDER, LYOPHILIZED, FOR SOLUTION INTRAVENOUS at 12:02

## 2022-10-16 RX ADMIN — Medication 50 MCG: at 09:46

## 2022-10-16 RX ADMIN — POLYETHYLENE GLYCOL 3350 17 G: 17 POWDER, FOR SOLUTION ORAL at 08:00

## 2022-10-16 RX ADMIN — SENNOSIDES AND DOCUSATE SODIUM 1 TABLET: 50; 8.6 TABLET ORAL at 20:22

## 2022-10-16 RX ADMIN — Medication 100 MCG/HR: at 12:02

## 2022-10-16 RX ADMIN — INSULIN ASPART 3 UNITS: 100 INJECTION, SOLUTION INTRAVENOUS; SUBCUTANEOUS at 11:01

## 2022-10-16 RX ADMIN — LACTULOSE 10 G: 10 SOLUTION ORAL at 09:35

## 2022-10-16 RX ADMIN — AMIODARONE HYDROCHLORIDE 150 MG: 1.5 INJECTION, SOLUTION INTRAVENOUS at 09:21

## 2022-10-16 RX ADMIN — LACTULOSE 10 G: 10 SOLUTION ORAL at 20:22

## 2022-10-16 RX ADMIN — DEXAMETHASONE SODIUM PHOSPHATE 6 MG: 4 INJECTION, SOLUTION INTRA-ARTICULAR; INTRALESIONAL; INTRAMUSCULAR; INTRAVENOUS; SOFT TISSUE at 08:00

## 2022-10-16 RX ADMIN — DEXMEDETOMIDINE HYDROCHLORIDE 1.2 MCG/KG/HR: 400 INJECTION INTRAVENOUS at 09:44

## 2022-10-16 RX ADMIN — IPRATROPIUM BROMIDE AND ALBUTEROL SULFATE 3 ML: 2.5; .5 SOLUTION RESPIRATORY (INHALATION) at 07:21

## 2022-10-16 RX ADMIN — PIPERACILLIN AND TAZOBACTAM 3.38 G: 3; .375 INJECTION, POWDER, LYOPHILIZED, FOR SOLUTION INTRAVENOUS at 04:52

## 2022-10-16 RX ADMIN — AMIODARONE HYDROCHLORIDE 0.5 MG/MIN: 1.8 INJECTION, SOLUTION INTRAVENOUS at 15:58

## 2022-10-16 RX ADMIN — QUETIAPINE FUMARATE 50 MG: 25 TABLET ORAL at 05:18

## 2022-10-16 RX ADMIN — QUETIAPINE FUMARATE 50 MG: 25 TABLET ORAL at 16:05

## 2022-10-16 RX ADMIN — PIPERACILLIN AND TAZOBACTAM 3.38 G: 3; .375 INJECTION, POWDER, LYOPHILIZED, FOR SOLUTION INTRAVENOUS at 20:29

## 2022-10-16 RX ADMIN — Medication 0.08 MCG/KG/MIN: at 15:59

## 2022-10-16 RX ADMIN — PROPOFOL 25 MCG/KG/MIN: 10 INJECTION, EMULSION INTRAVENOUS at 06:52

## 2022-10-16 RX ADMIN — THIAMINE HCL TAB 100 MG 100 MG: 100 TAB at 08:00

## 2022-10-16 RX ADMIN — DEXMEDETOMIDINE HYDROCHLORIDE 1.2 MCG/KG/HR: 400 INJECTION INTRAVENOUS at 18:19

## 2022-10-16 RX ADMIN — CHLORHEXIDINE GLUCONATE 15 ML: 1.2 SOLUTION ORAL at 19:38

## 2022-10-16 RX ADMIN — QUETIAPINE FUMARATE 75 MG: 25 TABLET ORAL at 20:22

## 2022-10-16 RX ADMIN — HEPARIN SODIUM AND DEXTROSE 1300 UNITS/HR: 10000; 5 INJECTION INTRAVENOUS at 05:10

## 2022-10-16 RX ADMIN — IPRATROPIUM BROMIDE AND ALBUTEROL SULFATE 3 ML: 2.5; .5 SOLUTION RESPIRATORY (INHALATION) at 15:45

## 2022-10-16 RX ADMIN — QUETIAPINE FUMARATE 50 MG: 25 TABLET ORAL at 10:49

## 2022-10-16 RX ADMIN — HEPARIN SODIUM AND DEXTROSE 1600 UNITS/HR: 10000; 5 INJECTION INTRAVENOUS at 20:31

## 2022-10-16 RX ADMIN — INSULIN ASPART 1 UNITS: 100 INJECTION, SOLUTION INTRAVENOUS; SUBCUTANEOUS at 16:03

## 2022-10-16 RX ADMIN — Medication 40 MG: at 06:06

## 2022-10-16 RX ADMIN — AMIODARONE HYDROCHLORIDE 1 MG/MIN: 1.8 INJECTION, SOLUTION INTRAVENOUS at 09:24

## 2022-10-16 RX ADMIN — Medication: at 09:38

## 2022-10-16 RX ADMIN — PROPOFOL 30 MCG/KG/MIN: 10 INJECTION, EMULSION INTRAVENOUS at 22:25

## 2022-10-16 RX ADMIN — SENNOSIDES AND DOCUSATE SODIUM 1 TABLET: 50; 8.6 TABLET ORAL at 08:00

## 2022-10-16 RX ADMIN — Medication 50 MCG: at 22:11

## 2022-10-16 RX ADMIN — IPRATROPIUM BROMIDE AND ALBUTEROL SULFATE 3 ML: 2.5; .5 SOLUTION RESPIRATORY (INHALATION) at 11:25

## 2022-10-16 RX ADMIN — CHLORHEXIDINE GLUCONATE 15 ML: 1.2 SOLUTION ORAL at 07:51

## 2022-10-16 ASSESSMENT — ACTIVITIES OF DAILY LIVING (ADL)
ADLS_ACUITY_SCORE: 30

## 2022-10-16 NOTE — PROGRESS NOTES
"Vent Mode: Other (see comments) (VC AC)  FiO2 (%): 40 % (increased to 45)  Resp Rate (Set): 18 breaths/min  Tidal Volume (Set, mL): 400 mL  PEEP (cm H2O): 10 cmH2O  Resp: 27    /58   Pulse 96   Temp 99.9  F (37.7  C)   Resp 27   Ht 1.702 m (5' 7\")   Wt 48.8 kg (107 lb 9.4 oz)   SpO2 (!) 87%   BMI 16.85 kg/m      Continues on mechanical ventilation, FiO2 increased to .45. Duoneb tx given inline, tolerated well. RT to monitor, titrate O2 as indicated.   "

## 2022-10-16 NOTE — PROGRESS NOTES
"Vent Mode: Other (see comments) (VC AC)  FiO2 (%): 45 % (decreased to 49)  Resp Rate (Set): 18 breaths/min  Tidal Volume (Set, mL): 400 mL  PEEP (cm H2O): 10 cmH2O  Resp: 19    /63   Pulse 77   Temp 99  F (37.2  C)   Resp 19   Ht 1.702 m (5' 7\")   Wt 48.8 kg (107 lb 9.4 oz)   SpO2 93%   BMI 16.85 kg/m      Pt awake and appears anxious, calmed and reassured her, went back to sleep. Suctioned inline small amount thin clear secretions. RT to monitor.     "

## 2022-10-16 NOTE — PLAN OF CARE
Goal Outcome Evaluation:         Problem: Gas Exchange Impaired  Goal: Optimal Gas Exchange  Outcome: Progressing     Problem: Mechanical Ventilation Invasive  Goal: Effective Communication  Outcome: Progressing  Goal: Optimal Device Function  Outcome: Progressing  Intervention: Optimize Device Care and Function  Recent Flowsheet Documentation  Taken 10/16/2022 [id number] by Matthew Elizabeth, RT  Airway Safety Measures:    all equipment/monitors on and audible    suction at bedside    Goal: Mechanical Ventilation Liberation  Outcome: Not Progressing  Goal: Absence of Device-Related Skin and Tissue Injury  Outcome: Progressing  Goal: Absence of Ventilator-Induced Lung Injury  Outcome: Progressing         Pt awake and seems anxious, calmed and reassured pt, went back to sleep. Weaning on hold this time per MD d/t A/resp failure and pneumonia with mucus plugging small subsegmental PE, circulatory shock.

## 2022-10-16 NOTE — PROGRESS NOTES
Care Management Follow Up    Length of Stay (days): 8    Expected Discharge Date: 10/21/2022     Concerns to be Addressed:     Medical progression, intubated, ICU status   Patient plan of care discussed at interdisciplinary rounds: Yes    Anticipated Discharge Disposition:  To be determined      Anticipated Discharge Services:  Unknown at this time  Anticipated Discharge DME:  Per treatment team    Additional Information:  Chart reviewed.  Pt admitted to [facility] for respiratory distress, acute respiratory failure leading to intubation on 10/8.  Pt remains intubated.  CM team following care progression to assist with discharge planning as needed.     Social history:  Patient lives in a split level house with spouse. Spouse suffered a recent stroke and was discharged from [facility] to [facility]. Cognitively he is not able to participate in decision making or planning for patient. Patient has been independent with ADLs and IADLs.  She ambulates without devices and drives. Frantz and pcp clinic state no HCD on file. Frantz will be primary family contact.       DARNELL Green

## 2022-10-16 NOTE — PROGRESS NOTES
RT PROGRESS NOTE    VENT DAY# 8    CURRENT SETTINGS:   Vent Mode: CMV/AC  (Continuous Mandatory Ventilation/ Assist Control)  FiO2 (%): 30 %  Resp Rate (Set): 18 breaths/min  Tidal Volume (Set, mL): 400 mL  PEEP (cm H2O): 10 cmH2O  Resp: 18      PATIENT PARAMETERS:  PIP 24  Pplat:  19  Pmean:  14  Compliance: 41.4  SBT: no      Secretions:  scant  02 Sats:  91%  BS: diminished    ETT SIZE 7.5 Secured at 22 cm at teeth/gums    Respiratory Medications: duoneb qid     ABG: none recent    NOTE / SHIFT SUMMARY:   Patient remained stable on full vent support with above settings. Rt to follow and wean as able.    Kenneth H. Kjellberg

## 2022-10-16 NOTE — PROGRESS NOTES
RT PROGRESS NOTE    VENT DAY# 9    CURRENT SETTINGS:   Vent Mode: Other (see comments) (VC AC)  FiO2 (%): 30 %  Resp Rate (Set): 18 breaths/min  Tidal Volume (Set, mL): 400 mL  PEEP (cm H2O): 10 cmH2O  Resp: 20      PATIENT PARAMETERS:  PIP 23  Pplat:  24  Pmean:  14  Compliance: 41  SBT: no, does not meet weaning criteria      Secretions:  none  02 Sats:  90%  BS: diminished and clear bilat    ETT SIZE 7.5 Secured at 22 cm at teeth/gums    Respiratory Medications: Duoneb QID, tx given inline, tolerated well.        NOTE / SHIFT SUMMARY:   Continues on mechanical ventilation, sedated, weaning on hold, RT to monitor.     Matthew Elizabeth, RT

## 2022-10-16 NOTE — PLAN OF CARE
Problem: Plan of Care - These are the overarching goals to be used throughout the patient stay.    Goal: Plan of Care Review/Shift Note  Description: The Plan of Care Review/Shift note should be completed every shift.  The Outcome Evaluation is a brief statement about your assessment that the patient is improving, declining, or no change.  This information will be displayed automatically on your shift note.  Outcome: Not Progressing   [facility] - ICU    RN Progress Note:            Pertinent Assessments:      Please refer to flowsheet rows for full assessment     Labile blood pressures, titrated Levo. Skin red and blotchy with blanchable red areas to coccyx and back. Hypothermic temperature without bare hugger applied and running.          Mobility Level:     1 Bedrest         Key Events - This Shift:              RASS has been more of a -3, decreased propofol.               Plan:     Awaiting pallative consult.          Goal Outcome Evaluation:

## 2022-10-16 NOTE — PLAN OF CARE
[facility] SAT / SBT Record:  Pt still requiring higher PEEP and pressor so unable to wean.      SAT Safety Screen PASSED   yes    SAT Performed Not Applicable   no    SBT Safety Screen Not Applicable   NA     Goal Outcome Evaluation:      Plan of Care Reviewed With: child    Overall Patient Progress: no changeOverall Patient Progress: no change

## 2022-10-16 NOTE — PROGRESS NOTES
Critical Care Progress Note      10/16/2022    Name: Lavonne Talbot MRN#: [id number]   Age: 67 year old YOB: 1955     Hsptl Day# 8  ICU DAY #    MV DAY #             Problem List:   Active Problems:    Acute respiratory failure with hypoxia (H)    Hypotension, unspecified hypotension type    Infection due to 2019 novel coronavirus    Clinically Significant Risk Factors Present on Admission                # Severe Malnutrition: based on nutrition assessment                      Summary/Hospital Course:     Lavonne Talbot is a 67 year old cachectic female (BMI 12.09 kg/m ), active smoker, with history of lymphedema on furosemide since 2021, metabolic alkalosis, polycythemia, and recent diagnosis of COVID-19 viral infection 1 week ago who presented to [facility] ED on 10/8/2022 due to respiratory distress now admitted to the ICU with acute respiratory failure requiring mechanical ventilation in the setting of possible community-acquired pneumonia or aspiration pneumonia with mucous plugging, small subsegmental pulmonary embolism, circulatory shock on vasopressors.      Assessment and plan :       I have personally reviewed the daily labs, imaging studies, cultures and discussed the case with referring physician and consulting physicians.     My assessment and plan by system for this patient is as follows:    Neurology/Psychiatry:   Sedated with Precedex, fentanyl and propofol  Agitated.  Increased Seroquel to 50 mg 3 times daily and 75 mg nightly    Cardiovascular:   Vasoplegia secondary to sedation.  Requiring norepinephrine,  0.08 mcg/kg/min  Got albumin yesterday since she looked over diuresed    Slightly elevated troponin, echo shows stress-induced cardiomyopathy global dysfunction and EF of 30%  Small PE, subsegmental.  No DVT.   Repeat CTA shows resolution of the original small embolus but to new emboli.  Restarted on heparin drip.  So far no further hematuria    Sinus tachycardia with  hypotension/hypoxia, on NE. Stable all night, EKG done to make sure she isn't in flutter since there was a sudden change in HR.  Responded well to amiodarone. HR down to 90, BP and oxygenation better.     Pulmonary/Ventilator Management:   Acute respiratory failure in a patient with severe emphysema  Concern for community-acquired pneumonia with left lower lobe consolidation.  Patient is also been diagnosed with COVID-19 but she does not appear to be having COVID-pneumonia.    On tidal volume 400 mL, rate of 20, FiO2 45% and PEEP of 12.  Decreased RR to 18  DuoNeb's  Continue dexamethasone for COVID    Worsening infiltrate on CT. Was sitched to zosyn/vanco at that time, nw on zosyn only      GI and Nutrition :   Increase tube feeds as tolerated      Renal/Fluids/Electrolytes:   Net even  Hyponatremic.  Decreased free water.  Lasix on hold    - monitor function and electrolytes as needed with replacement per ICU protocols.  - generally avoid nephrotoxic agents such as NSAID, IV contrast unless specifically required  - adjust medications as needed for renal clearance  - follow I/O's as appropriate.    Infectious Disease:   COVID-19.  Currently on dexamethasone.  CT does not look like COVID-pneumonia.  Haemophilus pneumonia.  Currently on Zosyn due to worsening infiltrate on ceftriaxone.  DC vancomycin    Endocrine:     - ICU insulin protocol, goal sugar <180      ICU Prophylaxis:   1. DVT: Hep drip  2. VAP: HOB 30 degrees, chlorhexidine rinse  3. Stress Ulcer: PPI  4. Restraints: Nonviolent soft two point restraints required and necessary for patient safety and continued cares and good effect as patient continues to pull at necessary lines, tubes despite education and distraction. Will readdress daily.     Category: Non-violent   Type of Restraint: Soft limb x2.   Behavior: Pulling at IVand black catheter tubings.   Root cause of behavior: Critical illness.   Less-restrictive methods that have failed: Redirection,  reorientation. 1:1 NA at bedside.   Response to restraint: Not actively pulling atcurrent restraints.   Criteria for release from restraint: Responds to redirection. Leaves medical devices in place.           Key Medications:       chlorhexidine  15 mL Mouth/Throat Q12H     dexamethasone  6 mg Intravenous Daily     [Held by provider] furosemide  40 mg Intravenous Q12H     influenza vac high-dose quad  0.7 mL Intramuscular Prior to discharge     insulin aspart  1-12 Units Subcutaneous Q4H     ipratropium - albuterol 0.5 mg/2.5 mg/3 mL  3 mL Nebulization 4x daily     lactulose  10 g Oral or Feeding Tube BID     [Held by provider] metolazone  5 mg Oral or Feeding Tube BID     pantoprazole  40 mg Per Feeding Tube QAM AC    Or     pantoprazole  40 mg Intravenous QAM AC     piperacillin-tazobactam  3.375 g Intravenous Q8H     polyethylene glycol  17 g Oral Daily     QUEtiapine  50 mg Oral or Feeding Tube TID     QUEtiapine  75 mg Oral or Feeding Tube At Bedtime     senna-docusate  1 tablet Oral or Feeding Tube BID     thiamine  100 mg Per Feeding Tube Daily       amiodarone 1 mg/min (10/16/22 0924)     amiodarone       dexmedetomidine 1.2 mcg/kg/hr (10/16/22 0944)     dextrose       fentaNYL 100 mcg/hr (10/16/22 1202)     heparin 1,600 Units/hr (10/16/22 1218)     propofol 30 mcg/kg/min (10/16/22 1055)    And     - MEDICATION INSTRUCTIONS -       norepinephrine 0.08 mcg/kg/min (10/16/22 1203)     vasopressin Stopped (10/14/22 1051)               Physical Examination:   Temp:  [95.7  F (35.4  C)-100.6  F (38.1  C)] 99.3  F (37.4  C)  Pulse:  [] 85  Resp:  [17-41] 18  BP: ()/(54-63) 105/58  MAP:  [60 mmHg-99 mmHg] 74 mmHg  Arterial Line BP: ()/(44-70) 101/56  FiO2 (%):  [30 %-45 %] 45 %  SpO2:  [85 %-97 %] 91 %    Intake/Output Summary (Last 24 hours) at 10/9/2022 1224  Last data filed at 10/9/2022 1200  Gross per 24 hour   Intake 947.77 ml   Output 902 ml   Net 45.77 ml     Wt Readings from Last 4  Encounters:   10/16/22 48.8 kg (107 lb 9.4 oz)     Arterial Line BP: ()/(44-70) 101/56  MAP:  [60 mmHg-99 mmHg] 74 mmHg  BP - Mean:  [64-72] 72  Vent Mode: Other (see comments) (VC AC)  FiO2 (%): (S) 45 %  Resp Rate (Set): 18 breaths/min  Tidal Volume (Set, mL): 400 mL  PEEP (cm H2O): 10 cmH2O  Resp: 18    Recent Labs   Lab 10/15/22  0414 10/14/22  0746 10/13/22  2031 10/13/22  0903   PH 7.51* 7.47* 7.47* 7.47*   PCO2 49* 54* 58* 55*   PO2 64* 69* 81 52*   HCO3 37* 37* 39* 37*   O2PER 45 0.55 60 0.55       GEN: no acute distress   HEENT: head ncat, sclera anicteric, OP patent, trachea midline   PULM: unlabored synchronous with vent, clear anteriorly    CV/COR: RRR S1S2 no gallop,  No rub, no murmur  ABD: soft nontender, hypoactive bowel sounds, no mass  EXT: No significant edema  NEURO: Sedated  SKIN: no obvious rash.   LINES: clean, dry intact         Data:   All data and imaging reviewed     ROUTINE ICU LABS (Last four results)  CMP  Recent Labs   Lab 10/16/22  1100 10/16/22  0727 10/16/22  0507 10/16/22  0500 10/15/22  0808 10/15/22  0414 10/14/22  0419 10/14/22  0416 10/13/22  0426 10/13/22  0413 10/12/22  1616 10/12/22  1440   NA  --   --   --  140  --  137  --  135*  --  142  --  145   POTASSIUM  --   --   --  4.3  --  3.9  --  4.2  --  4.2  --  4.5   CHLORIDE  --   --   --  100  --  94*  --  91*  --  99  --   --    CO2  --   --   --  34*  --  36*  --  37*  --  35*  --   --    ANIONGAP  --   --   --  6*  --  7  --  7  --  8  --   --    * 125* 116* 112*   < > 137*   < > 131*   < > 136*   < >  --    BUN  --   --   --  20.1  --  20.4  --  21.8  --  28.0*  --   --    CR  --   --   --  0.59  --  0.49*  --  0.44*  --  0.42*  --   --    GFRESTIMATED  --   --   --  >90  --  >90  --  >90  --  >90  --   --    JASON  --   --   --  9.1  --  8.9  --  8.9  --  8.6*  --   --    MAG  --   --   --  2.3  --  2.0  --  2.2  --  2.0  --   --    PHOS  --   --   --  3.5  --  3.5  --  4.6*  --  3.5  --   --    PROTTOTAL   --   --   --   --   --   --   --   --   --   --   --  6.3*   ALBUMIN  --   --   --   --   --   --   --   --   --   --   --  2.7*   BILITOTAL  --   --   --   --   --   --   --   --   --   --   --  0.6   ALKPHOS  --   --   --   --   --   --   --   --   --   --   --  86   AST  --   --   --   --   --   --   --   --   --   --   --  18   ALT  --   --   --   --   --   --   --   --   --   --   --  55*    < > = values in this interval not displayed.     CBC  Recent Labs   Lab 10/16/22  0500 10/15/22  0414 10/14/22  0416 10/13/22  1609   WBC 8.9 12.0* 11.3* 11.2*   RBC 4.07 4.47 4.76 4.69   HGB 12.9 14.9 15.1 15.6   HCT 38.8 42.7 45.3 45.8   MCV 95 96 95 98   MCH 31.7 33.3* 31.7 33.3*   MCHC 33.2 34.9 33.3 34.1   RDW 14.8 14.8 14.7 15.9*    191 187 184     INR  No lab results found in last 7 days.  Arterial Blood Gas  Recent Labs   Lab 10/15/22  0414 10/14/22  0746 10/13/22  2031 10/13/22  0903   PH 7.51* 7.47* 7.47* 7.47*   PCO2 49* 54* 58* 55*   PO2 64* 69* 81 52*   HCO3 37* 37* 39* 37*   O2PER 45 0.55 60 0.55       All cultures:  No results for input(s): CULT in the last 168 hours.  No results found for this or any previous visit (from the past 24 hour(s)).      Billing: This patient is critically ill: Yes. Total critical care time today 33 min exclusive of procedures or teaching.

## 2022-10-17 ENCOUNTER — APPOINTMENT (OUTPATIENT)
Dept: RADIOLOGY | Facility: HOSPITAL | Age: 67
DRG: 207 | End: 2022-10-17
Attending: INTERNAL MEDICINE
Payer: COMMERCIAL

## 2022-10-17 LAB
ALBUMIN MFR UR ELPH: <6 MG/DL
ALBUMIN UR-MCNC: NEGATIVE MG/DL
ANION GAP SERPL CALCULATED.3IONS-SCNC: 10 MMOL/L (ref 7–15)
APPEARANCE UR: CLEAR
BACTERIA BLD CULT: NO GROWTH
BACTERIA BLD CULT: NO GROWTH
BILIRUB UR QL STRIP: NEGATIVE
BUN SERPL-MCNC: 20.2 MG/DL (ref 8–23)
CALCIUM SERPL-MCNC: 9 MG/DL (ref 8.8–10.2)
CHLORIDE SERPL-SCNC: 99 MMOL/L (ref 98–107)
COLOR UR AUTO: COLORLESS
CREAT SERPL-MCNC: 0.59 MG/DL (ref 0.51–0.95)
CREAT UR-MCNC: 8.2 MG/DL
DEPRECATED HCO3 PLAS-SCNC: 30 MMOL/L (ref 22–29)
ERYTHROCYTE [DISTWIDTH] IN BLOOD BY AUTOMATED COUNT: 15 % (ref 10–15)
GFR SERPL CREATININE-BSD FRML MDRD: >90 ML/MIN/1.73M2
GLUCOSE BLDC GLUCOMTR-MCNC: 114 MG/DL (ref 70–99)
GLUCOSE BLDC GLUCOMTR-MCNC: 122 MG/DL (ref 70–99)
GLUCOSE BLDC GLUCOMTR-MCNC: 138 MG/DL (ref 70–99)
GLUCOSE BLDC GLUCOMTR-MCNC: 149 MG/DL (ref 70–99)
GLUCOSE BLDC GLUCOMTR-MCNC: 176 MG/DL (ref 70–99)
GLUCOSE BLDC GLUCOMTR-MCNC: 188 MG/DL (ref 70–99)
GLUCOSE SERPL-MCNC: 155 MG/DL (ref 70–99)
GLUCOSE UR STRIP-MCNC: NEGATIVE MG/DL
HCT VFR BLD AUTO: 38.8 % (ref 35–47)
HGB BLD-MCNC: 12.9 G/DL (ref 11.7–15.7)
HGB UR QL STRIP: NEGATIVE
KETONES UR STRIP-MCNC: NEGATIVE MG/DL
LEUKOCYTE ESTERASE UR QL STRIP: NEGATIVE
MAGNESIUM SERPL-MCNC: 2.1 MG/DL (ref 1.7–2.3)
MCH RBC QN AUTO: 31.7 PG (ref 26.5–33)
MCHC RBC AUTO-ENTMCNC: 33.2 G/DL (ref 31.5–36.5)
MCV RBC AUTO: 95 FL (ref 78–100)
NITRATE UR QL: NEGATIVE
PH UR STRIP: 6.5 [PH] (ref 5–7)
PHOSPHATE SERPL-MCNC: 3.4 MG/DL (ref 2.5–4.5)
PLATELET # BLD AUTO: 240 10E3/UL (ref 150–450)
POTASSIUM SERPL-SCNC: 4.4 MMOL/L (ref 3.4–5.3)
PROT/CREAT 24H UR: NORMAL MG/G{CREAT}
RBC # BLD AUTO: 4.07 10E6/UL (ref 3.8–5.2)
SODIUM SERPL-SCNC: 139 MMOL/L (ref 136–145)
SP GR UR STRIP: 1.01 (ref 1–1.03)
UFH PPP CHRO-ACNC: 0.69 IU/ML
UROBILINOGEN UR STRIP-MCNC: <2 MG/DL
WBC # BLD AUTO: 8.3 10E3/UL (ref 4–11)

## 2022-10-17 PROCEDURE — 250N000011 HC RX IP 250 OP 636: Performed by: NURSE PRACTITIONER

## 2022-10-17 PROCEDURE — 250N000011 HC RX IP 250 OP 636: Performed by: INTERNAL MEDICINE

## 2022-10-17 PROCEDURE — 85520 HEPARIN ASSAY: CPT | Performed by: INTERNAL MEDICINE

## 2022-10-17 PROCEDURE — 999N000157 HC STATISTIC RCP TIME EA 10 MIN

## 2022-10-17 PROCEDURE — 83735 ASSAY OF MAGNESIUM: CPT | Performed by: INTERNAL MEDICINE

## 2022-10-17 PROCEDURE — 250N000009 HC RX 250: Performed by: INTERNAL MEDICINE

## 2022-10-17 PROCEDURE — 99221 1ST HOSP IP/OBS SF/LOW 40: CPT | Performed by: PHYSICIAN ASSISTANT

## 2022-10-17 PROCEDURE — 250N000013 HC RX MED GY IP 250 OP 250 PS 637: Performed by: INTERNAL MEDICINE

## 2022-10-17 PROCEDURE — 84100 ASSAY OF PHOSPHORUS: CPT | Performed by: INTERNAL MEDICINE

## 2022-10-17 PROCEDURE — 200N000001 HC R&B ICU

## 2022-10-17 PROCEDURE — 250N000011 HC RX IP 250 OP 636

## 2022-10-17 PROCEDURE — 81003 URINALYSIS AUTO W/O SCOPE: CPT | Performed by: INTERNAL MEDICINE

## 2022-10-17 PROCEDURE — 94003 VENT MGMT INPAT SUBQ DAY: CPT

## 2022-10-17 PROCEDURE — 84156 ASSAY OF PROTEIN URINE: CPT | Performed by: INTERNAL MEDICINE

## 2022-10-17 PROCEDURE — 85027 COMPLETE CBC AUTOMATED: CPT | Performed by: INTERNAL MEDICINE

## 2022-10-17 PROCEDURE — 71045 X-RAY EXAM CHEST 1 VIEW: CPT

## 2022-10-17 PROCEDURE — 99291 CRITICAL CARE FIRST HOUR: CPT | Performed by: INTERNAL MEDICINE

## 2022-10-17 PROCEDURE — 80048 BASIC METABOLIC PNL TOTAL CA: CPT | Performed by: INTERNAL MEDICINE

## 2022-10-17 PROCEDURE — 94640 AIRWAY INHALATION TREATMENT: CPT | Mod: 76

## 2022-10-17 PROCEDURE — 94640 AIRWAY INHALATION TREATMENT: CPT

## 2022-10-17 RX ORDER — METOLAZONE 2.5 MG/1
2.5 TABLET ORAL DAILY
Status: DISCONTINUED | OUTPATIENT
Start: 2022-10-17 | End: 2022-10-19

## 2022-10-17 RX ORDER — QUETIAPINE FUMARATE 25 MG/1
25 TABLET, FILM COATED ORAL 2 TIMES DAILY
Status: DISCONTINUED | OUTPATIENT
Start: 2022-10-17 | End: 2022-10-17

## 2022-10-17 RX ORDER — FUROSEMIDE 10 MG/ML
20 INJECTION INTRAMUSCULAR; INTRAVENOUS EVERY 12 HOURS
Status: DISCONTINUED | OUTPATIENT
Start: 2022-10-17 | End: 2022-11-02

## 2022-10-17 RX ORDER — NOREPINEPHRINE BITARTRATE 0.02 MG/ML
.01-.6 INJECTION, SOLUTION INTRAVENOUS CONTINUOUS
Status: DISCONTINUED | OUTPATIENT
Start: 2022-10-17 | End: 2022-10-21

## 2022-10-17 RX ORDER — PROPOFOL 10 MG/ML
5-75 INJECTION, EMULSION INTRAVENOUS CONTINUOUS
Status: DISCONTINUED | OUTPATIENT
Start: 2022-10-17 | End: 2022-10-20

## 2022-10-17 RX ORDER — FUROSEMIDE 10 MG/ML
INJECTION INTRAMUSCULAR; INTRAVENOUS
Status: COMPLETED
Start: 2022-10-17 | End: 2022-10-17

## 2022-10-17 RX ORDER — QUETIAPINE FUMARATE 25 MG/1
50 TABLET, FILM COATED ORAL 2 TIMES DAILY
Status: DISCONTINUED | OUTPATIENT
Start: 2022-10-17 | End: 2022-10-18

## 2022-10-17 RX ADMIN — DEXMEDETOMIDINE HYDROCHLORIDE 1.2 MCG/KG/HR: 400 INJECTION INTRAVENOUS at 03:26

## 2022-10-17 RX ADMIN — QUETIAPINE FUMARATE 50 MG: 25 TABLET ORAL at 11:48

## 2022-10-17 RX ADMIN — Medication 100 MCG/HR: at 09:58

## 2022-10-17 RX ADMIN — Medication 40 MG: at 06:16

## 2022-10-17 RX ADMIN — IPRATROPIUM BROMIDE AND ALBUTEROL SULFATE 3 ML: 2.5; .5 SOLUTION RESPIRATORY (INHALATION) at 20:16

## 2022-10-17 RX ADMIN — Medication 50 MCG: at 06:15

## 2022-10-17 RX ADMIN — IPRATROPIUM BROMIDE AND ALBUTEROL SULFATE 3 ML: 2.5; .5 SOLUTION RESPIRATORY (INHALATION) at 11:25

## 2022-10-17 RX ADMIN — LACTULOSE 10 G: 10 SOLUTION ORAL at 08:07

## 2022-10-17 RX ADMIN — THIAMINE HCL TAB 100 MG 100 MG: 100 TAB at 08:07

## 2022-10-17 RX ADMIN — Medication 50 MCG: at 09:59

## 2022-10-17 RX ADMIN — INSULIN ASPART 2 UNITS: 100 INJECTION, SOLUTION INTRAVENOUS; SUBCUTANEOUS at 16:55

## 2022-10-17 RX ADMIN — CHLORHEXIDINE GLUCONATE 15 ML: 1.2 SOLUTION ORAL at 08:07

## 2022-10-17 RX ADMIN — Medication 50 MCG: at 01:25

## 2022-10-17 RX ADMIN — QUETIAPINE FUMARATE 50 MG: 25 TABLET ORAL at 06:16

## 2022-10-17 RX ADMIN — DEXMEDETOMIDINE HYDROCHLORIDE 1.2 MCG/KG/HR: 400 INJECTION INTRAVENOUS at 10:02

## 2022-10-17 RX ADMIN — Medication 50 MCG: at 17:52

## 2022-10-17 RX ADMIN — Medication 50 MCG: at 19:50

## 2022-10-17 RX ADMIN — IPRATROPIUM BROMIDE AND ALBUTEROL SULFATE 3 ML: 2.5; .5 SOLUTION RESPIRATORY (INHALATION) at 07:48

## 2022-10-17 RX ADMIN — FUROSEMIDE 20 MG: 10 INJECTION, SOLUTION INTRAMUSCULAR; INTRAVENOUS at 18:13

## 2022-10-17 RX ADMIN — INSULIN ASPART 2 UNITS: 100 INJECTION, SOLUTION INTRAVENOUS; SUBCUTANEOUS at 11:54

## 2022-10-17 RX ADMIN — Medication 50 MCG: at 15:29

## 2022-10-17 RX ADMIN — HEPARIN SODIUM AND DEXTROSE 1600 UNITS/HR: 10000; 5 INJECTION INTRAVENOUS at 11:49

## 2022-10-17 RX ADMIN — PIPERACILLIN AND TAZOBACTAM 3.38 G: 3; .375 INJECTION, POWDER, LYOPHILIZED, FOR SOLUTION INTRAVENOUS at 20:28

## 2022-10-17 RX ADMIN — LACTULOSE 10 G: 10 SOLUTION ORAL at 22:10

## 2022-10-17 RX ADMIN — SENNOSIDES AND DOCUSATE SODIUM 1 TABLET: 50; 8.6 TABLET ORAL at 08:07

## 2022-10-17 RX ADMIN — INSULIN ASPART 1 UNITS: 100 INJECTION, SOLUTION INTRAVENOUS; SUBCUTANEOUS at 04:27

## 2022-10-17 RX ADMIN — PIPERACILLIN AND TAZOBACTAM 3.38 G: 3; .375 INJECTION, POWDER, LYOPHILIZED, FOR SOLUTION INTRAVENOUS at 11:49

## 2022-10-17 RX ADMIN — DEXMEDETOMIDINE HYDROCHLORIDE 1.2 MCG/KG/HR: 400 INJECTION INTRAVENOUS at 19:40

## 2022-10-17 RX ADMIN — PROPOFOL 10 MCG/KG/MIN: 10 INJECTION, EMULSION INTRAVENOUS at 19:07

## 2022-10-17 RX ADMIN — AMIODARONE HYDROCHLORIDE 0.5 MG/MIN: 1.8 INJECTION, SOLUTION INTRAVENOUS at 03:38

## 2022-10-17 RX ADMIN — QUETIAPINE FUMARATE 50 MG: 25 TABLET ORAL at 20:30

## 2022-10-17 RX ADMIN — FUROSEMIDE 20 MG: 10 INJECTION, SOLUTION INTRAMUSCULAR; INTRAVENOUS at 14:08

## 2022-10-17 RX ADMIN — CHLORHEXIDINE GLUCONATE 15 ML: 1.2 SOLUTION ORAL at 20:28

## 2022-10-17 RX ADMIN — METOLAZONE 2.5 MG: 5 TABLET ORAL at 18:20

## 2022-10-17 RX ADMIN — DEXAMETHASONE SODIUM PHOSPHATE 6 MG: 4 INJECTION, SOLUTION INTRA-ARTICULAR; INTRALESIONAL; INTRAMUSCULAR; INTRAVENOUS; SOFT TISSUE at 08:07

## 2022-10-17 RX ADMIN — PIPERACILLIN AND TAZOBACTAM 3.38 G: 3; .375 INJECTION, POWDER, LYOPHILIZED, FOR SOLUTION INTRAVENOUS at 04:28

## 2022-10-17 RX ADMIN — Medication 175 MCG/HR: at 23:58

## 2022-10-17 RX ADMIN — PROPOFOL 30 MCG/KG/MIN: 10 INJECTION, EMULSION INTRAVENOUS at 10:02

## 2022-10-17 RX ADMIN — POLYETHYLENE GLYCOL 3350 17 G: 17 POWDER, FOR SOLUTION ORAL at 08:08

## 2022-10-17 RX ADMIN — SENNOSIDES AND DOCUSATE SODIUM 1 TABLET: 50; 8.6 TABLET ORAL at 20:30

## 2022-10-17 ASSESSMENT — ACTIVITIES OF DAILY LIVING (ADL)
ADLS_ACUITY_SCORE: 30

## 2022-10-17 NOTE — PROGRESS NOTES
"CLINICAL NUTRITION SERVICES - REASSESSMENT NOTE     Nutrition Prescription    RECOMMENDATIONS FOR MDs/PROVIDERS TO ORDER:      Malnutrition Status:    Severe in acute and chronic    Recommendations already ordered by Registered Dietitian (RD):  Continue same TF.    Future/Additional Recommendations:  Monitor TF tolerance and hydration.     EVALUATION OF THE PROGRESS TOWARD GOALS   Diet: NPO  Nutrition Support: Promote with fiber at goal rate 45 ml/hr continuous. TF start 10/9.   FWF 60 ml every 8 hrs (10/14)  Intake: TF at goal providing (1080ml/day): 1080 kcals, 67 g PRO, 897 ml free H20, with 180 ml flushes, 149 g CHO, 15 g fiber daily   Propofol at current rate provides ~166 calories/day.      NEW FINDINGS   Pt tolerating TF at goal rate.  Constipation noted, lactulose recently added to scheduled bowel meds.  Hydration WNL    ANTHROPOMETRICS  Height: 170.2 cm (5' 7\")  Admit wt 77 lb 2.6 oz 10/8, 94 lb 11.2 oz 10/9/22.  Most Recent Weight: 48.8 kg (107 lb 9.4 oz)    -0.4L since admit.    ASSESSED NUTRITION NEEDS  Estimated Energy Needs: 9195-2908 kcals/day (25 - 30 kcals/kg)  Justification: Critically ill, Underweight and Vented  Estimated Protein Needs: 51-86 grams protein/day (1.2-2)  Justification: critically ill, COVID 19.  Estimated Fluid Needs: 1075+ mL/day (25+)   Justification: Maintenance    PHYSICAL FINDINGS  See malnutrition section below.  Pt remains in COVID isolation precautions.    GI CONCERNS  Last BM 10/11.  Pt on scheduled bowel meds and lactulose recently added.    Gastric residual volumes 160-340 mls.    LABS  ROUTINE ICU LABS (Last four results)  CMPRecent Labs   Lab 10/17/22  0811 10/17/22  0427 10/17/22  0426 10/17/22  0124 10/16/22  0507 10/16/22  0500 10/15/22  0808 10/15/22  0414 10/14/22  0419 10/14/22  0416 10/12/22  1616 10/12/22  1440   NA  --   --  139  --   --  140  --  137  --  135*   < > 145   POTASSIUM  --   --  4.4  --   --  4.3  --  3.9  --  4.2   < > 4.5   CHLORIDE  --   --  " 99  --   --  100  --  94*  --  91*   < >  --    CO2  --   --  30*  --   --  34*  --  36*  --  37*   < >  --    ANIONGAP  --   --  10  --   --  6*  --  7  --  7   < >  --    * 149* 155* 122*   < > 112*   < > 137*   < > 131*   < >  --    BUN  --   --  20.2  --   --  20.1  --  20.4  --  21.8   < >  --    CR  --   --  0.59  --   --  0.59  --  0.49*  --  0.44*   < >  --    GFRESTIMATED  --   --  >90  --   --  >90  --  >90  --  >90   < >  --    JASON  --   --  9.0  --   --  9.1  --  8.9  --  8.9   < >  --    MAG  --   --  2.1  --   --  2.3  --  2.0  --  2.2   < >  --    PHOS  --   --  3.4  --   --  3.5  --  3.5  --  4.6*   < >  --    PROTTOTAL  --   --   --   --   --   --   --   --   --   --   --  6.3*   ALBUMIN  --   --   --   --   --   --   --   --   --   --   --  2.7*   BILITOTAL  --   --   --   --   --   --   --   --   --   --   --  0.6   ALKPHOS  --   --   --   --   --   --   --   --   --   --   --  86   AST  --   --   --   --   --   --   --   --   --   --   --  18   ALT  --   --   --   --   --   --   --   --   --   --   --  55*    < > = values in this interval not displayed.     CBC  Recent Labs   Lab 10/17/22  0426 10/16/22  0500 10/15/22  0414 10/14/22  0416   WBC 8.3 8.9 12.0* 11.3*   RBC 4.07 4.07 4.47 4.76   HGB 12.9 12.9 14.9 15.1   HCT 38.8 38.8 42.7 45.3   MCV 95 95 96 95   MCH 31.7 31.7 33.3* 31.7   MCHC 33.2 33.2 34.9 33.3   RDW 15.0 14.8 14.8 14.7    198 191 187     INRNo lab results found in last 7 days.  Arterial Blood Gas  Recent Labs   Lab 10/15/22  0414 10/14/22  0746 10/13/22  2031 10/13/22  0903   PH 7.51* 7.47* 7.47* 7.47*   PCO2 49* 54* 58* 55*   PO2 64* 69* 81 52*   HCO3 37* 37* 39* 37*   O2PER 45 0.55 60 0.55     Reviewed    MEDICATIONS    chlorhexidine  15 mL Mouth/Throat Q12H     [Held by provider] furosemide  40 mg Intravenous Q12H     influenza vac high-dose quad  0.7 mL Intramuscular Prior to discharge     insulin aspart  1-12 Units Subcutaneous Q4H     ipratropium - albuterol  0.5 mg/2.5 mg/3 mL  3 mL Nebulization 4x daily     lactulose  10 g Oral or Feeding Tube BID     [Held by provider] metolazone  5 mg Oral or Feeding Tube BID     pantoprazole  40 mg Per Feeding Tube QAM AC    Or     pantoprazole  40 mg Intravenous QAM AC     piperacillin-tazobactam  3.375 g Intravenous Q8H     polyethylene glycol  17 g Oral Daily     QUEtiapine  50 mg Oral or Feeding Tube TID     QUEtiapine  75 mg Oral or Feeding Tube At Bedtime     senna-docusate  1 tablet Oral or Feeding Tube BID     thiamine  100 mg Per Feeding Tube Daily        amiodarone 0.5 mg/min (10/17/22 0400)     dexmedetomidine 1.2 mcg/kg/hr (10/17/22 0400)     dextrose       fentaNYL 100 mcg/hr (10/17/22 0400)     heparin 1,600 Units/hr (10/17/22 0400)     propofol 30 mcg/kg/min (10/17/22 0400)    And     - MEDICATION INSTRUCTIONS -       norepinephrine 0.08 mcg/kg/min (10/17/22 0728)     vasopressin Stopped (10/14/22 1051)      dextrose, glucose **OR** dextrose **OR** glucagon, fentaNYL, LubriFresh P.M., propofol **AND** propofol **AND** CK total **AND** [CANCELED] Triglycerides **AND** - MEDICATION INSTRUCTIONS - **AND** Notify Physician, naloxone **OR** naloxone **OR** naloxone **OR** naloxone Reviewed    Malnutrition Diagnosis: Severe malnutrition  In Context of:  Acute illness or injury  Chronic illness or disease    Goals   Tolerate tube feeding-met, ongoing  Meet nutrition needs-met, ongoing  Electrolytes WNL-progressing  New-regular BM every 1-2 days.    CURRENT NUTRITION DIAGNOSIS  Swallowing difficulty related to acute illness as evidenced by intubation  -continues    INTERVENTIONS  Implementation  Continue current TF for patient's nutrition needs.    Monitoring/Evaluation  Progress toward goals will be monitored and evaluated per protocol.

## 2022-10-17 NOTE — PROGRESS NOTES
Care Management Follow Up    Length of Stay (days): 9    Expected Discharge Date: 10/21/2022     Concerns to be Addressed:       Patient plan of care discussed at interdisciplinary rounds: Yes    Anticipated Discharge Disposition:       Anticipated Discharge Services:    Anticipated Discharge DME:      Patient/family educated on Medicare website which has current facility and service quality ratings:    Education Provided on the Discharge Plan:    Patient/Family in Agreement with the Plan:      Referrals Placed by CM/SW:    Private pay costs discussed: Not applicable    Additional Information:  Discussed patient at ICU rounds. Patient remains intubated, on multiple gtts. Goal to continue to work on medical stability at this time.    Patient was previously independent at home alone.    CM will continue to follow care progression and aide in discharge planning as needed.     Sammie Goyal RN

## 2022-10-17 NOTE — PROGRESS NOTES
"Vent Mode: Other (see comments) (VC AC)  FiO2 (%): 30 % (decreased by RN)  Resp Rate (Set): 18 breaths/min  Tidal Volume (Set, mL): 400 mL  PEEP (cm H2O): 10 cmH2O  Resp: 28    BP 94/58 (BP Location: Left arm)   Pulse 70   Temp 98.1  F (36.7  C)   Resp 28   Ht 1.702 m (5' 7\")   Wt 48.8 kg (107 lb 9.4 oz)   SpO2 (!) 88%   BMI 16.85 kg/m      Duoneb tx given, tolerated well, RT to monitor.  "

## 2022-10-17 NOTE — PROGRESS NOTES
PROVIDER RESTRAINT FOR NON-VIOLENT BEHAVIOR FACE TO FACE EVALUATION    Patient's Immediate Situation:  Patient demonstrated the following behaviors: pulling lines    Patient's Reaction to the intervention:  Does patient understand the reason for restraint/seclusion? unable to express    Medical Condition:  Is there any evidence of compromise of Skin integrity, Respiratory, Cardiovascular, Musculoskeletal, Hydration?   Yes    Behavioral Condition:  In consultation with the RN, is there a need to continue this restraint or seclusion? Yes    See Restraint Flowsheet for complete restraint documentation and assessment.    Fredi Milan MD  Critical Care Medicine   10/17/22  8:30 AM

## 2022-10-17 NOTE — PROGRESS NOTES
PULMONARY / CRITICAL CARE PROGRESS NOTE    Date / Time of Admission:  10/8/2022  9:38 AM    Assessment:     Lavonne Talbot is a 67 year old cachectic female with history of severe emphysema, tobacco user.  Recent diagnosis of COVID-19 viral infection 1 week ago who presented to [facility] ED on 10/8/2022 due to respiratory distress now admitted to the ICU with acute respiratory failure requiring mechanical ventilation. Patient was treated for H influenza pneumonia and pulmonary embolism. Echocardiogram showed reduced EF 30-35%, per cardiology stress induced cardiomyopathy.     1. Acute respiratory failure s/p intubation 10/8/22  2. H influenza pneumonia  3. Pulmonary embolism   4. COVID19 viral infection diagnosed on 10/8/22  5. HFrEF  6. COPD , severe emphysematous changes   7. Malnutrition Body mass index is 16.85 kg/m .   8. Ongoing tobacco use    Advance Directives: Full code    Plan:   1. Titrate vent settings, A/C 18/400/10/40%  2. Sedation to keep RASS 0 to -1, precedex, fentanyl drip, discontinue propofol  3. Reduced dose of seroquel  4. Schedule bronchodilators  5. Follow up CXR  6. Heplock IV fluids   7. Titrate pressors to keep MAP > 60  8. Discontinue amiodarone drip  9. Resume lasix IV, follow up BNP  10. New tracheal culture  11. Continue abx zosyn  12. Monitor kidney function , supplement electrolytes as needed  13. Advance tube feedings  14. PPI for GI prophylaxis   15. Glucose level monitoring  16. DVT prophylaxis heparin drip  17. Palliative care was consulted , discuss goals of care.     Please contact me if you have any questions.  Total critical care time, not including separately billable procedure time: 45 minutes  This patient had a high probability of imminent or life threatening deterioration due to acute respiratory failure which required my direct attention, intervention and personal management.     Fredi Milan  Pulmonary / Critical Care  10/17/2022  8:15  AM          ICU DAILY CHECKLIST                           Can patient transfer out of MICU? no    FAST HUG:    Feeding:  Feeding: yes.  Patient is receiving TUBE FEEDS    [place]: yes  Analgesia/Sedation:yes  Precedex, fentanyl  Thromboembolic prophylaxis: Yes; Mode:  Heparin  HOB>30:  Yes  Stress Ulcer Protocol Active: Yes; Mode: PPI  Glycemic Control: Any glucose > 180 no; Mode of Insulin Therapy: Sliding Scale Insulin    INTUBATED:  Can patient have daily waking:  Yes  Can patient have spontaneous breathing trial:  Yes    Restraints? yes    PHYSICAL THERAPY AND MOBILITY:  Can patient have PT and mobility trial: no  Activity: bedrest    Subjective:   HPI:  Lavonne Talbot is a 67 year old cachectic female with history of severe emphysema, tobacco user.  Recent diagnosis of COVID-19 viral infection 1 week ago who presented to [facility] ED on 10/8/2022 due to respiratory distress now admitted to the ICU with acute respiratory failure requiring mechanical ventilation. Patient was treated for H influenza pneumonia and pulmonary embolism. Echocardiogram showed reduced EF 30-35%, per cardiology stress induced cardiomyopathy.     Events overnight   - FiO2 was increased to 40%  - Low dose NE drip   - Tolerating tube feedings     Allergies: Patient has no known allergies.     MEDS:  Current Facility-Administered Medications   Medication     amiodarone (NEXTERONE) 360 mg in D5W 200 mL 1.8 mg/mL infusion     chlorhexidine (PERIDEX) 0.12 % solution 15 mL     dexmedetomidine (PRECEDEX) 400 mcg in 0.9% sodium chloride 100 mL     dextrose 10% infusion     glucose gel 15-30 g    Or     dextrose 50 % injection 25-50 mL    Or     glucagon injection 1 mg     fentaNYL (SUBLIMAZE) 50 mcg/mL bolus from pump     fentaNYL (SUBLIMAZE) infusion     [Held by provider] furosemide (LASIX) injection 40 mg     heparin infusion 25,000 units in D5W 250 mL ANTICOAGULANT     influenza vac high-dose quad (FLUZONE HD) injection KEVIN 0.7 mL      "insulin aspart (NovoLOG) injection (RAPID ACTING)     ipratropium - albuterol 0.5 mg/2.5 mg/3 mL (DUONEB) neb solution 3 mL     lactulose (CHRONULAC) solution 10 g     LubriFresh P.M. OINT     propofol (DIPRIVAN) infusion    And     propofol (DIPRIVAN) bolus from infusion pump 10 mg    And     Medication Instruction     [Held by provider] metolazone (ZAROXOLYN) tablet 5 mg     naloxone (NARCAN) injection 0.2 mg    Or     naloxone (NARCAN) injection 0.4 mg    Or     naloxone (NARCAN) injection 0.2 mg    Or     naloxone (NARCAN) injection 0.4 mg     norepinephrine (LEVOPHED) 4 mg in  mL infusion PREMIX     pantoprazole (PROTONIX) 2 mg/mL suspension 40 mg    Or     pantoprazole (PROTONIX) IV push injection 40 mg     piperacillin-tazobactam (ZOSYN) 3.375 g vial to attach to  mL bag     polyethylene glycol (MIRALAX) Packet 17 g     QUEtiapine (SEROquel) tablet 50 mg     QUEtiapine (SEROquel) tablet 75 mg     senna-docusate (SENOKOT-S/PERICOLACE) 8.6-50 MG per tablet 1 tablet     thiamine (B-1) tablet 100 mg     vasopressin (VASOSTRICT) 20 Units in sodium chloride 0.9 % 100 mL standard conc infusion     Objective:   VITALS:  BP (!) 75/50   Pulse 64   Temp 98.4  F (36.9  C)   Resp 18   Ht 1.702 m (5' 7\")   Wt 48.8 kg (107 lb 9.4 oz)   SpO2 (!) 87%   BMI 16.85 kg/m    VENT:  Vent Mode: CMV/AC  (Continuous Mandatory Ventilation/ Assist Control)  FiO2 (%): 30 %  Resp Rate (Set): 18 breaths/min  Tidal Volume (Set, mL): 400 mL  PEEP (cm H2O): 10 cmH2O  Resp: 18    EXAM:   Gen: sedated, arousable, vented  HEENT: pink conjunctiva, moist mucosa  Neck: no thyromegaly, masses or JVD  Lungs: decrease breath sound both HT, discrete ronchi  CV: regular, no murmurs or gallops appreciated  Abdomen: soft, NT, BS wnl  Ext: trace edema LEs  Neuro: sedated, arousable, non focal     Data Review:  Recent Labs   Lab 10/17/22  0427 10/17/22  0426 10/17/22  0124 10/16/22  1938 10/16/22  1603 10/16/22  1100   * 155* 122* " 119* 151* 194*     Tracheal Culture 1+ Normal margaret       2+ Haemophilus influenzae Abnormal               10/17/22 04:26   Sodium 139   Potassium 4.4   Chloride 99   Carbon Dioxide (CO2) 30 (H)   Urea Nitrogen 20.2   Creatinine 0.59   GFR Estimate >90   Calcium 9.0   Anion Gap 10   Magnesium 2.1   Phosphorus 3.4   Glucose 155 (H)   WBC 8.3   Hemoglobin 12.9   Hematocrit 38.8   Platelet Count 240   RBC Count 4.07   MCV 95   MCH 31.7   MCHC 33.2   RDW 15.0     Echocardiogram 10/9/2022  Interpretation Summary   1. Left ventricular size and wall thickness are normal. Systolic function is moderately reduced with global hypokinesis. The estimated left ventricular ejection fraction is 30-35%.  2. Right ventricle is not well visualized. Limited views suggest possible enlargement and reduced systolic function.  3. No hemodynamically significant valvular abnormalities.  4. No prior study available for comparison.    CT CHEST PULMONARY EMBOLISM W CONTRAST  LOCATION: [facility]  DATE/TIME: 10/13/2022 1:03 PM  INDICATION: Acute respiratory decompensation worsening hypoxemia  COMPARISON: CT pulmonary angiogram 10/8/2022  FINDINGS:  ANGIOGRAM CHEST: The main pulmonary artery remains normal in size. The central pulmonary arteries remain patent. A subsegmental pulmonary embolism in the left lower lobe superior segment 10/8/2022 has resolved (series 4, image 181). However, there is a   new low-attenuation filling defect within proximal subsegmental level pulmonary arteries in the right lower lobe (series 4, image 236) and a peripheral subsegmental filling defect in the posterior basal left lower lobe (series 4, image 276). Normal caliber thoracic aorta. Unchanged mild arch, proximal great vessel and descending aorta atheromatous calcifications. No acute aortic syndrome. No CT evidence of right heart strain.  LUNGS AND PLEURA: Endotracheal tube is in satisfactory position. Central airway wall thickening is  unchanged. Debris within the segmental and proximal subsegmental lower lobe airways is slightly improved from 5 days earlier, however peripheral subsegmental airway debris in bilateral lower lobe airways which produce tree-in-bud opacity and all segments of the left lower lobe and the basilar segments of the right lower lobe have developed.. The extent of consolidation in the posterior basal left lower but is mildly increased from 5 days earlier, while inflammation and atelectasis in the left lateral costophrenic sulcus has decreased.. No pleural effusion is present.  MEDIASTINUM: Cardiac chambers remain normal in size. There is no pericardial effusion. No lymphadenopathy. Gastric tube courses through the decompressed esophagus terminating in the stomach body. Right midline catheter terminates in the lateral right subclavian vein and right jugular approach central venous catheter terminates in the SVC.  CORONARY ARTERY CALCIFICATION: Mild.  UPPER ABDOMEN: No new findings in the imaged upper abdomen.  MUSCULOSKELETAL: Generalized demineralization of the bones. There is subtle superior endplate deformity of L1. There is a paucity of subcutaneous fat. No new bone or chest wall soft tissue abnormalities.                                          IMPRESSION:  1.  Subsegmental pulmonary embolism in the left lower lobe 5 days earlier has resolved. 2 new subsegmental pulmonary emboli are present in the lower lobes as described.  2.  Mild worsening of focal consolidation in the posterior basal left lower lobe and development of peripheral airway debris/tree-in-bud opacity in both lower lobes consistent with bronchopneumonia/cellular bronchiolitis.  3.  Advanced emphysema.    By:  Fredi Milan MD, 10/17/2022  8:15 AM    Primary Care Physician:  Cinthya Cabral

## 2022-10-17 NOTE — PROGRESS NOTES
Pt continues to have periods of time where her HR goes into the 120s , BP 150s , RR 30s-40 and appears uncomfortable- have given her 50mcg of fentanyl X2 so far and it does seem to help--she shakes her head NO when asked if she's in pain--does have a hx of anxiety

## 2022-10-17 NOTE — CONSULTS
[facility]  Palliative Care Consultation Note    Patient: Lavonne Talbot  Date of Admission:  10/8/2022    Requesting Clinician / Team: ICU  Reason for consult: Goals of care    Impression & Recommendations:    Plan for family meeting tomorrow at 3:15pm to discuss goals of care. Frantz (son) and Jess (daughter) will be present. Lavonne's  recently suffered a stroke and has limited ability to communicate at this time, is currently in [facility].    Goals of Care: restorative    Advanced Care Planning:  Advance directive: No  POLST: No  Code status: Full  Health care agent/surrogate: Frantz (son) along with Jess (daughter). Lavonne's  recently had a stroke and has limited ability to communicate.    Symptom Management:  Not currently managing symptoms    Psychosocial & Spiritual:   Will request Palliative SW and Spiritual Health support    These recommendations have been discussed with Dr. Casillas.      Thank you for the opportunity to participate in the care of this patient and family. Our team: will continue to follow.     During regular M-F work hours -- if you are not sure who specifically to contact -- please contact us by calling us directly at the Palliative Care Main Line [phone]    After regular work hours and on weekends/holidays, you can leave a message at [phone]      Assessments:  Lavonne is a 66 y/o woman, active smoker, with history of lymphedema, polycythemia, and recent diagnosis of Covid infection, admitted to ICU on 10/8/2022 with acute respiratory failure requiring ventilation and circulatory shock requiring vasopressors. Respiratory failure attributed to CAP vs aspiration pneumonia with small PE. 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    Today, the patient was seen for:  Goals of care    Prognosis, Goals, & Planning:      Functional Status just prior to hospitalization: Palliative Performance Score:       70%-  1. Reduced; 2. Unable to do job/normal work,  "significant disease; 3. Full; 4. Normal or reduced; 5. Full      Prognosis, Goals, and/or Advance Care Planning were addressed today: Yes      Patient's decision making preferences: unable to assess          Patient has decision-making capacity today for complex decisions: No            I have concerns about the patient/family's health literacy today: No           Patient has a completed Health Care Directive: Unknown or unable to assess.      Code status: Full Code    Coping, Meaning, & Spirituality:   Mood, coping, and/or meaning in the context of serious illness were addressed today: Yes  Summary/Comments: Frantz says he is coping well. His mom is Judaism and would appreciate a visit from the .    Social:     Living situation: home with     Alvarez family / caregivers:  and 2 children    History of Present Illness:  History gathered today from: patient, family/loved ones, medical chart    Lavonne is sedated and intubated during my visit today. Her eyes are open and she is able to follow commands. She shakes her head \"no\" for pain.     Spoke with son Frantz over the phone. Frantz shared that his dad (Lavonne's ) had a stroke about 3 weeks ago and is now at an assisted living facility. He is physically recovering well, but has significant difficulty communicating. Frantz and his sister Jess are meeting with their dad this afternoon to ask him if Lavonne has a HCD or if they ever discussed what she would want if she were seriously ill. Frantz is the executor of Lavonne's will but does not believe Lavonne ever named a HCA. Plans to discuss this as well as possible tracheostomy with Lavonne's  today.    Arranged for family meeting in person tomorrow at 3:15pm. Called sister Jess who will also attend.    Key Palliative Symptom Data:  Denies pain, unable to assess further due to sedation/ventilation    ROS:  Unable to assess further due to sedation/ventilation     Past Medical History:  Past Medical History: "   Diagnosis Date     Tobacco abuse         Past Surgical History:  Past Surgical History:   Procedure Laterality Date     NO HISTORY OF SURGERY           Family History:  Family History   Problem Relation Age of Onset     Diabetes Mother          Allergies:  No Known Allergies     Medications:  I have reviewed this patient's medication profile and medications from this hospitalization.   Noted:  Current Facility-Administered Medications   Medication     amiodarone (NEXTERONE) 360 mg in D5W 200 mL 1.8 mg/mL infusion     chlorhexidine (PERIDEX) 0.12 % solution 15 mL     dexmedetomidine (PRECEDEX) 400 mcg in 0.9% sodium chloride 100 mL     dextrose 10% infusion     glucose gel 15-30 g    Or     dextrose 50 % injection 25-50 mL    Or     glucagon injection 1 mg     fentaNYL (SUBLIMAZE) 50 mcg/mL bolus from pump     fentaNYL (SUBLIMAZE) infusion     [Held by provider] furosemide (LASIX) injection 40 mg     heparin infusion 25,000 units in D5W 250 mL ANTICOAGULANT     influenza vac high-dose quad (FLUZONE HD) injection KEVIN 0.7 mL     insulin aspart (NovoLOG) injection (RAPID ACTING)     ipratropium - albuterol 0.5 mg/2.5 mg/3 mL (DUONEB) neb solution 3 mL     lactulose (CHRONULAC) solution 10 g     LubriFresh P.M. OINT     propofol (DIPRIVAN) infusion    And     propofol (DIPRIVAN) bolus from infusion pump 10 mg    And     Medication Instruction     [Held by provider] metolazone (ZAROXOLYN) tablet 5 mg     naloxone (NARCAN) injection 0.2 mg    Or     naloxone (NARCAN) injection 0.4 mg    Or     naloxone (NARCAN) injection 0.2 mg    Or     naloxone (NARCAN) injection 0.4 mg     norepinephrine (LEVOPHED) 4 mg in  mL infusion PREMIX     pantoprazole (PROTONIX) 2 mg/mL suspension 40 mg    Or     pantoprazole (PROTONIX) IV push injection 40 mg     piperacillin-tazobactam (ZOSYN) 3.375 g vial to attach to  mL bag     polyethylene glycol (MIRALAX) Packet 17 g     QUEtiapine (SEROquel) tablet 50 mg     QUEtiapine  "(SEROquel) tablet 75 mg     senna-docusate (SENOKOT-S/PERICOLACE) 8.6-50 MG per tablet 1 tablet     thiamine (B-1) tablet 100 mg     vasopressin (VASOSTRICT) 20 Units in sodium chloride 0.9 % 100 mL standard conc infusion         PERTINENT PHYSICAL EXAMINATION:  Vital Signs: Blood pressure (!) 75/50, pulse 64, temperature 98.4  F (36.9  C), resp. rate 18, height 1.702 m (5' 7\"), weight 48.8 kg (107 lb 9.4 oz), SpO2 (!) 87 %.   GENERAL: sedated/ventilated, eyes open and tracking, follows simple commands  SKIN: Warm and dry   HEENT: +temporal muscle wasting  ABDOMINAL: soft, non distended, non tender  MUSKL: no gross joint deformities   EXTREMITIES: No edema    Data reviewed:  Recent imaging reviewed, my comments on pertinents:   CT PE 10/13  IMPRESSION:  1.  Subsegmental pulmonary embolism in the left lower lobe 5 days earlier has resolved. 2 new subsegmental pulmonary emboli are present in the lower lobes as described.  2.  Mild worsening of focal consolidation in the posterior basal left lower lobe and development of peripheral airway debris/tree-in-bud opacity in both lower lobes consistent with bronchopneumonia/cellular bronchiolitis.  3.  Advanced emphysema.    Recent lab data reviewed, my comments on pertinents:   CMP  Recent Labs   Lab 10/17/22  0811 10/17/22  0427 10/17/22  0426 10/17/22  0124 10/16/22  0507 10/16/22  0500 10/15/22  0808 10/15/22  0414 10/14/22  0419 10/14/22  0416 10/12/22  1616 10/12/22  1440   NA  --   --  139  --   --  140  --  137  --  135*   < > 145   POTASSIUM  --   --  4.4  --   --  4.3  --  3.9  --  4.2   < > 4.5   CHLORIDE  --   --  99  --   --  100  --  94*  --  91*   < >  --    CO2  --   --  30*  --   --  34*  --  36*  --  37*   < >  --    ANIONGAP  --   --  10  --   --  6*  --  7  --  7   < >  --    * 149* 155* 122*   < > 112*   < > 137*   < > 131*   < >  --    BUN  --   --  20.2  --   --  20.1  --  20.4  --  21.8   < >  --    CR  --   --  0.59  --   --  0.59  --  0.49*  " --  0.44*   < >  --    GFRESTIMATED  --   --  >90  --   --  >90  --  >90  --  >90   < >  --    JASON  --   --  9.0  --   --  9.1  --  8.9  --  8.9   < >  --    MAG  --   --  2.1  --   --  2.3  --  2.0  --  2.2   < >  --    PHOS  --   --  3.4  --   --  3.5  --  3.5  --  4.6*   < >  --    PROTTOTAL  --   --   --   --   --   --   --   --   --   --   --  6.3*   ALBUMIN  --   --   --   --   --   --   --   --   --   --   --  2.7*   BILITOTAL  --   --   --   --   --   --   --   --   --   --   --  0.6   ALKPHOS  --   --   --   --   --   --   --   --   --   --   --  86   AST  --   --   --   --   --   --   --   --   --   --   --  18   ALT  --   --   --   --   --   --   --   --   --   --   --  55*    < > = values in this interval not displayed.     CBC  Recent Labs   Lab 10/17/22  0426 10/16/22  0500 10/15/22  0414 10/14/22  0416   WBC 8.3 8.9 12.0* 11.3*   RBC 4.07 4.07 4.47 4.76   HGB 12.9 12.9 14.9 15.1   HCT 38.8 38.8 42.7 45.3   MCV 95 95 96 95   MCH 31.7 31.7 33.3* 31.7   MCHC 33.2 33.2 34.9 33.3   RDW 15.0 14.8 14.8 14.7    198 191 187       Jessica Richards PA-C  [facility], Palliative Care  Dept phone: [phone]  Securely message with the Vocera Web Console

## 2022-10-17 NOTE — PLAN OF CARE
Problem: Plan of Care - These are the overarching goals to be used throughout the patient stay.    Goal: Absence of Hospital-Acquired Illness or Injury  Intervention: Identify and Manage Fall Risk  Recent Flowsheet Documentation  Taken 10/17/2022 0800 by Preeti Altman, RN  Safety Promotion/Fall Prevention: room near nurse's station  Intervention: Prevent Skin Injury  Recent Flowsheet Documentation  Taken 10/17/2022 0800 by Preeti Altman, RN  Body Position:   lower extremity elevated   neutral body alignment   weight shifting   [facility] - ICU    RN Progress Note:            Pertinent Assessments:      Please refer to flowsheet rows for full assessment     VS, weaning potential         Mobility Level:     BR         Key Events - This Shift:     [facility] SAT / SBT Record: Delete if N/A    SAT Safety Screen                  {PASSED/                          If FAILED why?    SAT Performed {na   If FAILED why?    SBT Safety Screen na   If FAILED why?     The need for more O2 and increase in Levo prevents a safe weaning trial- periodically HR increases and Bp increases and pt gets restless                Plan:   Continue to monitor  Palliative tjo speak with the family           Point of Contact Update       oal Outcome Evaluation:

## 2022-10-17 NOTE — PLAN OF CARE
Problem: Enteral Nutrition  Goal: Feeding Tolerance  Outcome: Progressing     Problem: Anxiety Signs/Symptoms  Goal: Optimized Energy Level (Anxiety Signs/Symptoms)  Outcome: Progressing   [facility] - ICU    RN Progress Note:            Pertinent Assessments:      Please refer to flowsheet rows for full assessment     No tachycardia, SR with no issues overnight. Follows commands and is redirectable if anxious, calm and cooperative otherwise. Improved sleep. BP remained stable with levo continuing titrate down.          Mobility Level:     1 Bedrest         Key Events - This Shift:     No acute changes or events. Patient was calm and cooperative.              Plan:     Continue to work towards weaning patient off sedation. Palliative consult today.        Goal Outcome Evaluation:       Lower residuals with TF today in comparison to yesterday. Redirectable when anxious. RASS -1 to 0, see MAR for meds.

## 2022-10-18 LAB
ALBUMIN SERPL BCG-MCNC: 3 G/DL (ref 3.5–5.2)
ALP SERPL-CCNC: 72 U/L (ref 35–104)
ALT SERPL W P-5'-P-CCNC: 20 U/L (ref 10–35)
ANION GAP SERPL CALCULATED.3IONS-SCNC: 11 MMOL/L (ref 7–15)
AST SERPL W P-5'-P-CCNC: 20 U/L (ref 10–35)
BASOPHILS # BLD AUTO: 0 10E3/UL (ref 0–0.2)
BASOPHILS NFR BLD AUTO: 0 %
BILIRUB SERPL-MCNC: 0.7 MG/DL
BUN SERPL-MCNC: 24.1 MG/DL (ref 8–23)
CALCIUM SERPL-MCNC: 9.4 MG/DL (ref 8.8–10.2)
CHLORIDE SERPL-SCNC: 94 MMOL/L (ref 98–107)
CREAT SERPL-MCNC: 0.77 MG/DL (ref 0.51–0.95)
CRP SERPL-MCNC: 83.7 MG/L
DEPRECATED HCO3 PLAS-SCNC: 35 MMOL/L (ref 22–29)
EOSINOPHIL # BLD AUTO: 0.1 10E3/UL (ref 0–0.7)
EOSINOPHIL NFR BLD AUTO: 1 %
ERYTHROCYTE [DISTWIDTH] IN BLOOD BY AUTOMATED COUNT: 14.6 % (ref 10–15)
GFR SERPL CREATININE-BSD FRML MDRD: 84 ML/MIN/1.73M2
GLUCOSE BLDC GLUCOMTR-MCNC: 135 MG/DL (ref 70–99)
GLUCOSE BLDC GLUCOMTR-MCNC: 135 MG/DL (ref 70–99)
GLUCOSE BLDC GLUCOMTR-MCNC: 138 MG/DL (ref 70–99)
GLUCOSE BLDC GLUCOMTR-MCNC: 147 MG/DL (ref 70–99)
GLUCOSE BLDC GLUCOMTR-MCNC: 155 MG/DL (ref 70–99)
GLUCOSE BLDC GLUCOMTR-MCNC: 158 MG/DL (ref 70–99)
GLUCOSE BLDC GLUCOMTR-MCNC: 98 MG/DL (ref 70–99)
GLUCOSE SERPL-MCNC: 139 MG/DL (ref 70–99)
HCT VFR BLD AUTO: 42.8 % (ref 35–47)
HGB BLD-MCNC: 14.1 G/DL (ref 11.7–15.7)
IMM GRANULOCYTES # BLD: 0.1 10E3/UL
IMM GRANULOCYTES NFR BLD: 1 %
LYMPHOCYTES # BLD AUTO: 1.5 10E3/UL (ref 0.8–5.3)
LYMPHOCYTES NFR BLD AUTO: 15 %
MAGNESIUM SERPL-MCNC: 2.2 MG/DL (ref 1.7–2.3)
MCH RBC QN AUTO: 31.1 PG (ref 26.5–33)
MCHC RBC AUTO-ENTMCNC: 32.9 G/DL (ref 31.5–36.5)
MCV RBC AUTO: 94 FL (ref 78–100)
MONOCYTES # BLD AUTO: 1.1 10E3/UL (ref 0–1.3)
MONOCYTES NFR BLD AUTO: 11 %
NEUTROPHILS # BLD AUTO: 7.2 10E3/UL (ref 1.6–8.3)
NEUTROPHILS NFR BLD AUTO: 72 %
NRBC # BLD AUTO: 0 10E3/UL
NRBC BLD AUTO-RTO: 0 /100
NT-PROBNP SERPL-MCNC: 998 PG/ML (ref 0–900)
PHOSPHATE SERPL-MCNC: 4.4 MG/DL (ref 2.5–4.5)
PLATELET # BLD AUTO: 294 10E3/UL (ref 150–450)
POTASSIUM SERPL-SCNC: 3.8 MMOL/L (ref 3.4–5.3)
PROCALCITONIN SERPL IA-MCNC: 0.16 NG/ML
PROT SERPL-MCNC: 6.5 G/DL (ref 6.4–8.3)
RBC # BLD AUTO: 4.54 10E6/UL (ref 3.8–5.2)
SODIUM SERPL-SCNC: 140 MMOL/L (ref 136–145)
UFH PPP CHRO-ACNC: 0.6 IU/ML
UFH PPP CHRO-ACNC: 0.63 IU/ML
UFH PPP CHRO-ACNC: 0.72 IU/ML
WBC # BLD AUTO: 9.9 10E3/UL (ref 4–11)

## 2022-10-18 PROCEDURE — 999N000009 HC STATISTIC AIRWAY CARE

## 2022-10-18 PROCEDURE — 250N000011 HC RX IP 250 OP 636: Performed by: INTERNAL MEDICINE

## 2022-10-18 PROCEDURE — 99291 CRITICAL CARE FIRST HOUR: CPT | Performed by: INTERNAL MEDICINE

## 2022-10-18 PROCEDURE — 94003 VENT MGMT INPAT SUBQ DAY: CPT

## 2022-10-18 PROCEDURE — 86140 C-REACTIVE PROTEIN: CPT | Performed by: INTERNAL MEDICINE

## 2022-10-18 PROCEDURE — 83880 ASSAY OF NATRIURETIC PEPTIDE: CPT | Performed by: INTERNAL MEDICINE

## 2022-10-18 PROCEDURE — 94640 AIRWAY INHALATION TREATMENT: CPT | Mod: 76

## 2022-10-18 PROCEDURE — 200N000001 HC R&B ICU

## 2022-10-18 PROCEDURE — 999N000253 HC STATISTIC WEANING TRIALS

## 2022-10-18 PROCEDURE — 999N000157 HC STATISTIC RCP TIME EA 10 MIN

## 2022-10-18 PROCEDURE — 250N000013 HC RX MED GY IP 250 OP 250 PS 637: Performed by: INTERNAL MEDICINE

## 2022-10-18 PROCEDURE — 84145 PROCALCITONIN (PCT): CPT | Performed by: INTERNAL MEDICINE

## 2022-10-18 PROCEDURE — 99233 SBSQ HOSP IP/OBS HIGH 50: CPT | Performed by: PHYSICIAN ASSISTANT

## 2022-10-18 PROCEDURE — 94640 AIRWAY INHALATION TREATMENT: CPT

## 2022-10-18 PROCEDURE — 250N000009 HC RX 250: Performed by: INTERNAL MEDICINE

## 2022-10-18 PROCEDURE — 85025 COMPLETE CBC W/AUTO DIFF WBC: CPT | Performed by: INTERNAL MEDICINE

## 2022-10-18 PROCEDURE — 85520 HEPARIN ASSAY: CPT | Performed by: INTERNAL MEDICINE

## 2022-10-18 PROCEDURE — 84100 ASSAY OF PHOSPHORUS: CPT | Performed by: INTERNAL MEDICINE

## 2022-10-18 PROCEDURE — 80053 COMPREHEN METABOLIC PANEL: CPT | Performed by: INTERNAL MEDICINE

## 2022-10-18 PROCEDURE — 83735 ASSAY OF MAGNESIUM: CPT | Performed by: INTERNAL MEDICINE

## 2022-10-18 PROCEDURE — 250N000011 HC RX IP 250 OP 636: Performed by: NURSE PRACTITIONER

## 2022-10-18 PROCEDURE — 87106 FUNGI IDENTIFICATION YEAST: CPT | Performed by: INTERNAL MEDICINE

## 2022-10-18 RX ORDER — ONDANSETRON 2 MG/ML
4 INJECTION INTRAMUSCULAR; INTRAVENOUS EVERY 6 HOURS PRN
Status: DISCONTINUED | OUTPATIENT
Start: 2022-10-18 | End: 2022-11-05

## 2022-10-18 RX ORDER — POLYETHYLENE GLYCOL 3350, SODIUM SULFATE ANHYDROUS, SODIUM BICARBONATE, SODIUM CHLORIDE, POTASSIUM CHLORIDE 236; 22.74; 6.74; 5.86; 2.97 G/4L; G/4L; G/4L; G/4L; G/4L
250 POWDER, FOR SOLUTION ORAL ONCE
Status: DISCONTINUED | OUTPATIENT
Start: 2022-10-18 | End: 2022-10-19

## 2022-10-18 RX ORDER — LACTULOSE 10 G/15ML
20 SOLUTION ORAL DAILY
Status: DISCONTINUED | OUTPATIENT
Start: 2022-10-18 | End: 2022-10-24

## 2022-10-18 RX ORDER — METOCLOPRAMIDE HYDROCHLORIDE 5 MG/ML
10 INJECTION INTRAMUSCULAR; INTRAVENOUS ONCE
Status: COMPLETED | OUTPATIENT
Start: 2022-10-18 | End: 2022-10-18

## 2022-10-18 RX ORDER — CEFTRIAXONE 1 G/1
1 INJECTION, POWDER, FOR SOLUTION INTRAMUSCULAR; INTRAVENOUS EVERY 24 HOURS
Status: DISCONTINUED | OUTPATIENT
Start: 2022-10-18 | End: 2022-10-20

## 2022-10-18 RX ORDER — QUETIAPINE FUMARATE 25 MG/1
25 TABLET, FILM COATED ORAL 2 TIMES DAILY
Status: DISCONTINUED | OUTPATIENT
Start: 2022-10-18 | End: 2022-10-20

## 2022-10-18 RX ADMIN — CHLORHEXIDINE GLUCONATE 15 ML: 1.2 SOLUTION ORAL at 20:26

## 2022-10-18 RX ADMIN — PIPERACILLIN AND TAZOBACTAM 3.38 G: 3; .375 INJECTION, POWDER, LYOPHILIZED, FOR SOLUTION INTRAVENOUS at 12:12

## 2022-10-18 RX ADMIN — Medication 25 MCG/HR: at 20:44

## 2022-10-18 RX ADMIN — INSULIN ASPART 1 UNITS: 100 INJECTION, SOLUTION INTRAVENOUS; SUBCUTANEOUS at 08:27

## 2022-10-18 RX ADMIN — INSULIN ASPART 1 UNITS: 100 INJECTION, SOLUTION INTRAVENOUS; SUBCUTANEOUS at 12:11

## 2022-10-18 RX ADMIN — CEFTRIAXONE SODIUM 1 G: 1 INJECTION, POWDER, FOR SOLUTION INTRAMUSCULAR; INTRAVENOUS at 17:19

## 2022-10-18 RX ADMIN — ONDANSETRON 4 MG: 2 INJECTION INTRAMUSCULAR; INTRAVENOUS at 16:27

## 2022-10-18 RX ADMIN — CHLORHEXIDINE GLUCONATE 15 ML: 1.2 SOLUTION ORAL at 08:39

## 2022-10-18 RX ADMIN — Medication 50 MCG: at 08:21

## 2022-10-18 RX ADMIN — HEPARIN SODIUM AND DEXTROSE 1600 UNITS/HR: 10000; 5 INJECTION INTRAVENOUS at 03:09

## 2022-10-18 RX ADMIN — DEXMEDETOMIDINE HYDROCHLORIDE 1.2 MCG/KG/HR: 400 INJECTION INTRAVENOUS at 04:26

## 2022-10-18 RX ADMIN — QUETIAPINE FUMARATE 25 MG: 25 TABLET ORAL at 20:27

## 2022-10-18 RX ADMIN — FUROSEMIDE 20 MG: 10 INJECTION, SOLUTION INTRAMUSCULAR; INTRAVENOUS at 05:35

## 2022-10-18 RX ADMIN — LACTULOSE 20 G: 10 SOLUTION ORAL at 14:12

## 2022-10-18 RX ADMIN — IPRATROPIUM BROMIDE AND ALBUTEROL SULFATE 3 ML: 2.5; .5 SOLUTION RESPIRATORY (INHALATION) at 19:58

## 2022-10-18 RX ADMIN — HEPARIN SODIUM AND DEXTROSE 1500 UNITS/HR: 10000; 5 INJECTION INTRAVENOUS at 20:25

## 2022-10-18 RX ADMIN — METOLAZONE 2.5 MG: 5 TABLET ORAL at 08:39

## 2022-10-18 RX ADMIN — IPRATROPIUM BROMIDE AND ALBUTEROL SULFATE 3 ML: 2.5; .5 SOLUTION RESPIRATORY (INHALATION) at 11:07

## 2022-10-18 RX ADMIN — SENNOSIDES AND DOCUSATE SODIUM 1 TABLET: 50; 8.6 TABLET ORAL at 08:39

## 2022-10-18 RX ADMIN — THIAMINE HCL TAB 100 MG 100 MG: 100 TAB at 08:39

## 2022-10-18 RX ADMIN — QUETIAPINE FUMARATE 50 MG: 25 TABLET ORAL at 08:39

## 2022-10-18 RX ADMIN — INSULIN ASPART 1 UNITS: 100 INJECTION, SOLUTION INTRAVENOUS; SUBCUTANEOUS at 04:37

## 2022-10-18 RX ADMIN — METOCLOPRAMIDE HYDROCHLORIDE 10 MG: 5 INJECTION INTRAMUSCULAR; INTRAVENOUS at 16:54

## 2022-10-18 RX ADMIN — POLYETHYLENE GLYCOL 3350 17 G: 17 POWDER, FOR SOLUTION ORAL at 08:39

## 2022-10-18 RX ADMIN — Medication 0.08 MCG/KG/MIN: at 08:57

## 2022-10-18 RX ADMIN — FUROSEMIDE 20 MG: 10 INJECTION, SOLUTION INTRAMUSCULAR; INTRAVENOUS at 17:19

## 2022-10-18 RX ADMIN — IPRATROPIUM BROMIDE AND ALBUTEROL SULFATE 3 ML: 2.5; .5 SOLUTION RESPIRATORY (INHALATION) at 15:07

## 2022-10-18 RX ADMIN — PIPERACILLIN AND TAZOBACTAM 3.38 G: 3; .375 INJECTION, POWDER, LYOPHILIZED, FOR SOLUTION INTRAVENOUS at 04:28

## 2022-10-18 RX ADMIN — PROPOFOL 30 MCG/KG/MIN: 10 INJECTION, EMULSION INTRAVENOUS at 06:02

## 2022-10-18 RX ADMIN — SODIUM PHOSPHATE, DIBASIC AND SODIUM PHOSPHATE, MONOBASIC 1 ENEMA: 7; 19 ENEMA RECTAL at 16:41

## 2022-10-18 RX ADMIN — Medication 40 MG: at 05:36

## 2022-10-18 RX ADMIN — DEXMEDETOMIDINE HYDROCHLORIDE 0.8 MCG/KG/HR: 400 INJECTION INTRAVENOUS at 16:38

## 2022-10-18 RX ADMIN — Medication 50 MCG: at 16:30

## 2022-10-18 RX ADMIN — IPRATROPIUM BROMIDE AND ALBUTEROL SULFATE 3 ML: 2.5; .5 SOLUTION RESPIRATORY (INHALATION) at 07:33

## 2022-10-18 RX ADMIN — Medication: at 11:40

## 2022-10-18 ASSESSMENT — ACTIVITIES OF DAILY LIVING (ADL)
ADLS_ACUITY_SCORE: 34
ADLS_ACUITY_SCORE: 30
ADLS_ACUITY_SCORE: 30
ADLS_ACUITY_SCORE: 34
ADLS_ACUITY_SCORE: 30
ADLS_ACUITY_SCORE: 34
ADLS_ACUITY_SCORE: 30

## 2022-10-18 NOTE — PROGRESS NOTES
Pt noted to have emesis x2. TF imediattely put on hold and pt suctioned orally and down ETT. Small amt noted in ETT, maybe 3 ml. OG residual checked and it was 20 ml.    Dr. Casillas notified. Zofran x1 given. Holding TF and all meds. Fleets given.     Pt still nauseated. Will give one dose fo Reglan per Dr. Casillas.

## 2022-10-18 NOTE — PROGRESS NOTES
PROVIDER RESTRAINT FOR NON-VIOLENT BEHAVIOR FACE TO FACE EVALUATION    Patient's Immediate Situation:  Patient demonstrated the following behaviors: pulling lines    Patient's Reaction to the intervention:  Does patient understand the reason for restraint/seclusion? unable to express    Medical Condition:  Is there any evidence of compromise of Skin integrity, Respiratory, Cardiovascular, Musculoskeletal, Hydration?   Yes    Behavioral Condition:  In consultation with the RN, is there a need to continue this restraint or seclusion? Yes    See Restraint Flowsheet for complete restraint documentation and assessment.    Fredi Milan MD  Critical Care Medicine   10/18/22  8:30 AM

## 2022-10-18 NOTE — PROGRESS NOTES
"RT PROGRESS NOTE    VENT DAY# 11    CURRENT SETTINGS:   Vent Mode: Other (see comments) (PC-PSV)  FiO2 (%): 40 %  Resp Rate (Set): 16 breaths/min  Tidal Volume (Set, mL): 400 mL  PEEP (cm H2O): 7 cmH2O  Inspiratory Pressure (cm H2O) (Drager Blanquita): 22  Resp: 21      PATIENT PARAMETERS:  PIP 22- 23  Pplat:  19 -20  Pmean: 11-12      ETT SIZE 7.5 Secured at 22 cm at teeth/gums    BP (!) 159/103   Pulse 80   Temp 98.6  F (37  C)   Resp 21   Ht 1.702 m (5' 7\")   Wt 46.4 kg (102 lb 4.7 oz)   SpO2 95%   BMI 16.02 kg/m        Silvana Cerda, RT  "

## 2022-10-18 NOTE — PROGRESS NOTES
Respiratory Care    Vent day 10    Vent Mode: Other (see comments) (PC-PSV.)  FiO2 (%): 40 %  Resp Rate (Set): 16 breaths/min  Tidal Volume (Set, mL): 400 mL  PEEP (cm H2O): 7 cmH2O  Inspiratory Pressure (cm H2O) (Drager Blanquita): 22  Resp: (!) 31    PIP: 22  Pplat: 21  Secretions: scant clear thin    Notes/plan: vent settings changed per MD for pt comfort and oxygenation. Pt tolerating well. Trying to wean pt, but she is tachypenic and agitated this evening. Nebs given. BS coarse/diminished pre and post. Will continue to follow.      Paulo Delgado, RT

## 2022-10-18 NOTE — PROGRESS NOTES
PULMONARY / CRITICAL CARE PROGRESS NOTE    Date / Time of Admission:  10/8/2022  9:38 AM    Assessment:     Lavonne Talbot is a 67 year old cachectic female with history of severe emphysema, tobacco user.  Recent diagnosis of COVID-19 viral infection 1 week ago who presented to [facility] ED on 10/8/2022 due to respiratory distress now admitted to the ICU with acute respiratory failure requiring mechanical ventilation. Patient was treated for H influenza pneumonia and pulmonary embolism. Echocardiogram showed reduced EF 30-35%, per cardiology stress induced cardiomyopathy.     1. Acute respiratory failure s/p intubation 10/8/22  2. H influenza pneumonia  3. Pulmonary embolism   4. COVID19 viral infection diagnosed on 10/8/22  5. HFrEF  6. COPD , severe emphysematous changes   7. Malnutrition Body mass index is 16.02 kg/m .   8. Ongoing tobacco use    Advance Directives: Full code    Plan:   1. Titrate vent settings, PCV RR16 InsPr 18 PEEP 5 FiO2 35%  2. Sedation to keep RASS 0 to -1, precedex, fentanyl drip, discontinue propofol  3. Continue seroquel BID  4. Schedule bronchodilators  5. Heplock IV fluids   6. Titrate pressors to keep MAP > 60  7. Lasix IV  8. New tracheal culture  9. Narrow Abx to ceftriaxone   10. Discontinue COVID19 respiratory isolation   11. Monitor kidney function , supplement electrolytes as needed  12. Advance tube feedings  13. PPI for GI prophylaxis   14. Glucose level monitoring  15. DVT prophylaxis heparin drip, start xarelto  16. Palliative care was consulted , family meeting this afternoon, discussion about goals of care.     Please contact me if you have any questions.  Total critical care time, not including separately billable procedure time: 45 minutes  This patient had a high probability of imminent or life threatening deterioration due to acute respiratory failure which required my direct attention, intervention and personal management.     Fredi Casillas  Bjorn  Pulmonary / Critical Care  10/18/2022  12:34 PM          ICU DAILY CHECKLIST                           Can patient transfer out of MICU? no    FAST HUG:    Feeding:  Feeding: yes.  Patient is receiving TUBE FEEDS    [place]: yes  Analgesia/Sedation:yes  Precedex, fentanyl  Thromboembolic prophylaxis: Yes; Mode:  Heparin  HOB>30:  Yes  Stress Ulcer Protocol Active: Yes; Mode: PPI  Glycemic Control: Any glucose > 180 no; Mode of Insulin Therapy: Sliding Scale Insulin    INTUBATED:  Can patient have daily waking:  Yes  Can patient have spontaneous breathing trial:  Yes    Restraints? yes    PHYSICAL THERAPY AND MOBILITY:  Can patient have PT and mobility trial: no  Activity: bedrest    Subjective:   HPI:  Lavonne Talbot is a 67 year old cachectic female with history of severe emphysema, tobacco user.  Recent diagnosis of COVID-19 viral infection 1 week ago who presented to [facility] ED on 10/8/2022 due to respiratory distress now admitted to the ICU with acute respiratory failure requiring mechanical ventilation. Patient was treated for H influenza pneumonia and pulmonary embolism. Echocardiogram showed reduced EF 30-35%, per cardiology stress induced cardiomyopathy.     Events overnight   - Agitated , restless, A/C was changed to PCV, FiO2 down to 35%  - Low dose NE drip   - Tolerating tube feedings     Allergies: Patient has no known allergies.     MEDS:  Current Facility-Administered Medications   Medication     chlorhexidine (PERIDEX) 0.12 % solution 15 mL     dexmedetomidine (PRECEDEX) 400 mcg in 0.9% sodium chloride 100 mL     dextrose 10% infusion     glucose gel 15-30 g    Or     dextrose 50 % injection 25-50 mL    Or     glucagon injection 1 mg     fentaNYL (SUBLIMAZE) 50 mcg/mL bolus from pump     fentaNYL (SUBLIMAZE) infusion     furosemide (LASIX) injection 20 mg     heparin infusion 25,000 units in D5W 250 mL ANTICOAGULANT     influenza vac high-dose quad (FLUZONE HD) injection KEVIN 0.7 mL  "    insulin aspart (NovoLOG) injection (RAPID ACTING)     ipratropium - albuterol 0.5 mg/2.5 mg/3 mL (DUONEB) neb solution 3 mL     LubriFresh P.M. OINT     metolazone (ZAROXOLYN) tablet 2.5 mg     naloxone (NARCAN) injection 0.2 mg    Or     naloxone (NARCAN) injection 0.4 mg    Or     naloxone (NARCAN) injection 0.2 mg    Or     naloxone (NARCAN) injection 0.4 mg     norepinephrine (LEVOPHED) 4 mg in  mL infusion PREMIX     pantoprazole (PROTONIX) 2 mg/mL suspension 40 mg    Or     pantoprazole (PROTONIX) IV push injection 40 mg     piperacillin-tazobactam (ZOSYN) 3.375 g vial to attach to  mL bag     polyethylene glycol (MIRALAX) Packet 17 g     propofol (DIPRIVAN) infusion     QUEtiapine (SEROquel) tablet 50 mg     senna-docusate (SENOKOT-S/PERICOLACE) 8.6-50 MG per tablet 1 tablet     thiamine (B-1) tablet 100 mg     vasopressin (VASOSTRICT) 20 Units in sodium chloride 0.9 % 100 mL standard conc infusion     Objective:   VITALS:  /67   Pulse 75   Temp 98.1  F (36.7  C)   Resp 22   Ht 1.702 m (5' 7\")   Wt 46.4 kg (102 lb 4.7 oz)   SpO2 96%   BMI 16.02 kg/m    VENT:  Vent Mode: Other (see comments) (PC- PSV)  FiO2 (%): 35 %  Resp Rate (Set): 16 breaths/min  Tidal Volume (Set, mL): 400 mL  PEEP (cm H2O): 7 cmH2O  Inspiratory Pressure (cm H2O) (Drager Blanquita): 18  Resp: 22    EXAM:   Gen: sedated, arousable, vented  HEENT: pink conjunctiva, moist mucosa  Neck: no thyromegaly, masses or JVD  Lungs: decrease breath sound both HT, discrete ronchi  CV: regular, no murmurs or gallops appreciated  Abdomen: soft, NT, BS wnl  Ext: trace edema LEs  Neuro: sedated, arousable, non focal     Data Review:  Recent Labs   Lab 10/18/22  1206 10/18/22  0826 10/18/22  0436 10/18/22  0434 10/17/22  2349 10/17/22  2049   * 147* 155* 139* 98 138*     Tracheal Culture 1+ Normal margaret       2+ Haemophilus influenzae Abnormal           Latest Reference Range & Units 10/18/22 04:34   Sodium 136 - 145 mmol/L " 140   Potassium 3.4 - 5.3 mmol/L 3.8   Chloride 98 - 107 mmol/L 94 (L)   Carbon Dioxide (CO2) 22 - 29 mmol/L 35 (H)   Urea Nitrogen 8.0 - 23.0 mg/dL 24.1 (H)   Creatinine 0.51 - 0.95 mg/dL 0.77   GFR Estimate >60 mL/min/1.73m2 84   Calcium 8.8 - 10.2 mg/dL 9.4   Anion Gap 7 - 15 mmol/L 11   Magnesium 1.7 - 2.3 mg/dL 2.2   Phosphorus 2.5 - 4.5 mg/dL 4.4   Albumin 3.5 - 5.2 g/dL 3.0 (L)   Protein Total 6.4 - 8.3 g/dL 6.5   Alkaline Phosphatase 35 - 104 U/L 72   ALT 10 - 35 U/L 20   AST 10 - 35 U/L 20   Bilirubin Total <=1.2 mg/dL 0.7   CRP Inflammation <5.00 mg/L 83.70 (H)   Glucose 70 - 99 mg/dL 139 (H)   N-Terminal Pro BNP Inpatient 0 - 900 pg/mL 998 (H)   Procalcitonin <0.05 ng/mL 0.16 (H)     XR CHEST 1 VIEW  LOCATION: [facility]  DATE/TIME: 10/17/2022 7:22 PM  INDICATION: Increase work of breathing , location of ET tube.  COMPARISON: 10/13/2022.                           IMPRESSION: Endotracheal tube tip 3.7 cm above the izabela. Nasogastric tube tip and the distal sidehole are within the stomach. Right IJ central venous catheter tip low SVC in good position. Underlying lung hyperinflation, severe emphysema and linear opacities radiating from the sara into the periphery of both lungs that could represent bronchial wall thickening, interstitial edema and/or interstitial inflammation. No pneumothorax.    Echocardiogram 10/9/2022  Interpretation Summary   1. Left ventricular size and wall thickness are normal. Systolic function is moderately reduced with global hypokinesis. The estimated left ventricular ejection fraction is 30-35%.  2. Right ventricle is not well visualized. Limited views suggest possible enlargement and reduced systolic function.  3. No hemodynamically significant valvular abnormalities.  4. No prior study available for comparison.    CT CHEST PULMONARY EMBOLISM W CONTRAST  LOCATION: [facility]  DATE/TIME: 10/13/2022 1:03 PM  INDICATION: Acute  respiratory decompensation worsening hypoxemia  COMPARISON: CT pulmonary angiogram 10/8/2022  FINDINGS:  ANGIOGRAM CHEST: The main pulmonary artery remains normal in size. The central pulmonary arteries remain patent. A subsegmental pulmonary embolism in the left lower lobe superior segment 10/8/2022 has resolved (series 4, image 181). However, there is a   new low-attenuation filling defect within proximal subsegmental level pulmonary arteries in the right lower lobe (series 4, image 236) and a peripheral subsegmental filling defect in the posterior basal left lower lobe (series 4, image 276). Normal caliber thoracic aorta. Unchanged mild arch, proximal great vessel and descending aorta atheromatous calcifications. No acute aortic syndrome. No CT evidence of right heart strain.  LUNGS AND PLEURA: Endotracheal tube is in satisfactory position. Central airway wall thickening is unchanged. Debris within the segmental and proximal subsegmental lower lobe airways is slightly improved from 5 days earlier, however peripheral subsegmental airway debris in bilateral lower lobe airways which produce tree-in-bud opacity and all segments of the left lower lobe and the basilar segments of the right lower lobe have developed.. The extent of consolidation in the posterior basal left lower but is mildly increased from 5 days earlier, while inflammation and atelectasis in the left lateral costophrenic sulcus has decreased.. No pleural effusion is present.  MEDIASTINUM: Cardiac chambers remain normal in size. There is no pericardial effusion. No lymphadenopathy. Gastric tube courses through the decompressed esophagus terminating in the stomach body. Right midline catheter terminates in the lateral right subclavian vein and right jugular approach central venous catheter terminates in the SVC.  CORONARY ARTERY CALCIFICATION: Mild.  UPPER ABDOMEN: No new findings in the imaged upper abdomen.  MUSCULOSKELETAL: Generalized  demineralization of the bones. There is subtle superior endplate deformity of L1. There is a paucity of subcutaneous fat. No new bone or chest wall soft tissue abnormalities.                                          IMPRESSION:  1.  Subsegmental pulmonary embolism in the left lower lobe 5 days earlier has resolved. 2 new subsegmental pulmonary emboli are present in the lower lobes as described.  2.  Mild worsening of focal consolidation in the posterior basal left lower lobe and development of peripheral airway debris/tree-in-bud opacity in both lower lobes consistent with bronchopneumonia/cellular bronchiolitis.  3.  Advanced emphysema.    By:  Fredi Milan MD, 10/18/2022  12:34 PM    Primary Care Physician:  Cinthya Cabral

## 2022-10-18 NOTE — PROGRESS NOTES
SPIRITUAL HEALTH SERVICES Progress Note       attempted to visit with Lavonne, but she was sleeping. Lavonne comes from [employer] [place].  offered prayer outside the room due to isolation precautions and Lavonne being unavailable for visit at this time.     A  will continue to visit as able or per request by patient/family/staff.       Jason Blum MDiv, [facility]  , [facility]  [phone]

## 2022-10-18 NOTE — PLAN OF CARE
[facility] - ICU    RN Progress Note:            Pertinent Assessments:      Please refer to flowsheet rows for full assessment     Agitation.          Mobility Level:     1, turn q 2 hours         Key Events - This Shift:     SJN SAT / SBT Record: Delete if N/A    SAT Safety Screen Failed   If FAILED why? Agitation, increasing/decreasing/increasing sedation   SAT Performed Failed   If FAILED why? agitation   SBT Safety Screen Failed   If FAILED why? agitation      Day shift RN reported that Propofol was DC'd to allow patient to wake up and the rate was not very high to begin with. When writer started shift patient was intermittently agitated. Fentanyl Boluses given, increased rate, Precedex already at max. Spoke with Dr Casillas regarding patient. Dr Casillas came to the room, assessed the vent settings and made some changes, observed patient. Propofol was reordered. Held off turning on Propofol due to patient being comfortable for a little while after vent settings were changed. Patient continued to fluctuate between agitation and light sedation. Attempted multiple times to optimize sedation with no success.              Plan:     Continue to monitor         Point of Contact Update No      Goal Outcome Evaluation:

## 2022-10-18 NOTE — PROGRESS NOTES
[facility]  Palliative Care Daily Progress Note       Recommendations & Counseling     Family meeting held today. Key takeaways:  - All family members in agreement that tracheostomy would not be in line with Lavonne's values  - Plan to continue current level of care with hope Lavonne can be successfully extubated in the coming days  - Family is aware that if Lavonne is extubated, she would be at very high risk for re-intubation. They will discuss whether this is something they think Lavonne would want.  - If we are unable to liberate Lavonne from the ventilator, family would consider compassionate extubation    Goals of Care: life-prolonging with limit of no tracheostomy    Advanced Care Planning:  Advance directive: No  POLST: No  Code status: Full  Health care agent/surrogate: Frantz (son) along with Jess (daughter). Lavonne's  recently had a stroke and has limited ability to communicate    Symptom Management:  Not currently managing symptoms    Psychosocial & Spiritual:   Appreciate Palliative SW and Spiritual Health involvement      Assessments          Lavonne is a 68 y/o woman, active smoker, with history of lymphedema, polycythemia, and recent diagnosis of Covid infection, admitted to ICU on 10/8/2022 with acute respiratory failure requiring ventilation and circulatory shock requiring vasopressors. Respiratory failure attributed to CAP vs aspiration pneumonia with small PE. 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    Today, the patient was seen for:  Goals of care    Prognosis, Goals, or Advance Care Planning was addressed today with: Yes.  Mood, coping, and/or meaning in the context of serious illness were addressed today: Yes.              Interval History:     Chart review/discussion with unit or clinical team members:   - Vent day 11, FiO2 40%, PEEP 7  - Weaning off sedation, able to vent wean x 1 hour today    Per patient or family/caregivers today:  Lavonne is sedated and ventilated  "during my visit.    Family meeting held this afternoon from 15:15-16:05  People present: Frantz (son), Jess (daughter), Chela Johnson (Palliative SW), Jessica Richards (Palliative PA)    Frantz and Jess provided update that their father (Lavonne's ) is doing well in [facility] where he is recovering from recent stroke. He still has difficulty communicating and does better with written communication. Jess and Frantz visited him yesterday and shared update on Lavonne's condition. They also brought up the question of tracheostomy, which Lavonne's  made clear Lavonne would not have wanted. Jess and Frantz agree that a tracheostomy would not have been consistent with Lavonne's values of privacy and preference to avoid aggressive medical treatment.     Frantz and Jess also gave helpful background that Lavonne has been declining at home in recent years. She has lost a lot of weight since her mother  2 years ago, but children suspect this has been going on much longer. Recently, Lavonne rarely leaves the house and is unable to walk very far due to weakness and shortness of breath. She had historically avoided most medical care and \"talking about anything negative\" per her children. They also suspect that she has been suffering from depression for some time, and recently seems to have \"given up on life.\" They have tried to get Lavonne to seek treatment but she was not open to talking about it.    I provided a brief medical update including encouraging news this afternoon that Lavonne is tolerating weaning of sedation and vent. The medical team and family remain cautiously hopeful that Lavonne can be successfully extubated. Jess and Frantz are aware that even if she is extubated, Lavonne would be at high risk of re-intubation. Explored whether that is something they think Lavonne would want, they plan to think it over and discuss with their father.    Discussed best and worst case scenarios for Lavonne in the coming days. Og are appreciative of this " information. They plan to arrange for their father to come visit Lavonne this week.    Key Palliative Symptoms:  Unable to assess due to sedation           Review of Systems:     Unable to assess due to sedation          Medications:     I have reviewed this patient's medication profile and medications during this hospitalization.    Noted meds:    Current Facility-Administered Medications   Medication     chlorhexidine (PERIDEX) 0.12 % solution 15 mL     dexmedetomidine (PRECEDEX) 400 mcg in 0.9% sodium chloride 100 mL     dextrose 10% infusion     glucose gel 15-30 g    Or     dextrose 50 % injection 25-50 mL    Or     glucagon injection 1 mg     fentaNYL (SUBLIMAZE) 50 mcg/mL bolus from pump     fentaNYL (SUBLIMAZE) infusion     furosemide (LASIX) injection 20 mg     heparin infusion 25,000 units in D5W 250 mL ANTICOAGULANT     influenza vac high-dose quad (FLUZONE HD) injection KEVIN 0.7 mL     insulin aspart (NovoLOG) injection (RAPID ACTING)     ipratropium - albuterol 0.5 mg/2.5 mg/3 mL (DUONEB) neb solution 3 mL     LubriFresh P.M. OINT     metolazone (ZAROXOLYN) tablet 2.5 mg     naloxone (NARCAN) injection 0.2 mg    Or     naloxone (NARCAN) injection 0.4 mg    Or     naloxone (NARCAN) injection 0.2 mg    Or     naloxone (NARCAN) injection 0.4 mg     norepinephrine (LEVOPHED) 4 mg in  mL infusion PREMIX     pantoprazole (PROTONIX) 2 mg/mL suspension 40 mg    Or     pantoprazole (PROTONIX) IV push injection 40 mg     piperacillin-tazobactam (ZOSYN) 3.375 g vial to attach to  mL bag     polyethylene glycol (MIRALAX) Packet 17 g     propofol (DIPRIVAN) infusion     QUEtiapine (SEROquel) tablet 50 mg     senna-docusate (SENOKOT-S/PERICOLACE) 8.6-50 MG per tablet 1 tablet     thiamine (B-1) tablet 100 mg     vasopressin (VASOSTRICT) 20 Units in sodium chloride 0.9 % 100 mL standard conc infusion                Physical Exam:   Vital Signs: Blood pressure (!) 85/51, pulse 91, temperature 99.1  F (37.3  " C), resp. rate 17, height 1.702 m (5' 7\"), weight 46.4 kg (102 lb 4.7 oz), SpO2 93 %.   GENERAL: cachectic, sedated/ventilated, eyes open but not tracking  SKIN: Warm and dry   HEENT: +temporal muscle wasting  ABDOMINAL: non distended  MUSKL: no gross joint deformities   EXTREMITIES: No edema              Data Reviewed:     Reviewed recent pertinent imaging:   CXR 10/18  IMPRESSION: Endotracheal tube tip 3.7 cm above the izabela. Nasogastric tube tip and the distal sidehole are within the stomach. Right IJ central venous catheter tip low SVC in good position. Underlying lung hyperinflation, severe emphysema and linear opacities radiating from the sara into the periphery of both lungs that could represent bronchial wall thickening, interstitial edema and/or interstitial inflammation. No pneumothorax.    Reviewed recent labs:   CMP  Recent Labs   Lab 10/18/22  0826 10/18/22  0436 10/18/22  0434 10/17/22  2349 10/17/22  0427 10/17/22  0426 10/16/22  0507 10/16/22  0500 10/15/22  0808 10/15/22  0414 10/12/22  1616 10/12/22  1440   NA  --   --  140  --   --  139  --  140  --  137   < > 145   POTASSIUM  --   --  3.8  --   --  4.4  --  4.3  --  3.9   < > 4.5   CHLORIDE  --   --  94*  --   --  99  --  100  --  94*   < >  --    CO2  --   --  35*  --   --  30*  --  34*  --  36*   < >  --    ANIONGAP  --   --  11  --   --  10  --  6*  --  7   < >  --    * 155* 139* 98   < > 155*   < > 112*   < > 137*   < >  --    BUN  --   --  24.1*  --   --  20.2  --  20.1  --  20.4   < >  --    CR  --   --  0.77  --   --  0.59  --  0.59  --  0.49*   < >  --    GFRESTIMATED  --   --  84  --   --  >90  --  >90  --  >90   < >  --    JASON  --   --  9.4  --   --  9.0  --  9.1  --  8.9   < >  --    MAG  --   --  2.2  --   --  2.1  --  2.3  --  2.0   < >  --    PHOS  --   --  4.4  --   --  3.4  --  3.5  --  3.5   < >  --    PROTTOTAL  --   --  6.5  --   --   --   --   --   --   --   --  6.3*   ALBUMIN  --   --  3.0*  --   --   --   --   --   " --   --   --  2.7*   BILITOTAL  --   --  0.7  --   --   --   --   --   --   --   --  0.6   ALKPHOS  --   --  72  --   --   --   --   --   --   --   --  86   AST  --   --  20  --   --   --   --   --   --   --   --  18   ALT  --   --  20  --   --   --   --   --   --   --   --  55*    < > = values in this interval not displayed.     CBC  Recent Labs   Lab 10/18/22  0434 10/17/22  0426 10/16/22  0500 10/15/22  0414   WBC 9.9 8.3 8.9 12.0*   RBC 4.54 4.07 4.07 4.47   HGB 14.1 12.9 12.9 14.9   HCT 42.8 38.8 38.8 42.7   MCV 94 95 95 96   MCH 31.1 31.7 31.7 33.3*   MCHC 32.9 33.2 33.2 34.9   RDW 14.6 15.0 14.8 14.8    240 198 191         Jessica Richards PA-C  [facility], Palliative Care  Dept phone: [phone]  Securely message with the Vocera Web Console

## 2022-10-18 NOTE — PROGRESS NOTES
RT PROGRESS NOTE    VENT DAY# 11    CURRENT SETTINGS:    Vent Mode: PC- PSV  FiO2 (%): 40 %  Resp Rate (Set): 16 breaths/min  PEEP (cm H2O): 7 cmH2O  Inspiratory Pressure (cm H2O) (Drager Blanquita): (S) 18      PATIENT PARAMETERS:  PIP 23  Pplat:  20  SBT:No  Secretions:  Scant white thin  02 Sats:  96%  BS: diminished    ETT SIZE 7.5 Secured at 22 cm at teeth/gums    Respiratory Medications: Duoneb 4x daily       NOTE / SHIFT SUMMARY:   Patient remains on ventilator support. ETT repositioned Q2 for skin integrity. Will titrate FiO2 as patient tolerates.      1416: attempt wean, PS 12/5  1510: wean stopped, >RR    Star Argenis, RT

## 2022-10-18 NOTE — PROGRESS NOTES
Care Management Follow Up    Length of Stay (days): 10    Expected Discharge Date: 10/24/2022     Concerns to be Addressed:       Patient plan of care discussed at interdisciplinary rounds: Yes    Anticipated Discharge Disposition:       Anticipated Discharge Services:    Anticipated Discharge DME:      Patient/family educated on Medicare website which has current facility and service quality ratings:    Education Provided on the Discharge Plan:    Patient/Family in Agreement with the Plan:      Referrals Placed by CM/SW:    Private pay costs discussed: Not applicable    Additional Information:  Discussed patient at ICU rounds. Patient remains intubated, per RN was agitated and uncomfortable at start of shift. Goal for today is wean as able.     Per notes, family meeting scheduled today with palliative at 3:15pm to discuss goals of care.     Discharge needs unknown, patient remains in critical condition and ICU status, CM will continue to follow care progression and aide in discharge planning as needed.     Sammie Goyal RN

## 2022-10-18 NOTE — PROGRESS NOTES
"Palliative Care Social Work Note    Participated in family care conference with pt's son Cory, dtr Jess and Jessica Richards, Palliative PA. Medical update given. See Jessica's notes for details. Og shared how their mom has been declining recently - poor appetite, weakness, shortness of breath and not interested in activities. She has not wanted to talk with her family regarding her health and \"anything negative.\"     Lavonne's  is recovering from a stroke,but his kids informed him of Lavonne's condition. He was clear that she would not want a trache and kids are in agreement. Family also shared that she probably wouldn't want a TCU as she would most likely \"be combative and not want to participate.\" Family is hopeful that she will continue to improve, but also realistic about the seriousness of her illness.     Allowed for time to process their thoughts and feelings. Acknowledged all they are doing to support their parents. They are planning to bring their dad to the hospital this week to see Lavonne.     All questions were answered. Gave Palliative Care phone number and encouraged to call if needs arise.     Provided reflective listening, validation of feelings and emotional support.     Collaborated with bedside RN and Chaplain Carri. PC SW will continue to follow.    Gladis Johnson, [facility]  Palliative Care   Office # [phone]  "

## 2022-10-19 ENCOUNTER — APPOINTMENT (OUTPATIENT)
Dept: CT IMAGING | Facility: HOSPITAL | Age: 67
DRG: 207 | End: 2022-10-19
Attending: INTERNAL MEDICINE
Payer: COMMERCIAL

## 2022-10-19 LAB
ALBUMIN SERPL BCG-MCNC: 3.1 G/DL (ref 3.5–5.2)
ALP SERPL-CCNC: 73 U/L (ref 35–104)
ALT SERPL W P-5'-P-CCNC: 23 U/L (ref 10–35)
ANION GAP SERPL CALCULATED.3IONS-SCNC: 11 MMOL/L (ref 7–15)
AST SERPL W P-5'-P-CCNC: 23 U/L (ref 10–35)
ATRIAL RATE - MUSE: 72 BPM
BASE EXCESS BLDA CALC-SCNC: 18.2 MMOL/L
BASOPHILS # BLD AUTO: 0.1 10E3/UL (ref 0–0.2)
BASOPHILS NFR BLD AUTO: 0 %
BILIRUB DIRECT SERPL-MCNC: 0.28 MG/DL (ref 0–0.3)
BILIRUB SERPL-MCNC: 0.7 MG/DL
BUN SERPL-MCNC: 27.1 MG/DL (ref 8–23)
C DIFF TOX B STL QL: NEGATIVE
CALCIUM SERPL-MCNC: 9.4 MG/DL (ref 8.8–10.2)
CHLORIDE SERPL-SCNC: 87 MMOL/L (ref 98–107)
COHGB MFR BLD: 98.3 % (ref 95–96)
CREAT SERPL-MCNC: 0.8 MG/DL (ref 0.51–0.95)
CRP SERPL-MCNC: 96.1 MG/L
DEPRECATED HCO3 PLAS-SCNC: 38 MMOL/L (ref 22–29)
DIASTOLIC BLOOD PRESSURE - MUSE: NORMAL MMHG
EOSINOPHIL # BLD AUTO: 0.1 10E3/UL (ref 0–0.7)
EOSINOPHIL NFR BLD AUTO: 1 %
ERYTHROCYTE [DISTWIDTH] IN BLOOD BY AUTOMATED COUNT: 14.5 % (ref 10–15)
GFR SERPL CREATININE-BSD FRML MDRD: 80 ML/MIN/1.73M2
GLUCOSE BLDC GLUCOMTR-MCNC: 105 MG/DL (ref 70–99)
GLUCOSE BLDC GLUCOMTR-MCNC: 129 MG/DL (ref 70–99)
GLUCOSE BLDC GLUCOMTR-MCNC: 143 MG/DL (ref 70–99)
GLUCOSE BLDC GLUCOMTR-MCNC: 154 MG/DL (ref 70–99)
GLUCOSE SERPL-MCNC: 105 MG/DL (ref 70–99)
HCO3 BLD-SCNC: 39 MMOL/L (ref 23–29)
HCT VFR BLD AUTO: 44.9 % (ref 35–47)
HGB BLD-MCNC: 14.8 G/DL (ref 11.7–15.7)
IMM GRANULOCYTES # BLD: 0.1 10E3/UL
IMM GRANULOCYTES NFR BLD: 1 %
INTERPRETATION ECG - MUSE: NORMAL
LACTATE SERPL-SCNC: 1.1 MMOL/L (ref 0.7–2)
LIPASE SERPL-CCNC: 14 U/L (ref 13–60)
LYMPHOCYTES # BLD AUTO: 1.3 10E3/UL (ref 0.8–5.3)
LYMPHOCYTES NFR BLD AUTO: 10 %
MAGNESIUM SERPL-MCNC: 2 MG/DL (ref 1.7–2.3)
MCH RBC QN AUTO: 31.1 PG (ref 26.5–33)
MCHC RBC AUTO-ENTMCNC: 33 G/DL (ref 31.5–36.5)
MCV RBC AUTO: 94 FL (ref 78–100)
MONOCYTES # BLD AUTO: 1.1 10E3/UL (ref 0–1.3)
MONOCYTES NFR BLD AUTO: 9 %
NEUTROPHILS # BLD AUTO: 10.6 10E3/UL (ref 1.6–8.3)
NEUTROPHILS NFR BLD AUTO: 79 %
NRBC # BLD AUTO: 0 10E3/UL
NRBC BLD AUTO-RTO: 0 /100
O2/TOTAL GAS SETTING VFR VENT: 0.7 %
OXYHGB MFR BLD: 96.8 % (ref 95–96)
P AXIS - MUSE: 65 DEGREES
PCO2 BLD: 57 MM HG (ref 35–45)
PEEP: 7 CM H2O
PH BLD: 7.48 [PH] (ref 7.37–7.44)
PHOSPHATE SERPL-MCNC: 5.4 MG/DL (ref 2.5–4.5)
PLATELET # BLD AUTO: 344 10E3/UL (ref 150–450)
PO2 BLD: 104 MM HG (ref 75–85)
POTASSIUM SERPL-SCNC: 3.7 MMOL/L (ref 3.4–5.3)
PR INTERVAL - MUSE: 122 MS
PROT SERPL-MCNC: 6.8 G/DL (ref 6.4–8.3)
QRS DURATION - MUSE: 104 MS
QT - MUSE: 458 MS
QTC - MUSE: 501 MS
R AXIS - MUSE: 87 DEGREES
RATE: 22 RR/MIN
RBC # BLD AUTO: 4.76 10E6/UL (ref 3.8–5.2)
SODIUM SERPL-SCNC: 136 MMOL/L (ref 136–145)
SYSTOLIC BLOOD PRESSURE - MUSE: NORMAL MMHG
T AXIS - MUSE: 41 DEGREES
TEMPERATURE: 37 DEGREES C
UFH PPP CHRO-ACNC: 0.51 IU/ML
VENTILATION MODE: ABNORMAL
VENTRICULAR RATE- MUSE: 72 BPM
WBC # BLD AUTO: 13.1 10E3/UL (ref 4–11)

## 2022-10-19 PROCEDURE — 250N000009 HC RX 250: Performed by: INTERNAL MEDICINE

## 2022-10-19 PROCEDURE — 82248 BILIRUBIN DIRECT: CPT | Performed by: INTERNAL MEDICINE

## 2022-10-19 PROCEDURE — 84100 ASSAY OF PHOSPHORUS: CPT | Performed by: INTERNAL MEDICINE

## 2022-10-19 PROCEDURE — 83605 ASSAY OF LACTIC ACID: CPT | Performed by: INTERNAL MEDICINE

## 2022-10-19 PROCEDURE — 80053 COMPREHEN METABOLIC PANEL: CPT | Performed by: INTERNAL MEDICINE

## 2022-10-19 PROCEDURE — C9113 INJ PANTOPRAZOLE SODIUM, VIA: HCPCS | Performed by: INTERNAL MEDICINE

## 2022-10-19 PROCEDURE — 83690 ASSAY OF LIPASE: CPT | Performed by: INTERNAL MEDICINE

## 2022-10-19 PROCEDURE — 94640 AIRWAY INHALATION TREATMENT: CPT

## 2022-10-19 PROCEDURE — 999N000157 HC STATISTIC RCP TIME EA 10 MIN

## 2022-10-19 PROCEDURE — 93005 ELECTROCARDIOGRAM TRACING: CPT

## 2022-10-19 PROCEDURE — 82805 BLOOD GASES W/O2 SATURATION: CPT | Performed by: INTERNAL MEDICINE

## 2022-10-19 PROCEDURE — 74177 CT ABD & PELVIS W/CONTRAST: CPT

## 2022-10-19 PROCEDURE — 250N000011 HC RX IP 250 OP 636: Performed by: INTERNAL MEDICINE

## 2022-10-19 PROCEDURE — 93005 ELECTROCARDIOGRAM TRACING: CPT | Performed by: INTERNAL MEDICINE

## 2022-10-19 PROCEDURE — 999N000009 HC STATISTIC AIRWAY CARE

## 2022-10-19 PROCEDURE — 93010 ELECTROCARDIOGRAM REPORT: CPT | Performed by: INTERNAL MEDICINE

## 2022-10-19 PROCEDURE — 94640 AIRWAY INHALATION TREATMENT: CPT | Mod: 76

## 2022-10-19 PROCEDURE — 87040 BLOOD CULTURE FOR BACTERIA: CPT | Performed by: INTERNAL MEDICINE

## 2022-10-19 PROCEDURE — 87493 C DIFF AMPLIFIED PROBE: CPT | Performed by: INTERNAL MEDICINE

## 2022-10-19 PROCEDURE — 258N000003 HC RX IP 258 OP 636: Performed by: INTERNAL MEDICINE

## 2022-10-19 PROCEDURE — 85014 HEMATOCRIT: CPT | Performed by: INTERNAL MEDICINE

## 2022-10-19 PROCEDURE — 85520 HEPARIN ASSAY: CPT | Performed by: INTERNAL MEDICINE

## 2022-10-19 PROCEDURE — 83735 ASSAY OF MAGNESIUM: CPT | Performed by: INTERNAL MEDICINE

## 2022-10-19 PROCEDURE — 250N000013 HC RX MED GY IP 250 OP 250 PS 637: Performed by: INTERNAL MEDICINE

## 2022-10-19 PROCEDURE — 99291 CRITICAL CARE FIRST HOUR: CPT | Performed by: INTERNAL MEDICINE

## 2022-10-19 PROCEDURE — 99233 SBSQ HOSP IP/OBS HIGH 50: CPT | Performed by: PHYSICIAN ASSISTANT

## 2022-10-19 PROCEDURE — 200N000001 HC R&B ICU

## 2022-10-19 PROCEDURE — 250N000011 HC RX IP 250 OP 636: Performed by: NURSE PRACTITIONER

## 2022-10-19 PROCEDURE — 94003 VENT MGMT INPAT SUBQ DAY: CPT

## 2022-10-19 PROCEDURE — 86140 C-REACTIVE PROTEIN: CPT | Performed by: INTERNAL MEDICINE

## 2022-10-19 RX ORDER — MAGNESIUM SULFATE HEPTAHYDRATE 40 MG/ML
2 INJECTION, SOLUTION INTRAVENOUS ONCE
Status: COMPLETED | OUTPATIENT
Start: 2022-10-19 | End: 2022-10-19

## 2022-10-19 RX ORDER — METRONIDAZOLE 500 MG/100ML
500 INJECTION, SOLUTION INTRAVENOUS EVERY 12 HOURS
Status: DISCONTINUED | OUTPATIENT
Start: 2022-10-19 | End: 2022-10-20

## 2022-10-19 RX ORDER — IOPAMIDOL 755 MG/ML
100 INJECTION, SOLUTION INTRAVASCULAR ONCE
Status: COMPLETED | OUTPATIENT
Start: 2022-10-19 | End: 2022-10-19

## 2022-10-19 RX ADMIN — Medication 50 MCG: at 22:00

## 2022-10-19 RX ADMIN — INSULIN ASPART 1 UNITS: 100 INJECTION, SOLUTION INTRAVENOUS; SUBCUTANEOUS at 12:59

## 2022-10-19 RX ADMIN — VASOPRESSIN 2.4 UNITS/HR: 20 INJECTION INTRAVENOUS at 13:38

## 2022-10-19 RX ADMIN — Medication 50 MCG: at 21:00

## 2022-10-19 RX ADMIN — Medication 50 MCG: at 03:31

## 2022-10-19 RX ADMIN — Medication 50 MCG: at 23:00

## 2022-10-19 RX ADMIN — IPRATROPIUM BROMIDE AND ALBUTEROL SULFATE 3 ML: 2.5; .5 SOLUTION RESPIRATORY (INHALATION) at 12:09

## 2022-10-19 RX ADMIN — PANTOPRAZOLE SODIUM 40 MG: 40 INJECTION, POWDER, FOR SOLUTION INTRAVENOUS at 05:51

## 2022-10-19 RX ADMIN — FUROSEMIDE 20 MG: 10 INJECTION, SOLUTION INTRAMUSCULAR; INTRAVENOUS at 05:48

## 2022-10-19 RX ADMIN — METRONIDAZOLE 500 MG: 500 INJECTION, SOLUTION INTRAVENOUS at 14:27

## 2022-10-19 RX ADMIN — VASOPRESSIN 2.4 UNITS/HR: 20 INJECTION INTRAVENOUS at 20:26

## 2022-10-19 RX ADMIN — Medication 0.1 MCG/KG/MIN: at 03:37

## 2022-10-19 RX ADMIN — INSULIN ASPART 1 UNITS: 100 INJECTION, SOLUTION INTRAVENOUS; SUBCUTANEOUS at 21:08

## 2022-10-19 RX ADMIN — MAGNESIUM SULFATE HEPTAHYDRATE 2 G: 40 INJECTION, SOLUTION INTRAVENOUS at 06:45

## 2022-10-19 RX ADMIN — HEPARIN SODIUM AND DEXTROSE 1500 UNITS/HR: 10000; 5 INJECTION INTRAVENOUS at 13:37

## 2022-10-19 RX ADMIN — PROPOFOL 25 MCG/KG/MIN: 10 INJECTION, EMULSION INTRAVENOUS at 09:15

## 2022-10-19 RX ADMIN — INSULIN ASPART 1 UNITS: 100 INJECTION, SOLUTION INTRAVENOUS; SUBCUTANEOUS at 15:50

## 2022-10-19 RX ADMIN — CEFTRIAXONE SODIUM 1 G: 1 INJECTION, POWDER, FOR SOLUTION INTRAMUSCULAR; INTRAVENOUS at 17:25

## 2022-10-19 RX ADMIN — VASOPRESSIN 2.4 UNITS/HR: 20 INJECTION INTRAVENOUS at 06:44

## 2022-10-19 RX ADMIN — CHLORHEXIDINE GLUCONATE 15 ML: 1.2 SOLUTION ORAL at 08:25

## 2022-10-19 RX ADMIN — IPRATROPIUM BROMIDE AND ALBUTEROL SULFATE 3 ML: 2.5; .5 SOLUTION RESPIRATORY (INHALATION) at 15:27

## 2022-10-19 RX ADMIN — DEXMEDETOMIDINE HYDROCHLORIDE 1.1 MCG/KG/HR: 400 INJECTION INTRAVENOUS at 03:36

## 2022-10-19 RX ADMIN — IPRATROPIUM BROMIDE AND ALBUTEROL SULFATE 3 ML: 2.5; .5 SOLUTION RESPIRATORY (INHALATION) at 07:56

## 2022-10-19 RX ADMIN — IOPAMIDOL 100 ML: 755 INJECTION, SOLUTION INTRAVENOUS at 09:42

## 2022-10-19 RX ADMIN — IPRATROPIUM BROMIDE AND ALBUTEROL SULFATE 3 ML: 2.5; .5 SOLUTION RESPIRATORY (INHALATION) at 19:31

## 2022-10-19 RX ADMIN — CHLORHEXIDINE GLUCONATE 15 ML: 1.2 SOLUTION ORAL at 21:08

## 2022-10-19 RX ADMIN — FUROSEMIDE 20 MG: 10 INJECTION, SOLUTION INTRAMUSCULAR; INTRAVENOUS at 17:44

## 2022-10-19 RX ADMIN — DEXMEDETOMIDINE HYDROCHLORIDE 1.2 MCG/KG/HR: 400 INJECTION INTRAVENOUS at 14:20

## 2022-10-19 ASSESSMENT — ACTIVITIES OF DAILY LIVING (ADL)
ADLS_ACUITY_SCORE: 30

## 2022-10-19 NOTE — PLAN OF CARE
Goal Outcome Evaluation:      Plan of Care Reviewed With: patient, spouse, child    Overall Patient Progress: no changeOverall Patient Progress: no change    Outcome Evaluation: Palliative called in family so they could spend some time with her    [facility] - ICU    RN Progress Note:            Pertinent Assessments:      Please refer to flowsheet rows for full assessment     Pt had loose stools and rectal tube was placed. Abdomen very distended. Pt was sent for CT of abd. Questionable ileus and infarcted spleen were reported.          Mobility Level:     1 Bedrest and turn every 2 hours         Key Events - This Shift:     [facility] SAT / SBT Record: Delete if N/A    SAT Safety Screen Not Applicable   If FAILED why?    SAT Performed PASSED   If FAILED why?    SBT Safety Screen FAILED   If FAILED why?       BP labile, O2 needs went up              Plan:     Continue current cares. Hold tube feeding. Titrate pressors to MAP >65.          Point of Contact Update YES-OR-NO: Yes  If No, reason:   Name:Jermaine spouse and children at bedside  Phone Number:  Summary of Conversation: Family came and expressed that pt would not want a trach.

## 2022-10-19 NOTE — PLAN OF CARE
Problem: Plan of Care - These are the overarching goals to be used throughout the patient stay.    Goal: Absence of Hospital-Acquired Illness or Injury  Intervention: Identify and Manage Fall Risk  Recent Flowsheet Documentation  Taken 10/18/2022 2000 by John Gonzalez RN  Safety Promotion/Fall Prevention:   fall prevention program maintained   lighting adjusted  Intervention: Prevent Skin Injury  Recent Flowsheet Documentation  Taken 10/18/2022 2000 by John Gonzalez RN  Body Position:   left   right   turned  Intervention: Prevent Infection  Recent Flowsheet Documentation  Taken 10/18/2022 2000 by John Gonzalez RN  Infection Prevention: environmental surveillance performed     Problem: Risk for Delirium  Goal: Improved Attention and Thought Clarity  Intervention: Maximize Cognitive Function  Recent Flowsheet Documentation  Taken 10/18/2022 2000 by John Gonzalez RN  Sensory Stimulation Regulation:   lighting decreased   visual stimulation minimized  Reorientation Measures: clock in view     Problem: Restraint, Nonbehavioral (Nonviolent)  Goal: Absence of Harm or Injury  Intervention: Implement Least Restrictive Safety Strategies  Recent Flowsheet Documentation  Taken 10/18/2022 2000 by John Gonzalez RN  Medical Device Protection: IV pole/bag removed from visual field  Intervention: Protect Skin and Joint Integrity  Recent Flowsheet Documentation  Taken 10/18/2022 2000 by John Gonzalez RN  Body Position:   left   right   turned  Range of Motion: ROM (range of motion) performed     Problem: COPD (Chronic Obstructive Pulmonary Disease) Comorbidity  Goal: Maintenance of COPD Symptom Control  Intervention: Maintain COPD-Symptom Control  Recent Flowsheet Documentation  Taken 10/18/2022 2000 by John Gonzalez RN  Medication Review/Management: medications reviewed     Problem: Heart Failure Comorbidity  Goal: Maintenance of Heart Failure Symptom Control  Intervention: Maintain Heart  Failure Management  Recent Flowsheet Documentation  Taken 10/18/2022 2000 by John Gonzalez RN  Medication Review/Management: medications reviewed     Problem: Gas Exchange Impaired  Goal: Optimal Gas Exchange  Intervention: Optimize Oxygenation and Ventilation  Recent Flowsheet Documentation  Taken 10/18/2022 2000 by John Gonzalez RN  Head of Bed (HOB) Positioning: HOB at 30-45 degrees     Problem: Mechanical Ventilation Invasive  Goal: Optimal Device Function  Intervention: Optimize Device Care and Function  Recent Flowsheet Documentation  Taken 10/18/2022 2000 by John Gonzalez RN  Airway Safety Measures:   all equipment/monitors on and audible   suction at bedside   suction equipment   suction regulator  Oral Care:   lip/mouth moisturizer applied   suction provided  Goal: Mechanical Ventilation Liberation  Intervention: Promote Extubation and Mechanical Ventilation Liberation  Recent Flowsheet Documentation  Taken 10/18/2022 2000 by John Gonzalez RN  Medication Review/Management: medications reviewed  Goal: Absence of Ventilator-Induced Lung Injury  Intervention: Prevent Ventilator-Associated Pneumonia  Recent Flowsheet Documentation  Taken 10/18/2022 2000 by John Gonzalez RN  Oral Care:   lip/mouth moisturizer applied   suction provided  Head of Bed (HOB) Positioning: HOB at 30-45 degrees     Problem: Enteral Nutrition  Goal: Absence of Aspiration Signs and Symptoms  Intervention: Minimize Aspiration Risk  Recent Flowsheet Documentation  Taken 10/18/2022 2000 by John Gonzalez RN  Oral Care:   lip/mouth moisturizer applied   suction provided  Head of Bed (HOB) Positioning: HOB at 30-45 degrees   Goal Outcome Evaluation:      Pt intubated and sedated. Sedation titrated as ordered to keep pt's RASS goal 0/-1. Continue to hold Tube feeding as ordered due to distended/firm abdomin and an episode of emesis.  Levo. Drip titrated as ordered to keep MAP>60 see MAR for details. Will  continue to monitor pt's status and update MD as needed.

## 2022-10-19 NOTE — PROGRESS NOTES
PULMONARY / CRITICAL CARE PROGRESS NOTE    Date / Time of Admission:  10/8/2022  9:38 AM    Assessment:     Lavonne Talbot is a 67 year old cachectic female with history of severe emphysema, tobacco user.  Recent diagnosis of COVID-19 viral infection 1 week ago who presented to [facility] ED on 10/8/2022 due to respiratory distress now admitted to the ICU with acute respiratory failure requiring mechanical ventilation. Patient was treated for H influenza pneumonia and pulmonary embolism. Echocardiogram showed reduced EF 30-35%, per cardiology stress induced cardiomyopathy.   N/V/abdominal distention. CT scan showed dilated bowel loops.     1. Acute respiratory failure s/p intubation 10/8/22  Increase O2 requirements after episode of N/V.   CT scan showed LLL infiltrate and   2. H influenza pneumonia  3. Pulmonary embolism   4. COVID19 viral infection diagnosed on 10/8/22  5. HFrEF  6. COPD , severe emphysematous changes   7. Ileus / Enterocolitis   LFTs and lipase are within normal limits. CT scan showed no bowel obstruction.   NPO for now. Supplement electrolytes. Repeat blood cultures. Check C diff in stool. Check lactic acid.   Add flagyl to ceftriaxone   8. Splenic infarct   Unclear etiology.   Radiology suggested paradoxical emboli related to PFO. Patient is currently anticoagulated for PE.   Will get blood cultures.   9. Malnutrition Body mass index is 16.02 kg/m .   10. Ongoing tobacco use    Advance Directives: Full code    Plan:   1. Titrate vent settings, PCV RR16 InsPr 22 PEEP 8 FiO2 45%  2. Sedation to keep RASS 0 to -1, precedex, fentanyl drip, wean off propofol  3. Schedule bronchodilators  4. Heplock IV fluids   5. Titrate pressors to keep MAP > 60  6. Lasix IV  7. Follow up tracheal culture  8. Follow up blood cultures   9. C diff in stool   10. Abx, add flagyl to ceftriaxone   11. Check lactic acid  12. Monitor kidney function , supplement electrolytes as needed  13. NPO  14. PPI for GI  prophylaxis   15. Glucose level monitoring  16. DVT prophylaxis heparin drip, discontinue xarelto in view of NPO status   17. Palliative care is following , ongoing discussion about goals of care.   Family expressed that patient will not want a tracheostomy tube placement.     Please contact me if you have any questions.  Total critical care time, not including separately billable procedure time: 45 minutes  This patient had a high probability of imminent or life threatening deterioration due to acute respiratory failure which required my direct attention, intervention and personal management.     Ferdi Milan  Pulmonary / Critical Care  10/19/2022  12:13 PM          ICU DAILY CHECKLIST                           Can patient transfer out of MICU? no    FAST HUG:    Feeding:  Feeding: NO Patient is receiving NPO  Monaco: yes  Analgesia/Sedation:yes  Precedex, fentanyl, propofol  Thromboembolic prophylaxis: Yes; Mode:  Heparin  HOB>30:  Yes  Stress Ulcer Protocol Active: Yes; Mode: PPI  Glycemic Control: Any glucose > 180 no; Mode of Insulin Therapy: Sliding Scale Insulin    INTUBATED:  Can patient have daily waking:  Yes  Can patient have spontaneous breathing trial:  Yes    Restraints? yes    PHYSICAL THERAPY AND MOBILITY:  Can patient have PT and mobility trial: no  Activity: bedrest    Subjective:   HPI:  Lavonne Talbot is a 67 year old cachectic female with history of severe emphysema, tobacco user.  Recent diagnosis of COVID-19 viral infection 1 week ago who presented to [facility] ED on 10/8/2022 due to respiratory distress now admitted to the ICU with acute respiratory failure requiring mechanical ventilation. Patient was treated for H influenza pneumonia and pulmonary embolism. Echocardiogram showed reduced EF 30-35%, per cardiology stress induced cardiomyopathy.     Events overnight   - Nausea and vomiting, tube feedings were held  - Increase FiO2 requirements  - Low dose NE drip   -  "Abdomen CT scan was ordered, dilated bowel loops    Allergies: Patient has no known allergies.     MEDS:  Current Facility-Administered Medications   Medication     cefTRIAXone (ROCEPHIN) 1 g vial to attach to  mL bag for ADULTS or NS 50 mL bag for PEDS     chlorhexidine (PERIDEX) 0.12 % solution 15 mL     dexmedetomidine (PRECEDEX) 400 mcg in 0.9% sodium chloride 100 mL     dextrose 10% infusion     glucose gel 15-30 g    Or     dextrose 50 % injection 25-50 mL    Or     glucagon injection 1 mg     fentaNYL (SUBLIMAZE) 50 mcg/mL bolus from pump     fentaNYL (SUBLIMAZE) infusion     furosemide (LASIX) injection 20 mg     heparin infusion 25,000 units in D5W 250 mL ANTICOAGULANT     influenza vac high-dose quad (FLUZONE HD) injection KEVIN 0.7 mL     insulin aspart (NovoLOG) injection (RAPID ACTING)     ipratropium - albuterol 0.5 mg/2.5 mg/3 mL (DUONEB) neb solution 3 mL     lactulose (CHRONULAC) solution 20 g     LubriFresh P.M. OINT     metolazone (ZAROXOLYN) tablet 2.5 mg     naloxone (NARCAN) injection 0.2 mg    Or     naloxone (NARCAN) injection 0.4 mg    Or     naloxone (NARCAN) injection 0.2 mg    Or     naloxone (NARCAN) injection 0.4 mg     norepinephrine (LEVOPHED) 4 mg in  mL infusion PREMIX     ondansetron (ZOFRAN) injection 4 mg     pantoprazole (PROTONIX) 2 mg/mL suspension 40 mg    Or     pantoprazole (PROTONIX) IV push injection 40 mg     polyethylene glycol (MIRALAX) Packet 17 g     polyethylene glycol solution 250 mL     propofol (DIPRIVAN) infusion     QUEtiapine (SEROquel) tablet 25 mg     senna-docusate (SENOKOT-S/PERICOLACE) 8.6-50 MG per tablet 1 tablet     thiamine (B-1) tablet 100 mg     vasopressin (VASOSTRICT) 20 Units in sodium chloride 0.9 % 100 mL standard conc infusion     Objective:   VITALS:  /70   Pulse 72   Temp 98.4  F (36.9  C)   Resp 16   Ht 1.702 m (5' 7\")   Wt 46.4 kg (102 lb 4.7 oz)   SpO2 (!) 86%   BMI 16.02 kg/m    VENT:  Vent Mode: Other (see " comments) (PC- PSV)  FiO2 (%): 70 %  Resp Rate (Set): 16 breaths/min  PEEP (cm H2O): 7 cmH2O  Pressure Support (cm H2O): (S) 12 cmH2O  Inspiratory Pressure (cm H2O) (Drager Blanquita): 22  Resp: 16    EXAM:   Gen: sedated, arousable, vented  HEENT: pale conjunctiva, moist mucosa  Neck: no thyromegaly, masses or JVD  Lungs: decrease breath sound both HT, discrete ronchi both HT  CV: regular, no murmurs or gallops appreciated  Abdomen: distended, NT, BS present with metallic sound   Ext: trace edema LEs  Neuro: sedated, arousable, non focal     Data Review:  Recent Labs   Lab 10/19/22  0809 10/19/22  0426 10/19/22  0424 10/18/22  2351 10/18/22  2024 10/18/22  1618   * 105* 105* 135* 138* 135*     Tracheal Culture 1+ Normal margaret       2+ Haemophilus influenzae Abnormal           10/19/22 04:26 10/19/22 08:09 10/19/22 09:21   Sodium 136     Potassium 3.7     Chloride 87 (L)     Carbon Dioxide (CO2) 38 (H)     Urea Nitrogen 27.1 (H)     Creatinine 0.80     GFR Estimate 80     Calcium 9.4     Anion Gap 11     Magnesium 2.0     Phosphorus 5.4 (H)     Albumin 3.1 (L)     Protein Total 6.8     Alkaline Phosphatase 73     ALT 23     AST 23     Bilirubin Direct 0.28     Bilirubin Total 0.7     CRP Inflammation 96.10 (H)     Glucose 105 (H)     Lipase 14     GLUCOSE BY METER POCT  129 (H)    pH Arterial   7.48 (H)   pCO2 Arterial   57 (H)   PO2 Arterial   104 (H)   Bicarbonate Arterial   39 (H)   Base Excess Art   18.2   FIO2   0.7   Oxyhemoglobin   96.8 (H)     CT ABDOMEN PELVIS W CONTRAST  DATE/TIME: 10/19/2022 9:42 AM  INDICATION: Distended abdomen, nausea and vomiting.  COMPARISON: CTAs chest 10/13/2022 and 10/8/2022 and abdominal radiograph 10/08/2022.  FINDINGS:   LOWER CHEST: Focal consolidation posterior basilar segment left lower lobe could represent evolving pneumonia and/or infarct. Extensive mucosal thickening and endoluminal secretions throughout the visualized bronchi and peribronchial opacities consistent  with acute bronchial inflammation and areas of peribronchial pneumonitis similar. Lungs are hyperinflated with emphysematous change. Visualized lungs are clear. No pleural effusion. Heart size normal with no pericardial effusion.   HEPATOBILIARY: Liver is normal.  No calcified gallstones.Mild bile duct dilatation to the level of the ampulla with no radiodense stone or mass probably normal variant.   PANCREAS: Possible pancreatic divisum, pancreas otherwise normal.  SPLEEN: Wedge-shaped perfusion defect lateral aspect mid to upper spleen is most likely an acute infarct with posttraumatic contusion possible in the appropriate clinical setting. Spleen size normal.  ADRENAL GLANDS: Normal.  KIDNEY/BLADDER: Monaco catheter in the bladder. Several diminutive benign renal cysts. No follow up is needed. Kidneys, ureters and bladder are otherwise normal.  BOWEL: Well-positioned nasogastric tube with complete decompression of the stomach, duodenum and proximal half of the jejunum. Moderate to marked fluid and gas distention of the distal jejunum, ileum, colon and rectum with no transition point or obstructing mass. Incidental probable 1 cm polyp distal transverse colon (axial image 92 of series 2). Trace abdominal and pelvic ascites and generalized mesenteric edema.  LYMPH NODES: No lymphadenopathy.  VASCULATURE: Normal caliber abdominal aorta with patent mesenteric and renal arteries.    PELVIC ORGANS: Bilateral adnexal enlargement measuring up to 5 cm on the right and 3 cm on the left. Recommend pelvic ultrasound.  MUSCULOSKELETAL: Bones are demineralized. Generalized subcutaneous edema.  IMPRESSION:   1.  Moderate to marked fluid and gas distention of the distal jejunum, ileum, colon and rectum with no transition point or obstructing mass compatible with severe adynamic ileus and/or a nonspecific enterocolitis. No free air or pneumatosis.  2.  Wedge-shaped perfusion defect lateral aspect mid to upper spleen is most likely  an acute infarct with posttraumatic contusion possible in the appropriate clinical setting. Etiology unclear but in the setting of recently diagnosed pulmonary emboli,   paradoxical emboli related to PFO should be considered.  3.  Trace abdominal and pelvic ascites and generalized mesenteric and subcutaneous edema.  4.  Bilateral adnexal enlargement measuring up to 5 cm on the right and 3 cm on the left. Recommend pelvic ultrasound.  5.  Incidental probable 1 cm polyp distal transverse colon.    Echocardiogram 10/9/2022  Interpretation Summary   1. Left ventricular size and wall thickness are normal. Systolic function is moderately reduced with global hypokinesis. The estimated left ventricular ejection fraction is 30-35%.  2. Right ventricle is not well visualized. Limited views suggest possible enlargement and reduced systolic function.  3. No hemodynamically significant valvular abnormalities.  4. No prior study available for comparison.    CT CHEST PULMONARY EMBOLISM W CONTRAST  LOCATION: [facility]  DATE/TIME: 10/13/2022 1:03 PM  INDICATION: Acute respiratory decompensation worsening hypoxemia  COMPARISON: CT pulmonary angiogram 10/8/2022  FINDINGS:  ANGIOGRAM CHEST: The main pulmonary artery remains normal in size. The central pulmonary arteries remain patent. A subsegmental pulmonary embolism in the left lower lobe superior segment 10/8/2022 has resolved (series 4, image 181). However, there is a   new low-attenuation filling defect within proximal subsegmental level pulmonary arteries in the right lower lobe (series 4, image 236) and a peripheral subsegmental filling defect in the posterior basal left lower lobe (series 4, image 276). Normal caliber thoracic aorta. Unchanged mild arch, proximal great vessel and descending aorta atheromatous calcifications. No acute aortic syndrome. No CT evidence of right heart strain.  LUNGS AND PLEURA: Endotracheal tube is in satisfactory position.  Central airway wall thickening is unchanged. Debris within the segmental and proximal subsegmental lower lobe airways is slightly improved from 5 days earlier, however peripheral subsegmental airway debris in bilateral lower lobe airways which produce tree-in-bud opacity and all segments of the left lower lobe and the basilar segments of the right lower lobe have developed.. The extent of consolidation in the posterior basal left lower but is mildly increased from 5 days earlier, while inflammation and atelectasis in the left lateral costophrenic sulcus has decreased.. No pleural effusion is present.  MEDIASTINUM: Cardiac chambers remain normal in size. There is no pericardial effusion. No lymphadenopathy. Gastric tube courses through the decompressed esophagus terminating in the stomach body. Right midline catheter terminates in the lateral right subclavian vein and right jugular approach central venous catheter terminates in the SVC.  CORONARY ARTERY CALCIFICATION: Mild.  UPPER ABDOMEN: No new findings in the imaged upper abdomen.  MUSCULOSKELETAL: Generalized demineralization of the bones. There is subtle superior endplate deformity of L1. There is a paucity of subcutaneous fat. No new bone or chest wall soft tissue abnormalities.                                          IMPRESSION:  1.  Subsegmental pulmonary embolism in the left lower lobe 5 days earlier has resolved. 2 new subsegmental pulmonary emboli are present in the lower lobes as described.  2.  Mild worsening of focal consolidation in the posterior basal left lower lobe and development of peripheral airway debris/tree-in-bud opacity in both lower lobes consistent with bronchopneumonia/cellular bronchiolitis.  3.  Advanced emphysema.    By:  Fredi Milan MD, 10/19/2022  12:13 PM    Primary Care Physician:  Cinthya Cabral

## 2022-10-19 NOTE — PROGRESS NOTES
Care Management Follow Up    Length of Stay (days): 11    Expected Discharge Date: 10/26/2022     Concerns to be Addressed: all concerns addressed in this encounter     Patient plan of care discussed at interdisciplinary rounds: Yes    Anticipated Discharge Disposition:       Anticipated Discharge Services:    Anticipated Discharge DME:      Patient/family educated on Medicare website which has current facility and service quality ratings:    Education Provided on the Discharge Plan:    Patient/Family in Agreement with the Plan:      Referrals Placed by CM/SW:    Private pay costs discussed: Not applicable    Additional Information:  Discussed patient at ICU rounds. Patient remains intubated, patient has eventful night.  Patient had emesis overnight, increased oxygen needs, decreased blood pressures.  Plan for CT today. Today is day 12 on vent. Per family care conference they have stated no trach.      Discharge needs unknown, CM will continue to follow care progression and aide in discharge planning as needed.     Sammie Goyal RN

## 2022-10-19 NOTE — PROGRESS NOTES
"RT PROGRESS NOTE    VENT DAY# 12    CURRENT SETTINGS:   Vent Mode: Other (see comments) (PS-PCV)  FiO2 (%): 40 %  Resp Rate (Set): 16 breaths/min  PEEP (cm H2O): 5 cmH2O  Pressure Support (cm H2O): (S) 12 cmH2O  Inspiratory Pressure (cm H2O) (Drager Blanquita): (S) 22  Resp: 16      PATIENT PARAMETERS:  PIP 18-23  Pplat:  14-17  Pmean:  8.8-10  Compliance: 19-30    ETT SIZE 7.5 Secured at 22 cm at teeth/gums    Pt's inspiratory pressure increased to 22 due to decreasing tidal volumes throughout the night.     /70   Pulse 93   Temp 99.9  F (37.7  C)   Resp 16   Ht 1.702 m (5' 7\")   Wt 46.4 kg (102 lb 4.7 oz)   SpO2 (!) 84%   BMI 16.02 kg/m          Silvana Cerda, RT  "

## 2022-10-19 NOTE — PROGRESS NOTES
[facility]  Palliative Care Daily Progress Note       Recommendations & Counseling     Lavonne is unfortunately doing worse today, requiring increased respiratory support and found to have severe ileus and splenic infarct. Provided medical update to family and encouraged them to bring Lavonne's  in to visit later today or tomorrow.    Family meeting held 10/18. Key takeaways:  - All family members in agreement that tracheostomy would not be in line with Lavonne's values  - Plan to continue current level of care with hope Lavonne can be successfully extubated in the coming days  - Family is aware that if Lavonne is extubated, she would be at very high risk for re-intubation. They will discuss whether this is something they think Lavonne would want.  - If we are unable to liberate Lavonne from the ventilator, family would consider compassionate extubation    Goals of Care: life-prolonging with limit of no tracheostomy    Advanced Care Planning:  Advance directive: No  POLST: No  Code status: Full  Health care agent/surrogate: Frantz (son) along with Jess (daughter). Lavonne's  recently had a stroke and has limited ability to communicate    Symptom Management:  Not currently managing symptoms    Psychosocial & Spiritual:   Appreciate Palliative SW and Spiritual Health involvement      Assessments          Lavonne is a 66 y/o woman, active smoker, with history of lymphedema, polycythemia, and recent diagnosis of Covid infection, admitted to ICU on 10/8/2022 with acute respiratory failure requiring ventilation and circulatory shock requiring vasopressors. Respiratory failure attributed to CAP vs aspiration pneumonia with small PE. 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    Today, the patient was seen for:  Emotional support    Prognosis, Goals, or Advance Care Planning was addressed today with: Yes.  Mood, coping, and/or meaning in the context of serious illness were addressed today: Yes.               Interval History:     Chart review/discussion with unit or clinical team members:   - Vomiting overnight, CT abdomen revealed severe ileus and splenic infarct  - Increased agitation requiring further sedation, worsening respiratory status now on FiO2 70%  - Discussed case with Dr. Casillas (ICU) and ICU RN    Per patient or family/caregivers today:  Lavonne is sedated and ventilated during my visit.    Called son Frantz and left voicemail. Called daughter Jess and provided update. She and Frantz were planning to bring their dad from [facility] to hospital tomorrow night. I encouraged them to bring him tonight if possible.     Key Palliative Symptoms:  Unable to assess due to sedation           Review of Systems:     Unable to assess due to sedation          Medications:     I have reviewed this patient's medication profile and medications during this hospitalization.    Noted meds:    Current Facility-Administered Medications   Medication     cefTRIAXone (ROCEPHIN) 1 g vial to attach to  mL bag for ADULTS or NS 50 mL bag for PEDS     chlorhexidine (PERIDEX) 0.12 % solution 15 mL     dexmedetomidine (PRECEDEX) 400 mcg in 0.9% sodium chloride 100 mL     dextrose 10% infusion     glucose gel 15-30 g    Or     dextrose 50 % injection 25-50 mL    Or     glucagon injection 1 mg     fentaNYL (SUBLIMAZE) 50 mcg/mL bolus from pump     fentaNYL (SUBLIMAZE) infusion     furosemide (LASIX) injection 20 mg     heparin infusion 25,000 units in D5W 250 mL ANTICOAGULANT     influenza vac high-dose quad (FLUZONE HD) injection KEVIN 0.7 mL     insulin aspart (NovoLOG) injection (RAPID ACTING)     ipratropium - albuterol 0.5 mg/2.5 mg/3 mL (DUONEB) neb solution 3 mL     lactulose (CHRONULAC) solution 20 g     LubriFresh P.M. OINT     metolazone (ZAROXOLYN) tablet 2.5 mg     naloxone (NARCAN) injection 0.2 mg    Or     naloxone (NARCAN) injection 0.4 mg    Or     naloxone (NARCAN) injection 0.2 mg    Or     naloxone (NARCAN) injection 0.4 mg  "    norepinephrine (LEVOPHED) 4 mg in  mL infusion PREMIX     ondansetron (ZOFRAN) injection 4 mg     pantoprazole (PROTONIX) 2 mg/mL suspension 40 mg    Or     pantoprazole (PROTONIX) IV push injection 40 mg     polyethylene glycol (MIRALAX) Packet 17 g     polyethylene glycol solution 250 mL     propofol (DIPRIVAN) infusion     QUEtiapine (SEROquel) tablet 25 mg     senna-docusate (SENOKOT-S/PERICOLACE) 8.6-50 MG per tablet 1 tablet     thiamine (B-1) tablet 100 mg     vasopressin (VASOSTRICT) 20 Units in sodium chloride 0.9 % 100 mL standard conc infusion                Physical Exam:   Vital Signs: Blood pressure 103/70, pulse 73, temperature 98.6  F (37  C), resp. rate 15, height 1.702 m (5' 7\"), weight 46.4 kg (102 lb 4.7 oz), SpO2 98 %.   GENERAL: cachectic, sedated/ventilated, eyes closed  SKIN: Warm and dry   HEENT: +temporal muscle wasting  ABDOMINAL: firm, non distended  MUSKL: no gross joint deformities   EXTREMITIES: No edema              Data Reviewed:     Reviewed recent pertinent imaging:   CT A/P 10/19  IMPRESSION:   1.  Moderate to marked fluid and gas distention of the distal jejunum, ileum, colon and rectum with no transition point or obstructing mass compatible with severe adynamic ileus and/or a nonspecific enterocolitis. No free air or pneumatosis.  2.  Wedge-shaped perfusion defect lateral aspect mid to upper spleen is most likely an acute infarct with posttraumatic contusion possible in the appropriate clinical setting. Etiology unclear but in the setting of recently diagnosed pulmonary emboli, paradoxical emboli related to PFO should be considered.  3.  Trace abdominal and pelvic ascites and generalized mesenteric and subcutaneous edema.  4.  Bilateral adnexal enlargement measuring up to 5 cm on the right and 3 cm on the left. Recommend pelvic ultrasound.  5.  Incidental probable 1 cm polyp distal transverse colon.    Reviewed recent labs:   [facility]  Recent Labs   Lab 10/19/22  0809 " 10/19/22  0426 10/19/22  0424 10/18/22  2351 10/18/22  0436 10/18/22  0434 10/17/22  0427 10/17/22  0426 10/16/22  0507 10/16/22  0500 10/12/22  1616 10/12/22  1440   NA  --  136  --   --   --  140  --  139  --  140   < > 145   POTASSIUM  --  3.7  --   --   --  3.8  --  4.4  --  4.3   < > 4.5   CHLORIDE  --  87*  --   --   --  94*  --  99  --  100   < >  --    CO2  --  38*  --   --   --  35*  --  30*  --  34*   < >  --    ANIONGAP  --  11  --   --   --  11  --  10  --  6*   < >  --    * 105* 105* 135*   < > 139*   < > 155*   < > 112*   < >  --    BUN  --  27.1*  --   --   --  24.1*  --  20.2  --  20.1   < >  --    CR  --  0.80  --   --   --  0.77  --  0.59  --  0.59   < >  --    GFRESTIMATED  --  80  --   --   --  84  --  >90  --  >90   < >  --    JASON  --  9.4  --   --   --  9.4  --  9.0  --  9.1   < >  --    MAG  --  2.0  --   --   --  2.2  --  2.1  --  2.3   < >  --    PHOS  --  5.4*  --   --   --  4.4  --  3.4  --  3.5   < >  --    PROTTOTAL  --  6.8  --   --   --  6.5  --   --   --   --   --  6.3*   ALBUMIN  --  3.1*  --   --   --  3.0*  --   --   --   --   --  2.7*   BILITOTAL  --  0.7  --   --   --  0.7  --   --   --   --   --  0.6   ALKPHOS  --  73  --   --   --  72  --   --   --   --   --  86   AST  --  23  --   --   --  20  --   --   --   --   --  18   ALT  --  23  --   --   --  20  --   --   --   --   --  55*    < > = values in this interval not displayed.     CBC  Recent Labs   Lab 10/19/22  0426 10/18/22  0434 10/17/22  0426 10/16/22  0500   WBC 13.1* 9.9 8.3 8.9   RBC 4.76 4.54 4.07 4.07   HGB 14.8 14.1 12.9 12.9   HCT 44.9 42.8 38.8 38.8   MCV 94 94 95 95   MCH 31.1 31.1 31.7 31.7   MCHC 33.0 32.9 33.2 33.2   RDW 14.5 14.6 15.0 14.8    [phone]         Jessica Richards PA-C  [facility]  Dept phone: [phone]  Securely message with the Vocera Web Console

## 2022-10-19 NOTE — PROGRESS NOTES
Family education completed:Yes    Report given to: EULOGIO Mast    Time of transfer:11:00    Transferred to:119    Belongings sent:Yes    Family updated:Yes. Pt notified family    Reviewed pertinent information from EPIC (EMAR/Clinical Summary/Flowsheets):Yes    Head-to-toe assessment with receiving RN:No    Recommendations (e.g. Family needs/recent issues/things to watch for): Pt has frequent loose incontinent stools

## 2022-10-19 NOTE — PLAN OF CARE
[facility] - ICU    RN Progress Note:            Pertinent Assessments:      Please refer to flowsheet rows for full assessment   Pt becoming more restless.  RR 40-45.  Propofol, precidex, and fenanyl titrated for comfort.  Feeding tube to continuous suction due to prior vomiting.  Stomach continues to be distended.  Dr. Montgomery notified.  Abdominal x-ray ordered for a.m. Levophed titrated up as sedation is titrating up.  Will restart vasopressin gtt.             Mobility Level:   2        Key Events - This Shift:      Fio2 increased to 50%.  Pt sedation increased due to increased anxiety and respiratory rate.  Vasopressin restarted                Plan:   Continue to monitor pt comfort level and titrate sedation accordingly.  Explain cares to patient to decrease anxiety.  Abdominal x-ray to check belly distention.  Will continue to monitor

## 2022-10-19 NOTE — PROGRESS NOTES
RT PROGRESS NOTE     VENT DAY# 12     CURRENT SETTINGS:  Vent Mode: PC-PSV  FiO2 (%): 60%  Resp Rate (Set): 16 breaths/min  PEEP (cm H2O): 10 cmH2O  Inspiratory Pressure (cm H2O) (Drager Blanquita): 22     PATIENT PARAMETERS:  PIP 25  Pplat:  18  SBT: No; PEEP >8  Secretions:  Scant white/clear thin  02 Sats:  100%  BS: diminished in left, coarse right     ETT SIZE 7.5 Secured at 22 cm at teeth/gums     Respiratory Medications: Duoneb 4x daily     ABG: @0921 pH 7.48; pCO2 57; pO2 104; HCO3 39     NOTE / SHIFT SUMMARY:   Patient remains on ventilator support. ETT repositioned Q2 by RT and RN for skin integrity. Continue to titrate FiO2 as pt tolerates.     Jacklyn Tan, RT

## 2022-10-19 NOTE — PROGRESS NOTES
RT PROGRESS NOTE    VENT DAY# 12    CURRENT SETTINGS:  Vent Mode: PC-PSV  FiO2 (%): 60%  Resp Rate (Set): 16 breaths/min  PEEP (cm H2O): 10 cmH2O  Inspiratory Pressure (cm H2O) (Drager Blanquita): 22      PATIENT PARAMETERS:  PIP 27  Pplat:  17  SBT: No  Secretions:  Scant white thin  02 Sats:  94%  BS: diminished    ETT SIZE 7.5 Secured at 22 cm at teeth/gums    Respiratory Medications: Duoneb 4x daily    ABG: @0921 pH 7.48; pCO2 57; pO2 104; HCO3 39    NOTE / SHIFT SUMMARY:   Patient remains on ventilator support. ETT repositioned Q2 by RT and RN for skin integrity. Will titrate FiO2 as pt tolerates.         Lucio More RT

## 2022-10-20 LAB
ALBUMIN SERPL BCG-MCNC: 3.1 G/DL (ref 3.5–5.2)
ALP SERPL-CCNC: 64 U/L (ref 35–104)
ALT SERPL W P-5'-P-CCNC: 16 U/L (ref 10–35)
ANION GAP SERPL CALCULATED.3IONS-SCNC: 14 MMOL/L (ref 7–15)
AST SERPL W P-5'-P-CCNC: 14 U/L (ref 10–35)
BASOPHILS # BLD AUTO: 0 10E3/UL (ref 0–0.2)
BASOPHILS NFR BLD AUTO: 0 %
BILIRUB SERPL-MCNC: 0.6 MG/DL
BUN SERPL-MCNC: 31.8 MG/DL (ref 8–23)
CALCIUM SERPL-MCNC: 9 MG/DL (ref 8.8–10.2)
CHLORIDE SERPL-SCNC: 87 MMOL/L (ref 98–107)
CREAT SERPL-MCNC: 0.72 MG/DL (ref 0.51–0.95)
DEPRECATED HCO3 PLAS-SCNC: 35 MMOL/L (ref 22–29)
EOSINOPHIL # BLD AUTO: 0 10E3/UL (ref 0–0.7)
EOSINOPHIL NFR BLD AUTO: 0 %
ERYTHROCYTE [DISTWIDTH] IN BLOOD BY AUTOMATED COUNT: 14.3 % (ref 10–15)
GFR SERPL CREATININE-BSD FRML MDRD: >90 ML/MIN/1.73M2
GLUCOSE BLDC GLUCOMTR-MCNC: 120 MG/DL (ref 70–99)
GLUCOSE BLDC GLUCOMTR-MCNC: 121 MG/DL (ref 70–99)
GLUCOSE BLDC GLUCOMTR-MCNC: 128 MG/DL (ref 70–99)
GLUCOSE BLDC GLUCOMTR-MCNC: 133 MG/DL (ref 70–99)
GLUCOSE BLDC GLUCOMTR-MCNC: 141 MG/DL (ref 70–99)
GLUCOSE BLDC GLUCOMTR-MCNC: 72 MG/DL (ref 70–99)
GLUCOSE BLDC GLUCOMTR-MCNC: 80 MG/DL (ref 70–99)
GLUCOSE BLDC GLUCOMTR-MCNC: 86 MG/DL (ref 70–99)
GLUCOSE SERPL-MCNC: 126 MG/DL (ref 70–99)
HCT VFR BLD AUTO: 39.1 % (ref 35–47)
HGB BLD-MCNC: 13.1 G/DL (ref 11.7–15.7)
IMM GRANULOCYTES # BLD: 0.1 10E3/UL
IMM GRANULOCYTES NFR BLD: 1 %
LYMPHOCYTES # BLD AUTO: 1.2 10E3/UL (ref 0.8–5.3)
LYMPHOCYTES NFR BLD AUTO: 11 %
MAGNESIUM SERPL-MCNC: 2.2 MG/DL (ref 1.7–2.3)
MCH RBC QN AUTO: 31.2 PG (ref 26.5–33)
MCHC RBC AUTO-ENTMCNC: 33.5 G/DL (ref 31.5–36.5)
MCV RBC AUTO: 93 FL (ref 78–100)
MONOCYTES # BLD AUTO: 0.9 10E3/UL (ref 0–1.3)
MONOCYTES NFR BLD AUTO: 8 %
NEUTROPHILS # BLD AUTO: 8.7 10E3/UL (ref 1.6–8.3)
NEUTROPHILS NFR BLD AUTO: 80 %
NRBC # BLD AUTO: 0 10E3/UL
NRBC BLD AUTO-RTO: 0 /100
PHOSPHATE SERPL-MCNC: 3.4 MG/DL (ref 2.5–4.5)
PLATELET # BLD AUTO: 342 10E3/UL (ref 150–450)
POTASSIUM SERPL-SCNC: 2.8 MMOL/L (ref 3.4–5.3)
POTASSIUM SERPL-SCNC: 3.1 MMOL/L (ref 3.4–5.3)
POTASSIUM SERPL-SCNC: 3.2 MMOL/L (ref 3.4–5.3)
PROT SERPL-MCNC: 6.4 G/DL (ref 6.4–8.3)
RBC # BLD AUTO: 4.2 10E6/UL (ref 3.8–5.2)
SODIUM SERPL-SCNC: 136 MMOL/L (ref 136–145)
UFH PPP CHRO-ACNC: 0.41 IU/ML
UFH PPP CHRO-ACNC: 0.81 IU/ML
WBC # BLD AUTO: 10.8 10E3/UL (ref 4–11)

## 2022-10-20 PROCEDURE — 94660 CPAP INITIATION&MGMT: CPT

## 2022-10-20 PROCEDURE — 99232 SBSQ HOSP IP/OBS MODERATE 35: CPT | Performed by: PHYSICIAN ASSISTANT

## 2022-10-20 PROCEDURE — 200N000001 HC R&B ICU

## 2022-10-20 PROCEDURE — 250N000011 HC RX IP 250 OP 636: Performed by: NURSE PRACTITIONER

## 2022-10-20 PROCEDURE — 250N000013 HC RX MED GY IP 250 OP 250 PS 637: Performed by: INTERNAL MEDICINE

## 2022-10-20 PROCEDURE — 80053 COMPREHEN METABOLIC PANEL: CPT | Performed by: INTERNAL MEDICINE

## 2022-10-20 PROCEDURE — 250N000011 HC RX IP 250 OP 636: Performed by: PHYSICIAN ASSISTANT

## 2022-10-20 PROCEDURE — 999N000009 HC STATISTIC AIRWAY CARE

## 2022-10-20 PROCEDURE — 250N000009 HC RX 250: Performed by: INTERNAL MEDICINE

## 2022-10-20 PROCEDURE — 250N000011 HC RX IP 250 OP 636: Performed by: INTERNAL MEDICINE

## 2022-10-20 PROCEDURE — 999N000253 HC STATISTIC WEANING TRIALS

## 2022-10-20 PROCEDURE — 85014 HEMATOCRIT: CPT | Performed by: INTERNAL MEDICINE

## 2022-10-20 PROCEDURE — 258N000003 HC RX IP 258 OP 636: Performed by: INTERNAL MEDICINE

## 2022-10-20 PROCEDURE — 94640 AIRWAY INHALATION TREATMENT: CPT | Mod: 76

## 2022-10-20 PROCEDURE — 94640 AIRWAY INHALATION TREATMENT: CPT

## 2022-10-20 PROCEDURE — 999N000157 HC STATISTIC RCP TIME EA 10 MIN

## 2022-10-20 PROCEDURE — C9113 INJ PANTOPRAZOLE SODIUM, VIA: HCPCS | Performed by: INTERNAL MEDICINE

## 2022-10-20 PROCEDURE — 84100 ASSAY OF PHOSPHORUS: CPT | Performed by: INTERNAL MEDICINE

## 2022-10-20 PROCEDURE — 83735 ASSAY OF MAGNESIUM: CPT | Performed by: INTERNAL MEDICINE

## 2022-10-20 PROCEDURE — 94003 VENT MGMT INPAT SUBQ DAY: CPT

## 2022-10-20 PROCEDURE — 85520 HEPARIN ASSAY: CPT | Performed by: INTERNAL MEDICINE

## 2022-10-20 PROCEDURE — 999N000259 HC STATISTIC EXTUBATION

## 2022-10-20 PROCEDURE — 99291 CRITICAL CARE FIRST HOUR: CPT | Performed by: INTERNAL MEDICINE

## 2022-10-20 PROCEDURE — 84132 ASSAY OF SERUM POTASSIUM: CPT | Performed by: INTERNAL MEDICINE

## 2022-10-20 PROCEDURE — 82040 ASSAY OF SERUM ALBUMIN: CPT | Performed by: INTERNAL MEDICINE

## 2022-10-20 RX ORDER — POTASSIUM CHLORIDE 7.45 MG/ML
10 INJECTION INTRAVENOUS
Status: COMPLETED | OUTPATIENT
Start: 2022-10-20 | End: 2022-10-20

## 2022-10-20 RX ORDER — QUETIAPINE FUMARATE 25 MG/1
50 TABLET, FILM COATED ORAL 2 TIMES DAILY
Status: DISCONTINUED | OUTPATIENT
Start: 2022-10-20 | End: 2022-10-21

## 2022-10-20 RX ORDER — LORAZEPAM 2 MG/ML
1 INJECTION INTRAMUSCULAR EVERY 4 HOURS PRN
Status: DISCONTINUED | OUTPATIENT
Start: 2022-10-20 | End: 2022-10-21

## 2022-10-20 RX ORDER — DEXMEDETOMIDINE HYDROCHLORIDE 4 UG/ML
.1-1.5 INJECTION, SOLUTION INTRAVENOUS CONTINUOUS
Status: DISCONTINUED | OUTPATIENT
Start: 2022-10-20 | End: 2022-10-20

## 2022-10-20 RX ORDER — GLYCOPYRROLATE 0.2 MG/ML
0.2 INJECTION, SOLUTION INTRAMUSCULAR; INTRAVENOUS EVERY 4 HOURS PRN
Status: DISCONTINUED | OUTPATIENT
Start: 2022-10-20 | End: 2022-10-24

## 2022-10-20 RX ORDER — MORPHINE SULFATE 10 MG/5ML
2.5-5 SOLUTION ORAL EVERY 4 HOURS PRN
Status: DISCONTINUED | OUTPATIENT
Start: 2022-10-20 | End: 2022-10-24

## 2022-10-20 RX ORDER — NALOXONE HYDROCHLORIDE 0.4 MG/ML
0.2 INJECTION, SOLUTION INTRAMUSCULAR; INTRAVENOUS; SUBCUTANEOUS
Status: DISCONTINUED | OUTPATIENT
Start: 2022-10-20 | End: 2022-11-11 | Stop reason: HOSPADM

## 2022-10-20 RX ORDER — POTASSIUM CHLORIDE 7.45 MG/ML
10 INJECTION INTRAVENOUS
Status: COMPLETED | OUTPATIENT
Start: 2022-10-21 | End: 2022-10-21

## 2022-10-20 RX ORDER — NALOXONE HYDROCHLORIDE 0.4 MG/ML
0.1 INJECTION, SOLUTION INTRAMUSCULAR; INTRAVENOUS; SUBCUTANEOUS
Status: DISCONTINUED | OUTPATIENT
Start: 2022-10-20 | End: 2022-11-11 | Stop reason: HOSPADM

## 2022-10-20 RX ORDER — ONDANSETRON 2 MG/ML
4 INJECTION INTRAMUSCULAR; INTRAVENOUS EVERY 6 HOURS PRN
Status: DISCONTINUED | OUTPATIENT
Start: 2022-10-20 | End: 2022-11-11 | Stop reason: HOSPADM

## 2022-10-20 RX ORDER — ONDANSETRON 4 MG/1
4 TABLET, ORALLY DISINTEGRATING ORAL EVERY 6 HOURS PRN
Status: DISCONTINUED | OUTPATIENT
Start: 2022-10-20 | End: 2022-11-11 | Stop reason: HOSPADM

## 2022-10-20 RX ORDER — LORAZEPAM 1 MG/1
1 TABLET ORAL EVERY 4 HOURS PRN
Status: DISCONTINUED | OUTPATIENT
Start: 2022-10-20 | End: 2022-10-21

## 2022-10-20 RX ADMIN — METRONIDAZOLE 500 MG: 500 INJECTION, SOLUTION INTRAVENOUS at 02:09

## 2022-10-20 RX ADMIN — POTASSIUM CHLORIDE 10 MEQ: 7.46 INJECTION, SOLUTION INTRAVENOUS at 23:21

## 2022-10-20 RX ADMIN — IPRATROPIUM BROMIDE AND ALBUTEROL SULFATE 3 ML: 2.5; .5 SOLUTION RESPIRATORY (INHALATION) at 21:19

## 2022-10-20 RX ADMIN — LORAZEPAM 1 MG: 2 INJECTION INTRAMUSCULAR; INTRAVENOUS at 20:59

## 2022-10-20 RX ADMIN — Medication 50 MCG: at 05:30

## 2022-10-20 RX ADMIN — Medication 50 MCG: at 00:01

## 2022-10-20 RX ADMIN — IPRATROPIUM BROMIDE AND ALBUTEROL SULFATE 3 ML: 2.5; .5 SOLUTION RESPIRATORY (INHALATION) at 16:37

## 2022-10-20 RX ADMIN — Medication 75 MCG/HR: at 03:55

## 2022-10-20 RX ADMIN — FUROSEMIDE 20 MG: 10 INJECTION, SOLUTION INTRAMUSCULAR; INTRAVENOUS at 06:46

## 2022-10-20 RX ADMIN — POTASSIUM CHLORIDE 10 MEQ: 7.46 INJECTION, SOLUTION INTRAVENOUS at 17:11

## 2022-10-20 RX ADMIN — CHLORHEXIDINE GLUCONATE 15 ML: 1.2 SOLUTION ORAL at 08:30

## 2022-10-20 RX ADMIN — POTASSIUM CHLORIDE 10 MEQ: 7.46 INJECTION, SOLUTION INTRAVENOUS at 09:39

## 2022-10-20 RX ADMIN — POTASSIUM CHLORIDE 10 MEQ: 7.46 INJECTION, SOLUTION INTRAVENOUS at 17:12

## 2022-10-20 RX ADMIN — IPRATROPIUM BROMIDE AND ALBUTEROL SULFATE 3 ML: 2.5; .5 SOLUTION RESPIRATORY (INHALATION) at 08:04

## 2022-10-20 RX ADMIN — DEXMEDETOMIDINE HYDROCHLORIDE 1.2 MCG/KG/HR: 400 INJECTION INTRAVENOUS at 00:00

## 2022-10-20 RX ADMIN — Medication 50 MCG: at 06:30

## 2022-10-20 RX ADMIN — POTASSIUM CHLORIDE 10 MEQ: 7.46 INJECTION, SOLUTION INTRAVENOUS at 10:52

## 2022-10-20 RX ADMIN — PANTOPRAZOLE SODIUM 40 MG: 40 INJECTION, POWDER, FOR SOLUTION INTRAVENOUS at 06:46

## 2022-10-20 RX ADMIN — IPRATROPIUM BROMIDE AND ALBUTEROL SULFATE 3 ML: 2.5; .5 SOLUTION RESPIRATORY (INHALATION) at 11:39

## 2022-10-20 RX ADMIN — VASOPRESSIN 2.4 UNITS/HR: 20 INJECTION INTRAVENOUS at 06:13

## 2022-10-20 RX ADMIN — Medication 0.04 MCG/KG/MIN: at 00:30

## 2022-10-20 RX ADMIN — HEPARIN SODIUM AND DEXTROSE 1500 UNITS/HR: 10000; 5 INJECTION INTRAVENOUS at 06:23

## 2022-10-20 RX ADMIN — PROPOFOL 25 MCG/KG/MIN: 10 INJECTION, EMULSION INTRAVENOUS at 00:42

## 2022-10-20 RX ADMIN — FUROSEMIDE 20 MG: 10 INJECTION, SOLUTION INTRAMUSCULAR; INTRAVENOUS at 17:44

## 2022-10-20 RX ADMIN — POTASSIUM CHLORIDE 10 MEQ: 7.46 INJECTION, SOLUTION INTRAVENOUS at 08:32

## 2022-10-20 RX ADMIN — DEXMEDETOMIDINE HYDROCHLORIDE 1.2 MCG/KG/HR: 400 INJECTION INTRAVENOUS at 09:38

## 2022-10-20 RX ADMIN — THIAMINE HCL TAB 100 MG 100 MG: 100 TAB at 09:41

## 2022-10-20 RX ADMIN — QUETIAPINE FUMARATE 50 MG: 25 TABLET ORAL at 10:51

## 2022-10-20 RX ADMIN — Medication 50 MCG: at 04:00

## 2022-10-20 RX ADMIN — POTASSIUM CHLORIDE 10 MEQ: 7.46 INJECTION, SOLUTION INTRAVENOUS at 12:21

## 2022-10-20 ASSESSMENT — ACTIVITIES OF DAILY LIVING (ADL)
ADLS_ACUITY_SCORE: 34
ADLS_ACUITY_SCORE: 30
ADLS_ACUITY_SCORE: 34
ADLS_ACUITY_SCORE: 30
ADLS_ACUITY_SCORE: 34
ADLS_ACUITY_SCORE: 34
ADLS_ACUITY_SCORE: 30
ADLS_ACUITY_SCORE: 32
ADLS_ACUITY_SCORE: 34
ADLS_ACUITY_SCORE: 30
ADLS_ACUITY_SCORE: 34
ADLS_ACUITY_SCORE: 30

## 2022-10-20 NOTE — PROGRESS NOTES
"Brief Palliative Care Note    Discussed case with ICU (Dr. Correa). Lavonne is doing very well today, sedation weaned, she is awake and alert, engaging with staff and wants tube out. Plan for extubation later today or tomorrow.    Spoke with Lavonne at bedside. She is trying to speak with me despite ET tube, miming pulling out tube. Lavonne motioned with her hand that she wants to write, RN provided pen and paper. Lavonne clearly wrote \"Jess and Frantz come now\" and \"I want to go home now.\" Also pointed at ET tube and mimed pulling out, pointed to \"now.\" Asked Lavonne if we removed the tube and her breathing got worse again, would she want to be reintubated? Lavonne shook her head \"no.\" She mouthed \"home\" again. Also asked if her heart were to stop, would she want CPR? Lavonne again shook her head \"no\" and gave thumbs down multiple times.     Called son Frantz, left voicemail. Called daughter Jess and shared medical update and reviewed my conversation with Lavonne. Jess let me know that she and her family had already decided to respect whatever Lavonne's wishes were regarding resuscitation and reintubation. They are in agreement with placement of DNR/DNI order at this time. Plan to visit Lavonne later today.    Jessica Richards PA-C  [facility], Palliative Care  Dept phone: [phone]  Securely message with the Vocera Web Console    "

## 2022-10-20 NOTE — PROGRESS NOTES
RT PROGRESS NOTE    CURRENT HIGH FLOW SETTINGS:  LITER FLOW: 40LPM        FIO2: 70%  PATIENT PARAMETERS:  SAT:93%  HR: 100  RR: 32  BS: crackles/rhonchi and decreased @ bases    Respiratory Medications: Duo Neb QID     NOTE / SHIFT SUMMARY:   Pt is currently being placed on HFNC with above settings after pt had refused to use Bipap Machine. Presently pt appears to be tachypneic in her breathing breathing pattern but delighted to be off Bipap Machine. Bipap Machine remains standby at the bedside. RT will continue to monitor closely and assess as needed.       Josiah Mcmahon, RRT

## 2022-10-20 NOTE — PROGRESS NOTES
EET # 7.5 and is secured at 21 cm at the teeth. Oxygen titrated. Patient remains on vent/full support; current settings: PC-PSV RR 16, 22 cmH2O, 40%, 10 cmH2O; sats high 90s. Secretion suction minimal. BS coarse/diminished. RT monitoring.    Jerrod Arnett, RT

## 2022-10-20 NOTE — PLAN OF CARE
[facility] - ICU    RN Progress Note:            Pertinent Assessments:      Please refer to flowsheet rows for full assessment     LS diminished. Remained on PEEP 10 titrated FiO2 down to 40%. Scant secretions from ETT. RASS at 0/-1 or +1/+2. Titrated sedation per MAR. PRN fentanyl boluses given per MAR. Able to follow commands and RUSSELL. Abdomen distended, firm. BS hypoactive. OG to LIS with 0 output.          Mobility Level:     2- Q2 turns          Key Events - This Shift:        Weaned levo down to 0.03. Patient at times very agitated and overbreathing ventilator up to 60 bpm.               Plan:     Daily SAT/SBT as able. Wean sedation as appropriate.

## 2022-10-20 NOTE — PROGRESS NOTES
RESPIRATORY CARE NOTE     Patient Name: Lavonne Talbot  Today's Date: 10/20/2022      Patient extubated at 12:10 pm and placed on Bipap Machine as ordered : 10/5, 14, 45%. , BS: slightly coarse rhonchi upper airways and decreased @ bases. Patient has fair productive cough, patient is able to verbalize upon extubation. No complications have been observed at this time.  Will continue to monitor closely and assess as needed.     Josiah Mcmahon, RRT

## 2022-10-20 NOTE — PROGRESS NOTES
[facility]  Palliative Care Daily Progress Note       Recommendations & Counseling     Lavonne was extubated to BiPAP earlier today. She is able to clearly articulate her wishes, is now DNR/DNI with support of family. Lavonne wants to go home as soon as possible. Consulted hospice to evaluate for GIP vs home hospice.    Goals of Care: life-prolonging with plan to transition to comfort-focused care    Advanced Care Planning:  Advance directive: No  POLST: No  Code status: DNR/DNI  Health care agent/surrogate: Frantz (son) along with Jess (daughter). Lavonne's  recently had a stroke and has limited ability to communicate    Symptom Management:  Not currently managing symptoms    Psychosocial & Spiritual:   Appreciate Palliative SW and Spiritual Health involvement      Assessments          Lavonne is a 66 y/o woman, active smoker, with history of lymphedema, polycythemia, and recent diagnosis of Covid infection, admitted to ICU on 10/8/2022 with acute respiratory failure requiring ventilation and circulatory shock requiring vasopressors. Respiratory failure attributed to CAP vs aspiration pneumonia with small PE. 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    Today, the patient was seen for:  Emotional support    Prognosis, Goals, or Advance Care Planning was addressed today with: Yes.  Mood, coping, and/or meaning in the context of serious illness were addressed today: Yes.              Interval History:     Chart review/discussion with unit or clinical team members:   - Discussed case with ICU (Dr. Correa). Lavonne is doing very well today, sedation weaned, she is awake and alert, engaging with staff and wants tube out. Plan for extubation later today    Per patient or family/caregivers today:  Spoke with Lavonne at bedside. She is trying to speak with me despite ET tube, miming pulling out tube. Lavonne motioned with her hand that she wants to write, RN provided pen and paper. Lavonne clearly wrote  "\"Jess and Frantz come now\" and \"I want to go home now.\" Also pointed at ET tube and mimed pulling out, pointed to \"now.\" Asked Lavonne if we removed the tube and her breathing got worse again, would she want to be reintubated? Lavonne shook her head \"no.\" She mouthed \"home\" again. Also asked if her heart were to stop, would she want CPR? Lavonne again shook her head \"no\" and gave thumbs down multiple times.      Called son Frantz, left voicemail. Called daughter Jess and shared medical update and reviewed my conversation with Lavonne. Jess let me know that she and her family had already decided to respect whatever Lavonne's wishes were regarding resuscitation and reintubation. They are in agreement with placement of DNR/DNI order at this time.    Returned to bedside this afternoon after Lavonne was extubated to BiPAP. Chela Johnson (Palliative SW) and I visited Lavonne and her son Frantz and  Jermaine at bedside. Jermaine has some difficulty communicating due to recent stroke, but is very happy to see Lavonne. Lavonne repeatedly tells us that she wants to go home. She knows Jermaine is unable to care for her right now (he is at [facility]), but Lavonne wants to stay with other family who live nearby.     Family had discussed hospice with RN earlier, reviewed again today. Lavonne understands that her time may be shorter than we would like, especially if she wants to go home. Explained that she is currently on BiPAP machine and would need to be stable on nasal cannula before we could consider going home. Lavonne said she would rather be in a \" home\" than in the hospital. Placed consult for hospice.    Key Palliative Symptoms:  Denies anxiety or pain           Review of Systems:     3 system review negative          Medications:     I have reviewed this patient's medication profile and medications during this hospitalization.    Noted meds:    Current Facility-Administered Medications   Medication     dextrose 10% infusion     glucose gel 15-30 g    Or     dextrose " "50 % injection 25-50 mL    Or     glucagon injection 1 mg     furosemide (LASIX) injection 20 mg     heparin infusion 25,000 units in D5W 250 mL ANTICOAGULANT     influenza vac high-dose quad (FLUZONE HD) injection KEVIN 0.7 mL     insulin aspart (NovoLOG) injection (RAPID ACTING)     ipratropium - albuterol 0.5 mg/2.5 mg/3 mL (DUONEB) neb solution 3 mL     lactulose (CHRONULAC) solution 20 g     LubriFresh P.M. OINT     naloxone (NARCAN) injection 0.2 mg    Or     naloxone (NARCAN) injection 0.4 mg    Or     naloxone (NARCAN) injection 0.2 mg    Or     naloxone (NARCAN) injection 0.4 mg     norepinephrine (LEVOPHED) 4 mg in  mL infusion PREMIX     ondansetron (ZOFRAN) injection 4 mg     polyethylene glycol (MIRALAX) Packet 17 g     [Held by provider] QUEtiapine (SEROquel) tablet 50 mg     thiamine (B-1) tablet 100 mg     vasopressin (VASOSTRICT) 20 Units in sodium chloride 0.9 % 100 mL standard conc infusion                Physical Exam:   Vital Signs: Blood pressure 103/70, pulse 93, temperature 99.5  F (37.5  C), resp. rate (!) 39, height 1.702 m (5' 7\"), weight 44.4 kg (97 lb 14.2 oz), SpO2 92 %.   GENERAL: intubated, alert and engaged, mouthing words and using pen/paper  SKIN: Warm and dry   HEENT: +temporal muscle wasting  ABDOMINAL: firm, non distended  MUSKL: no gross joint deformities   EXTREMITIES: No edema              Data Reviewed:     Reviewed recent pertinent imaging:   No new imaging    Reviewed recent labs:   CMP  Recent Labs   Lab 10/20/22  1458 10/20/22  1221 10/20/22  0743 10/20/22  0642 10/20/22  0520 10/19/22  0809 10/19/22  0426 10/18/22  0436 10/18/22  0434 10/17/22  0427 10/17/22  0426   NA  --   --   --  136  --   --  136  --  140  --  139   POTASSIUM 3.2*  --   --  2.8*  --   --  3.7  --  3.8  --  4.4   CHLORIDE  --   --   --  87*  --   --  87*  --  94*  --  99   CO2  --   --   --  35*  --   --  38*  --  35*  --  30*   ANIONGAP  --   --   --  14  --   --  11  --  11  --  10   GLC  --  " 121* 128* 126* 120*   < > 105*   < > 139*   < > 155*   BUN  --   --   --  31.8*  --   --  27.1*  --  24.1*  --  20.2   CR  --   --   --  0.72  --   --  0.80  --  0.77  --  0.59   GFRESTIMATED  --   --   --  >90  --   --  80  --  84  --  >90   JASON  --   --   --  9.0  --   --  9.4  --  9.4  --  9.0   MAG  --   --   --  2.2  --   --  2.0  --  2.2  --  2.1   PHOS  --   --   --  3.4  --   --  5.4*  --  4.4  --  3.4   PROTTOTAL  --   --   --  6.4  --   --  6.8  --  6.5  --   --    ALBUMIN  --   --   --  3.1*  --   --  3.1*  --  3.0*  --   --    BILITOTAL  --   --   --  0.6  --   --  0.7  --  0.7  --   --    ALKPHOS  --   --   --  64  --   --  73  --  72  --   --    AST  --   --   --  14  --   --  23  --  20  --   --    ALT  --   --   --  16  --   --  23  --  20  --   --     < > = values in this interval not displayed.     CBC  Recent Labs   Lab 10/20/22  0642 10/19/22  0426 10/18/22  0434 10/17/22  0426   WBC 10.8 13.1* 9.9 8.3   RBC 4.20 4.76 4.54 4.07   HGB 13.1 14.8 14.1 12.9   HCT 39.1 44.9 42.8 38.8   MCV 93 94 94 95   MCH 31.2 31.1 31.1 31.7   MCHC 33.5 33.0 32.9 33.2   RDW 14.3 14.5 14.6 15.0    [phone]         Jessica Richards PA-C  [facility]iative [facility]  Dept phone: [phone]  Securely message with the Vocera Web Console

## 2022-10-20 NOTE — CONSULTS
Referral Received - [facility]     ______________________________________________________________________ Providers: Please contact [facility] with changes in orders or clinical plan of care   Nursing: Please contact [facility] with significant changes in patient condition  ______________________________________________________________________    [facility] would like to thank you for the [facility] referral.    Referral received.and initial insurance information sent to  Hospice intake.    We are reviewing your patient's eligibility with intake team and medical director at this time.    Our plan is to visit your patient as soon as possible. We will update the Charge Nurse on the unit with [facility] eligibility.    Will meet w/ family at bedside at 5:30pm today.       Kim Enriquez RN on 10/20/2022 at 5:20 PM  [phone]

## 2022-10-20 NOTE — PROGRESS NOTES
Care Management Follow Up    Length of Stay (days): 12    Expected Discharge Date: 10/26/2022     Concerns to be Addressed: all concerns addressed in this encounter     Patient plan of care discussed at interdisciplinary rounds: Yes    Anticipated Discharge Disposition:       Anticipated Discharge Services:    Anticipated Discharge DME:      Patient/family educated on Medicare website which has current facility and service quality ratings:    Education Provided on the Discharge Plan:    Patient/Family in Agreement with the Plan:      Referrals Placed by CM/SW:    Private pay costs discussed: Not applicable    Additional Information:  Discussed patient at ICU rounds. Plan for today is to decrease peep and wean vent as able. Palliative to arrange for care conference with family and patient to discuss post extubation planning.     Discharge needs are unknown, CM will continue to follow care progression and aide in discharge planning as needed.     3:41 PM - Discussed patient with Intensivist, agreeable to Hospice consult, consult to be placed, palliative updated.      Sammie Goyal RN

## 2022-10-20 NOTE — PROGRESS NOTES
Palliative Care Social Work Note    Visited with Lavonne,  Jermaine, son Frantz and Jessica OSEI. Lavonne was extubated today and was on Bipap at time of visit. She was adamant that she wants to get home even if that means her time may be short. She and family would like to talk with hospice. Lavonne feels she has a lot of family that would help care for her. Her  Jermaine is currently in an assisted living so would not be available to do so.     Acknowledged that it is Lavonne's wish to be at home with family. Reassured her that we want to honor her wish, but need to see how she does since she was just extubated.     Provided active listening, validation of feelings, calm presence and emotional support.     PC SW remains available for emotional support.      Gladis Johnson, [facility]  Palliative Care   Office # [phone]

## 2022-10-20 NOTE — PROGRESS NOTES
PULMONARY / CRITICAL CARE PROGRESS NOTE    Date / Time of Admission:  10/8/2022  9:38 AM    Assessment:     Lavonne Talbot is a 67 year old cachectic female with history of severe emphysema, tobacco user.  Recent diagnosis of COVID-19 viral infection 1 week ago who presented to [facility] ED on 10/8/2022 due to respiratory distress now admitted to the ICU with acute respiratory failure requiring mechanical ventilation. Patient was treated for H influenza pneumonia and pulmonary embolism. Echocardiogram showed reduced EF 30-35%, per cardiology stress induced cardiomyopathy.   N/V/abdominal distention. CT scan showed dilated bowel loops.     1. Acute respiratory failure s/p intubation 10/8/22  Increase O2 requirements after episode of N/V.   CT scan showed LLL infiltrate and   2. H influenza pneumonia  3. Pulmonary embolism   4. COVID19 viral infection diagnosed on 10/8/22  5. HFrEF, EF 30-35%  6. COPD , severe emphysematous changes   7. Ileus / Enterocolitis   LFTs and lipase are within normal limits. CT scan showed no bowel obstruction.   NPO for now. Supplement electrolytes. Repeat blood cultures. Check C diff in stool. Check lactic acid.   Add flagyl to ceftriaxone   8. Splenic infarct   Unclear etiology.   Radiology suggested paradoxical emboli related to PFO. Patient is currently anticoagulated for PE.   Will get blood cultures.   9. Malnutrition Body mass index is 15.33 kg/m .   10. Ongoing tobacco use    Advance Directives: Full code    Plan:   1. Cont bipap post extubation for 2-4 hours, should wear it overnight.   2. Minimize sedation  3. Schedule bronchodilators  4. Heplock IV fluids   5. Lasix IV  6. Follow up tracheal culture  7. Follow up blood cultures   8. Stop abx - has gotten more than adequate course for the H. Influenzae tracheobronchitis.    9. Monitor kidney function , supplement electrolytes as needed  10. Completed course of decadron for covid-19  11. NPO while on BiPAP  12. Stop PPI -  extubated  13. Glucose level monitoring  14. DVT prophylaxis heparin drip, discontinue xarelto in view of NPO status   15. DNR/DNI code status noted    Please contact me if you have any questions.  Total critical care time, not including separately billable procedure time: 45 minutes  This patient had a high probability of imminent or life threatening deterioration due to acute respiratory failure which required my direct attention, intervention and personal management.     Clovis Correa MD (Avi)  [facility] Pulmonary & Critical Care [facility])  Clinic [phone]  Fax [phone]          ICU DAILY CHECKLIST                           Can patient transfer out of MICU? no    FAST HUG:    Feeding:  Feeding: NO Patient is receiving NPO  Monaco: yes  Analgesia/Sedation:yes  Precedex, fentanyl, propofol  Thromboembolic prophylaxis: Yes; Mode:  Heparin  HOB>30:  Yes  Stress Ulcer Protocol Active: Yes; Mode: PPI  Glycemic Control: Any glucose > 180 no; Mode of Insulin Therapy: Sliding Scale Insulin    INTUBATED:  Can patient have daily waking:  Yes  Can patient have spontaneous breathing trial:  Yes    Restraints? yes    PHYSICAL THERAPY AND MOBILITY:  Can patient have PT and mobility trial: no  Activity: bedrest    Subjective:   HPI:  Lavonne Talbot is a 67 year old cachectic female with history of severe emphysema, tobacco user.  Recent diagnosis of COVID-19 viral infection 1 week ago who presented to [facility] ED on 10/8/2022 due to respiratory distress now admitted to the ICU with acute respiratory failure requiring mechanical ventilation. Patient was treated for H influenza pneumonia and pulmonary embolism. Echocardiogram showed reduced EF 30-35%, per cardiology stress induced cardiomyopathy.     Events overnight   - awake, alert, saying she wants the tube out  - extubated to bipap around noon.  Wants to go home.     Allergies: Patient has no known allergies.     MEDS:  Current  "Facility-Administered Medications   Medication     cefTRIAXone (ROCEPHIN) 1 g vial to attach to  mL bag for ADULTS or NS 50 mL bag for PEDS     chlorhexidine (PERIDEX) 0.12 % solution 15 mL     dextrose 10% infusion     glucose gel 15-30 g    Or     dextrose 50 % injection 25-50 mL    Or     glucagon injection 1 mg     furosemide (LASIX) injection 20 mg     heparin infusion 25,000 units in D5W 250 mL ANTICOAGULANT     influenza vac high-dose quad (FLUZONE HD) injection KEVIN 0.7 mL     insulin aspart (NovoLOG) injection (RAPID ACTING)     ipratropium - albuterol 0.5 mg/2.5 mg/3 mL (DUONEB) neb solution 3 mL     lactulose (CHRONULAC) solution 20 g     LubriFresh P.M. OINT     naloxone (NARCAN) injection 0.2 mg    Or     naloxone (NARCAN) injection 0.4 mg    Or     naloxone (NARCAN) injection 0.2 mg    Or     naloxone (NARCAN) injection 0.4 mg     norepinephrine (LEVOPHED) 4 mg in  mL infusion PREMIX     ondansetron (ZOFRAN) injection 4 mg     pantoprazole (PROTONIX) 2 mg/mL suspension 40 mg    Or     pantoprazole (PROTONIX) IV push injection 40 mg     polyethylene glycol (MIRALAX) Packet 17 g     QUEtiapine (SEROquel) tablet 50 mg     thiamine (B-1) tablet 100 mg     vasopressin (VASOSTRICT) 20 Units in sodium chloride 0.9 % 100 mL standard conc infusion     Objective:   VITALS:  /70   Pulse 80   Temp 99.3  F (37.4  C)   Resp (!) 43   Ht 1.702 m (5' 7\")   Wt 44.4 kg (97 lb 14.2 oz)   SpO2 93%   BMI 15.33 kg/m    VENT:  Vent Mode: CPAP/PS  (Continuous positive airway pressure with Pressure Support)  FiO2 (%): (S) 45 %  Resp Rate (Set): 16 breaths/min  PEEP (cm H2O): 8 cmH2O  Pressure Support (cm H2O): 12 cmH2O  Inspiratory Pressure (cm H2O) (Drager Blanquita): 22  Resp: (!) 43    EXAM:   Gen: sedated, arousable, vented  HEENT: pale conjunctiva, moist mucosa  Neck: no thyromegaly, masses or JVD  Lungs: decrease breath sound both HT, discrete ronchi both HT  CV: regular, no murmurs or gallops " appreciated  Abdomen: distended, NT, BS present with metallic sound   Ext: trace edema LEs  Neuro: sedated, arousable, non focal     Data Review:  Recent Labs   Lab 10/20/22  0743 10/20/22  0642 10/20/22  0520 10/20/22  0047 10/19/22  2107 10/19/22  1548   * 126* 120* 133* 141* 154*     Tracheal Culture 1+ Normal margaret       2+ Haemophilus influenzae Abnormal           10/19/22 04:26 10/19/22 08:09 10/19/22 09:21   Sodium 136     Potassium 3.7     Chloride 87 (L)     Carbon Dioxide (CO2) 38 (H)     Urea Nitrogen 27.1 (H)     Creatinine 0.80     GFR Estimate 80     Calcium 9.4     Anion Gap 11     Magnesium 2.0     Phosphorus 5.4 (H)     Albumin 3.1 (L)     Protein Total 6.8     Alkaline Phosphatase 73     ALT 23     AST 23     Bilirubin Direct 0.28     Bilirubin Total 0.7     CRP Inflammation 96.10 (H)     Glucose 105 (H)     Lipase 14     GLUCOSE BY METER POCT  129 (H)    pH Arterial   7.48 (H)   pCO2 Arterial   57 (H)   PO2 Arterial   104 (H)   Bicarbonate Arterial   39 (H)   Base Excess Art   18.2   FIO2   0.7   Oxyhemoglobin   96.8 (H)     CT ABDOMEN PELVIS W CONTRAST  DATE/TIME: 10/19/2022 9:42 AM  INDICATION: Distended abdomen, nausea and vomiting.  COMPARISON: CTAs chest 10/13/2022 and 10/8/2022 and abdominal radiograph 10/08/2022.  FINDINGS:   LOWER CHEST: Focal consolidation posterior basilar segment left lower lobe could represent evolving pneumonia and/or infarct. Extensive mucosal thickening and endoluminal secretions throughout the visualized bronchi and peribronchial opacities consistent with acute bronchial inflammation and areas of peribronchial pneumonitis similar. Lungs are hyperinflated with emphysematous change. Visualized lungs are clear. No pleural effusion. Heart size normal with no pericardial effusion.   HEPATOBILIARY: Liver is normal.  No calcified gallstones.Mild bile duct dilatation to the level of the ampulla with no radiodense stone or mass probably normal variant.   PANCREAS:  Possible pancreatic divisum, pancreas otherwise normal.  SPLEEN: Wedge-shaped perfusion defect lateral aspect mid to upper spleen is most likely an acute infarct with posttraumatic contusion possible in the appropriate clinical setting. Spleen size normal.  ADRENAL GLANDS: Normal.  KIDNEY/BLADDER: Monaco catheter in the bladder. Several diminutive benign renal cysts. No follow up is needed. Kidneys, ureters and bladder are otherwise normal.  BOWEL: Well-positioned nasogastric tube with complete decompression of the stomach, duodenum and proximal half of the jejunum. Moderate to marked fluid and gas distention of the distal jejunum, ileum, colon and rectum with no transition point or obstructing mass. Incidental probable 1 cm polyp distal transverse colon (axial image 92 of series 2). Trace abdominal and pelvic ascites and generalized mesenteric edema.  LYMPH NODES: No lymphadenopathy.  VASCULATURE: Normal caliber abdominal aorta with patent mesenteric and renal arteries.    PELVIC ORGANS: Bilateral adnexal enlargement measuring up to 5 cm on the right and 3 cm on the left. Recommend pelvic ultrasound.  MUSCULOSKELETAL: Bones are demineralized. Generalized subcutaneous edema.  IMPRESSION:   1.  Moderate to marked fluid and gas distention of the distal jejunum, ileum, colon and rectum with no transition point or obstructing mass compatible with severe adynamic ileus and/or a nonspecific enterocolitis. No free air or pneumatosis.  2.  Wedge-shaped perfusion defect lateral aspect mid to upper spleen is most likely an acute infarct with posttraumatic contusion possible in the appropriate clinical setting. Etiology unclear but in the setting of recently diagnosed pulmonary emboli,   paradoxical emboli related to PFO should be considered.  3.  Trace abdominal and pelvic ascites and generalized mesenteric and subcutaneous edema.  4.  Bilateral adnexal enlargement measuring up to 5 cm on the right and 3 cm on the left.  Recommend pelvic ultrasound.  5.  Incidental probable 1 cm polyp distal transverse colon.    Echocardiogram 10/9/2022  Interpretation Summary   1. Left ventricular size and wall thickness are normal. Systolic function is moderately reduced with global hypokinesis. The estimated left ventricular ejection fraction is 30-35%.  2. Right ventricle is not well visualized. Limited views suggest possible enlargement and reduced systolic function.  3. No hemodynamically significant valvular abnormalities.  4. No prior study available for comparison.    CT CHEST PULMONARY EMBOLISM W CONTRAST  LOCATION: [facility]  DATE/TIME: 10/13/2022 1:03 PM  INDICATION: Acute respiratory decompensation worsening hypoxemia  COMPARISON: CT pulmonary angiogram 10/8/2022  FINDINGS:  ANGIOGRAM CHEST: The main pulmonary artery remains normal in size. The central pulmonary arteries remain patent. A subsegmental pulmonary embolism in the left lower lobe superior segment 10/8/2022 has resolved (series 4, image 181). However, there is a   new low-attenuation filling defect within proximal subsegmental level pulmonary arteries in the right lower lobe (series 4, image 236) and a peripheral subsegmental filling defect in the posterior basal left lower lobe (series 4, image 276). Normal caliber thoracic aorta. Unchanged mild arch, proximal great vessel and descending aorta atheromatous calcifications. No acute aortic syndrome. No CT evidence of right heart strain.  LUNGS AND PLEURA: Endotracheal tube is in satisfactory position. Central airway wall thickening is unchanged. Debris within the segmental and proximal subsegmental lower lobe airways is slightly improved from 5 days earlier, however peripheral subsegmental airway debris in bilateral lower lobe airways which produce tree-in-bud opacity and all segments of the left lower lobe and the basilar segments of the right lower lobe have developed.. The extent of consolidation  in the posterior basal left lower but is mildly increased from 5 days earlier, while inflammation and atelectasis in the left lateral costophrenic sulcus has decreased.. No pleural effusion is present.  MEDIASTINUM: Cardiac chambers remain normal in size. There is no pericardial effusion. No lymphadenopathy. Gastric tube courses through the decompressed esophagus terminating in the stomach body. Right midline catheter terminates in the lateral right subclavian vein and right jugular approach central venous catheter terminates in the SVC.  CORONARY ARTERY CALCIFICATION: Mild.  UPPER ABDOMEN: No new findings in the imaged upper abdomen.  MUSCULOSKELETAL: Generalized demineralization of the bones. There is subtle superior endplate deformity of L1. There is a paucity of subcutaneous fat. No new bone or chest wall soft tissue abnormalities.                                          IMPRESSION:  1.  Subsegmental pulmonary embolism in the left lower lobe 5 days earlier has resolved. 2 new subsegmental pulmonary emboli are present in the lower lobes as described.  2.  Mild worsening of focal consolidation in the posterior basal left lower lobe and development of peripheral airway debris/tree-in-bud opacity in both lower lobes consistent with bronchopneumonia/cellular bronchiolitis.  3.  Advanced emphysema.    By:  Fredi Milan MD, 10/20/2022  12:20 PM    Primary Care Physician:  Cinthya Cabral

## 2022-10-21 LAB
ANION GAP SERPL CALCULATED.3IONS-SCNC: 8 MMOL/L (ref 7–15)
BACTERIA ASPIRATE CULT: ABNORMAL
BUN SERPL-MCNC: 27.2 MG/DL (ref 8–23)
CALCIUM SERPL-MCNC: 9.5 MG/DL (ref 8.8–10.2)
CHLORIDE SERPL-SCNC: 92 MMOL/L (ref 98–107)
CREAT SERPL-MCNC: 0.67 MG/DL (ref 0.51–0.95)
DEPRECATED HCO3 PLAS-SCNC: 36 MMOL/L (ref 22–29)
ERYTHROCYTE [DISTWIDTH] IN BLOOD BY AUTOMATED COUNT: 14.3 % (ref 10–15)
GFR SERPL CREATININE-BSD FRML MDRD: >90 ML/MIN/1.73M2
GLUCOSE BLDC GLUCOMTR-MCNC: 108 MG/DL (ref 70–99)
GLUCOSE BLDC GLUCOMTR-MCNC: 110 MG/DL (ref 70–99)
GLUCOSE BLDC GLUCOMTR-MCNC: 117 MG/DL (ref 70–99)
GLUCOSE BLDC GLUCOMTR-MCNC: 67 MG/DL (ref 70–99)
GLUCOSE BLDC GLUCOMTR-MCNC: 73 MG/DL (ref 70–99)
GLUCOSE BLDC GLUCOMTR-MCNC: 75 MG/DL (ref 70–99)
GLUCOSE BLDC GLUCOMTR-MCNC: 76 MG/DL (ref 70–99)
GLUCOSE BLDC GLUCOMTR-MCNC: 78 MG/DL (ref 70–99)
GLUCOSE SERPL-MCNC: 69 MG/DL (ref 70–99)
HCT VFR BLD AUTO: 42.2 % (ref 35–47)
HGB BLD-MCNC: 14.1 G/DL (ref 11.7–15.7)
MAGNESIUM SERPL-MCNC: 2.1 MG/DL (ref 1.7–2.3)
MCH RBC QN AUTO: 31.3 PG (ref 26.5–33)
MCHC RBC AUTO-ENTMCNC: 33.4 G/DL (ref 31.5–36.5)
MCV RBC AUTO: 94 FL (ref 78–100)
PHOSPHATE SERPL-MCNC: 2.5 MG/DL (ref 2.5–4.5)
PLATELET # BLD AUTO: 376 10E3/UL (ref 150–450)
POTASSIUM SERPL-SCNC: 3 MMOL/L (ref 3.4–5.3)
POTASSIUM SERPL-SCNC: 3.1 MMOL/L (ref 3.4–5.3)
POTASSIUM SERPL-SCNC: 3.1 MMOL/L (ref 3.4–5.3)
POTASSIUM SERPL-SCNC: 3.3 MMOL/L (ref 3.4–5.3)
RBC # BLD AUTO: 4.51 10E6/UL (ref 3.8–5.2)
SODIUM SERPL-SCNC: 136 MMOL/L (ref 136–145)
UFH PPP CHRO-ACNC: 0.35 IU/ML
UFH PPP CHRO-ACNC: 0.41 IU/ML
WBC # BLD AUTO: 11.9 10E3/UL (ref 4–11)

## 2022-10-21 PROCEDURE — 250N000011 HC RX IP 250 OP 636: Performed by: INTERNAL MEDICINE

## 2022-10-21 PROCEDURE — 84132 ASSAY OF SERUM POTASSIUM: CPT | Performed by: INTERNAL MEDICINE

## 2022-10-21 PROCEDURE — 94640 AIRWAY INHALATION TREATMENT: CPT

## 2022-10-21 PROCEDURE — 200N000001 HC R&B ICU

## 2022-10-21 PROCEDURE — 99356 PR PROLONGED SERV,INPATIENT,1ST HR: CPT | Performed by: PHYSICIAN ASSISTANT

## 2022-10-21 PROCEDURE — 85520 HEPARIN ASSAY: CPT | Performed by: INTERNAL MEDICINE

## 2022-10-21 PROCEDURE — 999N000157 HC STATISTIC RCP TIME EA 10 MIN

## 2022-10-21 PROCEDURE — 250N000009 HC RX 250: Performed by: PHYSICIAN ASSISTANT

## 2022-10-21 PROCEDURE — 84100 ASSAY OF PHOSPHORUS: CPT | Performed by: INTERNAL MEDICINE

## 2022-10-21 PROCEDURE — 250N000009 HC RX 250: Performed by: INTERNAL MEDICINE

## 2022-10-21 PROCEDURE — 99233 SBSQ HOSP IP/OBS HIGH 50: CPT | Performed by: INTERNAL MEDICINE

## 2022-10-21 PROCEDURE — 99233 SBSQ HOSP IP/OBS HIGH 50: CPT | Performed by: PHYSICIAN ASSISTANT

## 2022-10-21 PROCEDURE — 94640 AIRWAY INHALATION TREATMENT: CPT | Mod: 76

## 2022-10-21 PROCEDURE — 80048 BASIC METABOLIC PNL TOTAL CA: CPT | Performed by: INTERNAL MEDICINE

## 2022-10-21 PROCEDURE — 85014 HEMATOCRIT: CPT | Performed by: INTERNAL MEDICINE

## 2022-10-21 PROCEDURE — 250N000013 HC RX MED GY IP 250 OP 250 PS 637: Performed by: INTERNAL MEDICINE

## 2022-10-21 PROCEDURE — 250N000011 HC RX IP 250 OP 636: Performed by: NURSE PRACTITIONER

## 2022-10-21 PROCEDURE — 83735 ASSAY OF MAGNESIUM: CPT | Performed by: INTERNAL MEDICINE

## 2022-10-21 PROCEDURE — 99223 1ST HOSP IP/OBS HIGH 75: CPT | Mod: AI | Performed by: INTERNAL MEDICINE

## 2022-10-21 RX ORDER — POTASSIUM CHLORIDE 7.45 MG/ML
10 INJECTION INTRAVENOUS
Status: COMPLETED | OUTPATIENT
Start: 2022-10-21 | End: 2022-10-21

## 2022-10-21 RX ORDER — POTASSIUM CHLORIDE 7.45 MG/ML
10 INJECTION INTRAVENOUS
Status: COMPLETED | OUTPATIENT
Start: 2022-10-21 | End: 2022-10-22

## 2022-10-21 RX ORDER — MORPHINE SULFATE 2 MG/ML
1-2 INJECTION, SOLUTION INTRAMUSCULAR; INTRAVENOUS EVERY 4 HOURS PRN
Status: DISCONTINUED | OUTPATIENT
Start: 2022-10-21 | End: 2022-10-24

## 2022-10-21 RX ADMIN — POTASSIUM CHLORIDE 10 MEQ: 7.46 INJECTION, SOLUTION INTRAVENOUS at 08:31

## 2022-10-21 RX ADMIN — IPRATROPIUM BROMIDE AND ALBUTEROL SULFATE 3 ML: 2.5; .5 SOLUTION RESPIRATORY (INHALATION) at 12:12

## 2022-10-21 RX ADMIN — POTASSIUM CHLORIDE 10 MEQ: 7.46 INJECTION, SOLUTION INTRAVENOUS at 15:32

## 2022-10-21 RX ADMIN — IPRATROPIUM BROMIDE AND ALBUTEROL SULFATE 3 ML: 2.5; .5 SOLUTION RESPIRATORY (INHALATION) at 08:02

## 2022-10-21 RX ADMIN — HEPARIN SODIUM AND DEXTROSE 1400 UNITS/HR: 10000; 5 INJECTION INTRAVENOUS at 03:20

## 2022-10-21 RX ADMIN — IPRATROPIUM BROMIDE AND ALBUTEROL SULFATE 3 ML: 2.5; .5 SOLUTION RESPIRATORY (INHALATION) at 21:26

## 2022-10-21 RX ADMIN — POTASSIUM CHLORIDE 10 MEQ: 7.46 INJECTION, SOLUTION INTRAVENOUS at 13:26

## 2022-10-21 RX ADMIN — POTASSIUM CHLORIDE 10 MEQ: 7.46 INJECTION, SOLUTION INTRAVENOUS at 00:47

## 2022-10-21 RX ADMIN — GLYCOPYRROLATE 0.2 MG: 0.2 INJECTION INTRAMUSCULAR; INTRAVENOUS at 05:30

## 2022-10-21 RX ADMIN — FUROSEMIDE 20 MG: 10 INJECTION, SOLUTION INTRAMUSCULAR; INTRAVENOUS at 05:22

## 2022-10-21 RX ADMIN — QUETIAPINE FUMARATE 12.5 MG: 25 TABLET ORAL at 22:26

## 2022-10-21 RX ADMIN — POTASSIUM CHLORIDE 10 MEQ: 7.46 INJECTION, SOLUTION INTRAVENOUS at 23:18

## 2022-10-21 RX ADMIN — HEPARIN SODIUM AND DEXTROSE 1400 UNITS/HR: 10000; 5 INJECTION INTRAVENOUS at 20:05

## 2022-10-21 RX ADMIN — IPRATROPIUM BROMIDE AND ALBUTEROL SULFATE 3 ML: 2.5; .5 SOLUTION RESPIRATORY (INHALATION) at 15:42

## 2022-10-21 RX ADMIN — POTASSIUM CHLORIDE 10 MEQ: 7.46 INJECTION, SOLUTION INTRAVENOUS at 14:29

## 2022-10-21 RX ADMIN — POTASSIUM CHLORIDE 10 MEQ: 7.46 INJECTION, SOLUTION INTRAVENOUS at 16:29

## 2022-10-21 RX ADMIN — POTASSIUM CHLORIDE 10 MEQ: 7.46 INJECTION, SOLUTION INTRAVENOUS at 06:20

## 2022-10-21 ASSESSMENT — ACTIVITIES OF DAILY LIVING (ADL)
ADLS_ACUITY_SCORE: 32
ADLS_ACUITY_SCORE: 36
ADLS_ACUITY_SCORE: 32
ADLS_ACUITY_SCORE: 36
ADLS_ACUITY_SCORE: 32
ADLS_ACUITY_SCORE: 36
ADLS_ACUITY_SCORE: 36
ADLS_ACUITY_SCORE: 32
ADLS_ACUITY_SCORE: 32
ADLS_ACUITY_SCORE: 36
ADLS_ACUITY_SCORE: 36
ADLS_ACUITY_SCORE: 32

## 2022-10-21 NOTE — CONSULTS
[facility]    Progress Note - [facility]    ______________________________________________________________________    [facility] 24/7 Contact Number: [phone]    - Providers: Please contact [facility] with changes in orders or clinical plan of care   - Nursing: Please contact [facility] with significant changes in patient condition  ______________________________________________________________________          Summary of Visit (includes assessment, medications and any new orders):   Met w/ patient and family this evening to discuss [facility] hospice. Patient is eligible for GIP per Dr. Camilo Caldwell. Discussed patient's wishes to transition home but O2 requirements prevent her from safely discharging. Discussed removal of BiPap and utilizing HFNC to improve quality of life for patient. Family understands prognosis and unlikely walters of returning home for EOL. Patient children are fearful of how patient's spouse recovery from recent CVA will be impaired by emotional stress of his wife. Patient's son wants to discuss w/ her spouses  to have a support plan in place for spouse before patient signs onto hospice. Son states he will contact [facility] clinical liaison in AM to coordinate signing of consents.     Thank you for the consult,       Kim Enriquez, RN  [phone]

## 2022-10-21 NOTE — PROGRESS NOTES
"O2 titrated to  4 lpm/Oxymizer, SpO2 90 %. BS diminished bilat. Duoneb tx given, tolerated well. Duoneb/mask tx given. Tolerated well. Loose np cough. BS unchanged after. RT to monitor.     /70   Pulse 90   Temp 99.3  F (37.4  C)   Resp 30   Ht 1.702 m (5' 7\")   Wt 44.4 kg (97 lb 14.2 oz)   SpO2 90%   BMI 15.33 kg/m      "

## 2022-10-21 NOTE — PLAN OF CARE
Problem: Plan of Care - These are the overarching goals to be used throughout the patient stay.    Goal: Plan of Care Review/Shift Note  Description: The Plan of Care Review/Shift note should be completed every shift.  The Outcome Evaluation is a brief statement about your assessment that the patient is improving, declining, or no change.  This information will be displayed automatically on your shift note.  Outcome: Progressing   Goal Outcome Evaluation:       [facility] - ICU    RN Progress Note:            Pertinent Assessments:      Please refer to flowsheet rows for full assessment     Alert and oriented. Vitals stable.          Mobility Level:     Turned and repositioned.         Key Events - This Shift:       Potassium replaced per protocol. Diet advanced to regular tolerated well.              Plan:     To recheck potassium at 1945.

## 2022-10-21 NOTE — PROGRESS NOTES
Care Management Follow Up    Length of Stay (days): 13    Expected Discharge Date: 10/26/2022     Concerns to be Addressed: all concerns addressed in this encounter     Patient plan of care discussed at interdisciplinary rounds: Yes    Anticipated Discharge Disposition:       Anticipated Discharge Services:    Anticipated Discharge DME:      Patient/family educated on Medicare website which has current facility and service quality ratings:    Education Provided on the Discharge Plan:    Patient/Family in Agreement with the Plan:      Referrals Placed by CM/SW:    Private pay costs discussed: Not applicable    Additional Information:  Discussed patient at ICU rounds.     RNCM followed up with Hospice team, plan to meet with patient.    12:25 PM - Updated patient accepted to [facility]. Hospice to meet and follow up with patient and family today.     Sammie Goyal RN

## 2022-10-21 NOTE — PROGRESS NOTES
"[facility]  Palliative Care Daily Progress Note       Recommendations & Counseling     Lavonne has been weaned to nasal cannula. Met with hospice team, qualifies for [facility] hospice but family not ready to sign on at this point.    I met with Lavonne and her family at bedside today. Challenging goals of care conversation given Lavonne's lack of insight (\"I'm not sick\") and  Jermaine's recent stroke (aphasic).   Key takeaways:  - Family would like to see how Lavonne does in the next few days before signing on to hospice or transitioning to comfort care  - Recommend transfer out of ICU for delirium management  - Consult PT at request of patient and family  - Discussed starting seroquel at night, family is in agreement  - Family is also amenable to continuing prn morphine for dyspnea/pain    Goals of Care: life-prolonging with limit of DNR/DNI    Advanced Care Planning:  Advance directive: No  POLST: No  Code status: DNR/DNI  Health care agent/surrogate: Frantz (son) along with Jess (daughter), Jermaine () recently had a stroke and has limited ability to communicate    Symptom Management:  #Delirium with anxiety, restlessness, hallucinations  - Stop prn ativan as may worsen delirium  - Start nightly seroquel 12.5mg  - Transfer out of ICU    #Dyspnea, tachypnea  - Continue prn morphine    Psychosocial & Spiritual:   Appreciate Palliative SW and Spiritual Health involvement      Assessments          Lavonne is a 68 y/o woman, active smoker, with history of lymphedema, polycythemia, and recent diagnosis of Covid infection, admitted to ICU on 10/8/2022 with acute respiratory failure requiring ventilation and circulatory shock requiring vasopressors. Respiratory failure attributed to CAP vs aspiration pneumonia with small PE. 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    Today, the patient was seen for:  Goals of care, family meeting, emotional support    Prognosis, Goals, or Advance Care Planning was " "addressed today with: Yes.  Mood, coping, and/or meaning in the context of serious illness were addressed today: Yes.              Interval History:     Chart review/discussion with unit or clinical team members:   - Per Hospice notes, Lavonne is eligible for [facility] hospice but family not ready to sign on at this point  - Received ativan x1 for restlessness/agitation  - Transitioned to HFNC then NC at 4L    Per patient or family/caregivers today:  Lavonne is calm and alert during my visit this morning. She denies any pain, dyspnea or anxiety. She recalls talking to hospice team and her family last night.     Called son Frantz and arranged to meet with him and family at bedside this afternoon. Frantz shared that Lavonne had a difficult night last night, has been hallucinating that \"they take me to alf every night and then back to the hospital.\" Also told Frantz she is seeing snakes in the bed.     Met with Frantz (son), Jess (daughter), and Jermaine () at Lavonne's bedside. Frantz shared that they have been discussing inpatient hospice, Lavonne is insistent on going home. I clarified with Lavonne that she is interested in hospice care, she said yes but she wants to go home. Lavonne mentioned several friends/family members who have been on home hospice. Explained that Lavonne would require a great deal of care at home right now which Jermaine is unable to provide as he is in [facility] for recent stroke. Lavonne said that then she wants to go home and she can take care of herself. Reminded Lavonne of long ICU stay and weakness, would need help at home. I also explained that Lavonne is still very sick, to which she replied \"I'm not sick.\" Lavonne believes she will get stronger and would like to get out of bed. She is open to working with physical therapy.    Spoke with Frantz and Jess outside the room. They were surprised that Lavonne said she wants to get stronger and try working with physical therapy. They would like her to be transferred out of the ICU and see how she does in the " next few days. If she is getting worse or not improving, they would consider comfort care or GIP hospice.    Discussed continuing prn medications for dyspnea or anxiety/agitation, Frantz and Jess agree with continuing. They are also amenable to scheduled bedtime seroquel to help with anxiety, agitation and delirium.    Discussed with Dr. Cain (ICU)     Key Palliative Symptoms:  Denies dyspnea, anxiety or pain           Review of Systems:     3 system review negative          Medications:     I have reviewed this patient's medication profile and medications during this hospitalization.    Noted meds:    Current Facility-Administered Medications   Medication     dextrose 10% infusion     glucose gel 15-30 g    Or     dextrose 50 % injection 25-50 mL    Or     glucagon injection 1 mg     [Held by provider] furosemide (LASIX) injection 20 mg     glycopyrrolate (ROBINUL) injection 0.2 mg     heparin infusion 25,000 units in D5W 250 mL ANTICOAGULANT     influenza vac high-dose quad (FLUZONE HD) injection KEVIN 0.7 mL     insulin aspart (NovoLOG) injection (RAPID ACTING)     ipratropium - albuterol 0.5 mg/2.5 mg/3 mL (DUONEB) neb solution 3 mL     lactulose (CHRONULAC) solution 20 g     LORazepam (ATIVAN) injection 1 mg    Or     LORazepam (ATIVAN) tablet 1 mg     LubriFresh P.M. OINT     morphine (PF) injection 1-2 mg     morphine solution 2.5-5 mg     naloxone (NARCAN) injection 0.1 mg     naloxone (NARCAN) injection 0.2 mg     naloxone (NARCAN) injection 0.2 mg    Or     naloxone (NARCAN) injection 0.4 mg    Or     naloxone (NARCAN) injection 0.2 mg    Or     naloxone (NARCAN) injection 0.4 mg     norepinephrine (LEVOPHED) 4 mg in  mL infusion PREMIX     ondansetron (ZOFRAN ODT) ODT tab 4 mg    Or     ondansetron (ZOFRAN) injection 4 mg     ondansetron (ZOFRAN) injection 4 mg     polyethylene glycol (MIRALAX) Packet 17 g     [Held by provider] QUEtiapine (SEROquel) tablet 50 mg     thiamine (B-1) tablet 100 mg      "vasopressin (VASOSTRICT) 20 Units in sodium chloride 0.9 % 100 mL standard conc infusion                Physical Exam:   Vital Signs: Blood pressure 103/70, pulse 91, temperature 99  F (37.2  C), resp. rate (!) 31, height 1.702 m (5' 7\"), weight 44.4 kg (97 lb 14.2 oz), SpO2 92 %.   GENERAL: on HFNC, awake and alert, answering questions appropriately  SKIN: Warm and dry   HEENT: +temporal muscle wasting  ABDOMINAL: firm, non distended, nontender  MUSKL: no gross joint deformities   EXTREMITIES: No edema              Data Reviewed:     Reviewed recent pertinent imaging:   No new imaging    Reviewed recent labs:   [facility]  Recent Labs   Lab 10/21/22  1120 10/21/22  0918 10/21/22  0810 10/21/22  0622 10/21/22  0518 10/21/22  0051 10/20/22  2238 10/20/22  1657 10/20/22  1458 10/20/22  0743 10/20/22  0642 10/19/22  0809 10/19/22  0426 10/18/22  0436 10/18/22  0434 10/17/22  0427 10/17/22  0426   NA  --   --   --   --  136  --   --   --   --   --  136  --  136  --  140  --  139   POTASSIUM  --   --   --   --  3.1*  3.1*  --  3.1*  --  3.2*  --  2.8*  --  3.7  --  3.8  --  4.4   CHLORIDE  --   --   --   --  92*  --   --   --   --   --  87*  --  87*  --  94*  --  99   CO2  --   --   --   --  36*  --   --   --   --   --  35*  --  38*  --  35*  --  30*   ANIONGAP  --   --   --   --  8  --   --   --   --   --  14  --  11  --  11  --  10   * 110* 67* 75 76  69*   < >  --    < >  --    < > 126*   < > 105*   < > 139*   < > 155*   BUN  --   --   --   --  27.2*  --   --   --   --   --  31.8*  --  27.1*  --  24.1*  --  20.2   CR  --   --   --   --  0.67  --   --   --   --   --  0.72  --  0.80  --  0.77  --  0.59   GFRESTIMATED  --   --   --   --  >90  --   --   --   --   --  >90  --  80  --  84  --  >90   JASON  --   --   --   --  9.5  --   --   --   --   --  9.0  --  9.4  --  9.4  --  9.0   MAG  --   --   --   --  2.1  --   --   --   --   --  2.2  --  2.0  --  2.2  --  2.1   PHOS  --   --   --   --   --   --   --   --   --   --  " 3.4  --  5.4*  --  4.4  --  3.4   PROTTOTAL  --   --   --   --   --   --   --   --   --   --  6.4  --  6.8  --  6.5  --   --    ALBUMIN  --   --   --   --   --   --   --   --   --   --  3.1*  --  3.1*  --  3.0*  --   --    BILITOTAL  --   --   --   --   --   --   --   --   --   --  0.6  --  0.7  --  0.7  --   --    ALKPHOS  --   --   --   --   --   --   --   --   --   --  64  --  73  --  72  --   --    AST  --   --   --   --   --   --   --   --   --   --  14  --  23  --  20  --   --    ALT  --   --   --   --   --   --   --   --   --   --  16  --  23  --  20  --   --     < > = values in this interval not displayed.     CBC  Recent Labs   Lab 10/21/22  0518 10/20/22  0642 10/19/22  0426 10/18/22  0434   WBC 11.9* 10.8 13.1* 9.9   RBC 4.51 4.20 4.76 4.54   HGB 14.1 13.1 14.8 14.1   HCT 42.2 39.1 44.9 42.8   MCV 94 93 94 94   MCH 31.3 31.2 31.1 31.1   MCHC 33.4 33.5 33.0 32.9   RDW 14.3 14.3 14.5 14.6    342 213 294           TTS: I have personally spent a total of 90 minutes today on the unit in review of medical record, consultation with the medical providers and assessment of patient today, with more than 50% of this time spent in counseling, coordination of care, and conversation in a family meeting re: diagnostic results, prognosis, symptom management, risks and benefits of management options,  emotional support and development of plan of care.     Jessica Richards PA-C  [facility], Palliative Care  Dep phone: [phone]  Securely message with the Vocera Web Console

## 2022-10-21 NOTE — PROGRESS NOTES
"O 2 5 lpm/NC, SpO2 90 %. BS diminished bilat, Duoneb tx given. Tolerated well. BS same after. RT to monitor    /70   Pulse 92   Temp 99.5  F (37.5  C)   Resp 29   Ht 1.702 m (5' 7\")   Wt 44.4 kg (97 lb 14.2 oz)   SpO2 (!) 89%   BMI 15.33 kg/m      "

## 2022-10-21 NOTE — PROGRESS NOTES
Pt remains on HFNC 40 lpm 68-70% 02 sating 97% RR 23 HR 95. Neb given x 1. Rt continue to monitor.

## 2022-10-21 NOTE — PLAN OF CARE
"[facility] - ICU    RN Progress Note:            Pertinent Assessments:      Please refer to flowsheet rows for full assessment     VSS. On HFNC 40L 60%. LS coarse, diminished. Respirations shallow. RR tachypneic. Denied pain. PRN Ativan given x1 for restlessness. Patient disorientated to situation and confused/forgetful. Rectal tube removed at beginning of shift per patient request. Patient had x5 large liquid stool incontinence.           Mobility Level:     2- Q2 turns in bed.          Key Events - This Shift:     Patient expressed multiple times that she wanted to go home.               Plan:     Possible transition to comfort cares vs. Hospice tomorrow pending family decision.          Point of Contact Update YES-OR-NO: Yes  Name: hyacinth Mercado   Phone Number: [phone]  Summary of Conversation: Updated daughter Jess about patient. Per patient, \"I am going home and my daughter is going to pick me up.\" Per daughter, that is not the case. Family wants patient to stay overnight and keep her safe while family determines the plan of care for tomorrow. Per family, patient confused, hallucinating, and did not recognize own family members. Notified Jess that RN will update her and family with any changes.          "

## 2022-10-21 NOTE — PROGRESS NOTES
PULMONARY / CRITICAL CARE PROGRESS NOTE    Date / Time of Admission:  10/8/2022  9:38 AM    Assessment:     Lavonne Talbot is a 67 year old cachectic female with history of severe emphysema, tobacco user.  Recent diagnosis of COVID-19 viral infection 1 week ago who presented to [facility] ED on 10/8/2022 due to respiratory distress now admitted to the ICU with acute respiratory failure requiring mechanical ventilation. Patient was treated for H influenza pneumonia and pulmonary embolism. Echocardiogram showed reduced EF 30-35%, per cardiology stress induced cardiomyopathy.   N/V/abdominal distention. CT scan showed dilated bowel loops.    1. Acute respiratory failure s/p intubation 10/8/22  Increase O2 requirements after episode of N/V.  Extubated to BiPAP on 10/20, did not like BiPAP, now on HFNC  2. H influenzae pneumonia  3. Pulmonary embolism   4. COVID19 viral infection diagnosed on 10/8/22  5. HFrEF, EF 30-35%. HFrEF c/b shock, New diagnosis - ? Stress induced in the setting of hypoxic respiratory failure - repeat echo once underlying stressors have been resolved; will likely need ischemic evaluation for further assessment. Echocardiogram showed reduced EF 30-35%, per cardiology stress induced cardiomyopathy  6. COPD, severe emphysematous changes and pulmonary cachexia  7. Hypokalemia. K <3.5.  8. Ileus / Enterocolitis   LFTs and lipase are within normal limits. CT scan showed no bowel obstruction. Advancing diet.  9. Splenic infarct   Unclear etiology.   Radiology suggested paradoxical emboli related to PFO. Patient is currently anticoagulated for PE.  10. Malnutrition Body mass index is 15.33 kg/m .   11. Ongoing tobacco use    Advance Directives: DNR/DNI    Plan:   1. DNR/DNI  2. Patient declines BiPAP, using HFNC  3. Patient would like to be home with hospice. Can trial low-flow oxygen to determine the feasibility of discharge home with hospice enrollment  4. Schedule bronchodilators  5. Heplock IV  fluids   6. Hold furosemide; appears euvolemic  7. Follow up tracheal culture  8. Follow up blood cultures   9. Completed abx for H Influenzae tracheobronchitis.    10. Monitor kidney function, supplement electrolytes as needed  11. Completed course of decadron for covid-19  12. Passed bedside swallow, will advance diet  13. Stop PPI - extubated  14. Glucose level monitoring  15. DVT prophylaxis heparin drip, if home with hospice could continue anticoagulation or not depending on patient preference (debatable palliative benefit to anticoagulation)  16. Patient to transfer out of ICU. Will follow on pulmonary consult service but sign out to hospitalist service.    Please contact me if you have any questions.  Total care time, not including separately billable procedure time: 40 minutes  This patient had a high probability of imminent or life threatening deterioration due to acute respiratory failure which required my direct attention, intervention and personal management.     Jhon Cain MD  Pulmonary and Critical Care Medicine  [facility]  Pager [phone]  Office [phone]  he/him/his        ICU DAILY CHECKLIST                         Can patient transfer out of MICU? no    FAST HUG:    Feeding:  Feeding: NO Patient is receiving NPO  Mon[place]: yes  Analgesia/Sedation:yes  Precedex, fentanyl, propofol  Thromboembolic prophylaxis: Yes; Mode:  Heparin  HOB>30:  Yes  Stress Ulcer Protocol Active: Yes; Mode: PPI  Glycemic Control: Any glucose > 180 no; Mode of Insulin Therapy: Sliding Scale Insulin    INTUBATED:  Can patient have daily waking:  Yes  Can patient have spontaneous breathing trial:  Yes    Restraints? yes    PHYSICAL THERAPY AND MOBILITY:  Can patient have PT and mobility trial: no  Activity: bedrest    Subjective:   HPI:  Lavonne Talbot is a 67 year old cachectic female with history of severe emphysema, tobacco user.  Recent diagnosis of COVID-19 viral infection 1 week ago who presented  to [facility] ED on 10/8/2022 due to respiratory distress now admitted to the ICU with acute respiratory failure requiring mechanical ventilation. Patient was treated for H influenza pneumonia and pulmonary embolism. Echocardiogram showed reduced EF 30-35%, per cardiology stress induced cardiomyopathy.     Events overnight   - no new events  - denies dyspnea, pain, anxiety  - patient reaffirms desire to go home with hospice enrollment    Allergies: Patient has no known allergies.     MEDS:  Current Facility-Administered Medications   Medication     dextrose 10% infusion     glucose gel 15-30 g    Or     dextrose 50 % injection 25-50 mL    Or     glucagon injection 1 mg     furosemide (LASIX) injection 20 mg     glycopyrrolate (ROBINUL) injection 0.2 mg     heparin infusion 25,000 units in D5W 250 mL ANTICOAGULANT     influenza vac high-dose quad (FLUZONE HD) injection KEVIN 0.7 mL     insulin aspart (NovoLOG) injection (RAPID ACTING)     ipratropium - albuterol 0.5 mg/2.5 mg/3 mL (DUONEB) neb solution 3 mL     lactulose (CHRONULAC) solution 20 g     LORazepam (ATIVAN) injection 1 mg    Or     LORazepam (ATIVAN) tablet 1 mg     LubriFresh P.M. OINT     morphine (PF) injection 1-2 mg     morphine solution 2.5-5 mg     naloxone (NARCAN) injection 0.1 mg     naloxone (NARCAN) injection 0.2 mg     naloxone (NARCAN) injection 0.2 mg    Or     naloxone (NARCAN) injection 0.4 mg    Or     naloxone (NARCAN) injection 0.2 mg    Or     naloxone (NARCAN) injection 0.4 mg     norepinephrine (LEVOPHED) 4 mg in  mL infusion PREMIX     ondansetron (ZOFRAN ODT) ODT tab 4 mg    Or     ondansetron (ZOFRAN) injection 4 mg     ondansetron (ZOFRAN) injection 4 mg     polyethylene glycol (MIRALAX) Packet 17 g     [Held by provider] QUEtiapine (SEROquel) tablet 50 mg     thiamine (B-1) tablet 100 mg     vasopressin (VASOSTRICT) 20 Units in sodium chloride 0.9 % 100 mL standard conc infusion     Objective:   VITALS:  /70  "  Pulse 97   Temp 99.3  F (37.4  C)   Resp 28   Ht 1.702 m (5' 7\")   Wt 44.4 kg (97 lb 14.2 oz)   SpO2 93%   BMI 15.33 kg/m    VENT:  Vent Mode: CPAP/PS  (Continuous positive airway pressure with Pressure Support)  FiO2 (%): 60 %  Resp Rate (Set): 16 breaths/min  PEEP (cm H2O): 8 cmH2O  Pressure Support (cm H2O): 12 cmH2O  Inspiratory Pressure (cm H2O) (Drager Blanquita): 22  Resp: 28    EXAM:   Gen: sedated, arousable, vented  HEENT: pale conjunctiva, moist mucosa  Neck: no thyromegaly, masses or JVD  Lungs: decrease breath sound both HT, discrete ronchi both HT  CV: regular, no murmurs or gallops appreciated  Abdomen: distended, NT, BS present with metallic sound   Ext: trace edema LEs  Neuro: sedated, arousable, non focal     Data Review:  Recent Labs   Lab 10/21/22  0918 10/21/22  0810 10/21/22  0622 10/21/22  0518 10/21/22  0051   * 67* 75 76  69* 78     Tracheal Culture 1+ Normal margaret       2+ Haemophilus influenzae Abnormal           10/19/22 04:26 10/19/22 08:09 10/19/22 09:21   Sodium 136     Potassium 3.7     Chloride 87 (L)     Carbon Dioxide (CO2) 38 (H)     Urea Nitrogen 27.1 (H)     Creatinine 0.80     GFR Estimate 80     Calcium 9.4     Anion Gap 11     Magnesium 2.0     Phosphorus 5.4 (H)     Albumin 3.1 (L)     Protein Total 6.8     Alkaline Phosphatase 73     ALT 23     AST 23     Bilirubin Direct 0.28     Bilirubin Total 0.7     CRP Inflammation 96.10 (H)     Glucose 105 (H)     Lipase 14     GLUCOSE BY METER POCT  129 (H)    pH Arterial   7.48 (H)   pCO2 Arterial   57 (H)   PO2 Arterial   104 (H)   Bicarbonate Arterial   39 (H)   Base Excess Art   18.2   FIO2   0.7   Oxyhemoglobin   96.8 (H)     CT ABDOMEN PELVIS W CONTRAST  DATE/TIME: 10/19/2022 9:42 AM  INDICATION: Distended abdomen, nausea and vomiting.  COMPARISON: CTAs chest 10/13/2022 and 10/8/2022 and abdominal radiograph 10/08/2022.  FINDINGS:   LOWER CHEST: Focal consolidation posterior basilar segment left lower lobe could " represent evolving pneumonia and/or infarct. Extensive mucosal thickening and endoluminal secretions throughout the visualized bronchi and peribronchial opacities consistent with acute bronchial inflammation and areas of peribronchial pneumonitis similar. Lungs are hyperinflated with emphysematous change. Visualized lungs are clear. No pleural effusion. Heart size normal with no pericardial effusion.   HEPATOBILIARY: Liver is normal.  No calcified gallstones.Mild bile duct dilatation to the level of the ampulla with no radiodense stone or mass probably normal variant.   PANCREAS: Possible pancreatic divisum, pancreas otherwise normal.  SPLEEN: Wedge-shaped perfusion defect lateral aspect mid to upper spleen is most likely an acute infarct with posttraumatic contusion possible in the appropriate clinical setting. Spleen size normal.  ADRENAL GLANDS: Normal.  KIDNEY/BLADDER: Monaco catheter in the bladder. Several diminutive benign renal cysts. No follow up is needed. Kidneys, ureters and bladder are otherwise normal.  BOWEL: Well-positioned nasogastric tube with complete decompression of the stomach, duodenum and proximal half of the jejunum. Moderate to marked fluid and gas distention of the distal jejunum, ileum, colon and rectum with no transition point or obstructing mass. Incidental probable 1 cm polyp distal transverse colon (axial image 92 of series 2). Trace abdominal and pelvic ascites and generalized mesenteric edema.  LYMPH NODES: No lymphadenopathy.  VASCULATURE: Normal caliber abdominal aorta with patent mesenteric and renal arteries.    PELVIC ORGANS: Bilateral adnexal enlargement measuring up to 5 cm on the right and 3 cm on the left. Recommend pelvic ultrasound.  MUSCULOSKELETAL: Bones are demineralized. Generalized subcutaneous edema.  IMPRESSION:   1.  Moderate to marked fluid and gas distention of the distal jejunum, ileum, colon and rectum with no transition point or obstructing mass compatible  with severe adynamic ileus and/or a nonspecific enterocolitis. No free air or pneumatosis.  2.  Wedge-shaped perfusion defect lateral aspect mid to upper spleen is most likely an acute infarct with posttraumatic contusion possible in the appropriate clinical setting. Etiology unclear but in the setting of recently diagnosed pulmonary emboli,   paradoxical emboli related to PFO should be considered.  3.  Trace abdominal and pelvic ascites and generalized mesenteric and subcutaneous edema.  4.  Bilateral adnexal enlargement measuring up to 5 cm on the right and 3 cm on the left. Recommend pelvic ultrasound.  5.  Incidental probable 1 cm polyp distal transverse colon.    Echocardiogram 10/9/2022  Interpretation Summary   1. Left ventricular size and wall thickness are normal. Systolic function is moderately reduced with global hypokinesis. The estimated left ventricular ejection fraction is 30-35%.  2. Right ventricle is not well visualized. Limited views suggest possible enlargement and reduced systolic function.  3. No hemodynamically significant valvular abnormalities.  4. No prior study available for comparison.    CT CHEST PULMONARY EMBOLISM W CONTRAST  LOCATION: [facility]  DATE/TIME: 10/13/2022 1:03 PM  INDICATION: Acute respiratory decompensation worsening hypoxemia  COMPARISON: CT pulmonary angiogram 10/8/2022  FINDINGS:  ANGIOGRAM CHEST: The main pulmonary artery remains normal in size. The central pulmonary arteries remain patent. A subsegmental pulmonary embolism in the left lower lobe superior segment 10/8/2022 has resolved (series 4, image 181). However, there is a   new low-attenuation filling defect within proximal subsegmental level pulmonary arteries in the right lower lobe (series 4, image 236) and a peripheral subsegmental filling defect in the posterior basal left lower lobe (series 4, image 276). Normal caliber thoracic aorta. Unchanged mild arch, proximal great vessel and  descending aorta atheromatous calcifications. No acute aortic syndrome. No CT evidence of right heart strain.  LUNGS AND PLEURA: Endotracheal tube is in satisfactory position. Central airway wall thickening is unchanged. Debris within the segmental and proximal subsegmental lower lobe airways is slightly improved from 5 days earlier, however peripheral subsegmental airway debris in bilateral lower lobe airways which produce tree-in-bud opacity and all segments of the left lower lobe and the basilar segments of the right lower lobe have developed.. The extent of consolidation in the posterior basal left lower but is mildly increased from 5 days earlier, while inflammation and atelectasis in the left lateral costophrenic sulcus has decreased.. No pleural effusion is present.  MEDIASTINUM: Cardiac chambers remain normal in size. There is no pericardial effusion. No lymphadenopathy. Gastric tube courses through the decompressed esophagus terminating in the stomach body. Right midline catheter terminates in the lateral right subclavian vein and right jugular approach central venous catheter terminates in the SVC.  CORONARY ARTERY CALCIFICATION: Mild.  UPPER ABDOMEN: No new findings in the imaged upper abdomen.  MUSCULOSKELETAL: Generalized demineralization of the bones. There is subtle superior endplate deformity of L1. There is a paucity of subcutaneous fat. No new bone or chest wall soft tissue abnormalities.                                          IMPRESSION:  1.  Subsegmental pulmonary embolism in the left lower lobe 5 days earlier has resolved. 2 new subsegmental pulmonary emboli are present in the lower lobes as described.  2.  Mild worsening of focal consolidation in the posterior basal left lower lobe and development of peripheral airway debris/tree-in-bud opacity in both lower lobes consistent with bronchopneumonia/cellular bronchiolitis.  3.  Advanced emphysema.

## 2022-10-21 NOTE — CONSULTS
"[facility]    Progress Note - [facility]    ______________________________________________________________________    [facility] 24/7 Contact Number: [phone]    - Providers: Please contact [facility] with changes in orders or clinical plan of care   - Nursing: Please contact [facility] with significant changes in patient condition  ______________________________________________________________________          Summary of Visit (includes assessment, medications and any new orders):   Saw patient at bedside. RT is transitioning patient onto regular NC. Called patient's son \"Frantz\" and stated if they want to bring patient home, there is a small window and hospice could assist w/ discharging her home today. Son stated family no longer wants to bring patient home, as patient spouse is recovering from CVA and both kids work full time. Discussed signing consents for IP hospice to manage patients current symptoms. Frantz stated he wants to talk to his mom first to confirm that this is her wish. Will be in this afternoon and hospice will attempt to get consents signed for IP hospice. Hospice will also provide information on hospice homes, LTC facilities if patient were to stabilize in the hospital.     Update 2:24PM: Met w/ patient son, daughter, and spouse to discuss plan of care. Family is open to hospice in the community setting but are unsure of private paying ability, since the children do not have access to their parents finances. We discussed IP hospice versus hospice in the community setting. Explained that patient is eligible for GIP and we can continue discharge planning during hospitalization to find the right location for EOL for Lavonne. Family overwhelmed at this time and not ready to sign consents for hospice. Did allow writer to send a referral to [facility] home. Hospice also recommended to primary MD to discuss w/ family about transitioning " onto comfort cares since patient is not yet signed onto hospice. Will update care coordinators on progress on OLOP referral.        Kim Enriquez RN  [phone]

## 2022-10-22 ENCOUNTER — APPOINTMENT (OUTPATIENT)
Dept: PHYSICAL THERAPY | Facility: HOSPITAL | Age: 67
DRG: 207 | End: 2022-10-22
Attending: PHYSICIAN ASSISTANT
Payer: COMMERCIAL

## 2022-10-22 LAB
ANION GAP SERPL CALCULATED.3IONS-SCNC: 6 MMOL/L (ref 7–15)
BUN SERPL-MCNC: 16.6 MG/DL (ref 8–23)
CALCIUM SERPL-MCNC: 9.2 MG/DL (ref 8.8–10.2)
CHLORIDE SERPL-SCNC: 97 MMOL/L (ref 98–107)
CREAT SERPL-MCNC: 0.54 MG/DL (ref 0.51–0.95)
DEPRECATED HCO3 PLAS-SCNC: 39 MMOL/L (ref 22–29)
ERYTHROCYTE [DISTWIDTH] IN BLOOD BY AUTOMATED COUNT: 14.3 % (ref 10–15)
GFR SERPL CREATININE-BSD FRML MDRD: >90 ML/MIN/1.73M2
GLUCOSE BLDC GLUCOMTR-MCNC: 102 MG/DL (ref 70–99)
GLUCOSE BLDC GLUCOMTR-MCNC: 114 MG/DL (ref 70–99)
GLUCOSE BLDC GLUCOMTR-MCNC: 151 MG/DL (ref 70–99)
GLUCOSE BLDC GLUCOMTR-MCNC: 77 MG/DL (ref 70–99)
GLUCOSE BLDC GLUCOMTR-MCNC: 90 MG/DL (ref 70–99)
GLUCOSE SERPL-MCNC: 82 MG/DL (ref 70–99)
HCT VFR BLD AUTO: 40.4 % (ref 35–47)
HGB BLD-MCNC: 13.3 G/DL (ref 11.7–15.7)
MAGNESIUM SERPL-MCNC: 1.8 MG/DL (ref 1.7–2.3)
MCH RBC QN AUTO: 31.6 PG (ref 26.5–33)
MCHC RBC AUTO-ENTMCNC: 32.9 G/DL (ref 31.5–36.5)
MCV RBC AUTO: 96 FL (ref 78–100)
PHOSPHATE SERPL-MCNC: 2.5 MG/DL (ref 2.5–4.5)
PLATELET # BLD AUTO: 382 10E3/UL (ref 150–450)
POTASSIUM SERPL-SCNC: 3.3 MMOL/L (ref 3.4–5.3)
POTASSIUM SERPL-SCNC: 3.3 MMOL/L (ref 3.4–5.3)
POTASSIUM SERPL-SCNC: 3.7 MMOL/L (ref 3.4–5.3)
RBC # BLD AUTO: 4.21 10E6/UL (ref 3.8–5.2)
SODIUM SERPL-SCNC: 142 MMOL/L (ref 136–145)
UFH PPP CHRO-ACNC: 0.34 IU/ML
WBC # BLD AUTO: 8.5 10E3/UL (ref 4–11)

## 2022-10-22 PROCEDURE — 258N000001 HC RX 258: Performed by: HOSPITALIST

## 2022-10-22 PROCEDURE — 84100 ASSAY OF PHOSPHORUS: CPT | Performed by: INTERNAL MEDICINE

## 2022-10-22 PROCEDURE — 85520 HEPARIN ASSAY: CPT | Performed by: INTERNAL MEDICINE

## 2022-10-22 PROCEDURE — 99207 PR CDG-CUT & PASTE-POTENTIAL IMPACT ON LEVEL: CPT | Performed by: INTERNAL MEDICINE

## 2022-10-22 PROCEDURE — 83735 ASSAY OF MAGNESIUM: CPT | Performed by: INTERNAL MEDICINE

## 2022-10-22 PROCEDURE — 80048 BASIC METABOLIC PNL TOTAL CA: CPT | Performed by: INTERNAL MEDICINE

## 2022-10-22 PROCEDURE — 999N000157 HC STATISTIC RCP TIME EA 10 MIN

## 2022-10-22 PROCEDURE — 94640 AIRWAY INHALATION TREATMENT: CPT | Mod: 76

## 2022-10-22 PROCEDURE — 99233 SBSQ HOSP IP/OBS HIGH 50: CPT | Performed by: NURSE PRACTITIONER

## 2022-10-22 PROCEDURE — 120N000001 HC R&B MED SURG/OB

## 2022-10-22 PROCEDURE — 94640 AIRWAY INHALATION TREATMENT: CPT

## 2022-10-22 PROCEDURE — 99232 SBSQ HOSP IP/OBS MODERATE 35: CPT | Performed by: INTERNAL MEDICINE

## 2022-10-22 PROCEDURE — 85027 COMPLETE CBC AUTOMATED: CPT | Performed by: INTERNAL MEDICINE

## 2022-10-22 PROCEDURE — 250N000011 HC RX IP 250 OP 636: Performed by: INTERNAL MEDICINE

## 2022-10-22 PROCEDURE — 999N000215 HC STATISTIC HFNC ADULT NON-CPAP

## 2022-10-22 PROCEDURE — 250N000013 HC RX MED GY IP 250 OP 250 PS 637: Performed by: INTERNAL MEDICINE

## 2022-10-22 PROCEDURE — 84132 ASSAY OF SERUM POTASSIUM: CPT | Performed by: INTERNAL MEDICINE

## 2022-10-22 PROCEDURE — 97162 PT EVAL MOD COMPLEX 30 MIN: CPT | Mod: GP

## 2022-10-22 PROCEDURE — 250N000009 HC RX 250: Performed by: INTERNAL MEDICINE

## 2022-10-22 PROCEDURE — 97530 THERAPEUTIC ACTIVITIES: CPT | Mod: GP

## 2022-10-22 RX ORDER — POTASSIUM CHLORIDE 1.5 G/1.58G
20 POWDER, FOR SOLUTION ORAL ONCE
Status: COMPLETED | OUTPATIENT
Start: 2022-10-22 | End: 2022-10-22

## 2022-10-22 RX ORDER — POTASSIUM CHLORIDE 7.45 MG/ML
10 INJECTION INTRAVENOUS
Status: DISCONTINUED | OUTPATIENT
Start: 2022-10-22 | End: 2022-10-22 | Stop reason: CLARIF

## 2022-10-22 RX ORDER — DEXTROSE MONOHYDRATE 100 MG/ML
INJECTION, SOLUTION INTRAVENOUS CONTINUOUS
Status: DISCONTINUED | OUTPATIENT
Start: 2022-10-22 | End: 2022-10-25

## 2022-10-22 RX ORDER — MAGNESIUM SULFATE HEPTAHYDRATE 40 MG/ML
2 INJECTION, SOLUTION INTRAVENOUS ONCE
Status: COMPLETED | OUTPATIENT
Start: 2022-10-22 | End: 2022-10-22

## 2022-10-22 RX ADMIN — QUETIAPINE FUMARATE 12.5 MG: 25 TABLET ORAL at 21:49

## 2022-10-22 RX ADMIN — POTASSIUM CHLORIDE 20 MEQ: 1.5 POWDER, FOR SOLUTION ORAL at 04:46

## 2022-10-22 RX ADMIN — IPRATROPIUM BROMIDE AND ALBUTEROL SULFATE 3 ML: 2.5; .5 SOLUTION RESPIRATORY (INHALATION) at 07:37

## 2022-10-22 RX ADMIN — POTASSIUM CHLORIDE 10 MEQ: 7.46 INJECTION, SOLUTION INTRAVENOUS at 00:06

## 2022-10-22 RX ADMIN — DEXTROSE MONOHYDRATE: 100 INJECTION, SOLUTION INTRAVENOUS at 04:45

## 2022-10-22 RX ADMIN — IPRATROPIUM BROMIDE AND ALBUTEROL SULFATE 3 ML: 2.5; .5 SOLUTION RESPIRATORY (INHALATION) at 12:54

## 2022-10-22 RX ADMIN — IPRATROPIUM BROMIDE AND ALBUTEROL SULFATE 3 ML: 2.5; .5 SOLUTION RESPIRATORY (INHALATION) at 19:50

## 2022-10-22 RX ADMIN — IPRATROPIUM BROMIDE AND ALBUTEROL SULFATE 3 ML: 2.5; .5 SOLUTION RESPIRATORY (INHALATION) at 16:51

## 2022-10-22 RX ADMIN — MAGNESIUM SULFATE HEPTAHYDRATE 2 G: 40 INJECTION, SOLUTION INTRAVENOUS at 05:07

## 2022-10-22 RX ADMIN — HEPARIN SODIUM AND DEXTROSE 1400 UNITS/HR: 10000; 5 INJECTION INTRAVENOUS at 13:58

## 2022-10-22 RX ADMIN — ONDANSETRON 4 MG: 2 INJECTION INTRAMUSCULAR; INTRAVENOUS at 04:46

## 2022-10-22 ASSESSMENT — ACTIVITIES OF DAILY LIVING (ADL)
ADLS_ACUITY_SCORE: 32

## 2022-10-22 NOTE — PROGRESS NOTES
RT called 1732 as patients oxygen would not maintain above 88% of 7 liter nasal canula. RT assessed and placed patient on 10lpm oxymask and titrated to 15lpm oxymask. The probe was replaced and multiple sites, forehead, toe and finger where checked and oxygen saturations where 85-87%. Patient is currently on 15lpm oximer nasal canula at 87-89%. Provider paged due to increase in oxygen needs. RT requested to transition to high flow.    Leah Crawford, RT

## 2022-10-22 NOTE — PROGRESS NOTES
Respiratory Therapy Progress note  10/22/2022  [id number]   Shift      Duonebs given QID per orders. BS diminished pre and post. Patient tolerated nebs well, but acknowledges that she can not always tell if they help her breathing. HR 85-90, RR 24-26, and oxygen saturation 90-93% on 5 L NC.    Gisell Walton, ANNETTE, RRT, CTTS

## 2022-10-22 NOTE — PROGRESS NOTES
Patient transferred to unit P2 room 222. Report given to receiving RN  at bedside aware of transfer. Belonging sent with patient.

## 2022-10-22 NOTE — PLAN OF CARE
Problem: Plan of Care - These are the overarching goals to be used throughout the patient stay.    Goal: Plan of Care Review/Shift Note  Description: The Plan of Care Review/Shift note should be completed every shift.  The Outcome Evaluation is a brief statement about your assessment that the patient is improving, declining, or no change.  This information will be displayed automatically on your shift note.  Outcome: Adequate for Care Transition  Flowsheets (Taken 10/22/2022 1454)  Plan of Care Reviewed With:   patient   spouse   child  Overall Patient Progress: improving  Goal: Absence of Hospital-Acquired Illness or Injury  Intervention: Identify and Manage Fall Risk  Recent Flowsheet Documentation  Taken 10/22/2022 1200 by Preeti Altman, RN  Safety Promotion/Fall Prevention: fall prevention program maintained  Taken 10/22/2022 0800 by Preeti Altman, RN  Safety Promotion/Fall Prevention: fall prevention program maintained   Goal Outcome Evaluation:      Plan of Care Reviewed With: patient, spouse, child    Overall Patient Progress: improvingOverall Patient Progress: improving  Pt is still on 5L NC and sating withing the 87-92 parameter. PT sat pt bedside and she tolerated it well-- still needs coaching to eat james--had scrambled eggs and a piece of toast--drinking well

## 2022-10-22 NOTE — CONSULTS
[facility]  Consult Note - Hospitalist Service  Date of Admission:  10/8/2022  Consult Requested by: Dr. Cain  Reason for Consult     Assessment & Plan   Lavonne Talbot is a 67 year old female with history of severe emphysema, tobacco use disorder, lymphedema on furosemide, and recent COVID-19 infection admitted to intensive care unit on 10/8/2022 due to acute hypoxic respiratory failure secondary to community-acquired pneumonia/aspiration pneumonia, small subsegmental pulmonary embolism, circulatory shock requiring vasopressors and possible myocardial injury.  She was intubated on 10/8/2022 and extubated to BiPAP on 10/20/2022.    She completed antibiotic therapy for haemophilus influenza tracheobronchitis while in the ICU.  She also completed course of dexamethasone in the ICU.  Blood culture and tracheal fluid cultures are pending.  She received furosemide briefly for possible pulmonary edema; now held as patient is euvolemic.  As per intensivist, patient prefers going home with hospice.    Severe COPD  Hypoxic respiratory failure requiring mechanical ventilation  Haemophilus influenza tracheobronchitis  Extubated to BiPAP on 10/20/2022  Continue bronchodilators as scheduled  Continue to hold furosemide for now as patient is euvolemic  Follow-up blood culture and tracheal fluid culture  Off antibiotics  Patient prefers being discharged home with hospice.  Follow-up pulmonology consult  Continue supplemental oxygen    Subsegmental pulmonary embolism  Continue heparin drip for now  Will need further discussion as regards anticoagulation as patient is leaning towards home hospice.    Recent COVID-19 infection  Completed course of dexamethasone  Of COVID isolation precautions    Cachexia  Severe malnutrition  Likely secondary to COPD cachexia  Follow-up consult to nutritionist    Hypokalemia  Replace as per protocol  Monitor potassium level      The patient's care was discussed with the  "Patient.    Edd Carrizales MD  [facility]  Securely message with the [employer] Web Console (learn more here)  Text page via [facility] Paging/[facility]       Hospitalist Service    Clinically Significant Risk Factors        # Hypokalemia: Lowest K = 2.8 mmol/L (Ref range: 3.4-5.3) in last 2 days, will replace as needed    # Hypercalcemia: corrected calcium is >10.1, will monitor as appropriate    # Hypoalbuminemia: Lowest albumin = 2.6 g/dL (Ref range: 3.5-5.2) at 10/8/2022 11:32 AM, will monitor as appropriate          # Cachexia: Estimated body mass index is 15.33 kg/m  as calculated from the following:    Height as of this encounter: 1.702 m (5' 7\").    Weight as of this encounter: 44.4 kg (97 lb 14.2 oz).   # Severe Malnutrition: based on nutrition assessment        ______________________________________________________________________    Chief Complaint   Acute hypoxic respiratory failure    History is obtained from the patient    History of Present Illness   Lavonne Talbot is a 67 year old female with history of severe emphysema, tobacco use disorder, lymphedema on furosemide, and recent COVID-19 infection admitted to intensive care unit on 10/8/2022 due to acute hypoxic respiratory failure secondary to community-acquired pneumonia/aspiration pneumonia, small subsegmental pulmonary embolism, circulatory shock requiring vasopressors and possible myocardial injury.  She was intubated on 10/8/2022 and extubated to BiPAP on 10/20/2022.    She completed antibiotic therapy for haemophilus influenza tracheobronchitis while in the ICU.  She also completed course of dexamethasone in the ICU.  Blood culture and tracheal fluid cultures are pending.  She received furosemide briefly for possible pulmonary edema; now held as patient is euvolemic.  As per intensivist, patient prefers going home with hospice.    She denies any complaints today and states her breathing has improved.    Review of Systems "   The 10 point Review of Systems is negative other than noted in the HPI or here.    Past Medical History    I have reviewed this patient's medical history and updated it with pertinent information if needed.   Past Medical History:   Diagnosis Date     Tobacco abuse        Past Surgical History   I have reviewed this patient's surgical history and updated it with pertinent information if needed.  Past Surgical History:   Procedure Laterality Date     NO HISTORY OF SURGERY         Social History   I have reviewed this patient's social history and updated it with pertinent information if needed.  Social History     Tobacco Use     Smoking status: Every Day     Types: Cigarettes       Family History   I have reviewed this patient's family history and updated it with pertinent information if needed.  Family History   Problem Relation Age of Onset     Diabetes Mother        Medications   I have reviewed this patient's current medications    Allergies   No Known Allergies    Physical Exam   Vital Signs: Temp: 99.5  F (37.5  C)     Pulse: 91   Resp: 30 SpO2: 98 % O2 Device: Nasal cannula Oxygen Delivery: 5 LPM  Weight: 97 lbs 14.15 oz    Physical Exam  General appearance: Cachectic, awake, Alert, Cooperative, not in any obvious distress and appears stated age   HEENT: Normocephalic, atraumatic, conjunctiva clear without icterus and ears without discharge  Lungs: Bilateral crackles with diminished air entry bilaterally  Cardiovascular: Regular Rate and Rythm, normal apical impulse, normal S1 and S2, no lower extremity edema bilaterally  Abdomen: Soft, non-tender and Non-distended, active bowel sounds  Skin: Skin color, texture normal and bruising or bleeding. No rashes or lesions over face, neck, arms and legs, turgor normal.  Musculoskeletal: No bony deformities or joint tenderness. Normal ROM upon flexion & extension.   Neurologic: Alert & Oriented X 3, Facial symmetry preserved and upper & lower extremities moving well  with symmetry  Psychiatric: Calm, normal eye contact and normal affect      Data   Results for orders placed or performed during the hospital encounter of 10/08/22 (from the past 24 hour(s))   Glucose by meter   Result Value Ref Range    GLUCOSE BY METER POCT 86 70 - 99 mg/dL   Potassium   Result Value Ref Range    Potassium 3.1 (L) 3.4 - 5.3 mmol/L   Heparin Unfractionated Anti Xa Level   Result Value Ref Range    Anti Xa Unfractionated Heparin 0.41 For Reference Range, See Comment IU/mL    Narrative    Therapeutic Range: UFH: 0.25-0.50 IU/mL for low intensity dosing,  0.30-0.70 IU/mL for high intensity dosing DVT and PE.  This test is not validated for other direct factor X inhibitors (e.g. rivaroxaban, apixaban, edoxaban, betrixaban, fondaparinux) and should not be used for monitoring of other medications.   Glucose by meter   Result Value Ref Range    GLUCOSE BY METER POCT 78 70 - 99 mg/dL   Potassium   Result Value Ref Range    Potassium 3.1 (L) 3.4 - 5.3 mmol/L   Basic metabolic panel   Result Value Ref Range    Sodium 136 136 - 145 mmol/L    Potassium 3.1 (L) 3.4 - 5.3 mmol/L    Chloride 92 (L) 98 - 107 mmol/L    Carbon Dioxide (CO2) 36 (H) 22 - 29 mmol/L    Anion Gap 8 7 - 15 mmol/L    Urea Nitrogen 27.2 (H) 8.0 - 23.0 mg/dL    Creatinine 0.67 0.51 - 0.95 mg/dL    Calcium 9.5 8.8 - 10.2 mg/dL    Glucose 69 (L) 70 - 99 mg/dL    GFR Estimate >90 >60 mL/min/1.73m2   CBC with platelets   Result Value Ref Range    WBC Count 11.9 (H) 4.0 - 11.0 10e3/uL    RBC Count 4.51 3.80 - 5.20 10e6/uL    Hemoglobin 14.1 11.7 - 15.7 g/dL    Hematocrit 42.2 35.0 - 47.0 %    MCV 94 78 - 100 fL    MCH 31.3 26.5 - 33.0 pg    MCHC 33.4 31.5 - 36.5 g/dL    RDW 14.3 10.0 - 15.0 %    Platelet Count 376 150 - 450 10e3/uL   Magnesium   Result Value Ref Range    Magnesium 2.1 1.7 - 2.3 mg/dL   Heparin Unfractionated Anti Xa Level   Result Value Ref Range    Anti Xa Unfractionated Heparin 0.35 For Reference Range, See Comment IU/mL     Narrative    Therapeutic Range: UFH: 0.25-0.50 IU/mL for low intensity dosing,  0.30-0.70 IU/mL for high intensity dosing DVT and PE.  This test is not validated for other direct factor X inhibitors (e.g. rivaroxaban, apixaban, edoxaban, betrixaban, fondaparinux) and should not be used for monitoring of other medications.   Glucose by meter   Result Value Ref Range    GLUCOSE BY METER POCT 76 70 - 99 mg/dL   Glucose by meter   Result Value Ref Range    GLUCOSE BY METER POCT 75 70 - 99 mg/dL   Glucose by meter   Result Value Ref Range    GLUCOSE BY METER POCT 67 (L) 70 - 99 mg/dL   Glucose by meter   Result Value Ref Range    GLUCOSE BY METER POCT 110 (H) 70 - 99 mg/dL   Potassium   Result Value Ref Range    Potassium 3.0 (L) 3.4 - 5.3 mmol/L   Phosphorus   Result Value Ref Range    Phosphorus 2.5 2.5 - 4.5 mg/dL   Glucose by meter   Result Value Ref Range    GLUCOSE BY METER POCT 108 (H) 70 - 99 mg/dL   Glucose by meter   Result Value Ref Range    GLUCOSE BY METER POCT 73 70 - 99 mg/dL   Potassium   Result Value Ref Range    Potassium 3.3 (L) 3.4 - 5.3 mmol/L   Glucose by meter   Result Value Ref Range    GLUCOSE BY METER POCT 117 (H) 70 - 99 mg/dL

## 2022-10-22 NOTE — PLAN OF CARE
Goal Outcome Evaluation:    Goal discussed with: Patient    Goal assessed as:  Condition not improving or stabilizing  Problem: COPD (Chronic Obstructive Pulmonary Disease) Comorbidity  Goal: Maintenance of COPD Symptom Control  Outcome: Progressing     Problem: Gas Exchange Impaired  Goal: Optimal Gas Exchange  Outcome: Progressing

## 2022-10-22 NOTE — PROGRESS NOTES
"Pulmonary Progress Note  10/22/2022   11:19 AM    Problem List:   Active Problems:    Acute respiratory failure with hypoxia (H)    Hypotension, unspecified hypotension type    Infection due to 2019 novel coronavirus       Plan:   - End stage COPD; DNR-I and moving toward hospice, appropriate.   - Oxygen as needed to keep SpO2 > 88%   - Decision to continue anticoagulation after heparin gtt is up to patient/family preference. May be of some palliative benefit on hospice? But not clear.   - Continue bronchodilators; no change  - PT as able. She wants to get stronger so she can go home, but not sure this is feasible given her family's limitations.   - Morphine as needed for air hunger.     Not much more to add; Our service will sign off. Please call if we can be of further assistance in her care.       Kiera Hale, [facility] Pulmonary/Critical Care    ________________________________________________________________      CC:   Chief Complaint   Patient presents with     Respiratory Distress     HPI: Lavonne Talbot is a 67 year old cachectic female with history of severe emphysema, tobacco user.  Recent diagnosis of COVID-19 viral infection 1 week ago who presented to [facility] ED on 10/8/2022 due to respiratory distress now admitted to the ICU with acute respiratory failure requiring mechanical ventilation. Patient was treated for H influenza pneumonia and pulmonary embolism. Echocardiogram showed reduced EF 30-35%, per cardiology stress induced cardiomyopathy.   N/V/abdominal distention. CT scan showed dilated bowel loops.   Extubated 10/20 to BiPAP and now stable on NC. Now considering transitioning to Hospice.     ROS: Feeling ok, \"just weak\". Breathing feels stable, no nausea, poor appetite.     Objective:   Vitals:    10/22/22 0500 10/22/22 0600 10/22/22 0737 10/22/22 0800   BP:    100/56   BP Location:       Pulse: 87 82  91   Resp: 26 23 23 25   Temp:    98.8  F (37.1  C)   TempSrc:    "    SpO2: 95% 94% 94% 91%   Weight:       Height:           I/O:     Intake/Output Summary (Last 24 hours) at 10/22/2022 1119  Last data filed at 10/22/2022 0600  Gross per 24 hour   Intake 652.57 ml   Output 1800 ml   Net -1147.43 ml     Weight change:     Medications Reviewed: yes    Physical Exam:   General: no distress; cachectic  HEENT: AT/NC; sunken features.   NEURO: grossly nonfocal, very weak.   CARDIAC: RRR S1S2  PULMONARY: mildly labored on NC; barrel chested; lungs are quiet throughout, no wheeze.   GASTROINTESTINAL: abdomen is soft without significant tenderness, masses, organomegaly or guarding  INTEGUMENT: thin, fragile skin.   PSYCH: calm    Lab Results Reviewed:   Recent Labs   Lab 10/22/22  0333 10/21/22  0518 10/20/22  0642      < > 136   CO2 39*   < > 35*   BUN 16.6   < > 31.8*   ALKPHOS  --   --  64   ALT  --   --  16   AST  --   --  14    < > = values in this interval not displayed.       Recent Labs   Lab 10/22/22  0333   WBC 8.5   HGB 13.3   HCT 40.4          Micro: cultures reviewed    Imaging: all imaging personally reviewed   10/19 CT Abd - 1.  Moderate to marked fluid and gas distention of the distal jejunum, ileum, colon and rectum with no transition point or obstructing mass compatible with severe adynamic ileus and/or a nonspecific enterocolitis. No free air or pneumatosis.  2.  Wedge-shaped perfusion defect lateral aspect mid to upper spleen is most likely an acute infarct with posttraumatic contusion possible in the appropriate clinical setting. Etiology unclear but in the setting of recently diagnosed pulmonary emboli,   paradoxical emboli related to PFO should be considered.  3.  Trace abdominal and pelvic ascites and generalized mesenteric and subcutaneous edema.  4.  Bilateral adnexal enlargement measuring up to 5 cm on the right and 3 cm on the left. Recommend pelvic ultrasound.  5.  Incidental probable 1 cm polyp distal transverse colon.    10/17 CXR - Endotracheal  tube tip 3.7 cm above the izabela. Nasogastric tube tip and the distal sidehole are within the stomach. Right IJ central venous catheter tip low SVC in good position. Underlying lung hyperinflation, severe emphysema and linear   opacities radiating from the sara into the periphery of both lungs that could represent bronchial wall thickening, interstitial edema and/or interstitial inflammation. No pneumothorax.        Kiera Hale, [facility] Pulmonary/Critical Care

## 2022-10-22 NOTE — PLAN OF CARE
Problem: Malnutrition  Goal: Improved Nutritional Intake  Outcome: Not Progressing   Goal Outcome Evaluation:    [facility] - ICU    RN Progress Note:            Pertinent Assessments:      Please refer to flowsheet rows for full assessment     Weak cough. Diminished with course LS. NSR. A/o but forgetful at times. Incontinent of bowel. Poor appetite.         Mobility Level:     2         Key Events - This Shift:        Potassium and magnesium replacement. Started on D10 at 25mL/hr d/t hypoglycemia.              Plan:     Optimize comfort and continue encouraging IS.     Patient slept approximately 25% of night. Frequent weak, congested cough. Some anxiety r/t wanting to go home. Blood sugars trending down d/t poor appetite. Contacted hospitalist (Dr Malloy) and received orders: Start D10 at 25 mL/hr continuous & Ok to keep urinary catheter for comfort d/t likely transition to hospice in very near future; bowel incontinence; end stage COPD status.  Electrolyte replacement (see mar). Remains on heparin gtt at 1400 units/hr.

## 2022-10-22 NOTE — CONSULTS
Consult received. Patient already being followed by our pulmonary consult service. She will be seen again on 10/22    Clovis Correa MD (Avi)  [facility] Pulmonary & Critical Care ([facility])  Clinic [phone]  Fax [phone]

## 2022-10-22 NOTE — PROGRESS NOTES
Patient placed on high flow Fi02 of 61% and flow of 40lpm. Patient saturating at 90%.     Leah Crawford, RT

## 2022-10-22 NOTE — PROGRESS NOTES
10/22/22 1115   Appointment Info   Signing Clinician's Name / Credentials (PT) Jacque Guerra, PT   Living Environment   People in Home spouse  ( did have a Stoke.)   Current Living Arrangements house  (split level entry)   Home Accessibility stairs to enter home;stairs within home   Number of Stairs, Main Entrance 2   Stair Railings, Main Entrance none   Number of Stairs, Within Home, Primary seven   Stair Railings, Within Home, Primary railing on right side (ascending)   Living Environment Comments Pt does stay on the upper level.   Self-Care   Usual Activity Tolerance excellent   Current Activity Tolerance moderate   Equipment Currently Used at Home none   Fall history within last six months no   Activity/Exercise/Self-Care Comment indep with mobility without AD, drives and is indep with all ADLs and home ADLs   General Information   Onset of Illness/Injury or Date of Surgery 10/08/22   Referring Physician Dr Jhon Cain   Patient/Family Therapy Goals Statement (PT) to go home   Pertinent History of Current Problem (include personal factors and/or comorbidities that impact the POC) Per the chart.Lavonne Talbot is a 67 year old female with history of severe emphysema, tobacco use disorder, lymphedema on furosemide, and recent COVID-19 infection admitted to intensive care unit on 10/8/2022 due to acute hypoxic respiratory failure secondary to community-acquired pneumonia/aspiration pneumonia, small subsegmental pulmonary embolism, circulatory shock requiring vasopressors and possible myocardial injury.  She was intubated on 10/8/2022 and extubated to BiPAP on 10/20/2022.   Existing Precautions/Restrictions fall   Weight-Bearing Status - LLE weight-bearing as tolerated   Weight-Bearing Status - RLE weight-bearing as tolerated   Cognition   Orientation Status (Cognition) oriented x 4   Follows Commands (Cognition) WFL   Pain Assessment   Patient Currently in Pain No   Range of Motion (ROM)   ROM Comment PROM  WFL bilat LEs   Strength (Manual Muscle Testing)   Strength Comments grossly 3+/5 bilat LEs.  Pt did get tired with ex.   Bed Mobility   Comment, (Bed Mobility) rolled bilat with mod A x 1. Supine>sit with mod A x1 and sit scoot with max A x 1, Sit>supine with min/mod A x 2   Transfers   Comment, (Transfers) not kathy yet   Gait/Stairs (Locomotion)   Comment, (Gait/Stairs) gait not kathy yet.   Balance   Balance Comments dyn sit bal with max A x 1.  Static sit bal with min A x 1.   Sensory Examination   Sensory Perception WNL  (LEs)   Clinical Impression   Criteria for Skilled Therapeutic Intervention Yes, treatment indicated   PT Diagnosis (PT) impaired functional mobility.   Influenced by the following impairments deconditioned, weakness, Pt is below PLOF.  dec bal, dec endurance.   Functional limitations due to impairments bed mobility, sit bal.   Clinical Presentation (PT Evaluation Complexity) Stable/Uncomplicated   Clinical Presentation Rationale Pt presents medically diagnosed.   Clinical Decision Making (Complexity) moderate complexity   Planned Therapy Interventions (PT) balance training;bed mobility training;home exercise program;gait training;ROM (range of motion);strengthening;transfer training   Anticipated Equipment Needs at Discharge (PT) walker, rolling;wheelchair   Risk & Benefits of therapy have been explained evaluation/treatment results reviewed;care plan/treatment goals reviewed;risks/benefits reviewed;patient;participants voiced agreement with care plan   PT Total Evaluation Time   PT Eval, Moderate Complexity Minutes (32343) 15   Physical Therapy Goals   PT Frequency Daily   PT Predicted Duration/Target Date for Goal Attainment 10/29/22   PT Goals Bed Mobility;Transfers;Gait;PT Goal 1;PT Goal 2   PT: Bed Mobility Supervision/stand-by assist;Supine to/from sit;Rolling   PT: Transfers Minimal assist;Sit to/from stand;Bed to/from chair;Assistive device  (min A x 2)   PT: Goal 1 Pt to kathy bilat LE ex  x 10 to 20 reps to increase strength for mobility.   PT: Goal 2 Pt to kathy standing x 1 minute with FWW with min A  x2 for upright function.   Interventions   Interventions Quick Adds Neuromuscular Re-ed;Therapeutic Activity;Therapeutic Procedure;Gait Training   Therapeutic Procedure/Exercise   Ther. Procedure: strength, endurance, ROM, flexibillity Minutes (79930) 1   Treatment Detail/Skilled Intervention seated bilat LAQ x 10 reps and bilat hip flex x 10 reps.  HR and O2 stable.  Pt did get fatiged and did have to return supine.   Therapeutic Activity   Therapeutic Activities: dynamic activities to improve functional performance Minutes (46052) 23   Treatment Detail/Skilled Intervention rolled bilat with rail with min Ax2.  Pt did have a BM and needed increased time for cares. Supine>sit with min A x2. Sit scoot with max A x 1.  Static sit bal with min A x1.  Sit >supine with min/mod A x 2.   PT Discharge Planning   PT Plan bed mobility, LE ex, sit bal, progress to standing and transfers with A x 2.   PT Discharge Recommendation (DC Rec) Transitional Care Facility   PT Rationale for DC Rec Pt did progress to sitting with PT.  Pt does need A x 2 with bed mobility.  Standing not kathy yet. TCU is recommended .   PT Brief overview of current status rolled bilat with mod x1 supine <>sit with min /mod A x2.  dec sit bal. Sat at the EOB about 5 minutes and did a few leg ex, min A with sit bal.   Total Session Time   Timed Code Treatment Minutes 24   Total Session Time (sum of timed and untimed services) 39

## 2022-10-22 NOTE — CONSULTS
"CLINICAL NUTRITION SERVICES - REASSESSMENT NOTE    RECOMMENDATIONS FOR MD/PROVIDER TO ORDER:    Recommendations Ordered by Registered Dietitian (RD):  Ensure Enlive (chocolate) with breakfast and lunch  Magic Cup (chocolate) with dinner   Future/Additional Recommendations:   Continue encouraging oral intake of meals/snacks/and supplements.   Malnutrition: severe     EVALUATION OF PROGRESS TOWARD GOALS   Diet:  Regular diet    Dosing weight: 44.4 kg actual wt  ASSESSED NUTRITION NEEDS (reassessed 10/22)  Estimated Energy Needs: 6394-6509 kcals/day (30-35 kcals/kg)  Justification: Underweight  Estimated Protein Needs: 53-89 grams protein/day (1.2-2 gm/kg)  Justification: critically ill  Estimated Fluid Needs: 1075+ mL/day (25+)   Justification: Maintenance    NEW FINDINGS:     Extubated 10/20    Clear liquids 10/21, then Regular texture    Pt reports no difficulty with chewing/swallowing foods and that appetite is present, was snacking on chocolate chip during visit    Son reports pt would typically take a few bites of a hamburger lizeth or snawich, eats very minimal    Per son pt has been \"skin and bones\" for last two years, prior to that always had a \"healthy slim\" appearance    No hx of nutritional supplement use like boost or ensure    Lavonne is observed to have severe muscle and adipose tissue depletion generalized throughout body    Son and pt willing to ty Ensure and Magic Cups to assist in intake and nutrition states    Per son, pt loves anything sweet    Pt cachectic    Pt taking thiamine daily    Weight history:  10/20/22 0645 44.4 kg (97 lb 14.2 oz) Bed scale   10/18/22 0330 46.4 kg (102 lb 4.7 oz) Bed scale   10/16/22 0200 48.8 kg (107 lb 9.4 oz) Bed scale   10/15/22 0000 44.6 kg (98 lb 6.4 oz) Bed scale   10/14/22 0545 46.7 kg (103 lb) Bed scale   10/13/22 0019 47.2 kg (104 lb 1.6 oz) Bed scale   10/12/22 0000 46.7 kg (102 lb 14.4 oz) Bed scale   10/09/22 0500 43 kg (94 lb 11.2 oz) --   10/08/22 1008 35 kg " (77 lb 2.6 oz) --   10/08/22 0955 35 kg (77 lb 2.6 oz) --   Per chart review:  131 lb 9/3/21, clinic note indicates pt with 2+ pitting edema at this wt. 144 lb 8/22/21, clinic note indicates LE edema    Previous Goals:   Tolerate tube feeding - met  Electrolytes WDL - ongoing, progressing  Regular BM every 1-2 days - met    Previous Nutrition Diagnosis:   Swallowing difficulty related to acute illness as evidenced by intubation  Evaluation: Completed    MALNUTRITION  % Weight Loss: > 20% in 1 year (severe malnutrition) 28% loss in 13 months.  (some fluid wt loss with LE edema. Previously)   % Intake:  </= 75% for >/= 1 month (severe malnutrition), though po, TF intolerance, family report  Subcutaneous Fat Loss:  Severe, global  Muscle Loss: severe, global  Fluid Retention:  None noted per chart 10/22    Malnutrition Diagnosis: Severe malnutrition  In Context of:  Chronic illness or disease    CURRENT NUTRITION DIAGNOSIS  Severe protein-calorie malnutrition related to chronic illness as evidenced by meeting less than 75% of energy needs for greater than 1 month, greater than 20% weight loss in 1 year, and severe generalized muscle and adipose tissue depletion.    Underweight related to chronic illness as evidenced by BMI of 15.33.    INTERVENTIONS  Implementation  Continue encouraging oral intake of meals/snacks/and supplements.  Ensure Enlive (chocolate) with breakfast and lunch  Magic Cup (chocolate) with dinner    Goals  Pt will meet >75% of kcal and protein needs through oral intake - ongoing, progressing  Wt gain 1-2lbs/week - new  Electrolytes WDL - ongoing, progressing    MONITORING AND EVALUATION:  Progress towards goals will be monitored and evaluated per protocol and Practice Guidelines

## 2022-10-23 LAB
ANION GAP SERPL CALCULATED.3IONS-SCNC: 3 MMOL/L (ref 7–15)
BASOPHILS # BLD AUTO: 0 10E3/UL (ref 0–0.2)
BASOPHILS NFR BLD AUTO: 0 %
BUN SERPL-MCNC: 12.4 MG/DL (ref 8–23)
CALCIUM SERPL-MCNC: 8.6 MG/DL (ref 8.8–10.2)
CHLORIDE SERPL-SCNC: 97 MMOL/L (ref 98–107)
CREAT SERPL-MCNC: 0.48 MG/DL (ref 0.51–0.95)
DEPRECATED HCO3 PLAS-SCNC: 38 MMOL/L (ref 22–29)
EOSINOPHIL # BLD AUTO: 0.1 10E3/UL (ref 0–0.7)
EOSINOPHIL NFR BLD AUTO: 1 %
ERYTHROCYTE [DISTWIDTH] IN BLOOD BY AUTOMATED COUNT: 14.2 % (ref 10–15)
GFR SERPL CREATININE-BSD FRML MDRD: >90 ML/MIN/1.73M2
GLUCOSE BLDC GLUCOMTR-MCNC: 106 MG/DL (ref 70–99)
GLUCOSE BLDC GLUCOMTR-MCNC: 110 MG/DL (ref 70–99)
GLUCOSE BLDC GLUCOMTR-MCNC: 114 MG/DL (ref 70–99)
GLUCOSE BLDC GLUCOMTR-MCNC: 127 MG/DL (ref 70–99)
GLUCOSE BLDC GLUCOMTR-MCNC: 83 MG/DL (ref 70–99)
GLUCOSE SERPL-MCNC: 109 MG/DL (ref 70–99)
HCT VFR BLD AUTO: 40 % (ref 35–47)
HGB BLD-MCNC: 13.1 G/DL (ref 11.7–15.7)
IMM GRANULOCYTES # BLD: 0.1 10E3/UL
IMM GRANULOCYTES NFR BLD: 1 %
LYMPHOCYTES # BLD AUTO: 1.4 10E3/UL (ref 0.8–5.3)
LYMPHOCYTES NFR BLD AUTO: 15 %
MAGNESIUM SERPL-MCNC: 1.9 MG/DL (ref 1.7–2.3)
MCH RBC QN AUTO: 31.7 PG (ref 26.5–33)
MCHC RBC AUTO-ENTMCNC: 32.8 G/DL (ref 31.5–36.5)
MCV RBC AUTO: 97 FL (ref 78–100)
MONOCYTES # BLD AUTO: 0.8 10E3/UL (ref 0–1.3)
MONOCYTES NFR BLD AUTO: 9 %
NEUTROPHILS # BLD AUTO: 6.7 10E3/UL (ref 1.6–8.3)
NEUTROPHILS NFR BLD AUTO: 74 %
NRBC # BLD AUTO: 0 10E3/UL
NRBC BLD AUTO-RTO: 0 /100
PHOSPHATE SERPL-MCNC: 3.1 MG/DL (ref 2.5–4.5)
PLATELET # BLD AUTO: 347 10E3/UL (ref 150–450)
POTASSIUM SERPL-SCNC: 3.3 MMOL/L (ref 3.4–5.3)
POTASSIUM SERPL-SCNC: 3.7 MMOL/L (ref 3.4–5.3)
RBC # BLD AUTO: 4.13 10E6/UL (ref 3.8–5.2)
SODIUM SERPL-SCNC: 138 MMOL/L (ref 136–145)
UFH PPP CHRO-ACNC: 0.39 IU/ML
WBC # BLD AUTO: 9.1 10E3/UL (ref 4–11)

## 2022-10-23 PROCEDURE — 250N000011 HC RX IP 250 OP 636: Performed by: INTERNAL MEDICINE

## 2022-10-23 PROCEDURE — 83735 ASSAY OF MAGNESIUM: CPT | Performed by: INTERNAL MEDICINE

## 2022-10-23 PROCEDURE — 250N000013 HC RX MED GY IP 250 OP 250 PS 637: Performed by: INTERNAL MEDICINE

## 2022-10-23 PROCEDURE — 80048 BASIC METABOLIC PNL TOTAL CA: CPT | Performed by: INTERNAL MEDICINE

## 2022-10-23 PROCEDURE — 258N000001 HC RX 258: Performed by: HOSPITALIST

## 2022-10-23 PROCEDURE — 999N000215 HC STATISTIC HFNC ADULT NON-CPAP

## 2022-10-23 PROCEDURE — 94640 AIRWAY INHALATION TREATMENT: CPT | Mod: 76

## 2022-10-23 PROCEDURE — 85025 COMPLETE CBC W/AUTO DIFF WBC: CPT | Performed by: INTERNAL MEDICINE

## 2022-10-23 PROCEDURE — 999N000157 HC STATISTIC RCP TIME EA 10 MIN

## 2022-10-23 PROCEDURE — 120N000001 HC R&B MED SURG/OB

## 2022-10-23 PROCEDURE — 85520 HEPARIN ASSAY: CPT | Performed by: INTERNAL MEDICINE

## 2022-10-23 PROCEDURE — 250N000009 HC RX 250: Performed by: INTERNAL MEDICINE

## 2022-10-23 PROCEDURE — 84100 ASSAY OF PHOSPHORUS: CPT | Performed by: INTERNAL MEDICINE

## 2022-10-23 PROCEDURE — 99207 PR CDG-CUT & PASTE-POTENTIAL IMPACT ON LEVEL: CPT | Performed by: INTERNAL MEDICINE

## 2022-10-23 PROCEDURE — 94640 AIRWAY INHALATION TREATMENT: CPT

## 2022-10-23 PROCEDURE — 84132 ASSAY OF SERUM POTASSIUM: CPT | Performed by: INTERNAL MEDICINE

## 2022-10-23 PROCEDURE — 99232 SBSQ HOSP IP/OBS MODERATE 35: CPT | Performed by: INTERNAL MEDICINE

## 2022-10-23 RX ORDER — POTASSIUM CHLORIDE 1.5 G/1.58G
20 POWDER, FOR SOLUTION ORAL ONCE
Status: COMPLETED | OUTPATIENT
Start: 2022-10-23 | End: 2022-10-23

## 2022-10-23 RX ADMIN — IPRATROPIUM BROMIDE AND ALBUTEROL SULFATE 3 ML: 2.5; .5 SOLUTION RESPIRATORY (INHALATION) at 15:35

## 2022-10-23 RX ADMIN — IPRATROPIUM BROMIDE AND ALBUTEROL SULFATE 3 ML: 2.5; .5 SOLUTION RESPIRATORY (INHALATION) at 11:24

## 2022-10-23 RX ADMIN — DEXTROSE MONOHYDRATE: 100 INJECTION, SOLUTION INTRAVENOUS at 21:35

## 2022-10-23 RX ADMIN — HEPARIN SODIUM AND DEXTROSE 1400 UNITS/HR: 10000; 5 INJECTION INTRAVENOUS at 09:20

## 2022-10-23 RX ADMIN — APIXABAN 10 MG: 5 TABLET, FILM COATED ORAL at 21:44

## 2022-10-23 RX ADMIN — IPRATROPIUM BROMIDE AND ALBUTEROL SULFATE 3 ML: 2.5; .5 SOLUTION RESPIRATORY (INHALATION) at 19:58

## 2022-10-23 RX ADMIN — POLYETHYLENE GLYCOL 3350 17 G: 17 POWDER, FOR SOLUTION ORAL at 09:13

## 2022-10-23 RX ADMIN — THIAMINE HCL TAB 100 MG 100 MG: 100 TAB at 09:13

## 2022-10-23 RX ADMIN — POTASSIUM CHLORIDE 20 MEQ: 1.5 POWDER, FOR SOLUTION ORAL at 09:13

## 2022-10-23 RX ADMIN — MORPHINE SULFATE 5 MG: 10 SOLUTION ORAL at 21:44

## 2022-10-23 RX ADMIN — APIXABAN 10 MG: 5 TABLET, FILM COATED ORAL at 14:46

## 2022-10-23 RX ADMIN — LACTULOSE 20 G: 10 SOLUTION ORAL at 09:13

## 2022-10-23 RX ADMIN — QUETIAPINE FUMARATE 12.5 MG: 25 TABLET ORAL at 21:44

## 2022-10-23 RX ADMIN — IPRATROPIUM BROMIDE AND ALBUTEROL SULFATE 3 ML: 2.5; .5 SOLUTION RESPIRATORY (INHALATION) at 07:35

## 2022-10-23 ASSESSMENT — ACTIVITIES OF DAILY LIVING (ADL)
ADLS_ACUITY_SCORE: 34
ADLS_ACUITY_SCORE: 30
ADLS_ACUITY_SCORE: 32
ADLS_ACUITY_SCORE: 34
ADLS_ACUITY_SCORE: 30
ADLS_ACUITY_SCORE: 30
ADLS_ACUITY_SCORE: 32
ADLS_ACUITY_SCORE: 34
ADLS_ACUITY_SCORE: 30
ADLS_ACUITY_SCORE: 32
ADLS_ACUITY_SCORE: 32
ADLS_ACUITY_SCORE: 30

## 2022-10-23 NOTE — PROGRESS NOTES
RESPIRATORY CARE NOTE   Patient is on HFNC 60% 40L, BS diminished, gave duoneb treatment x1, BS post treatment no change, patient perceives no improvement, patient tolerated well.     Kenneth H. Kjellberg

## 2022-10-23 NOTE — PROGRESS NOTES
Patient does not like the high flow canula and requested to try different devices. RT attempted nasal canula, oxymask, oxygen pendent and oximizer. Unfortunately, at max flow for each device, patient was unable to maintain a saturation above 86%.Once she shifts, coughs, or talks her saturation will dip below. RT discussed with Mrs. Burrell that her oxygen saturations where poor and she understood. We reviewed that her oxygen saturation was maintained on the high flow but she declined to use it.   RT paged the provider and let the nurse know     Currently placed on 7lpm nasal canula.    Leah Crawford, RT

## 2022-10-23 NOTE — CONSULTS
[facility]    Progress Note - [facility]    ______________________________________________________________________    [facility]/7 Contact Number: [phone]    - Providers: Please contact [facility] with changes in orders or clinical plan of care   - Nursing: Please contact [facility] with significant changes in patient condition  ______________________________________________________________________        Plan of Care Discussed with the Following:   - Nurse: Thiago  - Hospitalist/Rounding Provider: Ant    - Lavonne's Family/Preferred Contact: Frantz  - Hospice Provider: Dr. Mai    Summary of Visit (includes assessment, medications and any new orders):   Visits with Lavonne, she was resting in bed about to order lunch. Pt currently on nasal canula,O2 at 85 while speaking, did not appear labored at all while speaking. No family in room at time of visit. Pt expressed wanting to go live with her  currently in [place] at a facility. Pt expressed not wanting to pursue hospice while in the hospital or at discharge. Is appreciate of visit with information on hospice. Pt is no longer exhibiting unmanaged symptoms at this time. Would be appropriate for community hospice once she decides. Pt seems to want to focus on her  at this time. Which is reasonable considering his state of health as well. Family conference may be benefit so everyone can be on the same page in regards to care for Lavonne.        Raquel Baumann, RN  Clinical Hospice Nurse Liaison  [facility]  Cell [phone] call or text

## 2022-10-23 NOTE — PROGRESS NOTES
Patient has refused initiation of High Flow oxygen.  Dr. Nieto had a risk vs. benefit conversation.  Patient has made educated discission not to allow High Flow oxygen.

## 2022-10-23 NOTE — PLAN OF CARE
"Goal Outcome Evaluation:    Problem: End-of-Life Care  Goal: Comfort, Peace and Preserved Dignity  Outcome: Not Progressing  Intervention: Promote Physical Comfort  Recent Flowsheet Documentation  Taken 10/22/2022 2151 by Sammie Escobedo RN  Sensory Stimulation Regulation: care clustered  Intervention: Promote Peace and Maintain Dignity  Recent Flowsheet Documentation  Taken 10/22/2022 2151 by Sammie Escobedo RN  Supportive Measures:   active listening utilized   verbalization of feelings encouraged   Problem: Plan of Care - These are the overarching goals to be used throughout the patient stay.    Goal: Patient-Specific Goal (Individualized)  Description: You can add care plan individualizations to a care plan. Examples of Individualization might be:  \"Parent requests to be called daily at 9am for status\", \"I have a hard time hearing out of my right ear\", or \"Do not touch me to wake me up as it startles me\".  Outcome: Progressing  Flowsheets (Taken 10/23/2022 0005)  Individualized Care Needs: Stable O2. Comfort. Safety.  Anxieties, Fears or Concerns: Pt still in disbelief and overwhelmed over the events that brought her to the hospital and prior with her spouses CVA.  She stated that she was \"processing everything and my wheels are turning\".  Offered active listening and emotional support along with validating her feelings. She denied any pain or nausea. Encouraged her to purse-lip-breath when O2 sats were 86-87%. Hep drip at 1400 units. [place] draining cloudy bonnie urine.  Patient-Specific Goals (Include Timeframe): Lavonne wants to go home as soon as able.     B/P: 98/60, T: 99, P: 89, R: 24  O2:  88%  [place] 60% at 40  "

## 2022-10-23 NOTE — PLAN OF CARE
Goal Outcome Evaluation:      Plan of Care Reviewed With: patient    Overall Patient Progress: improvingOverall Patient Progress: improving    Outcome Evaluation: Pt transferred out of ICU to P2 last evening.  She is oriented and forgetful. Anxious to go home. On 40 LPM hiflow O2 at 60% and sats are between 88-90%. [place] draining cloudy bonnie urine. Hep drip and IVF's per order. Denied pain and/or nausea. Currently sleeping and appeared comfortable. Will continue to monitor.

## 2022-10-23 NOTE — PLAN OF CARE
Problem: Plan of Care - These are the overarching goals to be used throughout the patient stay.    Goal: Readiness for Transition of Care  Outcome: Progressing     Problem: COPD (Chronic Obstructive Pulmonary Disease) Comorbidity  Goal: Maintenance of COPD Symptom Control  Outcome: Progressing     Problem: Heart Failure Comorbidity  Goal: Maintenance of Heart Failure Symptom Control  Outcome: Progressing     Problem: Gas Exchange Impaired  Goal: Optimal Gas Exchange  Outcome: Progressing     Problem: End-of-Life Care  Goal: Comfort, Peace and Preserved Dignity  Outcome: Progressing   Goal Outcome Evaluation:    Goal discussed with: Patient    Goal assessed as:  Progressing

## 2022-10-23 NOTE — PROGRESS NOTES
[facility]  Consult Note - Hospitalist Service  Date of Admission:  10/8/2022  Consult Requested by: Dr. Cain  Reason for Consult     Assessment & Plan   Lavonne Talbot is a 67 year old female with history of severe emphysema, tobacco use disorder, lymphedema on furosemide, and recent COVID-19 infection admitted to intensive care unit on 10/8/2022 due to acute hypoxic respiratory failure secondary to community-acquired pneumonia/aspiration pneumonia, small subsegmental pulmonary embolism, circulatory shock requiring vasopressors and possible myocardial injury.  She was intubated on 10/8/2022 and extubated to BiPAP on 10/20/2022.    She completed antibiotic therapy for haemophilus influenza tracheobronchitis while in the ICU.  She also completed course of dexamethasone in the ICU.  Blood culture and tracheal fluid cultures are pending.  She received furosemide briefly for possible pulmonary edema; now held as patient is euvolemic.  As per intensivist, patient prefers going home with hospice.      10/22 :     On 5 liters of oxygen  Continue nebs, iv lasix on hold  Critical care signed off  Transfer out of ICU today      Not medically ready for discharge at this time.  Another 2 ? Days to discharge        Severe COPD  Hypoxic respiratory failure requiring mechanical ventilation  Haemophilus influenza tracheobronchitis  Extubated to BiPAP on 10/20/2022  Continue bronchodilators as scheduled  Continue to hold furosemide for now as patient is euvolemic  Follow-up blood culture and tracheal fluid culture  Off antibiotics  Patient prefers being discharged home with hospice.  Follow-up pulmonology consult  Continue supplemental oxygen    Subsegmental pulmonary embolism  Continue heparin drip for now  Will need further discussion as regards anticoagulation as patient is leaning towards home hospice.    Recent COVID-19 infection  Completed course of dexamethasone  Of COVID isolation  "precautions    Cachexia  Severe malnutrition  Likely secondary to COPD cachexia  Follow-up consult to nutritionist    Hypokalemia  Replace as per protocol  Monitor potassium level      The patient's care was discussed with the Patient.    Dontrell Nieto MD  [facility]  Securely message with the Vocera Web Console (learn more here)  Text page via [facility] Paging/Directory       Hospitalist Service    Clinically Significant Risk Factors        # Hypokalemia: Lowest K = 3 mmol/L (Ref range: 3.4-5.3) in last 2 days, will replace as needed       # Hypoalbuminemia: Lowest albumin = 2.6 g/dL (Ref range: 3.5-5.2) at 10/8/2022 11:32 AM, will monitor as appropriate          # Cachexia: Estimated body mass index is 15.33 kg/m  as calculated from the following:    Height as of this encounter: 1.702 m (5' 7\").    Weight as of this encounter: 44.4 kg (97 lb 14.2 oz).   # Severe Malnutrition: based on nutrition assessment        ______________________________________________________________________      Review of Systems   The 10 point Review of Systems is negative other than noted in the HPI or here.    Past Medical History    I have reviewed this patient's medical history and updated it with pertinent information if needed.   Past Medical History:   Diagnosis Date     Tobacco abuse        Past Surgical History   I have reviewed this patient's surgical history and updated it with pertinent information if needed.  Past Surgical History:   Procedure Laterality Date     NO HISTORY OF SURGERY         Social History   I have reviewed this patient's social history and updated it with pertinent information if needed.  Social History     Tobacco Use     Smoking status: Every Day     Types: Cigarettes       Family History   I have reviewed this patient's family history and updated it with pertinent information if needed.  Family History   Problem Relation Age of Onset     Diabetes Mother        Medications   I have " reviewed this patient's current medications    Allergies   No Known Allergies    Physical Exam   Vital Signs: Temp: 99  F (37.2  C) Temp src: Oral BP: 100/56 Pulse: 94   Resp: 24 SpO2: 90 % O2 Device: High Flow Nasal Cannula (HFNC) Oxygen Delivery: 40 LPM  Weight: 97 lbs 14.15 oz    Physical Exam  General appearance: Cachectic, awake, Alert, Cooperative, not in any obvious distress and appears stated age   HEENT: Normocephalic, atraumatic, conjunctiva clear without icterus and ears without discharge  Lungs: Bilateral crackles with diminished air entry bilaterally  Cardiovascular: Regular Rate and Rythm, normal apical impulse, normal S1 and S2, no lower extremity edema bilaterally  Abdomen: Soft, non-tender and Non-distended, active bowel sounds  Skin: Skin color, texture normal and bruising or bleeding. No rashes or lesions over face, neck, arms and legs, turgor normal.  Musculoskeletal: No bony deformities or joint tenderness. Normal ROM upon flexion & extension.   Neurologic: Alert & Oriented X 3, Facial symmetry preserved and upper & lower extremities moving well with symmetry  Psychiatric: Calm, normal eye contact and normal affect      Data   Results for orders placed or performed during the hospital encounter of 10/08/22 (from the past 24 hour(s))   Potassium   Result Value Ref Range    Potassium 3.3 (L) 3.4 - 5.3 mmol/L   Basic metabolic panel   Result Value Ref Range    Sodium 142 136 - 145 mmol/L    Potassium 3.3 (L) 3.4 - 5.3 mmol/L    Chloride 97 (L) 98 - 107 mmol/L    Carbon Dioxide (CO2) 39 (H) 22 - 29 mmol/L    Anion Gap 6 (L) 7 - 15 mmol/L    Urea Nitrogen 16.6 8.0 - 23.0 mg/dL    Creatinine 0.54 0.51 - 0.95 mg/dL    Calcium 9.2 8.8 - 10.2 mg/dL    Glucose 82 70 - 99 mg/dL    GFR Estimate >90 >60 mL/min/1.73m2   CBC with platelets   Result Value Ref Range    WBC Count 8.5 4.0 - 11.0 10e3/uL    RBC Count 4.21 3.80 - 5.20 10e6/uL    Hemoglobin 13.3 11.7 - 15.7 g/dL    Hematocrit 40.4 35.0 - 47.0 %     MCV 96 78 - 100 fL    MCH 31.6 26.5 - 33.0 pg    MCHC 32.9 31.5 - 36.5 g/dL    RDW 14.3 10.0 - 15.0 %    Platelet Count 382 150 - 450 10e3/uL   Magnesium   Result Value Ref Range    Magnesium 1.8 1.7 - 2.3 mg/dL   Phosphorus   Result Value Ref Range    Phosphorus 2.5 2.5 - 4.5 mg/dL   Glucose by meter   Result Value Ref Range    GLUCOSE BY METER POCT 77 70 - 99 mg/dL   Heparin Unfractionated Anti Xa Level   Result Value Ref Range    Anti Xa Unfractionated Heparin 0.34 For Reference Range, See Comment IU/mL    Narrative    Therapeutic Range: UFH: 0.25-0.50 IU/mL for low intensity dosing,  0.30-0.70 IU/mL for high intensity dosing DVT and PE.  This test is not validated for other direct factor X inhibitors (e.g. rivaroxaban, apixaban, edoxaban, betrixaban, fondaparinux) and should not be used for monitoring of other medications.   Glucose by meter   Result Value Ref Range    GLUCOSE BY METER POCT 90 70 - 99 mg/dL   Glucose by meter   Result Value Ref Range    GLUCOSE BY METER POCT 102 (H) 70 - 99 mg/dL   Potassium   Result Value Ref Range    Potassium 3.7 3.4 - 5.3 mmol/L   Glucose by meter   Result Value Ref Range    GLUCOSE BY METER POCT 114 (H) 70 - 99 mg/dL   Glucose by meter   Result Value Ref Range    GLUCOSE BY METER POCT 151 (H) 70 - 99 mg/dL

## 2022-10-24 ENCOUNTER — APPOINTMENT (OUTPATIENT)
Dept: PHYSICAL THERAPY | Facility: HOSPITAL | Age: 67
DRG: 207 | End: 2022-10-24
Payer: COMMERCIAL

## 2022-10-24 LAB
ANION GAP SERPL CALCULATED.3IONS-SCNC: 7 MMOL/L (ref 7–15)
BACTERIA BLD CULT: NO GROWTH
BACTERIA BLD CULT: NO GROWTH
BUN SERPL-MCNC: 9.8 MG/DL (ref 8–23)
CALCIUM SERPL-MCNC: 8.5 MG/DL (ref 8.8–10.2)
CHLORIDE SERPL-SCNC: 96 MMOL/L (ref 98–107)
CREAT SERPL-MCNC: 0.46 MG/DL (ref 0.51–0.95)
DEPRECATED HCO3 PLAS-SCNC: 35 MMOL/L (ref 22–29)
ERYTHROCYTE [DISTWIDTH] IN BLOOD BY AUTOMATED COUNT: 14 % (ref 10–15)
GFR SERPL CREATININE-BSD FRML MDRD: >90 ML/MIN/1.73M2
GLUCOSE SERPL-MCNC: 87 MG/DL (ref 70–99)
HCT VFR BLD AUTO: 38.4 % (ref 35–47)
HGB BLD-MCNC: 12.3 G/DL (ref 11.7–15.7)
MAGNESIUM SERPL-MCNC: 1.8 MG/DL (ref 1.7–2.3)
MCH RBC QN AUTO: 31.3 PG (ref 26.5–33)
MCHC RBC AUTO-ENTMCNC: 32 G/DL (ref 31.5–36.5)
MCV RBC AUTO: 98 FL (ref 78–100)
PHOSPHATE SERPL-MCNC: 3.2 MG/DL (ref 2.5–4.5)
PLATELET # BLD AUTO: 317 10E3/UL (ref 150–450)
POTASSIUM SERPL-SCNC: 3.5 MMOL/L (ref 3.4–5.3)
RBC # BLD AUTO: 3.93 10E6/UL (ref 3.8–5.2)
SODIUM SERPL-SCNC: 138 MMOL/L (ref 136–145)
WBC # BLD AUTO: 7.8 10E3/UL (ref 4–11)

## 2022-10-24 PROCEDURE — 250N000013 HC RX MED GY IP 250 OP 250 PS 637: Performed by: INTERNAL MEDICINE

## 2022-10-24 PROCEDURE — 84100 ASSAY OF PHOSPHORUS: CPT | Performed by: INTERNAL MEDICINE

## 2022-10-24 PROCEDURE — 94640 AIRWAY INHALATION TREATMENT: CPT | Mod: 76

## 2022-10-24 PROCEDURE — 99232 SBSQ HOSP IP/OBS MODERATE 35: CPT | Performed by: INTERNAL MEDICINE

## 2022-10-24 PROCEDURE — 94640 AIRWAY INHALATION TREATMENT: CPT

## 2022-10-24 PROCEDURE — 80048 BASIC METABOLIC PNL TOTAL CA: CPT | Performed by: INTERNAL MEDICINE

## 2022-10-24 PROCEDURE — 99207 PR CDG-CUT & PASTE-POTENTIAL IMPACT ON LEVEL: CPT | Performed by: INTERNAL MEDICINE

## 2022-10-24 PROCEDURE — 120N000001 HC R&B MED SURG/OB

## 2022-10-24 PROCEDURE — 999N000123 HC STATISTIC OXYGEN O2DAILY TECH TIME

## 2022-10-24 PROCEDURE — 99232 SBSQ HOSP IP/OBS MODERATE 35: CPT | Performed by: PHYSICIAN ASSISTANT

## 2022-10-24 PROCEDURE — 250N000011 HC RX IP 250 OP 636: Performed by: INTERNAL MEDICINE

## 2022-10-24 PROCEDURE — 97530 THERAPEUTIC ACTIVITIES: CPT | Mod: GP

## 2022-10-24 PROCEDURE — 83735 ASSAY OF MAGNESIUM: CPT | Performed by: INTERNAL MEDICINE

## 2022-10-24 PROCEDURE — 999N000157 HC STATISTIC RCP TIME EA 10 MIN

## 2022-10-24 PROCEDURE — 250N000009 HC RX 250: Performed by: INTERNAL MEDICINE

## 2022-10-24 PROCEDURE — 85027 COMPLETE CBC AUTOMATED: CPT | Performed by: INTERNAL MEDICINE

## 2022-10-24 RX ORDER — LANOLIN ALCOHOL/MO/W.PET/CERES
3 CREAM (GRAM) TOPICAL
Status: DISCONTINUED | OUTPATIENT
Start: 2022-10-24 | End: 2022-11-11 | Stop reason: HOSPADM

## 2022-10-24 RX ORDER — MAGNESIUM OXIDE 400 MG/1
400 TABLET ORAL EVERY 4 HOURS
Status: COMPLETED | OUTPATIENT
Start: 2022-10-24 | End: 2022-10-24

## 2022-10-24 RX ADMIN — MELATONIN TAB 3 MG 3 MG: 3 TAB at 21:21

## 2022-10-24 RX ADMIN — IPRATROPIUM BROMIDE AND ALBUTEROL SULFATE 3 ML: 2.5; .5 SOLUTION RESPIRATORY (INHALATION) at 11:01

## 2022-10-24 RX ADMIN — POLYETHYLENE GLYCOL 3350 17 G: 17 POWDER, FOR SOLUTION ORAL at 08:13

## 2022-10-24 RX ADMIN — APIXABAN 10 MG: 5 TABLET, FILM COATED ORAL at 20:55

## 2022-10-24 RX ADMIN — THIAMINE HCL TAB 100 MG 100 MG: 100 TAB at 08:14

## 2022-10-24 RX ADMIN — IPRATROPIUM BROMIDE AND ALBUTEROL SULFATE 3 ML: 2.5; .5 SOLUTION RESPIRATORY (INHALATION) at 07:55

## 2022-10-24 RX ADMIN — IPRATROPIUM BROMIDE AND ALBUTEROL SULFATE 3 ML: 2.5; .5 SOLUTION RESPIRATORY (INHALATION) at 16:44

## 2022-10-24 RX ADMIN — Medication 400 MG: at 10:22

## 2022-10-24 RX ADMIN — APIXABAN 10 MG: 5 TABLET, FILM COATED ORAL at 08:14

## 2022-10-24 RX ADMIN — IPRATROPIUM BROMIDE AND ALBUTEROL SULFATE 3 ML: 2.5; .5 SOLUTION RESPIRATORY (INHALATION) at 19:28

## 2022-10-24 RX ADMIN — LACTULOSE 20 G: 10 SOLUTION ORAL at 08:14

## 2022-10-24 RX ADMIN — Medication 400 MG: at 17:36

## 2022-10-24 RX ADMIN — FUROSEMIDE 20 MG: 10 INJECTION, SOLUTION INTRAMUSCULAR; INTRAVENOUS at 17:48

## 2022-10-24 ASSESSMENT — ACTIVITIES OF DAILY LIVING (ADL)
ADLS_ACUITY_SCORE: 34

## 2022-10-24 NOTE — PROGRESS NOTES
RESPIRATORY CARE NOTE     Patient Name: Lavonne Talbot  Today's Date: 10/23/2022       Pt continues to receive Duo 4Xday. BS are decreased. Pt is on 7 lpm HFNC with bubbler, SpO2 is 92%.  RT will continue to monitor and assess.

## 2022-10-24 NOTE — PLAN OF CARE
Problem: COPD (Chronic Obstructive Pulmonary Disease) Comorbidity  Goal: Maintenance of COPD Symptom Control  Outcome: Progressing  Intervention: Maintain COPD-Symptom Control  Recent Flowsheet Documentation  Taken 10/24/2022 1635 by Kashif Sanchez, RN  Supportive Measures: active listening utilized  Taken 10/24/2022 0836 by Kashif Sanchez, RN  Supportive Measures: active listening utilized     Problem: Gas Exchange Impaired  Goal: Optimal Gas Exchange  Outcome: Progressing     Problem: Malnutrition  Goal: Improved Nutritional Intake  Outcome: Progressing   Goal Outcome Evaluation:  Pt alert and oriented x 4.  No complaints of pain.  Had many loose stools today.  Lactulose discontinued.  Pt now on 9 liters high flow nasal canula.  Shortness of breath with excretion.  Shallow breathing noted.  Lung sounds diminished.  Encouraging deep breathing and incentive spirometry.  Also encouraging PO intact.  Appetite fair to poor.

## 2022-10-24 NOTE — PLAN OF CARE
"Goal Outcome Evaluation:      Plan of Care Reviewed With: patient    Overall Patient Progress: no changeOverall Patient Progress: no change    Outcome Evaluation: Denied any pain or discomfort. Wants to go where her spouse is. O2 sats fluctuating from 83% to 95% requiring adjustment of O2 delivery. Incontinent of stool x 1. [place] draining bonnie urine. D10% going per order.    BP 98/55 (BP Location: Left arm)   Pulse 83   Temp 98.6  F (37  C) (Oral)   Resp 24   Ht 1.702 m (5' 7\")   Wt 44.4 kg (97 lb 14.2 oz)   SpO2 93%   BMI 15.33 kg/m        "

## 2022-10-24 NOTE — PROGRESS NOTES
[facility]  Consult Note - Hospitalist Service  Date of Admission:  10/8/2022  Consult Requested by: Dr. Cain  Reason for Consult     Assessment & Plan   Lavonne Talbot is a 67 year old female with history of severe emphysema, tobacco use disorder, lymphedema on furosemide, and recent COVID-19 infection admitted to intensive care unit on 10/8/2022 due to acute hypoxic respiratory failure secondary to community-acquired pneumonia/aspiration pneumonia, small subsegmental pulmonary embolism, circulatory shock requiring vasopressors and possible myocardial injury.  She was intubated on 10/8/2022 and extubated to BiPAP on 10/20/2022.    She completed antibiotic therapy for haemophilus influenza tracheobronchitis while in the ICU.  She also completed course of dexamethasone in the ICU.  Blood culture and tracheal fluid cultures are pending.  She received furosemide briefly for possible pulmonary edema; now held as patient is euvolemic.  As per intensivist, patient prefers going home with hospice.      10/23 :     Needing high flow oxygen due to hypoxia but refusing  Continue nebs, iv lasix on hold  Critical care signed off  Transfer out of ICU 10/22  Patient declined hospice, palliative care consulted to determine goals of care      Not medically ready for discharge at this time.  Another 2 ? Days to discharge        Severe COPD  Hypoxic respiratory failure requiring mechanical ventilation  Haemophilus influenza tracheobronchitis  Extubated to BiPAP on 10/20/2022  Continue bronchodilators as scheduled  Continue to hold furosemide for now as patient is euvolemic  Follow-up blood culture and tracheal fluid culture  Off antibiotics  Patient prefers being discharged home with hospice.  Follow-up pulmonology consult  Continue supplemental oxygen    Subsegmental pulmonary embolism  Continue heparin drip for now  Will need further discussion as regards anticoagulation as patient is leaning towards home  "hospice.    Recent COVID-19 infection  Completed course of dexamethasone  Of COVID isolation precautions    Cachexia  Severe malnutrition  Likely secondary to COPD cachexia  Follow-up consult to nutritionist    Hypokalemia  Replace as per protocol  Monitor potassium level      The patient's care was discussed with the Patient.    Dontrell Nieto MD  [facility]  Securely message with the Vocera Web Console (learn more here)  Text page via [facility] Paging/[facility]       Hospitalist Service    Clinically Significant Risk Factors        # Hypokalemia: Lowest K = 3.3 mmol/L (Ref range: 3.4-5.3) in last 2 days, will replace as needed       # Hypoalbuminemia: Lowest albumin = 2.6 g/dL (Ref range: 3.5-5.2) at 10/8/2022 11:32 AM, will monitor as appropriate          # Cachexia: Estimated body mass index is 15.33 kg/m  as calculated from the following:    Height as of this encounter: 1.702 m (5' 7\").    Weight as of this encounter: 44.4 kg (97 lb 14.2 oz).   # Severe Malnutrition: based on nutrition assessment        ______________________________________________________________________      Review of Systems   The 10 point Review of Systems is negative other than noted in the HPI or here.    Past Medical History    I have reviewed this patient's medical history and updated it with pertinent information if needed.   Past Medical History:   Diagnosis Date     Tobacco abuse        Past Surgical History   I have reviewed this patient's surgical history and updated it with pertinent information if needed.  Past Surgical History:   Procedure Laterality Date     NO HISTORY OF SURGERY         Social History   I have reviewed this patient's social history and updated it with pertinent information if needed.  Social History     Tobacco Use     Smoking status: Every Day     Types: Cigarettes       Family History   I have reviewed this patient's family history and updated it with pertinent information if " needed.  Family History   Problem Relation Age of Onset     Diabetes Mother        Medications   I have reviewed this patient's current medications    Allergies   No Known Allergies    Physical Exam   Vital Signs: Temp: 98.6  F (37  C) Temp src: Oral BP: 106/56 Pulse: 100   Resp: 20 SpO2: 92 % O2 Device: High Flow Nasal Cannula (HFNC) Oxygen Delivery: 7 LPM  Weight: 97 lbs 14.15 oz    Physical Exam  General appearance: Cachectic, awake, Alert, Cooperative, not in any obvious distress and appears stated age   HEENT: Normocephalic, atraumatic, conjunctiva clear without icterus and ears without discharge  Lungs: Bilateral crackles with diminished air entry bilaterally  Cardiovascular: Regular Rate and Rythm, normal apical impulse, normal S1 and S2, no lower extremity edema bilaterally  Abdomen: Soft, non-tender and Non-distended, active bowel sounds  Skin: Skin color, texture normal and bruising or bleeding. No rashes or lesions over face, neck, arms and legs, turgor normal.  Musculoskeletal: No bony deformities or joint tenderness. Normal ROM upon flexion & extension.   Neurologic: Alert & Oriented X 3, Facial symmetry preserved and upper & lower extremities moving well with symmetry  Psychiatric: Calm, normal eye contact and normal affect      Data   Results for orders placed or performed during the hospital encounter of 10/08/22 (from the past 24 hour(s))   Glucose by meter   Result Value Ref Range    GLUCOSE BY METER POCT 151 (H) 70 - 99 mg/dL   Glucose by meter   Result Value Ref Range    GLUCOSE BY METER POCT 106 (H) 70 - 99 mg/dL   Glucose by meter   Result Value Ref Range    GLUCOSE BY METER POCT 127 (H) 70 - 99 mg/dL   CBC with Platelets & Differential    Narrative    The following orders were created for panel order CBC with Platelets & Differential.  Procedure                               Abnormality         Status                     ---------                               -----------         ------                      CBC with platelets and d...[[id number]]                      Final result                 Please view results for these tests on the individual orders.   Basic metabolic panel   Result Value Ref Range    Sodium 138 136 - 145 mmol/L    Potassium 3.3 (L) 3.4 - 5.3 mmol/L    Chloride 97 (L) 98 - 107 mmol/L    Carbon Dioxide (CO2) 38 (H) 22 - 29 mmol/L    Anion Gap 3 (L) 7 - 15 mmol/L    Urea Nitrogen 12.4 8.0 - 23.0 mg/dL    Creatinine 0.48 (L) 0.51 - 0.95 mg/dL    Calcium 8.6 (L) 8.8 - 10.2 mg/dL    Glucose 109 (H) 70 - 99 mg/dL    GFR Estimate >90 >60 mL/min/1.73m2   Phosphorus   Result Value Ref Range    Phosphorus 3.1 2.5 - 4.5 mg/dL   Magnesium   Result Value Ref Range    Magnesium 1.9 1.7 - 2.3 mg/dL   Heparin Unfractionated Anti Xa Level   Result Value Ref Range    Anti Xa Unfractionated Heparin 0.39 For Reference Range, See Comment IU/mL    Narrative    Therapeutic Range: UFH: 0.25-0.50 IU/mL for low intensity dosing,  0.30-0.70 IU/mL for high intensity dosing DVT and PE.  This test is not validated for other direct factor X inhibitors (e.g. rivaroxaban, apixaban, edoxaban, betrixaban, fondaparinux) and should not be used for monitoring of other medications.   CBC with platelets and differential   Result Value Ref Range    WBC Count 9.1 4.0 - 11.0 10e3/uL    RBC Count 4.13 3.80 - 5.20 10e6/uL    Hemoglobin 13.1 11.7 - 15.7 g/dL    Hematocrit 40.0 35.0 - 47.0 %    MCV 97 78 - 100 fL    MCH 31.7 26.5 - 33.0 pg    MCHC 32.8 31.5 - 36.5 g/dL    RDW 14.2 10.0 - 15.0 %    Platelet Count 347 150 - 450 10e3/uL    % Neutrophils 74 %    % Lymphocytes 15 %    % Monocytes 9 %    % Eosinophils 1 %    % Basophils 0 %    % Immature Granulocytes 1 %    NRBCs per 100 WBC 0 <1 /100    Absolute Neutrophils 6.7 1.6 - 8.3 10e3/uL    Absolute Lymphocytes 1.4 0.8 - 5.3 10e3/uL    Absolute Monocytes 0.8 0.0 - 1.3 10e3/uL    Absolute Eosinophils 0.1 0.0 - 0.7 10e3/uL    Absolute Basophils 0.0 0.0 - 0.2 10e3/uL    Absolute  Immature Granulocytes 0.1 <=0.4 10e3/uL    Absolute NRBCs 0.0 10e3/uL   Glucose by meter   Result Value Ref Range    GLUCOSE BY METER POCT 114 (H) 70 - 99 mg/dL   Glucose by meter   Result Value Ref Range    GLUCOSE BY METER POCT 110 (H) 70 - 99 mg/dL   Potassium   Result Value Ref Range    Potassium 3.7 3.4 - 5.3 mmol/L

## 2022-10-24 NOTE — PROGRESS NOTES
Pt de-sats to 83% on 7 LPM high flow nasal cannula. RT called and recommended turn up to 10 LPM. O2 didn't budge. Currently 15 LPM high flow nasal cannula and sats are 86% and slowly moving up. Pt refuses any other oxygen delivery device. Will continue to monitor.    Pt O2 sats 93% on 15 LPM high flow nasal cannula. Plan to leave it at 15 LPM while pt sleeping.

## 2022-10-24 NOTE — PROGRESS NOTES
Hospice:  Met with Pt in her room. She noted she was not ready for hospice at this time and she noted it to care manager.    Information on [facility] left with patient.    DARNELL Webb  [facility]  [phone]

## 2022-10-24 NOTE — PROGRESS NOTES
Respiratory Care Note    Pt is on pseudo-HFNC with humidity at 12 lpm. Oxygen saturation of 91%, HR 87, RR 18 (shallow breathing), and BBS diminished. Duoneb x1 given without adverse effects. Continue to encourage deep breathing and coughing techniques. Titrate oxygen as able.    Jacklyn Tan, RT

## 2022-10-24 NOTE — PROGRESS NOTES
"[facility]  Palliative Care Daily Progress Note       Recommendations & Counseling     Lavonne has been weaned to nasal cannula but still requiring >6LPM. Her mental status continues to improve, she is no longer having hallucinations. She is still processing her medical condition and implications. Right now she is not interested in hospice care, wants to continue to get stronger with eventual goal of returning home.    Goals of Care: life-prolonging with limit of DNR/DNI    Advanced Care Planning:  Advance directive: No  POLST: No  Code status: DNR/DNI  Health care agent/surrogate: Frantz (son) along with Jess (daughter), Jermaine () recently had a stroke and has limited ability to communicate    Symptom Management:  #Delirium with anxiety, restlessness, hallucinations - improved  - Stop seroquel    #Dyspnea, tachypnea  - Discontinue prn morphine    Psychosocial & Spiritual:   Appreciate Palliative SW and Spiritual Health involvement      Assessments          Lavonne is a 66 y/o woman, active smoker, with history of lymphedema, polycythemia, and recent diagnosis of Covid infection, admitted to ICU on 10/8/2022 with acute respiratory failure requiring ventilation and circulatory shock requiring vasopressors. Respiratory failure attributed to CAP vs aspiration pneumonia with small PE. 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    Today, the patient was seen for:  Goals of care, family meeting, emotional support    Prognosis, Goals, or Advance Care Planning was addressed today with: Yes.  Mood, coping, and/or meaning in the context of serious illness were addressed today: Yes.              Interval History:     Chart review/discussion with unit or clinical team members:   - Per Hospice notes, Lavonne is no longer interested in hospice here in the hospital or at discharge  - Transitioned from HFNC to \"pseudo-HFNC,\" currently on 10LPM with SpO2 in the 90s    Per patient or family/caregivers " "today:  Lavonne is alert and engaged during my visit today. She does not recall meeting me last week. I introduced myself and explained the role of Palliative Care. I reviewed events in the ICU and conversations I had with her family during that time. Lavonne remembers \"they were scaring me with snakes\" in the ICU. She also recalls being \"in retirement\" during that time. I explained that hallucinations are common among patients in the ICU. Lavonne expressed understanding, says she is no longer seeing snakes.    Lavonne understands that she has COPD and will continue to require oxygen support. She worked with PT who recommended discharge to TCU, Lavonne says she would prefer to go home. Reviewed that Jermaine is not able to help her at home, would not be safe. Lavonne knows this and says she's still \"thinking it over.\" She is not interested in hospice at this time. She would be willing to follow up with a Pulmonologist outside the hospital.    Called son Frantz and spoke with him this afternoon. He and Jermaine will be here tomorrow to visit Lavonne, I will meet with them then.    Key Palliative Symptoms:  Denies dyspnea, anxiety or pain           Review of Systems:     3 system review negative          Medications:     I have reviewed this patient's medication profile and medications during this hospitalization.    Noted meds:    Current Facility-Administered Medications   Medication     apixaban ANTICOAGULANT (ELIQUIS) tablet 10 mg    Followed by     [START ON 10/30/2022] apixaban ANTICOAGULANT (ELIQUIS) tablet 5 mg     dextrose 10% infusion     dextrose 10% infusion     glucose gel 15-30 g    Or     dextrose 50 % injection 25-50 mL    Or     glucagon injection 1 mg     furosemide (LASIX) injection 20 mg     glycopyrrolate (ROBINUL) injection 0.2 mg     influenza vac high-dose quad (FLUZONE HD) injection KEVIN 0.7 mL     ipratropium - albuterol 0.5 mg/2.5 mg/3 mL (DUONEB) neb solution 3 mL     lactulose (CHRONULAC) solution 20 g     LubriFresh P.M. OINT " "    magnesium oxide (MAG-OX) tablet 400 mg     morphine (PF) injection 1-2 mg     morphine solution 2.5-5 mg     naloxone (NARCAN) injection 0.1 mg     naloxone (NARCAN) injection 0.2 mg     naloxone (NARCAN) injection 0.2 mg    Or     naloxone (NARCAN) injection 0.4 mg    Or     naloxone (NARCAN) injection 0.2 mg    Or     naloxone (NARCAN) injection 0.4 mg     ondansetron (ZOFRAN ODT) ODT tab 4 mg    Or     ondansetron (ZOFRAN) injection 4 mg     ondansetron (ZOFRAN) injection 4 mg     polyethylene glycol (MIRALAX) Packet 17 g     QUEtiapine (SEROquel) half-tab 12.5 mg     thiamine (B-1) tablet 100 mg                Physical Exam:   Vital Signs: Blood pressure 103/63, pulse 87, temperature 98.6  F (37  C), temperature source Oral, resp. rate 18, height 1.702 m (5' 7\"), weight 44.4 kg (97 lb 14.2 oz), SpO2 94 %.   GENERAL: on nasal cannula, awake and alert, answering questions appropriately  SKIN: Warm and dry   HEENT: +temporal muscle wasting  ABDOMINAL: soft, nondistended, nontender  MUSKL: no gross joint deformities   EXTREMITIES: No edema              Data Reviewed:     Reviewed recent pertinent imaging:   No new imaging    Reviewed recent labs:   CMP  Recent Labs   Lab 10/24/22  0556 10/23/22  1314 10/23/22  1206 10/23/22  0823 10/23/22  0538 10/22/22  1658 10/22/22  1016 10/22/22  0339 10/22/22  0333 10/21/22  1120 10/21/22  1111 10/21/22  0622 10/21/22  0518 10/20/22  0743 10/20/22  0642 10/19/22  0809 10/19/22  0426 10/18/22  0436 10/18/22  0434     --   --   --  138  --   --   --  142  --   --   --  136  --  136  --  136  --  140   POTASSIUM 3.5 3.7  --   --  3.3*  --  3.7  --  3.3*  3.3*   < > 3.0*  --  3.1*  3.1*   < > 2.8*  --  3.7  --  3.8   CHLORIDE 96*  --   --   --  97*  --   --   --  97*  --   --   --  92*  --  87*  --  87*  --  94*   CO2 35*  --   --   --  38*  --   --   --  39*  --   --   --  36*  --  35*  --  38*  --  35*   ANIONGAP 7  --   --   --  3*  --   --   --  6*  --   --   --  8  " --  14  --  11  --  11   GLC 87  --  110* 114* 109*   < >  --    < > 82   < >  --    < > 76  69*   < > 126*   < > 105*   < > 139*   BUN 9.8  --   --   --  12.4  --   --   --  16.6  --   --   --  27.2*  --  31.8*  --  27.1*  --  24.1*   CR 0.46*  --   --   --  0.48*  --   --   --  0.54  --   --   --  0.67  --  0.72  --  0.80  --  0.77   GFRESTIMATED >90  --   --   --  >90  --   --   --  >90  --   --   --  >90  --  >90  --  80  --  84   JASON 8.5*  --   --   --  8.6*  --   --   --  9.2  --   --   --  9.5  --  9.0  --  9.4  --  9.4   MAG 1.8  --   --   --  1.9  --   --   --  1.8  --   --   --  2.1  --  2.2  --  2.0  --  2.2   PHOS 3.2  --   --   --  3.1  --   --   --  2.5  --  2.5  --   --   --  3.4  --  5.4*  --  4.4   PROTTOTAL  --   --   --   --   --   --   --   --   --   --   --   --   --   --  6.4  --  6.8  --  6.5   ALBUMIN  --   --   --   --   --   --   --   --   --   --   --   --   --   --  3.1*  --  3.1*  --  3.0*   BILITOTAL  --   --   --   --   --   --   --   --   --   --   --   --   --   --  0.6  --  0.7  --  0.7   ALKPHOS  --   --   --   --   --   --   --   --   --   --   --   --   --   --  64  --  73  --  72   AST  --   --   --   --   --   --   --   --   --   --   --   --   --   --  14  --  23  --  20   ALT  --   --   --   --   --   --   --   --   --   --   --   --   --   --  16  --  23  --  20    < > = values in this interval not displayed.     CBC  Recent Labs   Lab 10/24/22  0556 10/23/22  0538 10/22/22  0333 10/21/22  0518   WBC 7.8 9.1 8.5 11.9*   RBC 3.93 4.13 4.21 4.51   HGB 12.3 13.1 13.3 14.1   HCT 38.4 40.0 40.4 42.2   MCV 98 97 96 94   MCH 31.3 31.7 31.6 31.3   MCHC 32.0 32.8 32.9 33.4   RDW 14.0 14.2 14.3 14.3    [phone]         Jessica Richards PA-C  [facility]  Dept phone: [phone]  Securely message with the Vocera Web Console

## 2022-10-24 NOTE — PROGRESS NOTES
Care Management Follow Up    Length of Stay (days): 16    Expected Discharge Date: 10/26/2022     Concerns to be Addressed: O2 status - pt needs to be at 6L O2 or less to qualify for TCU; placement    Patient plan of care discussed at interdisciplinary rounds: Yes    Anticipated Discharge Disposition: Transitional Care     Anticipated Discharge Services:    Anticipated Discharge DME:      Referrals Placed by CM/SW:    Private pay costs discussed: Not applicable    Additional Information:    Per MD, pt requesting TCU. SW to f/u with pt/son Frantz for choices. Pt O2 needs to be 6L or less for TCU. SW spoke with alisha Landry over-the-phone. Plans for spouse to return home with daughter in [place]. Son suggests [place] / [place]. Will need TCU list. [facility]. Referral sent for pt. CM following.       Assessment hx: Pt lives in a split level house with spouse. Spouse suffered a recent stroke and was discharged [facility]. Cognitively he is not able to participate in decision making or planning for patient. Patient has been independent with ADLs and IADLs.  She ambulates without devices and drives. Alisha Landry is primary family contact.       NICOL Ocampo

## 2022-10-25 ENCOUNTER — APPOINTMENT (OUTPATIENT)
Dept: PHYSICAL THERAPY | Facility: HOSPITAL | Age: 67
DRG: 207 | End: 2022-10-25
Payer: COMMERCIAL

## 2022-10-25 LAB
ANION GAP SERPL CALCULATED.3IONS-SCNC: 8 MMOL/L (ref 7–15)
BUN SERPL-MCNC: 12.8 MG/DL (ref 8–23)
CALCIUM SERPL-MCNC: 8.7 MG/DL (ref 8.8–10.2)
CHLORIDE SERPL-SCNC: 96 MMOL/L (ref 98–107)
CREAT SERPL-MCNC: 0.44 MG/DL (ref 0.51–0.95)
DEPRECATED HCO3 PLAS-SCNC: 36 MMOL/L (ref 22–29)
ERYTHROCYTE [DISTWIDTH] IN BLOOD BY AUTOMATED COUNT: 14 % (ref 10–15)
GFR SERPL CREATININE-BSD FRML MDRD: >90 ML/MIN/1.73M2
GLUCOSE SERPL-MCNC: 111 MG/DL (ref 70–99)
HCT VFR BLD AUTO: 40.8 % (ref 35–47)
HGB BLD-MCNC: 13.2 G/DL (ref 11.7–15.7)
MAGNESIUM SERPL-MCNC: 2.1 MG/DL (ref 1.7–2.3)
MCH RBC QN AUTO: 31.7 PG (ref 26.5–33)
MCHC RBC AUTO-ENTMCNC: 32.4 G/DL (ref 31.5–36.5)
MCV RBC AUTO: 98 FL (ref 78–100)
PHOSPHATE SERPL-MCNC: 3.4 MG/DL (ref 2.5–4.5)
PLATELET # BLD AUTO: 348 10E3/UL (ref 150–450)
POTASSIUM SERPL-SCNC: 3.5 MMOL/L (ref 3.4–5.3)
RBC # BLD AUTO: 4.17 10E6/UL (ref 3.8–5.2)
SODIUM SERPL-SCNC: 140 MMOL/L (ref 136–145)
WBC # BLD AUTO: 9.2 10E3/UL (ref 4–11)

## 2022-10-25 PROCEDURE — 94640 AIRWAY INHALATION TREATMENT: CPT | Mod: 76

## 2022-10-25 PROCEDURE — 83735 ASSAY OF MAGNESIUM: CPT | Performed by: INTERNAL MEDICINE

## 2022-10-25 PROCEDURE — 99207 PR CDG-CUT & PASTE-POTENTIAL IMPACT ON LEVEL: CPT | Performed by: INTERNAL MEDICINE

## 2022-10-25 PROCEDURE — 97530 THERAPEUTIC ACTIVITIES: CPT | Mod: GP

## 2022-10-25 PROCEDURE — 94640 AIRWAY INHALATION TREATMENT: CPT

## 2022-10-25 PROCEDURE — 250N000009 HC RX 250: Performed by: INTERNAL MEDICINE

## 2022-10-25 PROCEDURE — 258N000001 HC RX 258: Performed by: HOSPITALIST

## 2022-10-25 PROCEDURE — 99232 SBSQ HOSP IP/OBS MODERATE 35: CPT | Performed by: PHYSICIAN ASSISTANT

## 2022-10-25 PROCEDURE — 250N000013 HC RX MED GY IP 250 OP 250 PS 637: Performed by: INTERNAL MEDICINE

## 2022-10-25 PROCEDURE — 82310 ASSAY OF CALCIUM: CPT | Performed by: INTERNAL MEDICINE

## 2022-10-25 PROCEDURE — 250N000013 HC RX MED GY IP 250 OP 250 PS 637: Performed by: PHYSICIAN ASSISTANT

## 2022-10-25 PROCEDURE — 85027 COMPLETE CBC AUTOMATED: CPT | Performed by: INTERNAL MEDICINE

## 2022-10-25 PROCEDURE — 120N000001 HC R&B MED SURG/OB

## 2022-10-25 PROCEDURE — 250N000011 HC RX IP 250 OP 636: Performed by: INTERNAL MEDICINE

## 2022-10-25 PROCEDURE — 84100 ASSAY OF PHOSPHORUS: CPT | Performed by: INTERNAL MEDICINE

## 2022-10-25 PROCEDURE — 99232 SBSQ HOSP IP/OBS MODERATE 35: CPT | Performed by: INTERNAL MEDICINE

## 2022-10-25 PROCEDURE — 97110 THERAPEUTIC EXERCISES: CPT | Mod: GP

## 2022-10-25 PROCEDURE — 999N000157 HC STATISTIC RCP TIME EA 10 MIN

## 2022-10-25 RX ORDER — MIRTAZAPINE 7.5 MG/1
7.5 TABLET, FILM COATED ORAL AT BEDTIME
Status: DISCONTINUED | OUTPATIENT
Start: 2022-10-25 | End: 2022-10-31

## 2022-10-25 RX ORDER — MORPHINE SULFATE 10 MG/5ML
2 SOLUTION ORAL EVERY 4 HOURS PRN
Status: DISCONTINUED | OUTPATIENT
Start: 2022-10-25 | End: 2022-10-27

## 2022-10-25 RX ADMIN — DEXTROSE MONOHYDRATE: 100 INJECTION, SOLUTION INTRAVENOUS at 15:13

## 2022-10-25 RX ADMIN — IPRATROPIUM BROMIDE AND ALBUTEROL SULFATE 3 ML: 2.5; .5 SOLUTION RESPIRATORY (INHALATION) at 11:12

## 2022-10-25 RX ADMIN — MIRTAZAPINE 7.5 MG: 7.5 TABLET, FILM COATED ORAL at 21:54

## 2022-10-25 RX ADMIN — APIXABAN 10 MG: 5 TABLET, FILM COATED ORAL at 08:58

## 2022-10-25 RX ADMIN — FUROSEMIDE 20 MG: 10 INJECTION, SOLUTION INTRAMUSCULAR; INTRAVENOUS at 06:38

## 2022-10-25 RX ADMIN — THIAMINE HCL TAB 100 MG 100 MG: 100 TAB at 08:59

## 2022-10-25 RX ADMIN — IPRATROPIUM BROMIDE AND ALBUTEROL SULFATE 3 ML: 2.5; .5 SOLUTION RESPIRATORY (INHALATION) at 07:27

## 2022-10-25 RX ADMIN — IPRATROPIUM BROMIDE AND ALBUTEROL SULFATE 3 ML: 2.5; .5 SOLUTION RESPIRATORY (INHALATION) at 16:49

## 2022-10-25 RX ADMIN — FUROSEMIDE 20 MG: 10 INJECTION, SOLUTION INTRAMUSCULAR; INTRAVENOUS at 18:01

## 2022-10-25 RX ADMIN — IPRATROPIUM BROMIDE AND ALBUTEROL SULFATE 3 ML: 2.5; .5 SOLUTION RESPIRATORY (INHALATION) at 19:19

## 2022-10-25 RX ADMIN — APIXABAN 10 MG: 5 TABLET, FILM COATED ORAL at 21:54

## 2022-10-25 ASSESSMENT — ACTIVITIES OF DAILY LIVING (ADL)
ADLS_ACUITY_SCORE: 34
ADLS_ACUITY_SCORE: 32
ADLS_ACUITY_SCORE: 32
ADLS_ACUITY_SCORE: 34
ADLS_ACUITY_SCORE: 34
ADLS_ACUITY_SCORE: 32
ADLS_ACUITY_SCORE: 34
ADLS_ACUITY_SCORE: 32

## 2022-10-25 NOTE — PROGRESS NOTES
"[facility]  Palliative Care Daily Progress Note       Recommendations & Counseling     Lavonne maintains desire to pursue life-prolonging care, goal is to get to TCU. Revisited hospice today and explored what that might look like. Lavonne is \"not ready for hospice\" at this time. Family supports her in her goals and understand prognosis.    Goals of Care: life-prolonging with limit of DNR/DNI    Advanced Care Planning:  Advance directive: No  POLST: No  Code status: DNR/DNI  Health care agent/surrogate: Frantz (son) along with Jess (daughter), Jermaine () recently had a stroke and has limited ability to communicate    Symptom Management:  #Dyspnea, tachypnea,hypoxia  - Continue prn morphine for dyspnea  - Pulm to follow up tomorrow    #Anorexia, cachexia, insomnia  - Start mirtazapine 7.5mg    #Dysuria  - Consider trial void, UA and culture    #Dizziness  - Orthostatics negative  - Consider prn meclizine    Psychosocial & Spiritual:   Appreciate Palliative SW and Spiritual Health involvement      Assessments          Lavonne is a 68 y/o woman, active smoker, with history of lymphedema, polycythemia, and recent diagnosis of Covid infection, admitted to ICU on 10/8/2022 with acute respiratory failure requiring ventilation and circulatory shock requiring vasopressors. Respiratory failure attributed to CAP vs aspiration pneumonia with small PE. 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  - Extubated on 10/20 to BiPAP    Today, the patient was seen for:  Goals of care, emotional support, symptom management    Prognosis, Goals, or Advance Care Planning was addressed today with: Yes.  Mood, coping, and/or meaning in the context of serious illness were addressed today: Yes.              Interval History:     Chart review/discussion with unit or clinical team members:   - Remains on 9-10L oxygen    Per patient or family/caregivers today:  Lavonne complains of lower abdominal discomfort and dysuria " "overnight. Notes that black tube was kinked, now fixed but still having discomfort. Has frequent urge to urinate despite black in place. Lavonne also complains of dizziness with sitting up or attempting to stand. She has poor appetite, requested half a piece of toast for breakfast.    Lavonne knows her  and son are visiting this evening. She knows that she cannot return home as Jermaine cannot take care of her right now. She does not want to go to TCU but says \"I have no choice.\" I also brought up hospice, which she was previously interested in. Lavonne says \"I'm not ready for hospice.\" Explored understanding of hospice and what that might mean. Shared that Lavonne would still qualify for hospice given current respiratory status and cachexia. Lavonne said \"I'm going to live for a long time.\" Shared my hope that Lavonne does live longer, and I worry that time might be shorter than we would like.    Called and spoke with son Frantz. Explained that Lavonne is more accepting of fact that she cannot return home, still not interested in hospice at this time. Frantz and family understand that Lavonne's prognosis is poor. Agreeable to current plan of waiting to see if Lavonne's oxygen can be weaned, follow up with Pulmonology tomorrow.    Key Palliative Symptoms:  Denies dyspnea, anxiety or pain           Review of Systems:     3 system review negative          Medications:     I have reviewed this patient's medication profile and medications during this hospitalization.    Noted meds:    Current Facility-Administered Medications   Medication     apixaban ANTICOAGULANT (ELIQUIS) tablet 10 mg    Followed by     [START ON 10/30/2022] apixaban ANTICOAGULANT (ELIQUIS) tablet 5 mg     dextrose 10% infusion     dextrose 10% infusion     glucose gel 15-30 g    Or     dextrose 50 % injection 25-50 mL    Or     glucagon injection 1 mg     furosemide (LASIX) injection 20 mg     influenza vac high-dose quad (FLUZONE HD) injection KEVIN 0.7 mL     ipratropium - " "albuterol 0.5 mg/2.5 mg/3 mL (DUONEB) neb solution 3 mL     LubriFresh P.M. OINT     melatonin tablet 3 mg     naloxone (NARCAN) injection 0.1 mg     naloxone (NARCAN) injection 0.2 mg     naloxone (NARCAN) injection 0.2 mg    Or     naloxone (NARCAN) injection 0.4 mg    Or     naloxone (NARCAN) injection 0.2 mg    Or     naloxone (NARCAN) injection 0.4 mg     ondansetron (ZOFRAN ODT) ODT tab 4 mg    Or     ondansetron (ZOFRAN) injection 4 mg     ondansetron (ZOFRAN) injection 4 mg     polyethylene glycol (MIRALAX) Packet 17 g     thiamine (B-1) tablet 100 mg                Physical Exam:   Vital Signs: Blood pressure 115/72, pulse 102, temperature 97.9  F (36.6  C), temperature source Oral, resp. rate 20, height 1.702 m (5' 7\"), weight 44.4 kg (97 lb 14.2 oz), SpO2 (!) 87 %.   GENERAL: on nasal cannula, awake and alert, answering questions appropriately  SKIN: Warm and dry   HEENT: +temporal muscle wasting  ABDOMINAL: soft, nondistended, nontender  MUSKL: no gross joint deformities   EXTREMITIES: No edema              Data Reviewed:     Reviewed recent pertinent imaging:   No new imaging    Reviewed recent labs:   CMP  Recent Labs   Lab 10/25/22  0645 10/24/22  0556 10/23/22  1314 10/23/22  1206 10/23/22  0823 10/23/22  0538 10/22/22  0339 10/22/22  0333 10/20/22  0743 10/20/22  0642 10/19/22  0809 10/19/22  0426    138  --   --   --  138  --  142   < > 136  --  136   POTASSIUM 3.5 3.5 3.7  --   --  3.3*   < > 3.3*  3.3*   < > 2.8*  --  3.7   CHLORIDE 96* 96*  --   --   --  97*  --  97*   < > 87*  --  87*   CO2 36* 35*  --   --   --  38*  --  39*   < > 35*  --  38*   ANIONGAP 8 7  --   --   --  3*  --  6*   < > 14  --  11   * 87  --  110* 114* 109*   < > 82   < > 126*   < > 105*   BUN 12.8 9.8  --   --   --  12.4  --  16.6   < > 31.8*  --  27.1*   CR 0.44* 0.46*  --   --   --  0.48*  --  0.54   < > 0.72  --  0.80   GFRESTIMATED >90 >90  --   --   --  >90  --  >90   < > >90  --  80   JASON 8.7* 8.5*  --  "  --   --  8.6*  --  9.2   < > 9.0  --  9.4   MAG 2.1 1.8  --   --   --  1.9  --  1.8   < > 2.2  --  2.0   PHOS 3.4 3.2  --   --   --  3.1  --  2.5   < > 3.4  --  5.4*   PROTTOTAL  --   --   --   --   --   --   --   --   --  6.4  --  6.8   ALBUMIN  --   --   --   --   --   --   --   --   --  3.1*  --  3.1*   BILITOTAL  --   --   --   --   --   --   --   --   --  0.6  --  0.7   ALKPHOS  --   --   --   --   --   --   --   --   --  64  --  73   AST  --   --   --   --   --   --   --   --   --  14  --  23   ALT  --   --   --   --   --   --   --   --   --  16  --  23    < > = values in this interval not displayed.     CBC  Recent Labs   Lab 10/25/22  0645 10/24/22  0556 10/23/22  0538 10/22/22  0333   WBC 9.2 7.8 9.1 8.5   RBC 4.17 3.93 4.13 4.21   HGB 13.2 12.3 13.1 13.3   HCT 40.8 38.4 40.0 40.4   MCV 98 98 97 96   MCH 31.7 31.3 31.7 31.6   MCHC 32.4 32.0 32.8 32.9   RDW 14.0 14.0 14.2 14.3    [phone]         Jessica Richards PA-C  [facility], Palliative Care  Dept phone: [phone]  Securely message with the Vocera Web Console

## 2022-10-25 NOTE — PLAN OF CARE
Problem: Plan of Care - These are the overarching goals to be used throughout the patient stay.    Goal: Optimal Comfort and Wellbeing  Outcome: Progressing     Problem: Anxiety Signs/Symptoms  Goal: Improved Sleep (Anxiety Signs/Symptoms)  Outcome: Progressing     Problem: COPD (Chronic Obstructive Pulmonary Disease) Comorbidity  Goal: Maintenance of COPD Symptom Control  Outcome: Progressing     Goal Outcome Evaluation:vss. Patient has been calm/pleasant this evening. Not reporting pain. Monaco patent and draining bonnie/cloudy urine. Nasal cannula at 9L. Sats in low 90's. Req melatonin for sleep.

## 2022-10-25 NOTE — PROGRESS NOTES
Patient is seen for a neb treatment. Shallow breathing and tachypnea noted. Pt remains on 9 lpm by nasal cannula. BS diminished pre/post treatment. Wet cough noted. Deep breath and coughing encouraged. RT following.    Jerrod Arnett, RT

## 2022-10-25 NOTE — PROGRESS NOTES
"Care Management Follow Up    Length of Stay (days): 17    Expected Discharge Date: 10/26/2022     Concerns to be Addressed: O2 status - pt needs to be at 6L O2 or less to qualify for TCU; placement    Patient plan of care discussed at interdisciplinary rounds: Yes    Anticipated Discharge Disposition: Transitional Care     Anticipated Discharge Services:    Anticipated Discharge DME:      Referrals Placed by CM/SW:    Private pay costs discussed: Not applicable    Additional Information:    YVETTE met in room to speak with pt about discharge planning. SW described process of therapy REC being TCU and pt expressed understanding. TCU list left in room for pt/family to review later today when son visits. SW let pt know that O2 would need to be lower to go to TCU. Pt states that she struggled overnight not being able to sleep much due to constant bowel issues. Emotional support provided. \"It is what it is\" pt stated. Anticipate transition to TCU - awaiting preferences from pt/son Frantz. CM to follow up pt/son tomorrow.       1520: YVETTE spoke with [facility], who confirmed that pt spouse Jermaine was in TCU and not anticipated to discharge in the next week. Referral was sent to TCU for screening in case pt becomes ready to discharge this week, she could go to the same TCU as her spouse Jermaine.         Assessment hx: Pt lives in a split level house with spouse. Spouse suffered a recent stroke and was discharged from [facility] to [facility], next to [facility]. Cognitively he is not able to participate in decision making or planning for patient. Patient has been independent with ADLs and IADLs. She ambulates without devices and drives. Son Frantz is primary family contact.       MEL OcampoSW        "

## 2022-10-25 NOTE — PROGRESS NOTES
"Remains on Bubbler HFNC 8 lpm, SpO2 87 %, increased to 9 lpm. BS significanty decreased bilat, Duoneb tx given, tolerated well, BS unchanged after, RT to monitor.    /72 (BP Location: Right arm, Patient Position: Semi-Gibson's)   Pulse 102   Temp 97.9  F (36.6  C) (Oral)   Resp 20   Ht 1.702 m (5' 7\")   Wt 44.4 kg (97 lb 14.2 oz)   SpO2 (!) 87%   BMI 15.33 kg/m      "

## 2022-10-25 NOTE — PROGRESS NOTES
"Bubbler HFNC 9 lpm, SpO2 93 %, BS diminished and clear, Duoneb tx/mask given, tolerated well. BS unchanged after    /68 (BP Location: Right arm)   Pulse 106   Temp 98.1  F (36.7  C) (Oral)   Resp 20   Ht 1.702 m (5' 7\")   Wt 44.4 kg (97 lb 14.2 oz)   SpO2 93%   BMI 15.33 kg/m      RT to follow.  "

## 2022-10-25 NOTE — PLAN OF CARE
"  Problem: COPD (Chronic Obstructive Pulmonary Disease) Comorbidity  Goal: Maintenance of COPD Symptom Control  Outcome: Progressing   Goal Outcome Evaluation:       Pt is on 9L of oxygen. Continues to have shallow breathing. Encouraged pursed lip breathing. Monaco intact draining cloudy bonnie urine. Pt has been sleeping when rounded upon. Per pt she felt \"off and didn't feel good\" during the night. Offered zofran, pt declined.  Uneventful shift.  Kanchan Sequeira RN                   "

## 2022-10-25 NOTE — PROGRESS NOTES
[facility]  Consult Note - Hospitalist Service  Date of Admission:  10/8/2022  Consult Requested by: Dr. Cain  Reason for Consult     Assessment & Plan   Lavonne Talbot is a 67 year old female with history of severe emphysema, tobacco use disorder, lymphedema on furosemide, and recent COVID-19 infection admitted to intensive care unit on 10/8/2022 due to acute hypoxic respiratory failure secondary to community-acquired pneumonia/aspiration pneumonia, small subsegmental pulmonary embolism, circulatory shock requiring vasopressors and possible myocardial injury.  She was intubated on 10/8/2022 and extubated to BiPAP on 10/20/2022.    She completed antibiotic therapy for haemophilus influenza tracheobronchitis while in the ICU.  She also completed course of dexamethasone in the ICU.  Blood culture and tracheal fluid cultures are pending.  She received furosemide briefly for possible pulmonary edema; now held as patient is euvolemic.  As per intensivist, patient prefers going home with hospice.      10/24 :     Needing high flow oxygen due to hypoxia but refusing  Continue nebs, iv lasix resumed  Critical care signed off  Transfer out of ICU 10/22  Patient declined hospice, palliative care consulted to determine goals of care      Not medically ready for discharge at this time.  Another 2 ? Days to discharge        Severe COPD  Hypoxic respiratory failure requiring mechanical ventilation  Haemophilus influenza tracheobronchitis  Extubated to BiPAP on 10/20/2022  Continue bronchodilators as scheduled  Continue to hold furosemide for now as patient is euvolemic  Follow-up blood culture and tracheal fluid culture  Off antibiotics  Patient prefers being discharged home with hospice.  Follow-up pulmonology consult  Continue supplemental oxygen    Subsegmental pulmonary embolism  Continue heparin drip for now  Will need further discussion as regards anticoagulation as patient is leaning towards home  "hospice.    Recent COVID-19 infection  Completed course of dexamethasone  Of COVID isolation precautions    Cachexia  Severe malnutrition  Likely secondary to COPD cachexia  Follow-up consult to nutritionist    Hypokalemia  Replace as per protocol  Monitor potassium level      The patient's care was discussed with the Patient.    Dontrell Nieto MD  [facility]  Securely message with the Vocera Web Console (learn more here)  Text page via [facility] Paging/[facility]       Hospitalist Service    Clinically Significant Risk Factors        # Hypokalemia: Lowest K = 3.3 mmol/L (Ref range: 3.4-5.3) in last 2 days, will replace as needed       # Hypoalbuminemia: Lowest albumin = 2.6 g/dL (Ref range: 3.5-5.2) at 10/8/2022 11:32 AM, will monitor as appropriate          # Cachexia: Estimated body mass index is 15.33 kg/m  as calculated from the following:    Height as of this encounter: 1.702 m (5' 7\").    Weight as of this encounter: 44.4 kg (97 lb 14.2 oz).   # Severe Malnutrition: based on nutrition assessment        ______________________________________________________________________      Review of Systems   The 10 point Review of Systems is negative other than noted in the HPI or here.    Past Medical History    I have reviewed this patient's medical history and updated it with pertinent information if needed.   Past Medical History:   Diagnosis Date     Tobacco abuse        Past Surgical History   I have reviewed this patient's surgical history and updated it with pertinent information if needed.  Past Surgical History:   Procedure Laterality Date     NO HISTORY OF SURGERY         Social History   I have reviewed this patient's social history and updated it with pertinent information if needed.  Social History     Tobacco Use     Smoking status: Every Day     Types: Cigarettes       Family History   I have reviewed this patient's family history and updated it with pertinent information if " needed.  Family History   Problem Relation Age of Onset     Diabetes Mother        Medications   I have reviewed this patient's current medications    Allergies   No Known Allergies    Physical Exam   Vital Signs: Temp: 98.1  F (36.7  C) Temp src: Oral BP: 112/74 Pulse: 101   Resp: 16 SpO2: 91 % O2 Device: High Flow Nasal Cannula (HFNC) Oxygen Delivery: 9 LPM  Weight: 97 lbs 14.15 oz    Physical Exam  General appearance: Cachectic, awake, Alert, Cooperative, not in any obvious distress and appears stated age   HEENT: Normocephalic, atraumatic, conjunctiva clear without icterus and ears without discharge  Lungs: Bilateral crackles with diminished air entry bilaterally  Cardiovascular: Regular Rate and Rythm, normal apical impulse, normal S1 and S2, no lower extremity edema bilaterally  Abdomen: Soft, non-tender and Non-distended, active bowel sounds  Skin: Skin color, texture normal and bruising or bleeding. No rashes or lesions over face, neck, arms and legs, turgor normal.  Musculoskeletal: No bony deformities or joint tenderness. Normal ROM upon flexion & extension.   Neurologic: Alert & Oriented X 3, Facial symmetry preserved and upper & lower extremities moving well with symmetry  Psychiatric: Calm, normal eye contact and normal affect      Data   Results for orders placed or performed during the hospital encounter of 10/08/22 (from the past 24 hour(s))   Basic metabolic panel   Result Value Ref Range    Sodium 138 136 - 145 mmol/L    Potassium 3.5 3.4 - 5.3 mmol/L    Chloride 96 (L) 98 - 107 mmol/L    Carbon Dioxide (CO2) 35 (H) 22 - 29 mmol/L    Anion Gap 7 7 - 15 mmol/L    Urea Nitrogen 9.8 8.0 - 23.0 mg/dL    Creatinine 0.46 (L) 0.51 - 0.95 mg/dL    Calcium 8.5 (L) 8.8 - 10.2 mg/dL    Glucose 87 70 - 99 mg/dL    GFR Estimate >90 >60 mL/min/1.73m2   CBC with platelets   Result Value Ref Range    WBC Count 7.8 4.0 - 11.0 10e3/uL    RBC Count 3.93 3.80 - 5.20 10e6/uL    Hemoglobin 12.3 11.7 - 15.7 g/dL     Hematocrit 38.4 35.0 - 47.0 %    MCV 98 78 - 100 fL    MCH 31.3 26.5 - 33.0 pg    MCHC 32.0 31.5 - 36.5 g/dL    RDW 14.0 10.0 - 15.0 %    Platelet Count 317 150 - 450 10e3/uL   Phosphorus   Result Value Ref Range    Phosphorus 3.2 2.5 - 4.5 mg/dL   Magnesium   Result Value Ref Range    Magnesium 1.8 1.7 - 2.3 mg/dL

## 2022-10-25 NOTE — PLAN OF CARE
Problem: COPD (Chronic Obstructive Pulmonary Disease) Comorbidity  Goal: Maintenance of COPD Symptom Control  Outcome: Progressing  Intervention: Maintain COPD-Symptom Control  Recent Flowsheet Documentation  Taken 10/25/2022 1606 by Kashif Sanchez RN  Supportive Measures: active listening utilized     Problem: Gas Exchange Impaired  Goal: Optimal Gas Exchange  Outcome: Progressing  Intervention: Optimize Oxygenation and Ventilation  Recent Flowsheet Documentation  Taken 10/25/2022 1606 by Kashif Sanchez RN  Head of Bed (HOB) Positioning: HOB at 45 degrees   Goal Outcome Evaluation:       Pt alert and oriented.  Remains on 9 liters high flow nasal canula,  sating 89-93%  shortness of breath noted.  Also complains of being dizzy.  Need much encouragement to turn and use incentive spirometry.  and has poor appetite. Declined getting up in chair today.  Did dangle on side of bed.  Monaco catheter removed at 1900.  Awaiting first void.

## 2022-10-26 ENCOUNTER — APPOINTMENT (OUTPATIENT)
Dept: PHYSICAL THERAPY | Facility: HOSPITAL | Age: 67
DRG: 207 | End: 2022-10-26
Payer: COMMERCIAL

## 2022-10-26 LAB
ALBUMIN UR-MCNC: NEGATIVE MG/DL
ANION GAP SERPL CALCULATED.3IONS-SCNC: 5 MMOL/L (ref 7–15)
APPEARANCE UR: CLEAR
BILIRUB UR QL STRIP: NEGATIVE
BUN SERPL-MCNC: 18.9 MG/DL (ref 8–23)
CALCIUM SERPL-MCNC: 9.1 MG/DL (ref 8.8–10.2)
CHLORIDE SERPL-SCNC: 95 MMOL/L (ref 98–107)
COLOR UR AUTO: NORMAL
CREAT SERPL-MCNC: 0.48 MG/DL (ref 0.51–0.95)
DEPRECATED HCO3 PLAS-SCNC: 39 MMOL/L (ref 22–29)
ERYTHROCYTE [DISTWIDTH] IN BLOOD BY AUTOMATED COUNT: 13.8 % (ref 10–15)
GFR SERPL CREATININE-BSD FRML MDRD: >90 ML/MIN/1.73M2
GLUCOSE SERPL-MCNC: 84 MG/DL (ref 70–99)
GLUCOSE UR STRIP-MCNC: NEGATIVE MG/DL
HCT VFR BLD AUTO: 41.3 % (ref 35–47)
HGB BLD-MCNC: 13.4 G/DL (ref 11.7–15.7)
HGB UR QL STRIP: NEGATIVE
KETONES UR STRIP-MCNC: NEGATIVE MG/DL
LEUKOCYTE ESTERASE UR QL STRIP: NEGATIVE
MAGNESIUM SERPL-MCNC: 1.9 MG/DL (ref 1.7–2.3)
MCH RBC QN AUTO: 31.5 PG (ref 26.5–33)
MCHC RBC AUTO-ENTMCNC: 32.4 G/DL (ref 31.5–36.5)
MCV RBC AUTO: 97 FL (ref 78–100)
NITRATE UR QL: NEGATIVE
PH UR STRIP: 5.5 [PH] (ref 5–7)
PHOSPHATE SERPL-MCNC: 3.8 MG/DL (ref 2.5–4.5)
PLATELET # BLD AUTO: 379 10E3/UL (ref 150–450)
POTASSIUM SERPL-SCNC: 3.5 MMOL/L (ref 3.4–5.3)
RBC # BLD AUTO: 4.25 10E6/UL (ref 3.8–5.2)
SODIUM SERPL-SCNC: 139 MMOL/L (ref 136–145)
SP GR UR STRIP: 1.01 (ref 1–1.03)
UROBILINOGEN UR STRIP-MCNC: <2 MG/DL
WBC # BLD AUTO: 9.3 10E3/UL (ref 4–11)

## 2022-10-26 PROCEDURE — 83735 ASSAY OF MAGNESIUM: CPT | Performed by: INTERNAL MEDICINE

## 2022-10-26 PROCEDURE — 120N000001 HC R&B MED SURG/OB

## 2022-10-26 PROCEDURE — 85014 HEMATOCRIT: CPT | Performed by: INTERNAL MEDICINE

## 2022-10-26 PROCEDURE — 99232 SBSQ HOSP IP/OBS MODERATE 35: CPT | Performed by: INTERNAL MEDICINE

## 2022-10-26 PROCEDURE — 94640 AIRWAY INHALATION TREATMENT: CPT | Mod: 76

## 2022-10-26 PROCEDURE — 999N000157 HC STATISTIC RCP TIME EA 10 MIN

## 2022-10-26 PROCEDURE — 250N000011 HC RX IP 250 OP 636: Performed by: INTERNAL MEDICINE

## 2022-10-26 PROCEDURE — 250N000013 HC RX MED GY IP 250 OP 250 PS 637: Performed by: INTERNAL MEDICINE

## 2022-10-26 PROCEDURE — 97530 THERAPEUTIC ACTIVITIES: CPT | Mod: GP | Performed by: PHYSICAL THERAPIST

## 2022-10-26 PROCEDURE — 250N000009 HC RX 250: Performed by: INTERNAL MEDICINE

## 2022-10-26 PROCEDURE — 99207 PR CDG-CUT & PASTE-POTENTIAL IMPACT ON LEVEL: CPT | Performed by: INTERNAL MEDICINE

## 2022-10-26 PROCEDURE — 250N000013 HC RX MED GY IP 250 OP 250 PS 637: Performed by: PHYSICIAN ASSISTANT

## 2022-10-26 PROCEDURE — 84100 ASSAY OF PHOSPHORUS: CPT | Performed by: INTERNAL MEDICINE

## 2022-10-26 PROCEDURE — 80048 BASIC METABOLIC PNL TOTAL CA: CPT | Performed by: INTERNAL MEDICINE

## 2022-10-26 PROCEDURE — 94640 AIRWAY INHALATION TREATMENT: CPT

## 2022-10-26 PROCEDURE — 81003 URINALYSIS AUTO W/O SCOPE: CPT | Performed by: INTERNAL MEDICINE

## 2022-10-26 PROCEDURE — 999N000123 HC STATISTIC OXYGEN O2DAILY TECH TIME

## 2022-10-26 RX ADMIN — FUROSEMIDE 20 MG: 10 INJECTION, SOLUTION INTRAMUSCULAR; INTRAVENOUS at 18:13

## 2022-10-26 RX ADMIN — MIRTAZAPINE 7.5 MG: 7.5 TABLET, FILM COATED ORAL at 21:35

## 2022-10-26 RX ADMIN — THIAMINE HCL TAB 100 MG 100 MG: 100 TAB at 08:06

## 2022-10-26 RX ADMIN — IPRATROPIUM BROMIDE AND ALBUTEROL SULFATE 3 ML: 2.5; .5 SOLUTION RESPIRATORY (INHALATION) at 16:11

## 2022-10-26 RX ADMIN — IPRATROPIUM BROMIDE AND ALBUTEROL SULFATE 3 ML: 2.5; .5 SOLUTION RESPIRATORY (INHALATION) at 11:23

## 2022-10-26 RX ADMIN — IPRATROPIUM BROMIDE AND ALBUTEROL SULFATE 3 ML: 2.5; .5 SOLUTION RESPIRATORY (INHALATION) at 20:14

## 2022-10-26 RX ADMIN — APIXABAN 10 MG: 5 TABLET, FILM COATED ORAL at 08:06

## 2022-10-26 RX ADMIN — APIXABAN 10 MG: 5 TABLET, FILM COATED ORAL at 21:34

## 2022-10-26 RX ADMIN — FUROSEMIDE 20 MG: 10 INJECTION, SOLUTION INTRAMUSCULAR; INTRAVENOUS at 06:32

## 2022-10-26 RX ADMIN — IPRATROPIUM BROMIDE AND ALBUTEROL SULFATE 3 ML: 2.5; .5 SOLUTION RESPIRATORY (INHALATION) at 07:37

## 2022-10-26 ASSESSMENT — ACTIVITIES OF DAILY LIVING (ADL)
ADLS_ACUITY_SCORE: 34
ADLS_ACUITY_SCORE: 38
ADLS_ACUITY_SCORE: 34
ADLS_ACUITY_SCORE: 38
ADLS_ACUITY_SCORE: 34

## 2022-10-26 NOTE — PROGRESS NOTES
Care Management Follow Up    Length of Stay (days): 18    Expected Discharge Date: 10/27/2022     Concerns to be Addressed:  (O2 status - pt needs to be at 6L O2 or less to qualify for TCU; placement)     Patient plan of care discussed at interdisciplinary rounds: Yes    Anticipated Discharge Disposition: Transitional Care     Anticipated Discharge Services:  TCU  Anticipated Discharge DME:  Per treatment team    Patient/family educated on Medicare website which has current facility and service quality ratings:  yes  Education Provided on the Discharge Plan:  yes  Patient/Family in Agreement with the Plan:  yes    Referrals Placed by CM/SW:  Post acute care facilities   Private pay costs discussed: Not applicable    Additional Information:    11:44 AM  SW met with Pt to discuss discharge planning.  Pt did not review TCU list with her son.  Pt is in agreement with referrals being sent in the area in which Pt lives.  SW utilized Medicare website to find facilities in Pt's area of residence.  SW left voice mail for Pt's son, Frantz.    Referrals faxed to:     Brandi Palacios    [facility]     3:16 PM  SW spoke Pt's son, Frantz, who reports feeling overwhelmed by current situation with his dad in TCU and some of Pt's attitude towards family.  SW provided supportive listening.  Frantz is in agreement with plan to find TCU as close to home as possible.  CM to keep Frantz updated regarding discharge planning.     DARNELL Green

## 2022-10-26 NOTE — PROGRESS NOTES
[facility]  Consult Note - Hospitalist Service  Date of Admission:  10/8/2022  Consult Requested by: Dr. Cain  Reason for Consult     Assessment & Plan   Lavonne Talbot is a 67 year old female with history of severe emphysema, tobacco use disorder, lymphedema on furosemide, and recent COVID-19 infection admitted to intensive care unit on 10/8/2022 due to acute hypoxic respiratory failure secondary to community-acquired pneumonia/aspiration pneumonia, small subsegmental pulmonary embolism, circulatory shock requiring vasopressors and possible myocardial injury.  She was intubated on 10/8/2022 and extubated to BiPAP on 10/20/2022.    She completed antibiotic therapy for haemophilus influenza tracheobronchitis while in the ICU.  She also completed course of dexamethasone in the ICU.  Blood culture and tracheal fluid cultures are pending.  She received furosemide briefly for possible pulmonary edema; now held as patient is euvolemic.  As per intensivist, patient prefers going home with hospice.      10/25 :     Needing high flow oxygen due to hypoxia but refusing  Continue nebs, iv lasix resumed  Critical care signed off  Transfer out of ICU 10/22  Patient declined hospice, palliative care consulted to determine goals of care      Not medically ready for discharge at this time.  Another 2 ? Days to discharge        Severe COPD  Hypoxic respiratory failure requiring mechanical ventilation  Haemophilus influenza tracheobronchitis  Extubated to BiPAP on 10/20/2022  Continue bronchodilators as scheduled  Continue to hold furosemide for now as patient is euvolemic  Follow-up blood culture and tracheal fluid culture  Off antibiotics  Patient prefers being discharged home with hospice.  Follow-up pulmonology consult  Continue supplemental oxygen    Subsegmental pulmonary embolism  Continue heparin drip for now  Will need further discussion as regards anticoagulation as patient is leaning towards home  "hospice.    Recent COVID-19 infection  Completed course of dexamethasone  Of COVID isolation precautions    Cachexia  Severe malnutrition  Likely secondary to COPD cachexia  Follow-up consult to nutritionist    Hypokalemia  Replace as per protocol  Monitor potassium level      The patient's care was discussed with the Patient.    Dontrell Nieto MD  [facility]  Securely message with the Vocera Web Console (learn more here)  Text page via [facility] Paging/Directory       Hospitalist Service    Clinically Significant Risk Factors              # Hypoalbuminemia: Lowest albumin = 2.6 g/dL (Ref range: 3.5-5.2) at 10/8/2022 11:32 AM, will monitor as appropriate          # Cachexia: Estimated body mass index is 15.33 kg/m  as calculated from the following:    Height as of this encounter: 1.702 m (5' 7\").    Weight as of this encounter: 44.4 kg (97 lb 14.2 oz).   # Severe Malnutrition: based on nutrition assessment        ______________________________________________________________________      Review of Systems   The 10 point Review of Systems is negative other than noted in the HPI or here.    Past Medical History    I have reviewed this patient's medical history and updated it with pertinent information if needed.   Past Medical History:   Diagnosis Date     Tobacco abuse        Past Surgical History   I have reviewed this patient's surgical history and updated it with pertinent information if needed.  Past Surgical History:   Procedure Laterality Date     NO HISTORY OF SURGERY         Social History   I have reviewed this patient's social history and updated it with pertinent information if needed.  Social History     Tobacco Use     Smoking status: Every Day     Types: Cigarettes       Family History   I have reviewed this patient's family history and updated it with pertinent information if needed.  Family History   Problem Relation Age of Onset     Diabetes Mother        Medications   I have " reviewed this patient's current medications    Allergies   No Known Allergies    Physical Exam   Vital Signs: Temp: 98.1  F (36.7  C) Temp src: Oral BP: 111/69 Pulse: 101   Resp: 20 SpO2: 93 % O2 Device: High Flow Nasal Cannula (HFNC) Oxygen Delivery: 9 LPM  Weight: 97 lbs 14.15 oz    Physical Exam  General appearance: Cachectic, awake, Alert, Cooperative, not in any obvious distress and appears stated age   HEENT: Normocephalic, atraumatic, conjunctiva clear without icterus and ears without discharge  Lungs: Bilateral crackles with diminished air entry bilaterally  Cardiovascular: Regular Rate and Rythm, normal apical impulse, normal S1 and S2, no lower extremity edema bilaterally  Abdomen: Soft, non-tender and Non-distended, active bowel sounds  Skin: Skin color, texture normal and bruising or bleeding. No rashes or lesions over face, neck, arms and legs, turgor normal.  Musculoskeletal: No bony deformities or joint tenderness. Normal ROM upon flexion & extension.   Neurologic: Alert & Oriented X 3, Facial symmetry preserved and upper & lower extremities moving well with symmetry  Psychiatric: Calm, normal eye contact and normal affect      Data   Results for orders placed or performed during the hospital encounter of 10/08/22 (from the past 24 hour(s))   Basic metabolic panel   Result Value Ref Range    Sodium 140 136 - 145 mmol/L    Potassium 3.5 3.4 - 5.3 mmol/L    Chloride 96 (L) 98 - 107 mmol/L    Carbon Dioxide (CO2) 36 (H) 22 - 29 mmol/L    Anion Gap 8 7 - 15 mmol/L    Urea Nitrogen 12.8 8.0 - 23.0 mg/dL    Creatinine 0.44 (L) 0.51 - 0.95 mg/dL    Calcium 8.7 (L) 8.8 - 10.2 mg/dL    Glucose 111 (H) 70 - 99 mg/dL    GFR Estimate >90 >60 mL/min/1.73m2   CBC with platelets   Result Value Ref Range    WBC Count 9.2 4.0 - 11.0 10e3/uL    RBC Count 4.17 3.80 - 5.20 10e6/uL    Hemoglobin 13.2 11.7 - 15.7 g/dL    Hematocrit 40.8 35.0 - 47.0 %    MCV 98 78 - 100 fL    MCH 31.7 26.5 - 33.0 pg    MCHC 32.4 31.5 - 36.5  g/dL    RDW 14.0 10.0 - 15.0 %    Platelet Count 348 150 - 450 10e3/uL   Phosphorus   Result Value Ref Range    Phosphorus 3.4 2.5 - 4.5 mg/dL   Magnesium   Result Value Ref Range    Magnesium 2.1 1.7 - 2.3 mg/dL

## 2022-10-26 NOTE — PLAN OF CARE
Problem: Gas Exchange Impaired  Goal: Optimal Gas Exchange  Outcome: Progressing     Problem: Anxiety Signs/Symptoms  Goal: Improved Sleep (Anxiety Signs/Symptoms)  Outcome: Progressing     Patient reports poor sleep at night. On O2 by NC at 9L. Denies pain. Incontinent of urine. IV lasix given per orders.    Brian Mei, RN

## 2022-10-26 NOTE — PROGRESS NOTES
RESPIRATORY CARE NOTE     Patient Name: Lavonne Talbot  Today's Date: 10/26/2022       Pt remains on Bubbler HFNC 9L, SPO2 88-91%, RR 24-26 bpm. BS: very diminished @ bases. Pt did receive her scheduled neb treatments as ordered. Post neb tx, pt reported relief. RT will continue to monitor and assess as needed.     Josiah Mcmahon RRT

## 2022-10-26 NOTE — PROGRESS NOTES
Duoneb given. BS diminished pre/post treatment. Pt remains on 9 lpm O2 by nasal cannula. sats low 90s. Deep breath and coughing encouraged. RT following.    Jerrod Arnett, RT

## 2022-10-26 NOTE — PLAN OF CARE
Problem: Plan of Care - These are the overarching goals to be used throughout the patient stay.    Goal: Plan of Care Review  Description: The Plan of Care Review/Shift note should be completed every shift.  The Outcome Evaluation is a brief statement about your assessment that the patient is improving, declining, or no change.  This information will be displayed automatically on your shift note.  Outcome: Progressing   Goal Outcome Evaluation:       Pt has had a poor appetite, encouraged food and fluids. Was unable to void this am, bladder scan done for >400, straight cath done for 500ml of bonnie urine at 0915. Encouraged pt to try to get up and use the commode. Pt feels she is too weak to get out of bed.

## 2022-10-27 ENCOUNTER — APPOINTMENT (OUTPATIENT)
Dept: RADIOLOGY | Facility: HOSPITAL | Age: 67
DRG: 207 | End: 2022-10-27
Attending: INTERNAL MEDICINE
Payer: COMMERCIAL

## 2022-10-27 ENCOUNTER — APPOINTMENT (OUTPATIENT)
Dept: PHYSICAL THERAPY | Facility: HOSPITAL | Age: 67
DRG: 207 | End: 2022-10-27
Payer: COMMERCIAL

## 2022-10-27 LAB
ANION GAP SERPL CALCULATED.3IONS-SCNC: 6 MMOL/L (ref 7–15)
BUN SERPL-MCNC: 25.2 MG/DL (ref 8–23)
CALCIUM SERPL-MCNC: 8.9 MG/DL (ref 8.8–10.2)
CHLORIDE SERPL-SCNC: 96 MMOL/L (ref 98–107)
CREAT SERPL-MCNC: 0.52 MG/DL (ref 0.51–0.95)
DEPRECATED HCO3 PLAS-SCNC: 39 MMOL/L (ref 22–29)
ERYTHROCYTE [DISTWIDTH] IN BLOOD BY AUTOMATED COUNT: 13.9 % (ref 10–15)
GFR SERPL CREATININE-BSD FRML MDRD: >90 ML/MIN/1.73M2
GLUCOSE SERPL-MCNC: 91 MG/DL (ref 70–99)
HCT VFR BLD AUTO: 44.4 % (ref 35–47)
HGB BLD-MCNC: 14.3 G/DL (ref 11.7–15.7)
MAGNESIUM SERPL-MCNC: 1.8 MG/DL (ref 1.7–2.3)
MCH RBC QN AUTO: 31.4 PG (ref 26.5–33)
MCHC RBC AUTO-ENTMCNC: 32.2 G/DL (ref 31.5–36.5)
MCV RBC AUTO: 97 FL (ref 78–100)
PHOSPHATE SERPL-MCNC: 3.4 MG/DL (ref 2.5–4.5)
PLATELET # BLD AUTO: 406 10E3/UL (ref 150–450)
POTASSIUM SERPL-SCNC: 3.4 MMOL/L (ref 3.4–5.3)
POTASSIUM SERPL-SCNC: 3.8 MMOL/L (ref 3.4–5.3)
RBC # BLD AUTO: 4.56 10E6/UL (ref 3.8–5.2)
SODIUM SERPL-SCNC: 141 MMOL/L (ref 136–145)
WBC # BLD AUTO: 15 10E3/UL (ref 4–11)

## 2022-10-27 PROCEDURE — 99232 SBSQ HOSP IP/OBS MODERATE 35: CPT | Performed by: PHYSICIAN ASSISTANT

## 2022-10-27 PROCEDURE — 80048 BASIC METABOLIC PNL TOTAL CA: CPT | Performed by: INTERNAL MEDICINE

## 2022-10-27 PROCEDURE — 71045 X-RAY EXAM CHEST 1 VIEW: CPT

## 2022-10-27 PROCEDURE — 94640 AIRWAY INHALATION TREATMENT: CPT

## 2022-10-27 PROCEDURE — 250N000009 HC RX 250: Performed by: INTERNAL MEDICINE

## 2022-10-27 PROCEDURE — 99207 PR CDG-CUT & PASTE-POTENTIAL IMPACT ON LEVEL: CPT | Performed by: INTERNAL MEDICINE

## 2022-10-27 PROCEDURE — 120N000001 HC R&B MED SURG/OB

## 2022-10-27 PROCEDURE — 97530 THERAPEUTIC ACTIVITIES: CPT | Mod: GP

## 2022-10-27 PROCEDURE — 94640 AIRWAY INHALATION TREATMENT: CPT | Mod: 76

## 2022-10-27 PROCEDURE — 84100 ASSAY OF PHOSPHORUS: CPT | Performed by: INTERNAL MEDICINE

## 2022-10-27 PROCEDURE — 85027 COMPLETE CBC AUTOMATED: CPT | Performed by: INTERNAL MEDICINE

## 2022-10-27 PROCEDURE — 250N000011 HC RX IP 250 OP 636: Performed by: INTERNAL MEDICINE

## 2022-10-27 PROCEDURE — 99232 SBSQ HOSP IP/OBS MODERATE 35: CPT | Performed by: INTERNAL MEDICINE

## 2022-10-27 PROCEDURE — 84132 ASSAY OF SERUM POTASSIUM: CPT | Performed by: INTERNAL MEDICINE

## 2022-10-27 PROCEDURE — 999N000157 HC STATISTIC RCP TIME EA 10 MIN

## 2022-10-27 PROCEDURE — 83735 ASSAY OF MAGNESIUM: CPT | Performed by: INTERNAL MEDICINE

## 2022-10-27 PROCEDURE — 250N000013 HC RX MED GY IP 250 OP 250 PS 637: Performed by: INTERNAL MEDICINE

## 2022-10-27 PROCEDURE — 97110 THERAPEUTIC EXERCISES: CPT | Mod: GP

## 2022-10-27 PROCEDURE — 250N000013 HC RX MED GY IP 250 OP 250 PS 637: Performed by: PHYSICIAN ASSISTANT

## 2022-10-27 RX ORDER — MORPHINE SULFATE 10 MG/5ML
5 SOLUTION ORAL EVERY 4 HOURS PRN
Status: DISCONTINUED | OUTPATIENT
Start: 2022-10-27 | End: 2022-11-11 | Stop reason: HOSPADM

## 2022-10-27 RX ORDER — POTASSIUM CHLORIDE 1.5 G/1.58G
20 POWDER, FOR SOLUTION ORAL ONCE
Status: COMPLETED | OUTPATIENT
Start: 2022-10-27 | End: 2022-10-27

## 2022-10-27 RX ORDER — MORPHINE SULFATE 10 MG/5ML
5 SOLUTION ORAL 3 TIMES DAILY
Status: DISCONTINUED | OUTPATIENT
Start: 2022-10-27 | End: 2022-10-31

## 2022-10-27 RX ADMIN — FUROSEMIDE 20 MG: 10 INJECTION, SOLUTION INTRAMUSCULAR; INTRAVENOUS at 05:31

## 2022-10-27 RX ADMIN — FUROSEMIDE 20 MG: 10 INJECTION, SOLUTION INTRAMUSCULAR; INTRAVENOUS at 18:06

## 2022-10-27 RX ADMIN — APIXABAN 10 MG: 5 TABLET, FILM COATED ORAL at 08:59

## 2022-10-27 RX ADMIN — MIRTAZAPINE 7.5 MG: 7.5 TABLET, FILM COATED ORAL at 21:50

## 2022-10-27 RX ADMIN — IPRATROPIUM BROMIDE AND ALBUTEROL SULFATE 3 ML: 2.5; .5 SOLUTION RESPIRATORY (INHALATION) at 16:03

## 2022-10-27 RX ADMIN — IPRATROPIUM BROMIDE AND ALBUTEROL SULFATE 3 ML: 2.5; .5 SOLUTION RESPIRATORY (INHALATION) at 20:33

## 2022-10-27 RX ADMIN — POTASSIUM CHLORIDE 20 MEQ: 1.5 POWDER, FOR SOLUTION ORAL at 09:00

## 2022-10-27 RX ADMIN — THIAMINE HCL TAB 100 MG 100 MG: 100 TAB at 09:00

## 2022-10-27 RX ADMIN — Medication 5 MG: at 21:48

## 2022-10-27 RX ADMIN — IPRATROPIUM BROMIDE AND ALBUTEROL SULFATE 3 ML: 2.5; .5 SOLUTION RESPIRATORY (INHALATION) at 11:36

## 2022-10-27 RX ADMIN — APIXABAN 10 MG: 5 TABLET, FILM COATED ORAL at 21:49

## 2022-10-27 RX ADMIN — Medication 5 MG: at 18:05

## 2022-10-27 RX ADMIN — IPRATROPIUM BROMIDE AND ALBUTEROL SULFATE 3 ML: 2.5; .5 SOLUTION RESPIRATORY (INHALATION) at 07:31

## 2022-10-27 ASSESSMENT — ACTIVITIES OF DAILY LIVING (ADL)
ADLS_ACUITY_SCORE: 38

## 2022-10-27 NOTE — PLAN OF CARE
Problem: COPD (Chronic Obstructive Pulmonary Disease) Comorbidity  Goal: Maintenance of COPD Symptom Control  Outcome: Progressing  Intervention: Maintain COPD-Symptom Control  Recent Flowsheet Documentation  Taken 10/27/2022 0900 by Chrissy Spencer RN  Medication Review/Management: medications reviewed     Problem: Malnutrition  Goal: Improved Nutritional Intake  Outcome: Not Progressing   Goal Outcome Evaluation:  Poor appetite ordered  only fruit for breakfast.   Incontinent of bladder and bowels. Held am navdeep lax  Patent refused due  to large bowel movements this morning   Complain of dizziness when sitting /dangle on the bed. On 10 L NC  sating at 92% refusing high flow .

## 2022-10-27 NOTE — PROGRESS NOTES
[facility]  Palliative Care Daily Progress Note       Recommendations & Counseling     Lavonne remains on a high level of oxygen (10L) which has been difficult to wean, plan for trial scheduled morphine.     Ongoing goals of care conversations with Lavonne and family. Family feels Lavonne is able to make decisions for herself now and they are supportive of whatever she chooses. Ideally Lavonne and family would prefer she go to TCU, but it is unclear if that is possible at this point. Lavonne has many reservations about hospice care which we have been working through.    Goals of Care: life-prolonging with limit of DNR/DNI    Advanced Care Planning:  Advance directive: No  POLST: No  Code status: DNR/DNI  Health care agent/surrogate: Frantz (son) along with Jess (daughter), Jermaine () recently had a stroke and has limited ability to communicate    Symptom Management:  #Dyspnea, tachypnea,hypoxia  - Appreciate Pulm input  - Started standing morphine for dyspnea with prn available    #Anorexia, cachexia, insomnia  - Continue mirtazapine 7.5mg    #Dizziness  - Orthostatics negative  - Consider prn meclizine    Psychosocial & Spiritual:   Appreciate Palliative SW and Spiritual Health involvement      Assessments          Lavonne is a 66 y/o woman, active smoker, with history of lymphedema, polycythemia, and recent diagnosis of Covid infection, admitted to ICU on 10/8/2022 with acute respiratory failure requiring ventilation and circulatory shock requiring vasopressors. Respiratory failure attributed to CAP vs aspiration pneumonia with small PE. 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  - Extubated on 10/20 to BiPAP    Today, the patient was seen for:  Goals of care, emotional support, symptom management    Prognosis, Goals, or Advance Care Planning was addressed today with: Yes.  Mood, coping, and/or meaning in the context of serious illness were addressed today: Yes.              Interval History:  "    Chart review/discussion with unit or clinical team members:   - Remains on 9-10L oxygen    Per patient or family/caregivers today:  Lavonne is feeling ok today. She notes she has continued dizziness any time she sits up or attempts to stand, thus has not been able to do much with PT. She also notes frustration with \"everyone telling me to eat more\" because she does not have much appetite and feels it is difficult for her to digest large amounts of food.    Lavonne has mentioned several times that \"this all happened because of Covid.\" I reflected that her Covid infection prior to admission did seem to lead to her critical illness, but her underlying lung disease and malnutrition have likely been brewing for some time. Lavonne says she wants to get back to her state of health a few weeks before admission. She is hopeful that she can do that, and says \"otherwise I might as well be in a morgue.\" Again explored possibility of hospice and what that might look like.    Called son Frantz and spoke with him over the phone. He and Jermaine visited on Tuesday, Lavonne was very irritable during their visit. Frantz is concerned that Lavonne has been vacillating between saying she wants to get better and go to TCU and saying he should \"put her in a morgue.\" Discussed Lavonne's frustration and difficulty coping with her new diagnoses, provided emotional support.    Key Palliative Symptoms:  Denies anxiety or pain, +dyspnea with exertion           Review of Systems:     3 system review negative          Medications:     I have reviewed this patient's medication profile and medications during this hospitalization.    Noted meds:    Current Facility-Administered Medications   Medication     apixaban ANTICOAGULANT (ELIQUIS) tablet 10 mg    Followed by     [START ON 10/30/2022] apixaban ANTICOAGULANT (ELIQUIS) tablet 5 mg     dextrose 10% infusion     glucose gel 15-30 g    Or     dextrose 50 % injection 25-50 mL    Or     glucagon injection 1 mg     " "furosemide (LASIX) injection 20 mg     influenza vac high-dose quad (FLUZONE HD) injection KEVIN 0.7 mL     ipratropium - albuterol 0.5 mg/2.5 mg/3 mL (DUONEB) neb solution 3 mL     LubriFresh P.M. OINT     melatonin tablet 3 mg     mirtazapine (REMERON) tablet TABS 7.5 mg     morphine solution 2 mg     naloxone (NARCAN) injection 0.1 mg     naloxone (NARCAN) injection 0.2 mg     naloxone (NARCAN) injection 0.2 mg    Or     naloxone (NARCAN) injection 0.4 mg    Or     naloxone (NARCAN) injection 0.2 mg    Or     naloxone (NARCAN) injection 0.4 mg     ondansetron (ZOFRAN ODT) ODT tab 4 mg    Or     ondansetron (ZOFRAN) injection 4 mg     ondansetron (ZOFRAN) injection 4 mg     [Held by provider] polyethylene glycol (MIRALAX) Packet 17 g     thiamine (B-1) tablet 100 mg                Physical Exam:   Vital Signs: Blood pressure 103/59, pulse 107, temperature 98.2  F (36.8  C), temperature source Oral, resp. rate 24, height 1.702 m (5' 7\"), weight 35.7 kg (78 lb 9.6 oz), SpO2 92 %.   GENERAL: on nasal cannula, awake and alert, answering questions appropriately  SKIN: Warm and dry   HEENT: +temporal muscle wasting  ABDOMINAL: soft, nondistended, nontender  MUSKL: no gross joint deformities   EXTREMITIES: No edema              Data Reviewed:     Reviewed recent pertinent imaging:   No new imaging    Reviewed recent labs:   CMP  Recent Labs   Lab 10/27/22  1320 10/27/22  1143 10/27/22  0530 10/26/22  0631 10/25/22  0645 10/24/22  0556   NA  --   --  141 139 140 138   POTASSIUM 3.8  --  3.4 3.5 3.5 3.5   CHLORIDE  --   --  96* 95* 96* 96*   CO2  --   --  39* 39* 36* 35*   ANIONGAP  --   --  6* 5* 8 7   GLC  --   --  91 84 111* 87   BUN  --   --  25.2* 18.9 12.8 9.8   CR  --   --  0.52 0.48* 0.44* 0.46*   GFRESTIMATED  --   --  >90 >90 >90 >90   JASON  --   --  8.9 9.1 8.7* 8.5*   MAG  --  1.8  --  1.9 2.1 1.8   PHOS  --  3.4  --  3.8 3.4 3.2     CBC  Recent Labs   Lab 10/27/22  0530 10/26/22  0631 10/25/22  0645 " 10/24/22  0556   WBC 15.0* 9.3 9.2 7.8   RBC 4.56 4.25 4.17 3.93   HGB 14.3 13.4 13.2 12.3   HCT 44.4 41.3 40.8 38.4   MCV 97 97 98 98   MCH 31.4 31.5 31.7 31.3   MCHC 32.2 32.4 32.4 32.0   RDW 13.9 13.8 14.0 14.0    379 348 317         Jessica Richards PA-C  [facility], Palliative Care  Dept phone: [phone]  Securely message with the Vocera Web Console

## 2022-10-27 NOTE — PLAN OF CARE
Problem: Plan of Care - These are the overarching goals to be used throughout the patient stay.    Goal: Patient-Specific Goal (Individualized)  Outcome: Progressing     Problem: COPD (Chronic Obstructive Pulmonary Disease) Comorbidity  Goal: Maintenance of COPD Symptom Control  Outcome: Progressing  Intervention: Maintain COPD-Symptom Control  Recent Flowsheet Documentation  Taken 10/26/2022 2014 by Rubina Dumont RN  Medication Review/Management: medications reviewed     Problem: Gas Exchange Impaired  Goal: Optimal Gas Exchange  Outcome: Progressing   Goal Outcome Evaluation:    [id number].... Pt had a relatively quiet shift, on 02 at 10L/NC and satting at 93%. She was largely in continent of urine, scanned for 133 mls. No s/sx of pain noted, pt has been in bed all shift.

## 2022-10-27 NOTE — PROGRESS NOTES
[facility]  Consult Note - Hospitalist Service  Date of Admission:  10/8/2022  Consult Requested by: Dr. Cain  Reason for Consult     Assessment & Plan   Lavonne Talbot is a 67 year old female with history of severe emphysema, tobacco use disorder, lymphedema on furosemide, and recent COVID-19 infection admitted to intensive care unit on 10/8/2022 due to acute hypoxic respiratory failure secondary to community-acquired pneumonia/aspiration pneumonia, small subsegmental pulmonary embolism, circulatory shock requiring vasopressors and possible myocardial injury.  She was intubated on 10/8/2022 and extubated to BiPAP on 10/20/2022.    She completed antibiotic therapy for haemophilus influenza tracheobronchitis while in the ICU.  She also completed course of dexamethasone in the ICU.  Blood culture and tracheal fluid cultures are pending.  She received furosemide briefly for possible pulmonary edema; now held as patient is euvolemic.  As per intensivist, patient prefers going home with hospice.      10/26 :     Needing high flow oxygen due to hypoxia but refusing  Continue nebs, iv lasix resumed  Critical care signed off  Transfer out of ICU 10/22  Patient declined hospice, palliative care consulted to determine goals of care    Care management team working on placement      Severe COPD  Hypoxic respiratory failure requiring mechanical ventilation  Haemophilus influenza tracheobronchitis  Extubated to BiPAP on 10/20/2022  Continue bronchodilators as scheduled  Continue to hold furosemide for now as patient is euvolemic  Follow-up blood culture and tracheal fluid culture  Off antibiotics  Patient prefers being discharged home with hospice.  Follow-up pulmonology consult  Continue supplemental oxygen    Subsegmental pulmonary embolism  Continue heparin drip for now  Will need further discussion as regards anticoagulation as patient is leaning towards home hospice.    Recent COVID-19  "infection  Completed course of dexamethasone  Of COVID isolation precautions    Cachexia  Severe malnutrition  Likely secondary to COPD cachexia  Follow-up consult to nutritionist    Hypokalemia  Replace as per protocol  Monitor potassium level      The patient's care was discussed with the Patient.    Dontrell Nieto MD  [facility]  Securely message with the Vocera Web Console (learn more here)  Text page via [facility] Paging/Directory       Hospitalist Service    Clinically Significant Risk Factors              # Hypoalbuminemia: Lowest albumin = 2.6 g/dL (Ref range: 3.5-5.2) at 10/8/2022 11:32 AM, will monitor as appropriate          # Cachexia: Estimated body mass index is 15.33 kg/m  as calculated from the following:    Height as of this encounter: 1.702 m (5' 7\").    Weight as of this encounter: 44.4 kg (97 lb 14.2 oz).   # Severe Malnutrition: based on nutrition assessment        ______________________________________________________________________      Review of Systems   The 10 point Review of Systems is negative other than noted in the HPI or here.    Past Medical History    I have reviewed this patient's medical history and updated it with pertinent information if needed.   Past Medical History:   Diagnosis Date     Tobacco abuse        Past Surgical History   I have reviewed this patient's surgical history and updated it with pertinent information if needed.  Past Surgical History:   Procedure Laterality Date     NO HISTORY OF SURGERY         Social History   I have reviewed this patient's social history and updated it with pertinent information if needed.  Social History     Tobacco Use     Smoking status: Every Day     Types: Cigarettes       Family History   I have reviewed this patient's family history and updated it with pertinent information if needed.  Family History   Problem Relation Age of Onset     Diabetes Mother        Medications   I have reviewed this patient's current " medications    Allergies   No Known Allergies    Physical Exam   Vital Signs: Temp: 98.8  F (37.1  C) Temp src: Oral BP: 103/59 Pulse: 102   Resp: 26 SpO2: 93 % O2 Device: High Flow Nasal Cannula (HFNC) Oxygen Delivery: 9 LPM  Weight: 97 lbs 14.15 oz    Physical Exam  General appearance: Cachectic, awake, Alert, Cooperative, not in any obvious distress and appears stated age   HEENT: Normocephalic, atraumatic, conjunctiva clear without icterus and ears without discharge  Lungs: Bilateral crackles with diminished air entry bilaterally  Cardiovascular: Regular Rate and Rythm, normal apical impulse, normal S1 and S2, no lower extremity edema bilaterally  Abdomen: Soft, non-tender and Non-distended, active bowel sounds  Skin: Skin color, texture normal and bruising or bleeding. No rashes or lesions over face, neck, arms and legs, turgor normal.  Musculoskeletal: No bony deformities or joint tenderness. Normal ROM upon flexion & extension.   Neurologic: Alert & Oriented X 3, Facial symmetry preserved and upper & lower extremities moving well with symmetry  Psychiatric: Calm, normal eye contact and normal affect      Data   Results for orders placed or performed during the hospital encounter of 10/08/22 (from the past 24 hour(s))   Basic metabolic panel   Result Value Ref Range    Sodium 139 136 - 145 mmol/L    Potassium 3.5 3.4 - 5.3 mmol/L    Chloride 95 (L) 98 - 107 mmol/L    Carbon Dioxide (CO2) 39 (H) 22 - 29 mmol/L    Anion Gap 5 (L) 7 - 15 mmol/L    Urea Nitrogen 18.9 8.0 - 23.0 mg/dL    Creatinine 0.48 (L) 0.51 - 0.95 mg/dL    Calcium 9.1 8.8 - 10.2 mg/dL    Glucose 84 70 - 99 mg/dL    GFR Estimate >90 >60 mL/min/1.73m2   CBC with platelets   Result Value Ref Range    WBC Count 9.3 4.0 - 11.0 10e3/uL    RBC Count 4.25 3.80 - 5.20 10e6/uL    Hemoglobin 13.4 11.7 - 15.7 g/dL    Hematocrit 41.3 35.0 - 47.0 %    MCV 97 78 - 100 fL    MCH 31.5 26.5 - 33.0 pg    MCHC 32.4 31.5 - 36.5 g/dL    RDW 13.8 10.0 - 15.0 %     Platelet Count 379 150 - 450 10e3/uL   Magnesium   Result Value Ref Range    Magnesium 1.9 1.7 - 2.3 mg/dL   Phosphorus   Result Value Ref Range    Phosphorus 3.8 2.5 - 4.5 mg/dL   UA reflex to Microscopic and Culture    Specimen: Urine, Catheter   Result Value Ref Range    Color Urine Light Yellow Colorless, Straw, Light Yellow, Yellow    Appearance Urine Clear Clear    Glucose Urine Negative Negative mg/dL    Bilirubin Urine Negative Negative    Ketones Urine Negative Negative mg/dL    Specific Gravity Urine 1.009 1.001 - 1.030    Blood Urine Negative Negative    pH Urine 5.5 5.0 - 7.0    Protein Albumin Urine Negative Negative mg/dL    Urobilinogen Urine <2.0 <2.0 mg/dL    Nitrite Urine Negative Negative    Leukocyte Esterase Urine Negative Negative    Narrative    Microscopic not indicated

## 2022-10-27 NOTE — PROGRESS NOTES
[facility]  Consult Note - Hospitalist Service  Date of Admission:  10/8/2022  Consult Requested by: Dr. Cain  Reason for Consult     Assessment & Plan      Lavonne Talbot is a 67 year old female with history of severe emphysema, tobacco use disorder, lymphedema on furosemide, and recent COVID-19 infection admitted to intensive care unit on 10/8/2022 due to acute hypoxic respiratory failure secondary to community-acquired pneumonia/aspiration pneumonia, small subsegmental pulmonary embolism, circulatory shock requiring vasopressors and possible myocardial injury.  She was intubated on 10/8/2022 and extubated to BiPAP on 10/20/2022.    She completed antibiotic therapy for haemophilus influenza tracheobronchitis while in the ICU.  She also completed course of dexamethasone in the ICU.  Blood cultures : 10/8 : diptheroids 1/2 bottles - likely contaminant ; 10/19 : NGTD and tracheal fluid cultures 10/19 : candida 10/8 : hemophilus.  She received furosemide briefly for possible pulmonary edema; now held as patient is euvolemic.  As per intensivist, patient prefers going home with hospice.      10/22 :   Critical care signed off  Transfer out of ICU 10/22    10/27 :     On oxygen - needing up to 10 liters ( end stage lung disease )  Continue nebs, iv lasix for possible fluid overload  On eliquis for PE    palliative care consulted to determine goals of care  Patient declined hospice, DNR/DNI    Care management team working on placement but will need oxygen requirement of 6 liters or less for TCU to accept patient, difficult placement      A/p :     Severe COPD  Hypoxic respiratory failure requiring mechanical ventilation  Haemophilus influenza tracheobronchitis  Extubated to BiPAP on 10/20/2022  Continue bronchodilators as scheduled    Blood cultures : 10/8 : diptheroids 1/2 bottles - likely contaminant ; 10/19 : NGTD and tracheal fluid cultures 10/19 : candida 10/8 : hemophilus.  Off  "antibiotics  Follow-up pulmonology consult  Continue supplemental oxygen    Subsegmental pulmonary embolism  S/p heparin infusion, on eliquis  Will need further discussion as regards anticoagulation as patient is leaning towards home hospice.    Recent COVID-19 infection  Completed course of dexamethasone  Of COVID isolation precautions    Cachexia  Severe malnutrition  Likely secondary to COPD cachexia  Follow-up consult to nutritionist    Hypokalemia  Replace as per protocol  Monitor potassium level      The patient's care was discussed with the Patient.    Dontrell Nieto MD  [facility]  Securely message with the Vocera Web Console (learn more here)  Text page via [facility] Paging/Directory       Hospitalist Service    Clinically Significant Risk Factors              # Hypoalbuminemia: Lowest albumin = 2.6 g/dL (Ref range: 3.5-5.2) at 10/8/2022 11:32 AM, will monitor as appropriate          # Cachexia: Estimated body mass index is 12.31 kg/m  as calculated from the following:    Height as of this encounter: 1.702 m (5' 7\").    Weight as of this encounter: 35.7 kg (78 lb 9.6 oz).   # Severe Malnutrition: based on nutrition assessment        ______________________________________________________________________      Review of Systems   The 10 point Review of Systems is negative other than noted in the HPI or here.    Past Medical History    I have reviewed this patient's medical history and updated it with pertinent information if needed.   Past Medical History:   Diagnosis Date     Tobacco abuse        Past Surgical History   I have reviewed this patient's surgical history and updated it with pertinent information if needed.  Past Surgical History:   Procedure Laterality Date     NO HISTORY OF SURGERY         Social History   I have reviewed this patient's social history and updated it with pertinent information if needed.  Social History     Tobacco Use     Smoking status: Every Day     Types: " Cigarettes       Family History   I have reviewed this patient's family history and updated it with pertinent information if needed.  Family History   Problem Relation Age of Onset     Diabetes Mother        Medications   I have reviewed this patient's current medications    Allergies   No Known Allergies    Physical Exam   Vital Signs: Temp: 98.2  F (36.8  C) Temp src: Oral BP: 103/59 Pulse: 107   Resp: 24 SpO2: 92 % O2 Device: Nasal cannula Oxygen Delivery: 10 LPM  Weight: 78 lbs 9.6 oz    Physical Exam  General appearance: Cachectic, awake, Alert, Cooperative, not in any obvious distress and appears stated age   HEENT: Normocephalic, atraumatic, conjunctiva clear without icterus and ears without discharge  Lungs: Bilateral crackles with diminished air entry bilaterally  Cardiovascular: Regular Rate and Rythm, normal apical impulse, normal S1 and S2, no lower extremity edema bilaterally  Abdomen: Soft, non-tender and Non-distended, active bowel sounds  Skin: Skin color, texture normal and bruising or bleeding. No rashes or lesions over face, neck, arms and legs, turgor normal.  Musculoskeletal: No bony deformities or joint tenderness. Normal ROM upon flexion & extension.   Neurologic: Alert & Oriented X 3, Facial symmetry preserved and upper & lower extremities moving well with symmetry  Psychiatric: Calm, normal eye contact and normal affect      Data   Results for orders placed or performed during the hospital encounter of 10/08/22 (from the past 24 hour(s))   Basic metabolic panel   Result Value Ref Range    Sodium 141 136 - 145 mmol/L    Potassium 3.4 3.4 - 5.3 mmol/L    Chloride 96 (L) 98 - 107 mmol/L    Carbon Dioxide (CO2) 39 (H) 22 - 29 mmol/L    Anion Gap 6 (L) 7 - 15 mmol/L    Urea Nitrogen 25.2 (H) 8.0 - 23.0 mg/dL    Creatinine 0.52 0.51 - 0.95 mg/dL    Calcium 8.9 8.8 - 10.2 mg/dL    Glucose 91 70 - 99 mg/dL    GFR Estimate >90 >60 mL/min/1.73m2   CBC with platelets   Result Value Ref Range    WBC  Count 15.0 (H) 4.0 - 11.0 10e3/uL    RBC Count 4.56 3.80 - 5.20 10e6/uL    Hemoglobin 14.3 11.7 - 15.7 g/dL    Hematocrit 44.4 35.0 - 47.0 %    MCV 97 78 - 100 fL    MCH 31.4 26.5 - 33.0 pg    MCHC 32.2 31.5 - 36.5 g/dL    RDW 13.9 10.0 - 15.0 %    Platelet Count 406 150 - 450 10e3/uL   Magnesium   Result Value Ref Range    Magnesium 1.8 1.7 - 2.3 mg/dL   Phosphorus   Result Value Ref Range    Phosphorus 3.4 2.5 - 4.5 mg/dL   Potassium   Result Value Ref Range    Potassium 3.8 3.4 - 5.3 mmol/L

## 2022-10-27 NOTE — PROGRESS NOTES
CLINICAL NUTRITION SERVICES - REASSESSMENT NOTE      RECOMMENDATIONS FOR MD/PROVIDER TO ORDER:    Recommend starting supplemental nutrition support. Pt severely underweight and unable to eat adequately (BMI 12.31).     Recommendations Ordered by Registered Dietitian (RD):   Discontinue Enteral Nutrition Daily Weighs  Order Daily Weighs  Continue Ensure and Magic Cup   Future/Additional Recommendations: monitor po, wt   Malnutrition: Severe     EVALUATION OF PROGRESS TOWARD GOALS   Diet: Regular, Ensure Enlive BID, Magic Cup daily    Intake/Tolerance: 0-50% intake.    NEW FINDINGS:   Reports only bites of foods w/ minimal appetite. Does take some of supplements received. Does not feel need for changing supplements. Feels that appetite has been improving slowly since being in ICU. Pt appears more frail than previous RD visit last week. Questioned new bed wt due to change of beds   (ICU to Cardiac bed), however, pt also not eating well. Will order new wts. Would recommend starting supplemental nutrition support as pt severely underweight and unable to eat adequately. Encouraged oral intake through meals and supplements. Started Remeron 10/25.    10/27/22 1300 35.7 kg (78 lb 9.6 oz) Bed scale   10/20/22 0645 44.4 kg (97 lb 14.2 oz) Bed scale   10/18/22 0330 46.4 kg (102 lb 4.7 oz) Bed scale   10/16/22 0200 48.8 kg (107 lb 9.4 oz) Bed scale   10/15/22 0000 44.6 kg (98 lb 6.4 oz) Bed scale   10/14/22 0545 46.7 kg (103 lb) Bed scale   10/13/22 0019 47.2 kg (104 lb 1.6 oz) Bed scale   10/12/22 0000 46.7 kg (102 lb 14.4 oz) Bed scale   10/09/22 0500 43 kg (94 lb 11.2 oz) --   10/08/22 1008 35 kg (77 lb 2.6 oz) --       Previous Goals:   Pt will meet >75% of kcal and protein needs through oral intake - not progressing  Wt gain 1-2lbs/week - not progressing  Electrolytes WDL - met    Previous Nutrition Diagnosis:   Severe protein-calorie malnutrition related to chronic illness as evidenced by meeting less than 75% of energy  needs for greater than 1 month, greater than 20% weight loss in 1 year, and severe generalized muscle and adipose tissue depletion.     Underweight related to chronic illness as evidenced by BMI of 15.33.  Evaluation: Declining      MALNUTRITION  % Weight Loss: > 20% in 1 year (severe malnutrition) 28% loss in 13 months. - recent further wt loss  % Intake:  </= 75% for >/= 1 month (severe malnutrition)  Subcutaneous Fat Loss:  Severe, global  Muscle Loss: severe, global  Fluid Retention:  None noted per chart 10/24     Malnutrition Diagnosis: Severe malnutrition  In Context of:  Chronic illness or disease    CURRENT NUTRITION DIAGNOSIS  Severe protein-calorie malnutrition related to chronic illness as evidenced by meeting less than 75% of energy needs for greater than 1 month, greater than 20% weight loss in 1 year, and severe generalized muscle and adipose tissue depletion.     Underweight related to chronic illness as evidenced by BMI of 12.31.    INTERVENTIONS  Recommendations / Nutrition Prescription  Recommend supplemental nutrition support    Implementation  Continue Ensure Enlive BID  Continue Magic Cup daily  Continue to encourage adequate oral intake of kcal and protein    Goals  Pt will meet >75% of kcal and protein needs through oral intake  Wt gain 1-2lbs/week    MONITORING AND EVALUATION:  Progress towards goals will be monitored and evaluated per protocol and Practice Guidelines

## 2022-10-27 NOTE — PROGRESS NOTES
RESPIRATORY CARE NOTE   Patient is on 6 lpm N/C, BS coarse/ diminished, gave Duoneb treatment x3, BS post treatment no change, patient perceives no improvement, patient tolerated well.     Casa Hernández, RT

## 2022-10-27 NOTE — PROGRESS NOTES
"Pulmonary Progress Note  10/22/2022   11:19 AM    Problem List:   Active Problems:    Acute respiratory failure with hypoxia (H)    Hypotension, unspecified hypotension type    Infection due to 2019 novel coronavirus     Per our conversation  She is fairly confused about what she wants in her medical care. She is still recovering from the fact that before she was sick she was \"fine\". I think the trauma of her critical illness has left her somewhere between hope and practicality of what her life looks like now. We discussed how her lungs are scarred and not going to come back in a sig way. She will be on O2 the rest of her life. She was understandingly shaken by this. She had moments of clarity where she knew she did not want to live this way, but not to the point of any decision. I offered support and answered her questions about her lung disease.     Plan:   - End stage COPD; appreciate palliative care team's time  - Oxygen as needed to keep SpO2 > 88%   - Continue bronchodilators  - PT as able, she gets very tired with minimal activity and dizzy  - Morphine as needed for air hunger.   - CXR-she has not had one in a while    Casi Rhodes, DO  Pulmonary and Critical Care Attending  pgr [phone]    ________________________________________________________________      CC:   Chief Complaint   Patient presents with     Respiratory Distress     HPI: Lavonne Talbot is a 67 year old cachectic female with history of severe emphysema, tobacco user.  Recent diagnosis of COVID-19 viral infection 1 week ago who presented to [facility] ED on 10/8/2022 due to respiratory distress now admitted to the ICU with acute respiratory failure requiring mechanical ventilation. Patient was treated for H influenza pneumonia and pulmonary embolism. Echocardiogram showed reduced EF 30-35%, per cardiology stress induced cardiomyopathy.   Extubated 10/20 to BiPAP and now stable on NC.    ROS: Feeling ok, \"just weak\". Breathing " feels stable, no nausea, poor appetite.     Objective:   Vitals:    10/26/22 2358 10/27/22 0019 10/27/22 0851 10/27/22 1300   BP: 94/54  103/59    BP Location: Left arm  Left arm    Pulse: 102 102 107    Resp: 22 24 24    Temp: 99.2  F (37.3  C)  98.2  F (36.8  C)    TempSrc: Oral  Oral    SpO2: 99% 94% 92%    Weight:    35.7 kg (78 lb 9.6 oz)   Height:           I/O:     Intake/Output Summary (Last 24 hours) at 10/22/2022 1119  Last data filed at 10/22/2022 0600  Gross per 24 hour   Intake 652.57 ml   Output 1800 ml   Net -1147.43 ml     Weight change:     Medications Reviewed: yes    Physical Exam:   General: no distress; cachectic  HEENT: AT/NC; sunken features. Temporal wasting  NEURO: grossly nonfocal, very weak.   CARDIAC: RRR S1S2  PULMONARY: mildly labored on NC; barrel chested; lungs are quiet throughout, no wheeze.   GASTROINTESTINAL: abdomen is soft without significant tenderness, masses, organomegaly or guarding  INTEGUMENT: thin, fragile skin.   PSYCH: calm    Lab Results Reviewed:   Recent Labs   Lab 10/27/22  0530      CO2 39*   BUN 25.2*       Recent Labs   Lab 10/27/22  0530   WBC 15.0*   HGB 14.3   HCT 44.4          Micro: cultures reviewed    Imaging: all imaging personally reviewed   10/19 CT Abd - 1.  Moderate to marked fluid and gas distention of the distal jejunum, ileum, colon and rectum with no transition point or obstructing mass compatible with severe adynamic ileus and/or a nonspecific enterocolitis. No free air or pneumatosis.  2.  Wedge-shaped perfusion defect lateral aspect mid to upper spleen is most likely an acute infarct with posttraumatic contusion possible in the appropriate clinical setting. Etiology unclear but in the setting of recently diagnosed pulmonary emboli,   paradoxical emboli related to PFO should be considered.  3.  Trace abdominal and pelvic ascites and generalized mesenteric and subcutaneous edema.  4.  Bilateral adnexal enlargement measuring up to 5  cm on the right and 3 cm on the left. Recommend pelvic ultrasound.  5.  Incidental probable 1 cm polyp distal transverse colon.    10/17 CXR - Endotracheal tube tip 3.7 cm above the izabela. Nasogastric tube tip and the distal sidehole are within the stomach. Right IJ central venous catheter tip low SVC in good position. Underlying lung hyperinflation, severe emphysema and linear   opacities radiating from the sara into the periphery of both lungs that could represent bronchial wall thickening, interstitial edema and/or interstitial inflammation. No pneumothorax.

## 2022-10-27 NOTE — PLAN OF CARE
"Goal Outcome Evaluation:    Overall Patient Progress: improvingOverall Patient Progress: improving    Outcome Evaluation: Pt sats low 90's on 10LNC (refuses highflow). Denied any pain. On bladder protocol. Otherwise uneventful NOC. Will continue to monitor.    BP 94/54 (BP Location: Left arm)   Pulse 102   Temp 99.2  F (37.3  C) (Oral)   Resp 24   Ht 1.702 m (5' 7\")   Wt 44.4 kg (97 lb 14.2 oz)   SpO2 94%   BMI 15.33 kg/m      "

## 2022-10-28 ENCOUNTER — APPOINTMENT (OUTPATIENT)
Dept: PHYSICAL THERAPY | Facility: HOSPITAL | Age: 67
DRG: 207 | End: 2022-10-28
Payer: COMMERCIAL

## 2022-10-28 LAB
ANION GAP SERPL CALCULATED.3IONS-SCNC: 10 MMOL/L (ref 7–15)
BUN SERPL-MCNC: 27.6 MG/DL (ref 8–23)
CALCIUM SERPL-MCNC: 8.7 MG/DL (ref 8.8–10.2)
CHLORIDE SERPL-SCNC: 93 MMOL/L (ref 98–107)
CREAT SERPL-MCNC: 0.56 MG/DL (ref 0.51–0.95)
DEPRECATED HCO3 PLAS-SCNC: 37 MMOL/L (ref 22–29)
ERYTHROCYTE [DISTWIDTH] IN BLOOD BY AUTOMATED COUNT: 13.6 % (ref 10–15)
GFR SERPL CREATININE-BSD FRML MDRD: >90 ML/MIN/1.73M2
GLUCOSE SERPL-MCNC: 87 MG/DL (ref 70–99)
HCT VFR BLD AUTO: 42.1 % (ref 35–47)
HGB BLD-MCNC: 13.5 G/DL (ref 11.7–15.7)
MAGNESIUM SERPL-MCNC: 1.7 MG/DL (ref 1.7–2.3)
MCH RBC QN AUTO: 31.3 PG (ref 26.5–33)
MCHC RBC AUTO-ENTMCNC: 32.1 G/DL (ref 31.5–36.5)
MCV RBC AUTO: 98 FL (ref 78–100)
PHOSPHATE SERPL-MCNC: 3.1 MG/DL (ref 2.5–4.5)
PLATELET # BLD AUTO: 392 10E3/UL (ref 150–450)
POTASSIUM SERPL-SCNC: 3.6 MMOL/L (ref 3.4–5.3)
RBC # BLD AUTO: 4.31 10E6/UL (ref 3.8–5.2)
SODIUM SERPL-SCNC: 140 MMOL/L (ref 136–145)
WBC # BLD AUTO: 14.4 10E3/UL (ref 4–11)

## 2022-10-28 PROCEDURE — 250N000011 HC RX IP 250 OP 636: Performed by: NURSE PRACTITIONER

## 2022-10-28 PROCEDURE — 250N000013 HC RX MED GY IP 250 OP 250 PS 637: Performed by: INTERNAL MEDICINE

## 2022-10-28 PROCEDURE — 120N000001 HC R&B MED SURG/OB

## 2022-10-28 PROCEDURE — 250N000009 HC RX 250: Performed by: INTERNAL MEDICINE

## 2022-10-28 PROCEDURE — 99233 SBSQ HOSP IP/OBS HIGH 50: CPT | Performed by: NURSE PRACTITIONER

## 2022-10-28 PROCEDURE — 84100 ASSAY OF PHOSPHORUS: CPT | Performed by: INTERNAL MEDICINE

## 2022-10-28 PROCEDURE — 250N000013 HC RX MED GY IP 250 OP 250 PS 637: Performed by: PHYSICIAN ASSISTANT

## 2022-10-28 PROCEDURE — 999N000157 HC STATISTIC RCP TIME EA 10 MIN

## 2022-10-28 PROCEDURE — 99232 SBSQ HOSP IP/OBS MODERATE 35: CPT | Performed by: INTERNAL MEDICINE

## 2022-10-28 PROCEDURE — 94640 AIRWAY INHALATION TREATMENT: CPT

## 2022-10-28 PROCEDURE — 97530 THERAPEUTIC ACTIVITIES: CPT | Mod: GP

## 2022-10-28 PROCEDURE — 999N000127 HC STATISTIC PERIPHERAL IV START W US GUIDANCE

## 2022-10-28 PROCEDURE — 80048 BASIC METABOLIC PNL TOTAL CA: CPT | Performed by: INTERNAL MEDICINE

## 2022-10-28 PROCEDURE — 85027 COMPLETE CBC AUTOMATED: CPT | Performed by: INTERNAL MEDICINE

## 2022-10-28 PROCEDURE — 83735 ASSAY OF MAGNESIUM: CPT | Performed by: INTERNAL MEDICINE

## 2022-10-28 PROCEDURE — 94640 AIRWAY INHALATION TREATMENT: CPT | Mod: 76

## 2022-10-28 PROCEDURE — 99232 SBSQ HOSP IP/OBS MODERATE 35: CPT | Performed by: PHYSICIAN ASSISTANT

## 2022-10-28 RX ORDER — MAGNESIUM OXIDE 400 MG/1
400 TABLET ORAL EVERY 4 HOURS
Status: COMPLETED | OUTPATIENT
Start: 2022-10-28 | End: 2022-10-28

## 2022-10-28 RX ORDER — BUDESONIDE 0.5 MG/2ML
1 INHALANT ORAL
Status: DISCONTINUED | OUTPATIENT
Start: 2022-10-28 | End: 2022-11-11 | Stop reason: HOSPADM

## 2022-10-28 RX ORDER — PIPERACILLIN SODIUM, TAZOBACTAM SODIUM 3; .375 G/15ML; G/15ML
3.38 INJECTION, POWDER, LYOPHILIZED, FOR SOLUTION INTRAVENOUS EVERY 8 HOURS
Status: COMPLETED | OUTPATIENT
Start: 2022-10-28 | End: 2022-11-04

## 2022-10-28 RX ORDER — PIPERACILLIN SODIUM, TAZOBACTAM SODIUM 3; .375 G/15ML; G/15ML
3.38 INJECTION, POWDER, LYOPHILIZED, FOR SOLUTION INTRAVENOUS ONCE
Status: COMPLETED | OUTPATIENT
Start: 2022-10-28 | End: 2022-10-28

## 2022-10-28 RX ORDER — VANCOMYCIN HYDROCHLORIDE 1 G/200ML
1000 INJECTION, SOLUTION INTRAVENOUS ONCE
Status: COMPLETED | OUTPATIENT
Start: 2022-10-28 | End: 2022-10-28

## 2022-10-28 RX ADMIN — APIXABAN 10 MG: 5 TABLET, FILM COATED ORAL at 08:35

## 2022-10-28 RX ADMIN — Medication 400 MG: at 12:38

## 2022-10-28 RX ADMIN — PIPERACILLIN AND TAZOBACTAM 3.38 G: 3; .375 INJECTION, POWDER, LYOPHILIZED, FOR SOLUTION INTRAVENOUS at 21:59

## 2022-10-28 RX ADMIN — Medication 5 MG: at 08:35

## 2022-10-28 RX ADMIN — PIPERACILLIN AND TAZOBACTAM 3.38 G: 3; .375 INJECTION, POWDER, LYOPHILIZED, FOR SOLUTION INTRAVENOUS at 17:14

## 2022-10-28 RX ADMIN — APIXABAN 10 MG: 5 TABLET, FILM COATED ORAL at 20:20

## 2022-10-28 RX ADMIN — Medication 400 MG: at 15:13

## 2022-10-28 RX ADMIN — BUDESONIDE 1 MG: 0.5 INHALANT ORAL at 19:54

## 2022-10-28 RX ADMIN — IPRATROPIUM BROMIDE AND ALBUTEROL SULFATE 3 ML: 2.5; .5 SOLUTION RESPIRATORY (INHALATION) at 15:20

## 2022-10-28 RX ADMIN — VANCOMYCIN HYDROCHLORIDE 1000 MG: 1 INJECTION, SOLUTION INTRAVENOUS at 18:05

## 2022-10-28 RX ADMIN — IPRATROPIUM BROMIDE AND ALBUTEROL SULFATE 3 ML: 2.5; .5 SOLUTION RESPIRATORY (INHALATION) at 11:17

## 2022-10-28 RX ADMIN — MIRTAZAPINE 7.5 MG: 7.5 TABLET, FILM COATED ORAL at 20:20

## 2022-10-28 RX ADMIN — IPRATROPIUM BROMIDE AND ALBUTEROL SULFATE 3 ML: 2.5; .5 SOLUTION RESPIRATORY (INHALATION) at 07:26

## 2022-10-28 RX ADMIN — IPRATROPIUM BROMIDE AND ALBUTEROL SULFATE 3 ML: 2.5; .5 SOLUTION RESPIRATORY (INHALATION) at 19:54

## 2022-10-28 RX ADMIN — THIAMINE HCL TAB 100 MG 100 MG: 100 TAB at 08:37

## 2022-10-28 ASSESSMENT — ACTIVITIES OF DAILY LIVING (ADL)
ADLS_ACUITY_SCORE: 38
ADLS_ACUITY_SCORE: 37
ADLS_ACUITY_SCORE: 37
ADLS_ACUITY_SCORE: 39
ADLS_ACUITY_SCORE: 37
ADLS_ACUITY_SCORE: 37
ADLS_ACUITY_SCORE: 39
ADLS_ACUITY_SCORE: 37
ADLS_ACUITY_SCORE: 38
ADLS_ACUITY_SCORE: 37
ADLS_ACUITY_SCORE: 39
ADLS_ACUITY_SCORE: 39

## 2022-10-28 NOTE — PHARMACY-VANCOMYCIN DOSING SERVICE
Pharmacy Vancomycin Initial Note  Date of Service 2022  Patient's  1955  67 year old, female    Indication: [facility] Pneumonia    Current estimated CrCl = Estimated Creatinine Clearance: 54.6 mL/min (based on SCr of 0.56 mg/dL).    Creatinine for last 3 days  10/26/2022:  6:31 AM Creatinine 0.48 mg/dL  10/27/2022:  5:30 AM Creatinine 0.52 mg/dL  10/28/2022:  6:44 AM Creatinine 0.56 mg/dL    Recent Vancomycin Level(s) for last 3 days  No results found for requested labs within last 72 hours.      Vancomycin IV Administrations (past 72 hours)      No vancomycin orders with administrations in past 72 hours.                Nephrotoxins and other renal medications (From now, onward)    Start     Dose/Rate Route Frequency Ordered Stop    10/29/22 0500  vancomycin (VANCOCIN) 600 mg in sodium chloride 0.9 % 250 mL intermittent infusion        See Hyperspace for full Linked Orders Report.    600 mg  over 60-90 Minutes Intravenous EVERY 12 HOURS 10/28/22 1614      10/28/22 2200  piperacillin-tazobactam (ZOSYN) 3.375 g vial to attach to  mL bag        Note to Pharmacy: Extended infusion dosing to start 6 hours after initial infusion.   See Hyperspace for full Linked Orders Report.    3.375 g  over 240 Minutes Intravenous EVERY 8 HOURS 10/28/22 1515      10/28/22 1700  vancomycin (VANCOCIN) 1000 mg in dextrose 5% 200 mL PREMIX        See Hyperspace for full Linked Orders Report.    1,000 mg  200 mL/hr over 1 Hours Intravenous ONCE 10/28/22 1614      10/28/22 1600  piperacillin-tazobactam (ZOSYN) 3.375 g vial to attach to  mL bag        See Hyperspace for full Linked Orders Report.    3.375 g  over 30 Minutes Intravenous ONCE 10/28/22 1515      10/17/22 1800  furosemide (LASIX) injection 20 mg         20 mg  over 1-3 Minutes Intravenous EVERY 12 HOURS 10/17/22 1326            Contrast Orders - past 72 hours (72h ago, onward)    None          [employer] Prediction of Planned Initial  Vancomycin Regimen  Loading dose: 1000 mg  Regimen: 600 mg IV every 12 hours.  Start time: 17:00 on 10/28/2022  Exposure target: AUC24 (range)400-600 mg/L.hr   AUC24,ss: 481 mg/L.hr  Probability of AUC24 > 400: 71 %  Ctrough,ss: 14.8 mg/L  Probability of Ctrough,ss > 20: 23 %  Probability of nephrotoxicity (Lodise KALEB 2009): 10 %          Plan:  1. Start vancomycin  1000 mg loading dose then 600 mg IV q12h.   2. Vancomycin monitoring method: AUC  3. Vancomycin therapeutic monitoring goal: 400-600 mg*h/L  4. Pharmacy will check vancomycin levels as appropriate in 1-3 Days.    5. Serum creatinine levels will be ordered a minimum of twice weekly.      Michaeal Day, JAKYH

## 2022-10-28 NOTE — PROGRESS NOTES
RCAT Treatment Plan    Patient Score: 15  Patient Acuity: 3    Clinical Indication for Therapy: Severe COPD exacerbation, COVID PNA    Therapy Ordered: Duoneb QID     Assessment Summary: COPD with acute exacerbation, COVID PNA, A/hypoxic Resp Failure, CXR hyperexpanded, new opacification RLL, trace effusion Rt. O2 at 10 lpm/bubble HFO2, BS diminished with crackles RLL, no home medication list but pt states that she takes nebulizer txs at home, will continue with current medication (probably home regimen). Will discontinue further RCAT reviews.      Matthew Elizabeth, RT  10/28/2022

## 2022-10-28 NOTE — PLAN OF CARE
Problem: COPD (Chronic Obstructive Pulmonary Disease) Comorbidity  Goal: Maintenance of COPD Symptom Control  Outcome: Progressing  Intervention: Maintain COPD-Symptom Control  Recent Flowsheet Documentation  Taken 10/27/2022 2100 by Rubina Dumont RN  Medication Review/Management: medications reviewed  Taken 10/27/2022 2059 by Rubina Dumont RN  Medication Review/Management: medications reviewed  Taken 10/27/2022 1914 by Rubina Dumont RN  Medication Review/Management: medications reviewed     Problem: Gas Exchange Impaired  Goal: Optimal Gas Exchange  Outcome: Progressing  Intervention: Optimize Oxygenation and Ventilation  Recent Flowsheet Documentation  Taken 10/27/2022 2059 by Rubina Dumont RN  Head of Bed (HOB) Positioning: HOB at 30-45 degrees     Problem: Malnutrition  Goal: Improved Nutritional Intake  Outcome: Progressing   Goal Outcome Evaluation:        [id number]...Pt had a relatively quiet shift, on 02 at 10L/NC and satting at 93%. She was largely in continent of urine and stool. Pt was started on Morphine solution, she denied pain.  Pt has been in bed all shift.

## 2022-10-28 NOTE — PROGRESS NOTES
"O2 10 lpm/bubbler HFNC, SpO 2 94 %, BS diminished bilat. Duoneb tx given, tolerated well. BS unchanged after.    BP 97/59 (BP Location: Right arm)   Pulse 110   Temp 98.3  F (36.8  C) (Oral)   Resp 18   Ht 1.702 m (5' 7\")   Wt 36 kg (79 lb 6.4 oz)   SpO2 94%   BMI 12.44 kg/m      RT to follow.  "

## 2022-10-28 NOTE — PROGRESS NOTES
"[facility] Palliative Care   Social Work Note    Met Lavonne with colleague FARNAZ Matute. Lavonne was sitting up in the chair and commented that it felt good to accomplish that goal for today.She wants to get stronger and go to TCU. She is also aware of her oxygen needs and that unfortunately those have not improved. She wants to give it more time. She is aware that hospice is an option but wants to remain hopeful that she could get to TCU.     Her daughter and  arrived and an update was given about our conversation. Pt's dtr hopes that her mom will \"not give up\" and continue to improve. She also acknowledged that it's her mom's decision. Education provided regarding her disease and what to expect re recovery.     Provided reflective listening, validation of feelings and emotional support.    Gladis Johnson, [facility]  Palliative Care   Office # [phone]       "

## 2022-10-28 NOTE — PLAN OF CARE
"Goal Outcome Evaluation:      Plan of Care Reviewed With: patient    Overall Patient Progress: no changeOverall Patient Progress: no change    Problem: Plan of Care - These are the overarching goals to be used throughout the patient stay.    Goal: Patient-Specific Goal (Individualized)  Description: You can add care plan individualizations to a care plan. Examples of Individualization might be:  \"Parent requests to be called daily at 9am for status\", \"I have a hard time hearing out of my right ear\", or \"Do not touch me to wake me up as it startles me\".  Outcome: Progressing  Flowsheets (Taken 10/28/2022 0358)  Individualized Care Needs: She wants to get out of bed and make it to the recliner.  Anxieties, Fears or Concerns: Encouraged pt to talk about this hospitalization, her , her prognosis, etc. She stated that she is still coming to terms with all that has happened the past month. She feels \"everyone is here trying to get me to do things and they just don't understand\". She feels as though she will \"end up where I'm supposed to be\" regarding discharging. She stated that it's uncomfortable to eat and the food \"just stays there\" while pointing to her stomach. She stated she has no pain but does have discomfort when she eats. She's disappointed that she can't get out of bed on her own. Offered lots of active listening and emotional support. Spoke about Hospice with her and she stated she wasn't ready for that yet, but at the same time she stated \"they might as well lock me up in the morgue, I would be dead in an hour\". Writer tried to have pt explain her morgue comment further but pt could not. She is currently on 10 LNC and sats 89-93%. Will continue to monitor.  Patient-Specific Goals (Include Timeframe): Lavonne would like to get out of bed today and make it to the recliner to sit.     /60 (BP Location: Right arm)   Pulse 104   Temp 99.2  F (37.3  C) (Oral)   Resp 20   Ht 1.702 m (5' 7\")   Wt 35.7 " kg (78 lb 9.6 oz)   SpO2 (!) 89%   BMI 12.31 kg/m

## 2022-10-28 NOTE — PROGRESS NOTES
[facility]  Palliative Care Daily Progress Note       Recommendations & Counseling     Lavonne remains on a high level of oxygen (10L) which has been difficult to wean, currently trialing scheduled morphine.    Ongoing goals of care conversations with Lavonne and family. Family feels Lavonne is able to make decisions for herself now and they are supportive of whatever she chooses. Ideally Lavonne and family would prefer she go to TCU, but it is unclear if that is possible at this point. Lavonne has many reservations about hospice care which we have been working through.    Goals of Care: life-prolonging with limit of DNR/DNI    Advanced Care Planning:  Advance directive: No  POLST: No  Code status: DNR/DNI  Health care agent/surrogate: Frantz (son) along with Jess (daughter), Jermaine () recently had a stroke and has limited ability to communicate    Symptom Management:  #Dyspnea, tachypnea,hypoxia  - Appreciate Pulm input  - Continue standing morphine for dyspnea with prn available    #Anorexia, cachexia, insomnia  - Continue mirtazapine 7.5mg    Psychosocial & Spiritual:   Appreciate Palliative SW and Spiritual Health involvement      Assessments          Lavonne is a 68 y/o woman, active smoker, with history of lymphedema, polycythemia, and recent diagnosis of Covid infection, admitted to ICU on 10/8/2022 with acute respiratory failure requiring ventilation and circulatory shock requiring vasopressors. Respiratory failure attributed to CAP vs aspiration pneumonia with small PE. 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  - Extubated on 10/20 to BiPAP    Today, the patient was seen for:  Goals of care, emotional support, symptom management    Prognosis, Goals, or Advance Care Planning was addressed today with: Yes.  Mood, coping, and/or meaning in the context of serious illness were addressed today: Yes.              Interval History:     Chart review/discussion with unit or clinical team  "members:   - Remains on \"10 lpm Bubble HFNC\"  - Appreciate Pulm input yesterday    Per patient or family/caregivers today:  I saw Lavonne with Palliative SW Chela Johnson. Lavonne was up in the chair today, a first for her this admission. She said this was one of her goals today and is pleased she was able to make it happen with support from the nursing staff. She hopes to continue to get better but is frustrated and scared by how weak she feels. We talked about Lavonne's plan to get to TCU. She understands she would need to be on less oxygen to get there and we have not yet been able to wean her oxygen. Lavonne has understood from the medical team that \"everybody is telling me I'm going to kick the bucket.\" She denies any fears or worries about the future. Lavonne confirmed that she is still not ready for hospice.    During our conversation we were joined by Jess (daughter) and Jermaine (). Jermaine recently suffered a stroke and struggles with aphasia. Provided brief overview of our conversation for Jess and Jermaine. Jermaine did not seem to understand, intermittently interjected with tangential comments. Jess urged her mom \"not to give up\" when I mentioned hospice as an alternative if we are unable to wean Lavonne's oxygen. Emphasized that Lavonne is incredibly strong of will and wants to get better, but unfortunately her body is very weak and we do not know if she will be able to manifest a recovery. Even in the best case scenario, Lavonne will require oxygen for the rest of her life. Lavonne and her family remain hopeful that she will improve as she has already done better than expected after extubation.    Key Palliative Symptoms:  Denies anxiety or pain, +dyspnea with exertion           Review of Systems:     3 system review negative          Medications:     I have reviewed this patient's medication profile and medications during this hospitalization.    Noted meds:    Current Facility-Administered Medications   Medication     apixaban " "ANTICOAGULANT (ELIQUIS) tablet 10 mg    Followed by     [START ON 10/30/2022] apixaban ANTICOAGULANT (ELIQUIS) tablet 5 mg     budesonide (PULMICORT) neb solution 1 mg     dextrose 10% infusion     glucose gel 15-30 g    Or     dextrose 50 % injection 25-50 mL    Or     glucagon injection 1 mg     furosemide (LASIX) injection 20 mg     [START ON 10/29/2022] influenza vac high-dose quad (FLUZONE HD) injection KEVIN 0.7 mL     ipratropium - albuterol 0.5 mg/2.5 mg/3 mL (DUONEB) neb solution 3 mL     LubriFresh P.M. OINT     magnesium oxide (MAG-OX) tablet 400 mg     melatonin tablet 3 mg     mirtazapine (REMERON) tablet TABS 7.5 mg     morphine solution 5 mg     morphine solution 5 mg     naloxone (NARCAN) injection 0.1 mg     naloxone (NARCAN) injection 0.2 mg     naloxone (NARCAN) injection 0.2 mg    Or     naloxone (NARCAN) injection 0.4 mg    Or     naloxone (NARCAN) injection 0.2 mg    Or     naloxone (NARCAN) injection 0.4 mg     ondansetron (ZOFRAN ODT) ODT tab 4 mg    Or     ondansetron (ZOFRAN) injection 4 mg     ondansetron (ZOFRAN) injection 4 mg     polyethylene glycol (MIRALAX) Packet 17 g     thiamine (B-1) tablet 100 mg                Physical Exam:   Vital Signs: Blood pressure 97/59, pulse 110, temperature 98.3  F (36.8  C), temperature source Oral, resp. rate 18, height 1.702 m (5' 7\"), weight 35.7 kg (78 lb 9.6 oz), SpO2 (!) 87 %.   GENERAL: sitting up in chair, on nasal cannula, awake and alert  SKIN: Warm and dry   HEENT: +temporal muscle wasting  ABDOMINAL: soft, nondistended, nontender  MUSKL: no gross joint deformities   EXTREMITIES: No edema              Data Reviewed:     Reviewed recent pertinent imaging:   CXR 10/27  IMPRESSION: Patient's rotated to the left. Patient's been extubated. Right IJ central line overlies the mid SVC.     Lungs are hyperexpanded consistent with COPD.     New hazy opacification of the right lower lobe with trace effusion. Unclear if this reflects some atelectasis " due to volume loss or a bronchopneumonia. Endobronchial secretions noted on the recent CT of the abdomen within the lung bases.      Left lung is clear. No pneumothorax. Heart and pulmonary vascularity are normal.    Reviewed recent labs:   CMP  Recent Labs   Lab 10/28/22  0644 10/27/22  1320 10/27/22  1143 10/27/22  0530 10/26/22  0631 10/25/22  0645     --   --  141 139 140   POTASSIUM 3.6 3.8  --  3.4 3.5 3.5   CHLORIDE 93*  --   --  96* 95* 96*   CO2 37*  --   --  39* 39* 36*   ANIONGAP 10  --   --  6* 5* 8   GLC 87  --   --  91 84 111*   BUN 27.6*  --   --  25.2* 18.9 12.8   CR 0.56  --   --  0.52 0.48* 0.44*   GFRESTIMATED >90  --   --  >90 >90 >90   JASON 8.7*  --   --  8.9 9.1 8.7*   MAG 1.7  --  1.8  --  1.9 2.1   PHOS 3.1  --  3.4  --  3.8 3.4     CBC  Recent Labs   Lab 10/28/22  0644 10/27/22  0530 10/26/22  0631 10/25/22  0645   WBC 14.4* 15.0* 9.3 9.2   RBC 4.31 4.56 4.25 4.17   HGB 13.5 14.3 13.4 13.2   HCT 42.1 44.4 41.3 40.8   MCV 98 97 97 98   MCH 31.3 31.4 31.5 31.7   MCHC 32.1 32.2 32.4 32.4   RDW 13.6 13.9 13.8 14.0    406 379 348         Jessica Richards PA-C  [facility], Palliative Care  Dept phone: [phone]  Securely message with the Vocera Web Console

## 2022-10-28 NOTE — PROGRESS NOTES
Pulmonary Progress Note  10/28/2022     Problem List:   Active Problems:    Acute respiratory failure with hypoxia (H)    Hypotension, unspecified hypotension type    Infection due to 2019 novel coronavirus       Plan:   - End stage COPD; DNR-I     - Oxygen as needed to keep SpO2 > 88%. She is refusing HFNC.    - Continue bronchodilators; no change  - PRN Morphine for air hunger.   - Appreciate Palliative Care involvement. Goal is TCU if we can get her oxygen needs down.   - CXR shows new RLL; Start vanc and zosyn. Sputum culture.   RIJ central line has been in place for 20-days. This needs to be removed. If central line is needed, should have PICC placed.           Kiera Hale, CNP  [facility] Pulmonary/Critical Care    ________________________________________________________________      CC:   Chief Complaint   Patient presents with     Respiratory Distress     HPI: Lavonne Talbot is a 67 year old cachectic female with history of severe emphysema, tobacco user.  Recent diagnosis of COVID-19 viral infection 1 week ago who presented to [facility] ED on 10/8/2022 due to respiratory distress now admitted to the ICU with acute respiratory failure requiring mechanical ventilation. Patient was treated for H influenza pneumonia and pulmonary embolism. Echocardiogram showed reduced EF 30-35%, per cardiology stress induced cardiomyopathy.   N/V/abdominal distention. CT scan showed dilated bowel loops.   Extubated 10/20 to BiPAP. Moved out of ICU and is recovering out on the floor. Attempting to get stronger to get to TCU. Not quite ready for Hospice.     ROS: breathing is stable. Upset that her Xray shows pneumonia.     Objective:   Vitals:    10/28/22 0716 10/28/22 0727 10/28/22 1118 10/28/22 1447   BP: 97/59      BP Location: Right arm      Pulse: 110      Resp: 18      Temp: 98.3  F (36.8  C)      TempSrc: Oral      SpO2: (!) 87% (!) 86% (!) 87% 92%   Weight:    36 kg (79 lb 6.4 oz)   Height:            I/O:     Intake/Output Summary (Last 24 hours) at 10/22/2022 1119  Last data filed at 10/22/2022 0600  Gross per 24 hour   Intake 652.57 ml   Output 1800 ml   Net -1147.43 ml     Weight change:     Medications Reviewed: yes    Physical Exam:   General: no distress; cachectic  HEENT: AT/NC; sunken features.   NEURO: grossly nonfocal, very weak.   CARDIAC: RRR S1S2  PULMONARY: unlabored on NC  INTEGUMENT: thin, fragile skin.   PSYCH: calm    Lab Results Reviewed:   Recent Labs   Lab 10/28/22  0644      CO2 37*   BUN 27.6*       Recent Labs   Lab 10/28/22  0644   WBC 14.4*   HGB 13.5   HCT 42.1          Micro: cultures reviewed    Imaging: all imaging personally reviewed   10/27 CXR - Patient's rotated to the left. Patient's been extubated. Right IJ central line overlies the mid SVC.     Lungs are hyperexpanded consistent with COPD.     New hazy opacification of the right lower lobe with trace effusion. Unclear if this reflects some atelectasis due to volume loss or a bronchopneumonia. Endobronchial secretions noted on the recent CT of the abdomen within the lung bases.      Left lung is clear. No pneumothorax. Heart and pulmonary vascularity are normal.        10/19 CT Abd - 1.  Moderate to marked fluid and gas distention of the distal jejunum, ileum, colon and rectum with no transition point or obstructing mass compatible with severe adynamic ileus and/or a nonspecific enterocolitis. No free air or pneumatosis.  2.  Wedge-shaped perfusion defect lateral aspect mid to upper spleen is most likely an acute infarct with posttraumatic contusion possible in the appropriate clinical setting. Etiology unclear but in the setting of recently diagnosed pulmonary emboli,   paradoxical emboli related to PFO should be considered.  3.  Trace abdominal and pelvic ascites and generalized mesenteric and subcutaneous edema.  4.  Bilateral adnexal enlargement measuring up to 5 cm on the right and 3 cm on the left.  Recommend pelvic ultrasound.  5.  Incidental probable 1 cm polyp distal transverse colon.    10/17 CXR - Endotracheal tube tip 3.7 cm above the izabela. Nasogastric tube tip and the distal sidehole are within the stomach. Right IJ central venous catheter tip low SVC in good position. Underlying lung hyperinflation, severe emphysema and linear   opacities radiating from the sara into the periphery of both lungs that could represent bronchial wall thickening, interstitial edema and/or interstitial inflammation. No pneumothorax.        Kiera Hale, [facility] Pulmonary/Critical Care

## 2022-10-28 NOTE — PROGRESS NOTES
[facility]    Medicine Progress Note - Hospitalist Service    Date of Admission:  10/8/2022    Assessment & Plan   Lavonne Talbot is a 67 year old female with history of severe emphysema, tobacco use disorder, lymphedema on furosemide, and recent COVID-19 infection admitted to intensive care unit on 10/8/2022 due to acute hypoxic respiratory failure secondary to community-acquired pneumonia/aspiration pneumonia, small subsegmental pulmonary embolism, circulatory shock requiring vasopressors and possible myocardial injury.  She was intubated on 10/8/2022 and extubated to BiPAP on 10/20/2022.     She completed antibiotic therapy for haemophilus influenza tracheobronchitis while in the ICU.  She also completed course of dexamethasone in the ICU.  Blood cultures : 10/8 : diptheroids 1/2 bottles - likely contaminant ; 10/19 : NGTD and tracheal fluid cultures 10/19 : candida 10/8 : hemophilus.  She received furosemide briefly for possible pulmonary edema; now held as patient is euvolemic.  As per intensivist, patient considerning going home with hospice. Care management team working on placement but will need oxygen requirement of 6 liters or less for TCU to accept patient, difficult placement     Severe COPD  Hypoxic respiratory failure requiring mechanical ventilation  Haemophilus influenza tracheobronchitis  Atelectasis vs HAP pneumnia right LL  Extubated to BiPAP on 10/20/2022  Continue bronchodilators as scheduled   Blood cultures : 10/8 : diptheroids 1/2 bottles - likely contaminant ; 10/19 : NGTD and tracheal fluid cultures 10/19 : candida 10/8 : hemophilus.  Was Off antibiotics. pulmonology consulted and started her on vanco and zosyn 10-28. Patient does not look septic and she is having a pneumonia but cannot rule out completely  Continue supplemental oxygen     Subsegmental pulmonary embolism  S/p heparin infusion, on eliquis  Will need further discussion as regards anticoagulation as  "patient is leaning towards home hospice.     Recent COVID-19 infection  Completed course of dexamethasone  Of COVID isolation precautions     Cachexia  Severe malnutrition  Likely secondary to COPD cachexia  Follow-up consult to nutritionist  Encouraged to take protein supplement     Hypokalemia  Replace as per protocol  Monitor potassium level    Diet: Advance Diet as Tolerated: Regular Diet Adult  Snacks/Supplements Adult: Ensure Enlive; With Meals  Snacks/Supplements Adult: Magic Cup; With Meals      DVT Prophylaxis: DOAC  [place] Catheter: Not present  Central Lines: PRESENT  CVC TRIPLE LUMEN Right-Site Assessment: WDL  Cardiac Monitoring: None  Code Status: No CPR- Do NOT Intubate      Disposition Plan    Discontinue central line     Expected Discharge Date: 10/31/2022    Discharge Delays: Placement - TCU  Oxygen Needs - Arrange Home O2    Discharge Comments: TCU        The patient's care was discussed with the Bedside Nurse and Patient.    Corey Rausch MD  Hospitalist Service  [facility]  Securely message with the Vocera Web Console (learn more here)  Text page via [employer] Paging/Directory         Clinically Significant Risk Factors              # Hypoalbuminemia: Lowest albumin = 2.6 g/dL (Ref range: 3.5-5.2) at 10/8/2022 11:32 AM, will monitor as appropriate          # Cachexia: Estimated body mass index is 12.44 kg/m  as calculated from the following:    Height as of this encounter: 1.702 m (5' 7\").    Weight as of this encounter: 36 kg (79 lb 6.4 oz).   # Severe Malnutrition: based on nutrition assessment        ______________________________________________________________________    Interval History   Feeling slightly better, no fever, or new symptms    Data reviewed today: I reviewed all medications, new labs and imaging results over the last 24 hours. I personally reviewed no images or EKG's today. Reviewed chest Xray from yesterday    Physical Exam   Vital Signs: Temp: " 98.2  F (36.8  C) Temp src: Oral BP: 101/63 Pulse: 109   Resp: 20 SpO2: 93 % O2 Device: High Flow Nasal Cannula (HFNC) Oxygen Delivery: 10 LPM  Weight: 79 lbs 6.4 oz  Exam:  Constitutional: cachectic alert, no distress and cooperative  Head  normocephalic   Neck: Neck supple.  No JVD  ENT: Pupils symmetrical, sclera anicteric,  Cardiac: RRR. No murmurs, clicks gallops or rub,   Respiratory: Breathing easy. Lungs clear but BS a distant and prolonged expiration  Gastrointestinal: Abdomen soft, non-tender. BS normal.  Musculoskeletal: extremities normal- no gross deformities noted. No clubbing  Skin: no rashes, extremities edema: none.   Neurologic:oriented and appropriate, grossly non focal      Data    Chest 10-27  New hazy opacification of the right lower lobe with trace effusion. Unclear if this reflects some atelectasis due to volume loss or a bronchopneumonia.   Recent Labs   Lab 10/28/22  0644 10/27/22  1320 10/27/22  0530 10/26/22  0631   WBC 14.4*  --  15.0* 9.3   HGB 13.5  --  14.3 13.4   MCV 98  --  97 97     --  406 379     --  141 139   POTASSIUM 3.6 3.8 3.4 3.5   CHLORIDE 93*  --  96* 95*   CO2 37*  --  39* 39*   BUN 27.6*  --  25.2* 18.9   CR 0.56  --  0.52 0.48*   ANIONGAP 10  --  6* 5*   JASON 8.7*  --  8.9 9.1   GLC 87  --  91 84     No results found for this or any previous visit (from the past 24 hour(s)).  Medications     dextrose         apixaban ANTICOAGULANT  10 mg Oral BID    Followed by     [START ON 10/30/2022] apixaban ANTICOAGULANT  5 mg Oral BID     budesonide  1 mg Nebulization 2 times daily     furosemide  20 mg Intravenous Q12H     [START ON 10/29/2022] influenza vac high-dose quad  0.7 mL Intramuscular Prior to discharge     ipratropium - albuterol 0.5 mg/2.5 mg/3 mL  3 mL Nebulization 4x daily     mirtazapine  7.5 mg Oral At Bedtime     morphine  5 mg Oral TID     piperacillin-tazobactam  3.375 g Intravenous Once    Followed by     piperacillin-tazobactam  3.375 g  Intravenous Q8H     polyethylene glycol  17 g Oral Daily     thiamine  100 mg Per Feeding Tube Daily     vancomycin  1,000 mg Intravenous Once    Followed by     [START ON 10/29/2022] vancomycin  600 mg Intravenous Q12H     Corey Rausch MD  Subsequent hosp care level 2

## 2022-10-28 NOTE — PROGRESS NOTES
Care Management Follow Up    Length of Stay (days): 20    Expected Discharge Date: 10/31/2022     Concerns to be Addressed:  (O2 status - pt needs to be at 6L O2 or less to qualify for TCU; placement)     Patient plan of care discussed at interdisciplinary rounds: Yes    Anticipated Discharge Disposition: Transitional Care vs. ?     Anticipated Discharge Services:    Anticipated Discharge DME:    Referrals Placed by CM/SW:  Post-Acute Facilities  Private pay costs discussed: Not applicable    Additional Information:    Palliative continues to follow patient for GOC discussions. Pt O2 still too high for TCU. TCU referrals sent and pending lowered O2 status. Anticipate transition to TCU, once medically stable and placement secured. CM following.     Assessment hx: Pt lives in a split level house with spouse. Spouse suffered a recent stroke and was discharged from [facility] to [facility] (now discharged from TCU, dtr Jess staying with spouse). Cognitively he is not able to participate in decision making or planning for patient. Patient has been independent with ADLs and IADLs. She ambulates without devices and drives. Son Frantz is primary family contact.     NICOL Ocampo

## 2022-10-28 NOTE — PROGRESS NOTES
"O2 9 lpm/Bubbler HFNC, SpO2 86, O2 increased to 10 lpm. BS diminished bilat, Duoneb tx given, tolerated well, BS unchanged after    BP 97/59 (BP Location: Right arm)   Pulse 110   Temp 98.3  F (36.8  C) (Oral)   Resp 18   Ht 1.702 m (5' 7\")   Wt 35.7 kg (78 lb 9.6 oz)   SpO2 (!) 86%   BMI 12.31 kg/m      RT to monitor  "

## 2022-10-28 NOTE — PLAN OF CARE
Problem: Gas Exchange Impaired  Goal: Optimal Gas Exchange  Outcome: Progressing     Problem: Malnutrition  Goal: Improved Nutritional Intake  Outcome: Progressing   Goal Outcome Evaluation:          Alert and oriented x4.  Denies pain.   Shortness of breath with activity and dizziness with getting out of bed per patient report.   She was able to get out of bed to the chair with PT, patient in good spirits after that.    Lungs diminished with some inspiratory wheezes at times. Nebs per RT. Ow SAts 88-92% w/ NC 10 L.   IS up to 500.    Unable to void this morning. Complaining of bladder pressure. Bladder scan for 430 ml. Straight cath for 500 ml of dark bonnie urine.   Continue to monitor.     New orders for IV antibiotics, infusing now.   Orders to remove central line once abx done. New peripheral IV to be placed.     Sputum sample to be collected. Patient is aware. Unproductive cough.

## 2022-10-28 NOTE — PROGRESS NOTES
"O2 10 lpm Bubble HFNC, SpO2 87 %, BS diminished bilat, Duoneb/mask tx given, tolerated well, BS unchanged after    BP 97/59 (BP Location: Right arm)   Pulse 110   Temp 98.3  F (36.8  C) (Oral)   Resp 18   Ht 1.702 m (5' 7\")   Wt 35.7 kg (78 lb 9.6 oz)   SpO2 (!) 87%   BMI 12.31 kg/m      RT to follow  "

## 2022-10-29 ENCOUNTER — APPOINTMENT (OUTPATIENT)
Dept: PHYSICAL THERAPY | Facility: HOSPITAL | Age: 67
DRG: 207 | End: 2022-10-29
Payer: COMMERCIAL

## 2022-10-29 LAB
ANION GAP SERPL CALCULATED.3IONS-SCNC: 3 MMOL/L (ref 7–15)
BUN SERPL-MCNC: 25.5 MG/DL (ref 8–23)
CALCIUM SERPL-MCNC: 8.3 MG/DL (ref 8.8–10.2)
CHLORIDE SERPL-SCNC: 96 MMOL/L (ref 98–107)
CREAT SERPL-MCNC: 0.54 MG/DL (ref 0.51–0.95)
DEPRECATED HCO3 PLAS-SCNC: 37 MMOL/L (ref 22–29)
ERYTHROCYTE [DISTWIDTH] IN BLOOD BY AUTOMATED COUNT: 13.6 % (ref 10–15)
GFR SERPL CREATININE-BSD FRML MDRD: >90 ML/MIN/1.73M2
GLUCOSE SERPL-MCNC: 85 MG/DL (ref 70–99)
HCT VFR BLD AUTO: 39 % (ref 35–47)
HGB BLD-MCNC: 12.4 G/DL (ref 11.7–15.7)
MAGNESIUM SERPL-MCNC: 2 MG/DL (ref 1.7–2.3)
MCH RBC QN AUTO: 31.6 PG (ref 26.5–33)
MCHC RBC AUTO-ENTMCNC: 31.8 G/DL (ref 31.5–36.5)
MCV RBC AUTO: 100 FL (ref 78–100)
PHOSPHATE SERPL-MCNC: 3.1 MG/DL (ref 2.5–4.5)
PLATELET # BLD AUTO: 355 10E3/UL (ref 150–450)
POTASSIUM SERPL-SCNC: 3.7 MMOL/L (ref 3.4–5.3)
RBC # BLD AUTO: 3.92 10E6/UL (ref 3.8–5.2)
SODIUM SERPL-SCNC: 136 MMOL/L (ref 136–145)
WBC # BLD AUTO: 10.2 10E3/UL (ref 4–11)

## 2022-10-29 PROCEDURE — 94640 AIRWAY INHALATION TREATMENT: CPT

## 2022-10-29 PROCEDURE — 94799 UNLISTED PULMONARY SVC/PX: CPT

## 2022-10-29 PROCEDURE — 250N000013 HC RX MED GY IP 250 OP 250 PS 637: Performed by: INTERNAL MEDICINE

## 2022-10-29 PROCEDURE — 250N000011 HC RX IP 250 OP 636: Performed by: INTERNAL MEDICINE

## 2022-10-29 PROCEDURE — 90662 IIV NO PRSV INCREASED AG IM: CPT | Performed by: INTERNAL MEDICINE

## 2022-10-29 PROCEDURE — 83735 ASSAY OF MAGNESIUM: CPT | Performed by: INTERNAL MEDICINE

## 2022-10-29 PROCEDURE — 250N000011 HC RX IP 250 OP 636: Performed by: NURSE PRACTITIONER

## 2022-10-29 PROCEDURE — 250N000013 HC RX MED GY IP 250 OP 250 PS 637: Performed by: PHYSICIAN ASSISTANT

## 2022-10-29 PROCEDURE — 97110 THERAPEUTIC EXERCISES: CPT | Mod: GP

## 2022-10-29 PROCEDURE — 80048 BASIC METABOLIC PNL TOTAL CA: CPT | Performed by: INTERNAL MEDICINE

## 2022-10-29 PROCEDURE — 120N000001 HC R&B MED SURG/OB

## 2022-10-29 PROCEDURE — 84100 ASSAY OF PHOSPHORUS: CPT | Performed by: INTERNAL MEDICINE

## 2022-10-29 PROCEDURE — 94640 AIRWAY INHALATION TREATMENT: CPT | Mod: 76

## 2022-10-29 PROCEDURE — 999N000157 HC STATISTIC RCP TIME EA 10 MIN

## 2022-10-29 PROCEDURE — 250N000009 HC RX 250: Performed by: INTERNAL MEDICINE

## 2022-10-29 PROCEDURE — 99232 SBSQ HOSP IP/OBS MODERATE 35: CPT | Performed by: INTERNAL MEDICINE

## 2022-10-29 PROCEDURE — 258N000003 HC RX IP 258 OP 636: Performed by: NURSE PRACTITIONER

## 2022-10-29 PROCEDURE — G0008 ADMIN INFLUENZA VIRUS VAC: HCPCS | Performed by: INTERNAL MEDICINE

## 2022-10-29 PROCEDURE — 85027 COMPLETE CBC AUTOMATED: CPT | Performed by: INTERNAL MEDICINE

## 2022-10-29 PROCEDURE — 97530 THERAPEUTIC ACTIVITIES: CPT | Mod: GP

## 2022-10-29 PROCEDURE — 258N000003 HC RX IP 258 OP 636

## 2022-10-29 PROCEDURE — 99207 PR CDG-CUT & PASTE-POTENTIAL IMPACT ON LEVEL: CPT | Performed by: INTERNAL MEDICINE

## 2022-10-29 PROCEDURE — 36415 COLL VENOUS BLD VENIPUNCTURE: CPT | Performed by: INTERNAL MEDICINE

## 2022-10-29 RX ORDER — MAGNESIUM OXIDE 400 MG/1
400 TABLET ORAL EVERY 4 HOURS
Status: COMPLETED | OUTPATIENT
Start: 2022-10-29 | End: 2022-10-29

## 2022-10-29 RX ADMIN — IPRATROPIUM BROMIDE AND ALBUTEROL SULFATE 3 ML: 2.5; .5 SOLUTION RESPIRATORY (INHALATION) at 08:10

## 2022-10-29 RX ADMIN — Medication 5 MG: at 09:35

## 2022-10-29 RX ADMIN — BUDESONIDE 1 MG: 0.5 INHALANT ORAL at 08:10

## 2022-10-29 RX ADMIN — POLYETHYLENE GLYCOL 3350 17 G: 17 POWDER, FOR SOLUTION ORAL at 09:32

## 2022-10-29 RX ADMIN — MIRTAZAPINE 7.5 MG: 7.5 TABLET, FILM COATED ORAL at 20:49

## 2022-10-29 RX ADMIN — VANCOMYCIN HYDROCHLORIDE 600 MG: 1 INJECTION, POWDER, LYOPHILIZED, FOR SOLUTION INTRAVENOUS at 17:38

## 2022-10-29 RX ADMIN — PIPERACILLIN AND TAZOBACTAM 3.38 G: 3; .375 INJECTION, POWDER, LYOPHILIZED, FOR SOLUTION INTRAVENOUS at 06:54

## 2022-10-29 RX ADMIN — Medication 5 MG: at 13:40

## 2022-10-29 RX ADMIN — INFLUENZA A VIRUS A/VICTORIA/2570/2019 IVR-215 (H1N1) ANTIGEN (FORMALDEHYDE INACTIVATED), INFLUENZA A VIRUS A/DARWIN/9/2021 SAN-010 (H3N2) ANTIGEN (FORMALDEHYDE INACTIVATED), INFLUENZA B VIRUS B/PHUKET/3073/2013 ANTIGEN (FORMALDEHYDE INACTIVATED), AND INFLUENZA B VIRUS B/MICHIGAN/01/2021 ANTIGEN (FORMALDEHYDE INACTIVATED) 0.7 ML: 60; 60; 60; 60 INJECTION, SUSPENSION INTRAMUSCULAR at 09:33

## 2022-10-29 RX ADMIN — THIAMINE HCL TAB 100 MG 100 MG: 100 TAB at 09:32

## 2022-10-29 RX ADMIN — PIPERACILLIN AND TAZOBACTAM 3.38 G: 3; .375 INJECTION, POWDER, LYOPHILIZED, FOR SOLUTION INTRAVENOUS at 22:20

## 2022-10-29 RX ADMIN — FUROSEMIDE 20 MG: 10 INJECTION, SOLUTION INTRAMUSCULAR; INTRAVENOUS at 17:54

## 2022-10-29 RX ADMIN — BUDESONIDE 1 MG: 0.5 INHALANT ORAL at 20:55

## 2022-10-29 RX ADMIN — IPRATROPIUM BROMIDE AND ALBUTEROL SULFATE 3 ML: 2.5; .5 SOLUTION RESPIRATORY (INHALATION) at 16:06

## 2022-10-29 RX ADMIN — APIXABAN 10 MG: 5 TABLET, FILM COATED ORAL at 20:48

## 2022-10-29 RX ADMIN — Medication 5 MG: at 20:49

## 2022-10-29 RX ADMIN — VANCOMYCIN HYDROCHLORIDE 600 MG: 1 INJECTION, POWDER, LYOPHILIZED, FOR SOLUTION INTRAVENOUS at 04:38

## 2022-10-29 RX ADMIN — SODIUM CHLORIDE 500 ML: 9 INJECTION, SOLUTION INTRAVENOUS at 07:11

## 2022-10-29 RX ADMIN — IPRATROPIUM BROMIDE AND ALBUTEROL SULFATE 3 ML: 2.5; .5 SOLUTION RESPIRATORY (INHALATION) at 11:42

## 2022-10-29 RX ADMIN — Medication 400 MG: at 11:02

## 2022-10-29 RX ADMIN — MORPHINE SULFATE 5 MG: 10 SOLUTION ORAL at 13:53

## 2022-10-29 RX ADMIN — Medication 400 MG: at 13:53

## 2022-10-29 RX ADMIN — APIXABAN 10 MG: 5 TABLET, FILM COATED ORAL at 09:32

## 2022-10-29 RX ADMIN — PIPERACILLIN AND TAZOBACTAM 3.38 G: 3; .375 INJECTION, POWDER, LYOPHILIZED, FOR SOLUTION INTRAVENOUS at 13:53

## 2022-10-29 RX ADMIN — IPRATROPIUM BROMIDE AND ALBUTEROL SULFATE 3 ML: 2.5; .5 SOLUTION RESPIRATORY (INHALATION) at 20:52

## 2022-10-29 ASSESSMENT — ACTIVITIES OF DAILY LIVING (ADL)
ADLS_ACUITY_SCORE: 39
ADLS_ACUITY_SCORE: 40
ADLS_ACUITY_SCORE: 39
ADLS_ACUITY_SCORE: 40
ADLS_ACUITY_SCORE: 36
ADLS_ACUITY_SCORE: 39
ADLS_ACUITY_SCORE: 36
ADLS_ACUITY_SCORE: 40
ADLS_ACUITY_SCORE: 39
ADLS_ACUITY_SCORE: 39
ADLS_ACUITY_SCORE: 40
ADLS_ACUITY_SCORE: 39

## 2022-10-29 NOTE — PROGRESS NOTES
Pulmonary Progress Note  10/28/2022     Problem List:   Active Problems:    Acute respiratory failure with hypoxia (H)    Hypotension, unspecified hypotension type    Infection due to 2019 novel coronavirus       Plan:   - End stage COPD; DNR-I     - Oxygen as needed to keep SpO2 > 88%. She is refusing HFNC.    - Continue bronchodilators; no change  - PRN Morphine for air hunger.   - Appreciate Palliative Care involvement. Goal is TCU if we can get her oxygen needs down.   - CXR shows new RLL; on vanc and zosyn. Sputum culture.      ________________________________________________________________      CC:   Chief Complaint   Patient presents with     Respiratory Distress     HPI: Lavonne Talbot is a 67 year old cachectic female with history of severe emphysema, tobacco user.  Recent diagnosis of COVID-19 viral infection 1 week ago who presented to [facility] ED on 10/8/2022 due to respiratory distress now admitted to the ICU with acute respiratory failure requiring mechanical ventilation. Patient was treated for H influenza pneumonia and pulmonary embolism. Echocardiogram showed reduced EF 30-35%, per cardiology stress induced cardiomyopathy.   N/V/abdominal distention. CT scan showed dilated bowel loops.   Extubated 10/20 to BiPAP. Moved out of ICU and is recovering out on the floor. Attempting to get stronger to get to TCU. Not quite ready for Hospice.     ROS: She would like to go outside. She is working with physical therapy on her strength. Good spirits this am     Objective:   Vitals:    10/1955 10/28/22 2301 10/29/22 0810 10/29/22 0900   BP:  99/58  93/53   BP Location:  Right arm  Right arm   Pulse:  96 97 104   Resp:  20 24 18   Temp:  98.1  F (36.7  C)  97.8  F (36.6  C)   TempSrc:  Oral  Oral   SpO2: 94% 98% 93% 90%   Weight:       Height:           I/O:     Intake/Output Summary (Last 24 hours) at 10/22/2022 1119  Last data filed at 10/22/2022 0600  Gross per 24 hour   Intake 652.57 ml    Output 1800 ml   Net -1147.43 ml     Weight change: 0.363 kg (12.8 oz)    Medications Reviewed: yes    Physical Exam:   General: no distress; cachectic, laying in bed  HEENT: NC in place  NEURO: grossly nonfocal, very weak.   CARDIAC: RRR S1S2  PULMONARY: unlabored on NC  INTEGUMENT: thin, fragile skin.   PSYCH: calm    Lab Results Reviewed:   Recent Labs   Lab 10/29/22  0820      CO2 37*   BUN 25.5*       Recent Labs   Lab 10/29/22  0820   WBC 10.2   HGB 12.4   HCT 39.0          Micro: cultures reviewed    Imaging: all imaging personally reviewed   10/27 CXR - Patient's rotated to the left. Patient's been extubated. Right IJ central line overlies the mid SVC.     Lungs are hyperexpanded consistent with COPD.     New hazy opacification of the right lower lobe with trace effusion. Unclear if this reflects some atelectasis due to volume loss or a bronchopneumonia. Endobronchial secretions noted on the recent CT of the abdomen within the lung bases.      Left lung is clear. No pneumothorax. Heart and pulmonary vascularity are normal.        10/19 CT Abd - 1.  Moderate to marked fluid and gas distention of the distal jejunum, ileum, colon and rectum with no transition point or obstructing mass compatible with severe adynamic ileus and/or a nonspecific enterocolitis. No free air or pneumatosis.  2.  Wedge-shaped perfusion defect lateral aspect mid to upper spleen is most likely an acute infarct with posttraumatic contusion possible in the appropriate clinical setting. Etiology unclear but in the setting of recently diagnosed pulmonary emboli,   paradoxical emboli related to PFO should be considered.  3.  Trace abdominal and pelvic ascites and generalized mesenteric and subcutaneous edema.  4.  Bilateral adnexal enlargement measuring up to 5 cm on the right and 3 cm on the left. Recommend pelvic ultrasound.  5.  Incidental probable 1 cm polyp distal transverse colon.    10/17 CXR - Endotracheal tube tip 3.7  cm above the izabela. Nasogastric tube tip and the distal sidehole are within the stomach. Right IJ central venous catheter tip low SVC in good position. Underlying lung hyperinflation, severe emphysema and linear   opacities radiating from the sara into the periphery of both lungs that could represent bronchial wall thickening, interstitial edema and/or interstitial inflammation. No pneumothorax.

## 2022-10-29 NOTE — PLAN OF CARE
Problem: Gas Exchange Impaired  Goal: Optimal Gas Exchange  Intervention: Optimize Oxygenation and Ventilation  Recent Flowsheet Documentation  Taken 10/29/2022 0036 by Nya Kapoor RN  Head of Bed ([facility]) Positioning: HOB at 20-30 degrees  Taken 10/29/2022 0035 by Nya Kapoor RN  Head of Bed ([facility]) Positioning: HOB at 15 degrees  Taken 10/28/2022 2021 by Nya Kapoor RN  Head of Bed ([facility]) Positioning: HOB at 20-30 degrees  10L oxygen via nasal cannula as pt will only allow this type of device to be used to administer oxygen.  Lung sounds faint/diminished throughout.  Weak congested cough, sputum sample still needs to be obtained.    Bladder scanned for 169cc this AM.  Pt wearing a brief and is incontinent, asked pt if needed to go to the bathroom and she declines.      Nya Kapoor RN

## 2022-10-29 NOTE — PROGRESS NOTES
[facility]    Medicine Progress Note - Hospitalist Service    Date of Admission:  10/8/2022    Assessment & Plan   Lavonne Talbot is a 67 year old female with history of severe emphysema, tobacco use disorder, lymphedema on furosemide, and recent COVID-19 infection admitted to intensive care unit on 10/8/2022 due to acute hypoxic respiratory failure secondary to community-acquired pneumonia/aspiration pneumonia, small subsegmental pulmonary embolism, circulatory shock requiring vasopressors and possible myocardial injury.  She was intubated on 10/8/2022 and extubated to BiPAP on 10/20/2022.     She completed antibiotic therapy for haemophilus influenza tracheobronchitis while in the ICU.  She also completed course of dexamethasone in the ICU.  Blood cultures : 10/8 : diptheroids 1/2 bottles - likely contaminant ; 10/19 : NGTD and tracheal fluid cultures 10/19 : candida 10/8 : hemophilus.  She received furosemide briefly for possible pulmonary edema; now held as patient is euvolemic.  As per intensivist, patient considerning going home with hospice. Care management team working on placement but will need oxygen requirement of 6 liters or less for TCU to accept patient, difficult placement     Severe COPD  Hypoxic respiratory failure requiring mechanical ventilation  Haemophilus influenza tracheobronchitis  Atelectasis vs HAP pneumnia right LL  Extubated to BiPAP on 10/20/2022  Continue bronchodilators as scheduled. Added pulmicort 10-27   Blood cultures : 10/8 : diptheroids 1/2 bottles - likely contaminant ; 10/19 : NGTD and tracheal fluid cultures 10/19 : candida 10/8 : hemophilus.  Was Off antibiotics. pulmonology consulted and started her on vanco and zosyn 10-28. Patient does not look septic and she is having a pneumonia but cannot rule out completely  Continue supplemental oxygen and wean as tolerated down to 8 l from 10 today     Subsegmental pulmonary embolism  S/p heparin infusion, on  "eliquis  Will need further discussion as regards anticoagulation as patient is leaning towards home hospice.     Recent COVID-19 infection  Completed course of dexamethasone  Of COVID isolation precautions     Cachexia  Severe malnutrition  Likely secondary to COPD cachexia  Follow-up consult to nutritionist  Encouraged to take protein supplement     Hypokalemia  Replace as per protocol  Monitor potassium level    Diet: Advance Diet as Tolerated: Regular Diet Adult  Snacks/Supplements Adult: Ensure Enlive; With Meals  Snacks/Supplements Adult: Magic Cup; With Meals      DVT Prophylaxis: DOAC  [place] Catheter: Not present  Central Lines: None  Cardiac Monitoring: None  Code Status: No CPR- Do NOT Intubate      Disposition Plan    Discontinue central line    Expected Discharge Date: 10/31/2022    Discharge Delays: Placement - TCU  Oxygen Needs - Arrange Home O2    Discharge Comments: TCU        The patient's care was discussed with the Bedside Nurse and Patient.    Corey Rausch MD  Hospitalist Service  [facility]  Securely message with the Vocera Web Console (learn more here)  Text page via [employer] Paging/Directory         Clinically Significant Risk Factors              # Hypoalbuminemia: Lowest albumin = 2.6 g/dL (Ref range: 3.5-5.2) at 10/8/2022 11:32 AM, will monitor as appropriate          # Cachexia: Estimated body mass index is 12.44 kg/m  as calculated from the following:    Height as of this encounter: 1.702 m (5' 7\").    Weight as of this encounter: 36 kg (79 lb 6.4 oz).   # Severe Malnutrition: based on nutrition assessment        ______________________________________________________________________    Interval History   Feels slightly better, no new symptom. Uneventful day and night    Data reviewed today: I reviewed all medications, new labs and imaging results over the last 24 hours. I personally reviewed no images or EKG's today. Reviewed chest Xray from " yesterday    Physical Exam   Vital Signs: Temp: 98.3  F (36.8  C) Temp src: Oral BP: (S) 90/53 Pulse: 92   Resp: 18 SpO2: 96 % O2 Device: High Flow Nasal Cannula (HFNC) Oxygen Delivery: 8 LPM  Weight: 79 lbs 6.4 oz  Exam:  Constitutional: cachectic alert, no distress and cooperative  Head  normocephalic   Neck: Neck supple.  No JVD  ENT: Pupils symmetrical, sclera anicteric,  Cardiac: RRR. No murmurs, clicks gallops or rub,   Respiratory: Breathing easy. Lungs clear but BS a distant and prolonged expiration  Gastrointestinal: Abdomen soft, non-tender. BS normal.  Musculoskeletal: extremities normal- no gross deformities noted. No clubbing  Skin: no rashes, extremities edema: none.   Neurologic:oriented and appropriate, grossly non focal      Data    Chest 10-27  New hazy opacification of the right lower lobe with trace effusion. Unclear if this reflects some atelectasis due to volume loss or a bronchopneumonia.   Recent Labs   Lab 10/29/22  0820 10/28/22  0644 10/27/22  1320 10/27/22  0530   WBC 10.2 14.4*  --  15.0*   HGB 12.4 13.5  --  14.3    98  --  97    392  --  406    140  --  141   POTASSIUM 3.7 3.6 3.8 3.4   CHLORIDE 96* 93*  --  96*   CO2 37* 37*  --  39*   BUN 25.5* 27.6*  --  25.2*   CR 0.54 0.56  --  0.52   ANIONGAP 3* 10  --  6*   JASON 8.3* 8.7*  --  8.9   GLC 85 87  --  91     No results found for this or any previous visit (from the past 24 hour(s)).  Medications     dextrose         apixaban ANTICOAGULANT  10 mg Oral BID    Followed by     [START ON 10/30/2022] apixaban ANTICOAGULANT  5 mg Oral BID     budesonide  1 mg Nebulization 2 times daily     furosemide  20 mg Intravenous Q12H     ipratropium - albuterol 0.5 mg/2.5 mg/3 mL  3 mL Nebulization 4x daily     magnesium oxide  400 mg Oral Q4H     mirtazapine  7.5 mg Oral At Bedtime     morphine  5 mg Oral TID     piperacillin-tazobactam  3.375 g Intravenous Q8H     polyethylene glycol  17 g Oral Daily     thiamine  100 mg Per  Feeding Tube Daily     vancomycin  600 mg Intravenous Q12H     Corey Rausch MD  Subsequent hosp care level 2

## 2022-10-29 NOTE — PROGRESS NOTES
RESPIRATORY CARE NOTE    Pt on 8lpm hfnc, bs diminished, fair congested non productive cough.   Received Duoneb x2, pulmicort x1 via face mask. BS did not change after tx. Fv x1 10 rep 2 cycles, kathy well    Cont monitoring    Rose Kinney RT

## 2022-10-30 ENCOUNTER — APPOINTMENT (OUTPATIENT)
Dept: PHYSICAL THERAPY | Facility: HOSPITAL | Age: 67
DRG: 207 | End: 2022-10-30
Payer: COMMERCIAL

## 2022-10-30 LAB
ANION GAP SERPL CALCULATED.3IONS-SCNC: 5 MMOL/L (ref 7–15)
BUN SERPL-MCNC: 17.2 MG/DL (ref 8–23)
CALCIUM SERPL-MCNC: 8.1 MG/DL (ref 8.8–10.2)
CHLORIDE SERPL-SCNC: 95 MMOL/L (ref 98–107)
CREAT SERPL-MCNC: 0.52 MG/DL (ref 0.51–0.95)
DEPRECATED HCO3 PLAS-SCNC: 38 MMOL/L (ref 22–29)
ERYTHROCYTE [DISTWIDTH] IN BLOOD BY AUTOMATED COUNT: 13.5 % (ref 10–15)
GFR SERPL CREATININE-BSD FRML MDRD: >90 ML/MIN/1.73M2
GLUCOSE SERPL-MCNC: 83 MG/DL (ref 70–99)
HCT VFR BLD AUTO: 36.4 % (ref 35–47)
HGB BLD-MCNC: 11.5 G/DL (ref 11.7–15.7)
MAGNESIUM SERPL-MCNC: 1.9 MG/DL (ref 1.7–2.3)
MCH RBC QN AUTO: 31.9 PG (ref 26.5–33)
MCHC RBC AUTO-ENTMCNC: 31.6 G/DL (ref 31.5–36.5)
MCV RBC AUTO: 101 FL (ref 78–100)
PHOSPHATE SERPL-MCNC: 2.8 MG/DL (ref 2.5–4.5)
PLATELET # BLD AUTO: 362 10E3/UL (ref 150–450)
POTASSIUM SERPL-SCNC: 3.6 MMOL/L (ref 3.4–5.3)
RBC # BLD AUTO: 3.61 10E6/UL (ref 3.8–5.2)
SODIUM SERPL-SCNC: 138 MMOL/L (ref 136–145)
VANCOMYCIN SERPL-MCNC: 10.3 UG/ML
WBC # BLD AUTO: 6.8 10E3/UL (ref 4–11)

## 2022-10-30 PROCEDURE — 250N000013 HC RX MED GY IP 250 OP 250 PS 637: Performed by: INTERNAL MEDICINE

## 2022-10-30 PROCEDURE — 36415 COLL VENOUS BLD VENIPUNCTURE: CPT | Performed by: INTERNAL MEDICINE

## 2022-10-30 PROCEDURE — 250N000009 HC RX 250: Performed by: INTERNAL MEDICINE

## 2022-10-30 PROCEDURE — 120N000001 HC R&B MED SURG/OB

## 2022-10-30 PROCEDURE — 85027 COMPLETE CBC AUTOMATED: CPT | Performed by: INTERNAL MEDICINE

## 2022-10-30 PROCEDURE — 36415 COLL VENOUS BLD VENIPUNCTURE: CPT | Performed by: NURSE PRACTITIONER

## 2022-10-30 PROCEDURE — 99232 SBSQ HOSP IP/OBS MODERATE 35: CPT | Performed by: INTERNAL MEDICINE

## 2022-10-30 PROCEDURE — 83735 ASSAY OF MAGNESIUM: CPT | Performed by: INTERNAL MEDICINE

## 2022-10-30 PROCEDURE — 94640 AIRWAY INHALATION TREATMENT: CPT | Mod: 76

## 2022-10-30 PROCEDURE — 94799 UNLISTED PULMONARY SVC/PX: CPT

## 2022-10-30 PROCEDURE — 250N000013 HC RX MED GY IP 250 OP 250 PS 637: Performed by: PHYSICIAN ASSISTANT

## 2022-10-30 PROCEDURE — 97530 THERAPEUTIC ACTIVITIES: CPT | Mod: GP

## 2022-10-30 PROCEDURE — 250N000011 HC RX IP 250 OP 636: Performed by: NURSE PRACTITIONER

## 2022-10-30 PROCEDURE — 250N000011 HC RX IP 250 OP 636: Performed by: INTERNAL MEDICINE

## 2022-10-30 PROCEDURE — 99232 SBSQ HOSP IP/OBS MODERATE 35: CPT | Performed by: HOSPITALIST

## 2022-10-30 PROCEDURE — 999N000157 HC STATISTIC RCP TIME EA 10 MIN

## 2022-10-30 PROCEDURE — 80202 ASSAY OF VANCOMYCIN: CPT | Performed by: NURSE PRACTITIONER

## 2022-10-30 PROCEDURE — 258N000003 HC RX IP 258 OP 636: Performed by: NURSE PRACTITIONER

## 2022-10-30 PROCEDURE — 84100 ASSAY OF PHOSPHORUS: CPT | Performed by: INTERNAL MEDICINE

## 2022-10-30 PROCEDURE — 82310 ASSAY OF CALCIUM: CPT | Performed by: INTERNAL MEDICINE

## 2022-10-30 PROCEDURE — 94640 AIRWAY INHALATION TREATMENT: CPT

## 2022-10-30 RX ORDER — MAGNESIUM OXIDE 400 MG/1
400 TABLET ORAL EVERY 4 HOURS
Status: COMPLETED | OUTPATIENT
Start: 2022-10-30 | End: 2022-10-30

## 2022-10-30 RX ADMIN — IPRATROPIUM BROMIDE AND ALBUTEROL SULFATE 3 ML: 2.5; .5 SOLUTION RESPIRATORY (INHALATION) at 07:49

## 2022-10-30 RX ADMIN — PIPERACILLIN AND TAZOBACTAM 3.38 G: 3; .375 INJECTION, POWDER, LYOPHILIZED, FOR SOLUTION INTRAVENOUS at 06:11

## 2022-10-30 RX ADMIN — IPRATROPIUM BROMIDE AND ALBUTEROL SULFATE 3 ML: 2.5; .5 SOLUTION RESPIRATORY (INHALATION) at 20:23

## 2022-10-30 RX ADMIN — Medication 5 MG: at 09:41

## 2022-10-30 RX ADMIN — POLYETHYLENE GLYCOL 3350 17 G: 17 POWDER, FOR SOLUTION ORAL at 09:41

## 2022-10-30 RX ADMIN — Medication 5 MG: at 22:15

## 2022-10-30 RX ADMIN — APIXABAN 5 MG: 5 TABLET, FILM COATED ORAL at 22:17

## 2022-10-30 RX ADMIN — MIRTAZAPINE 7.5 MG: 7.5 TABLET, FILM COATED ORAL at 22:17

## 2022-10-30 RX ADMIN — FUROSEMIDE 20 MG: 10 INJECTION, SOLUTION INTRAMUSCULAR; INTRAVENOUS at 17:47

## 2022-10-30 RX ADMIN — VANCOMYCIN HYDROCHLORIDE 600 MG: 1 INJECTION, POWDER, LYOPHILIZED, FOR SOLUTION INTRAVENOUS at 18:27

## 2022-10-30 RX ADMIN — APIXABAN 5 MG: 5 TABLET, FILM COATED ORAL at 09:41

## 2022-10-30 RX ADMIN — VANCOMYCIN HYDROCHLORIDE 600 MG: 1 INJECTION, POWDER, LYOPHILIZED, FOR SOLUTION INTRAVENOUS at 04:16

## 2022-10-30 RX ADMIN — BUDESONIDE 1 MG: 0.5 INHALANT ORAL at 20:23

## 2022-10-30 RX ADMIN — PIPERACILLIN AND TAZOBACTAM 3.38 G: 3; .375 INJECTION, POWDER, LYOPHILIZED, FOR SOLUTION INTRAVENOUS at 22:17

## 2022-10-30 RX ADMIN — Medication 400 MG: at 17:46

## 2022-10-30 RX ADMIN — Medication 400 MG: at 13:36

## 2022-10-30 RX ADMIN — THIAMINE HCL TAB 100 MG 100 MG: 100 TAB at 09:41

## 2022-10-30 RX ADMIN — Medication 5 MG: at 13:36

## 2022-10-30 RX ADMIN — IPRATROPIUM BROMIDE AND ALBUTEROL SULFATE 3 ML: 2.5; .5 SOLUTION RESPIRATORY (INHALATION) at 11:16

## 2022-10-30 RX ADMIN — PIPERACILLIN AND TAZOBACTAM 3.38 G: 3; .375 INJECTION, POWDER, LYOPHILIZED, FOR SOLUTION INTRAVENOUS at 13:37

## 2022-10-30 RX ADMIN — BUDESONIDE 1 MG: 0.5 INHALANT ORAL at 07:49

## 2022-10-30 ASSESSMENT — ACTIVITIES OF DAILY LIVING (ADL)
ADLS_ACUITY_SCORE: 37
ADLS_ACUITY_SCORE: 36
ADLS_ACUITY_SCORE: 37
ADLS_ACUITY_SCORE: 36
ADLS_ACUITY_SCORE: 35
ADLS_ACUITY_SCORE: 36
ADLS_ACUITY_SCORE: 36
ADLS_ACUITY_SCORE: 37
ADLS_ACUITY_SCORE: 36

## 2022-10-30 NOTE — PLAN OF CARE
Problem: Plan of Care - These are the overarching goals to be used throughout the patient stay.    Goal: Absence of Hospital-Acquired Illness or Injury  Intervention: Identify and Manage Fall Risk  Recent Flowsheet Documentation  Taken 10/29/2022 2038 by Antonette Blackmon RN  Safety Promotion/Fall Prevention:    activity supervised    assistive device/personal items within reach    lighting adjusted    nonskid shoes/slippers when out of bed    patient and family education    room door open    room near nurse's station  Intervention: Prevent Skin Injury  Recent Flowsheet Documentation  Taken 10/29/2022 2038 by Antonette Blackmon RN  Body Position: position changed independently  Goal: Optimal Comfort and Wellbeing  Intervention: Provide Person-Centered Care  Recent Flowsheet Documentation  Taken 10/29/2022 2038 by Antonette Blackmon RN  Trust Relationship/Rapport:    care explained    emotional support provided    empathic listening provided    choices provided    questions answered    questions encouraged    reassurance provided     Problem: Risk for Delirium  Goal: Optimal Coping  Intervention: Optimize Psychosocial Adjustment to Delirium  Recent Flowsheet Documentation  Taken 10/29/2022 2038 by Antonette Blackmon RN  Supportive Measures: active listening utilized  Goal: Improved Behavioral Control  Intervention: Prevent and Manage Agitation  Recent Flowsheet Documentation  Taken 10/29/2022 2038 by Antonette Blackmon RN  Environment Familiarity/Consistency: daily routine followed     Problem: Restraint, Nonbehavioral (Nonviolent)  Goal: Absence of Harm or Injury  Intervention: Implement Least Restrictive Safety Strategies  Recent Flowsheet Documentation  Taken 10/29/2022 2038 by Antonette Blackmon RN  Medical Device Protection:    IV pole/bag removed from visual field    tubing secured  Intervention: Protect Dignity, Rights, and Personal Wellbeing  Recent Flowsheet Documentation  Taken 10/29/2022 2038 by Lorri  Antonette LEBRON RN  Trust Relationship/Rapport:    care explained    emotional support provided    empathic listening provided    choices provided    questions answered    questions encouraged    reassurance provided  Intervention: Protect Skin and Joint Integrity  Recent Flowsheet Documentation  Taken 10/29/2022 2038 by Antonette Blackmon RN  Body Position: position changed independently     Problem: COPD (Chronic Obstructive Pulmonary Disease) Comorbidity  Goal: Maintenance of COPD Symptom Control  Intervention: Maintain COPD-Symptom Control  Recent Flowsheet Documentation  Taken 10/29/2022 2038 by Antonette Blackmon RN  Supportive Measures: active listening utilized  Medication Review/Management: medications reviewed     Problem: Heart Failure Comorbidity  Goal: Maintenance of Heart Failure Symptom Control  Intervention: Maintain Heart Failure Management  Recent Flowsheet Documentation  Taken 10/29/2022 2038 by Antonette Blackmon RN  Medication Review/Management: medications reviewed     Problem: Gas Exchange Impaired  Goal: Optimal Gas Exchange  Intervention: Optimize Oxygenation and Ventilation  Recent Flowsheet Documentation  Taken 10/29/2022 2038 by Antonette Blackmon RN  Head of Bed (HOB) Positioning: HOB at 30-45 degrees     Problem: Mechanical Ventilation Invasive  Goal: Effective Communication  Intervention: Ensure Effective Communication  Recent Flowsheet Documentation  Taken 10/29/2022 2038 by Antonette Blackmon RN  Trust Relationship/Rapport:    care explained    emotional support provided    empathic listening provided    choices provided    questions answered    questions encouraged    reassurance provided  Goal: Optimal Device Function  Intervention: Optimize Device Care and Function  Recent Flowsheet Documentation  Taken 10/29/2022 2038 by Antonette Blackmon RN  Airway Safety Measures: all equipment/monitors on and audible  Goal: Mechanical Ventilation Liberation  Intervention: Promote Extubation and  Mechanical Ventilation Liberation  Recent Flowsheet Documentation  Taken 10/29/2022 2038 by Antonette Blackmon RN  Medication Review/Management: medications reviewed  Goal: Absence of Ventilator-Induced Lung Injury  Intervention: Prevent Ventilator-Associated Pneumonia  Recent Flowsheet Documentation  Taken 10/29/2022 2038 by Antonette Blackmon RN  Head of Bed (hospitals) Positioning: HOB at 30-45 degrees     Problem: Enteral Nutrition  Goal: Absence of Aspiration Signs and Symptoms  Intervention: Minimize Aspiration Risk  Recent Flowsheet Documentation  Taken 10/29/2022 2038 by Antonette Blackmon RN  Head of Bed (hospitals) Positioning: HOB at 30-45 degrees     Problem: Anxiety Signs/Symptoms  Goal: Improved Mood Symptoms (Anxiety Signs/Symptoms)  Intervention: Optimize Emotion and Mood  Recent Flowsheet Documentation  Taken 10/29/2022 2038 by Antonette Blackmon RN  Supportive Measures: active listening utilized  Goal: Enhanced Social, Occupational or Functional Skills (Anxiety Signs/Symptoms)  Intervention: Promote Social, Occupational and Functional Ability  Recent Flowsheet Documentation  Taken 10/29/2022 2038 by Antonette Blackmon RN  Trust Relationship/Rapport:    care explained    emotional support provided    empathic listening provided    choices provided    questions answered    questions encouraged    reassurance provided     Problem: End-of-Life Care  Goal: Comfort, Peace and Preserved Dignity  Intervention: Promote Peace and Maintain Dignity  Recent Flowsheet Documentation  Taken 10/29/2022 2038 by Antonette Blackmon RN  Supportive Measures: active listening utilized     Problem: Activity Intolerance  Goal: Enhanced Capacity and Energy  Intervention: Optimize Activity Tolerance  Recent Flowsheet Documentation  Taken 10/29/2022 2038 by Antonette Blackmon RN  Activity Management: activity adjusted per tolerance   Goal Outcome Evaluation: patient is A&Ox4. Denies Pain, N/V, lightheadedness. BP soft (SBP in the 90s), lasix  held this AM per parameters. Maintaining >90% O2 with 7L HFNC. LSD. IV abx administered per MAR. Incontinent of bladder. Able to make needs known    Provider Notified: DEANDRE ONEAL.  Reason: M. MAG . BP soft (95/54). Pt asymp. Lasix held this AM per MAR.

## 2022-10-30 NOTE — PROGRESS NOTES
"Pt still on 8L HFNC, sats > 90% bs diminished, receives duoneb QID and pulmicort BID, flutter valve used independently and after treatments.     BP 91/51 (BP Location: Right arm, Patient Position: Semi-Gibson's, Cuff Size: Adult Small)   Pulse 104   Temp 99.1  F (37.3  C) (Oral)   Resp 20   Ht 1.702 m (5' 7\")   Wt 36 kg (79 lb 6.4 oz)   SpO2 90%   BMI 12.44 kg/m        Leah Evans, RT on 10/29/2022 at 9:58 PM    "

## 2022-10-30 NOTE — PROGRESS NOTES
Hospitalist Progress Note    Assessment/Plan    Active Problems:    Acute respiratory failure with hypoxia (H)    Hypotension, unspecified hypotension type    Infection due to 2019 novel coronavirus  67-year-old patient with history of emphysem who presented to the hospital on October 8 was admitted to the ICU after diagnosis of COVID-19 infection 1 week prior to admission, for acute respiratory failure secondary to pneumonia, COVID-19 infection, PE, circulatory shock required vasopressor, she was intubated on October 8 extubated to BiPAP on October 20    , was treated for haemophilus influenza tracheobronchitis, received dexamethasone, blood culture grew diphtheroid which was likely contamination, tracheal fluid cultures from October 19 grew Candida, she received furosemide for pulmonary edema, she was considering going home with hospice, and the plan for her is to go to TCU but due to high oxygen requirement, this difficulty finding facility she still requiring 7 L oxygen now being treated for hospital-acquired pneumonia    Severe COPD  Hypoxic respiratory failure requiring intubation haemophilus influenza tracheobronchitis,  Right lower lobe pneumonia   recovered COVID-19 infection,  Extubated to BiPAP on October 20, pulmonary started on Vanco and Zosyn on October 28 for treatment of pneumonia, after period of being off antibiotic,  Still requiring 7 L oxygen    Subsegmental PE  Was on heparin infusion transition to Eliquis    Recent COVID-19 infection  Was treated with dexamethasone, off precautions    Splenic infarct  Unclear etiology, radiology suggested paradoxical emboli related to PFO, patient is being, anticoagulated with Eliquis      Stress-induced cardiomyopathy  Echo showed an EF of 30 to 35% likely secondary to hypoxia with COVID-19 infection  Will need to repeat echo to see if that ejection fraction improved otherwise needs to have ischemic evaluation if she does not elect hospice     severe protein  calorie malnutrition  Secondary to chronic illness and acute illness, dietitian is following    Prolonged QTC,  During hospitalization in the ICU she had a prolonged QTC was attributed to hypomagnesemia and it was replaced    Cardiogenic and distributive shock  Requiring pressor support while in the ICU, now resolved      Barriers to Discharge: High oxygen requirement    Anticipated discharge date/Disposition: TCU once oxygen requirement improves to below 6 L    Subjective  Patient was seen today she states overall I feel better, I want to go home, she denies shortness of breath    Objective    Vital signs in last 24 hours  Temp:  [98.6  F (37  C)-99.1  F (37.3  C)] 98.6  F (37  C)  Pulse:  [] 94  Resp:  [16-20] 20  BP: ()/(50-59) 88/50  SpO2:  [90 %-100 %] 94 % [unfilled] O2 Device: High Flow Nasal Cannula (HFNC)    Weight:   [unfilled] Weight change:     Intake/Output last 3 shifts  I/O last 3 completed shifts:  In: 740 [P.O.:240; IV Piggyback:500]  Out: -   Body mass index is 12.44 kg/m .    Physical Exam:  General Appearance: Alert, oriented x3, does not appear in distress.  Looks cachectic  HEENT: Normocephalic. No scleral icterus, . Mucous membranes moist.  Heart: :Regular rate and rhythm, normal S1 ,S2, No murmurs, no JVD, no pedal edema   Lungs diminished breath sounds bilaterally. No wheezing or crackles  Abdomen: Soft, non tender, no rebound or rigidity, non distended, bowel sounds present.    Pertinent Labs   Lab Results: personally reviewed.   Recent Labs   Lab 10/30/22  0713 10/29/22  0820 10/28/22  0644    136 140   CO2 38* 37* 37*   BUN 17.2 25.5* 27.6*     Recent Labs   Lab 10/30/22  0713 10/29/22  0820 10/28/22  0644   WBC 6.8 10.2 14.4*   HGB 11.5* 12.4 13.5   HCT 36.4 39.0 42.1    355 392     No results for input(s): CKTOTAL, TROPONINI in the last 168 hours.    Invalid input(s): TROPONINT, CKMBINDEX  Invalid input(s): POCGLUFGR      Advanced Care Planning:  Discharge  Planning discussed with patient  Total time with this patient is 25 min with 50% of time spent in examining the patient, reviewing records, discussing plan of care and counseling, 50% of time spent in coordination of care.  Care discussed and coordinated with EULOGIO.      Melvin Townsend MD  Internal Medicine Hospitalist  10/30/2022

## 2022-10-30 NOTE — PHARMACY-VANCOMYCIN DOSING SERVICE
Pharmacy Vancomycin Note  Date of Service 2022  Patient's  1955   67 year old, female    Indication: [facility]-Associated Pneumonia  Day of Therapy: 3  Current vancomycin regimen:  600 mg IV q12h  Current vancomycin monitoring method: AUC  Current vancomycin therapeutic monitoring goal: 400-600 mg*h/L    InsightRX Prediction of Current Vancomycin Regimen  Loading dose: N/A  Regimen: 600 mg IV every 12 hours.  Start time: 19:55 on 10/30/2022  Exposure target: AUC24 (range)400-600 mg/L.hr   AUC24,ss: 422 mg/L.hr  Probability of AUC24 > 400: 61 %  Ctrough,ss: 12.2 mg/L  Probability of Ctrough,ss > 20: 3 %  Probability of nephrotoxicity (Lodise KALEB ): 7 %    Current estimated CrCl = Estimated Creatinine Clearance: 58.8 mL/min (based on SCr of 0.52 mg/dL).    Creatinine for last 3 days  10/28/2022:  6:44 AM Creatinine 0.56 mg/dL  10/29/2022:  8:20 AM Creatinine 0.54 mg/dL  10/30/2022:  7:13 AM Creatinine 0.52 mg/dL    Recent Vancomycin Levels (past 3 days)  10/30/2022:  4:53 PM Vancomycin 10.3 ug/mL    Vancomycin IV Administrations (past 72 hours)                   vancomycin (VANCOCIN) 600 mg in sodium chloride 0.9 % 250 mL intermittent infusion (mg) 600 mg New Bag 10/30/22 1827     600 mg New Bag  0416     600 mg New Bag 10/29/22 1738     600 mg New Bag  0438    vancomycin (VANCOCIN) 1000 mg in dextrose 5% 200 mL PREMIX (mg) 1,000 mg New Bag 10/28/22 1805                Nephrotoxins and other renal medications (From now, onward)    Start     Dose/Rate Route Frequency Ordered Stop    10/29/22 0500  vancomycin (VANCOCIN) 600 mg in sodium chloride 0.9 % 250 mL intermittent infusion        See Hyperspace for full Linked Orders Report.    600 mg  over 60-90 Minutes Intravenous EVERY 12 HOURS 10/28/22 1614      10/28/22 2200  piperacillin-tazobactam (ZOSYN) 3.375 g vial to attach to  mL bag        Note to Pharmacy: Extended infusion dosing to start 6 hours after initial infusion.   See  Hyperspace for full Linked Orders Report.    3.375 g  over 240 Minutes Intravenous EVERY 8 HOURS 10/28/22 1515      10/17/22 1800  furosemide (LASIX) injection 20 mg         20 mg  over 1-3 Minutes Intravenous EVERY 12 HOURS 10/17/22 1326               Contrast Orders - past 72 hours (72h ago, onward)    None          Interpretation of levels and current regimen:  Vancomycin level is reflective of -600    Has serum creatinine changed greater than 50% in last 72 hours: No    Urine output:  unable to determine     Renal Function: Stable    InsightRX Prediction of Planned New Vancomycin Regimen  Loading dose: N/A  Regimen: 750 mg IV every 12 hours.  Start time: 19:55 on 10/30/2022  Exposure target: AUC24 (range)400-600 mg/L.hr   AUC24,ss: 523 mg/L.hr  Probability of AUC24 > 400: 92 %  Ctrough,ss: 15.1 mg/L  Probability of Ctrough,ss > 20: 14 %  Probability of nephrotoxicity (Lodise KALEB 2009): 10 %    Plan:  1. Increase Dose to 750 mg IV q12h (standardized dose)  2. Vancomycin monitoring method: AUC  3. Vancomycin therapeutic monitoring goal: 400-600 mg*h/L  4. Pharmacy will check vancomycin levels as appropriate in 1-3 Days.  5. Serum creatinine levels will be ordered daily for the first week of therapy and at least twice weekly for subsequent weeks.    Sherita Salter, [facility]

## 2022-10-30 NOTE — PLAN OF CARE
Goal Outcome Evaluation:             Pleasant, denied pain, denied difficulty breathing. Refused to transfer out of bed.

## 2022-10-30 NOTE — PROGRESS NOTES
Pulmonary Progress Note  10/28/2022     Problem List:   Active Problems:    Acute respiratory failure with hypoxia (H)    Hypotension, unspecified hypotension type    Infection due to 2019 novel coronavirus       Plan:   - End stage COPD; DNR-I     - Oxygen as needed to keep SpO2 > 88%   - Continue bronchodilators; no change  - PRN Morphine for air hunger.   - Appreciate Palliative Care involvement. Goal is TCU if we can get her oxygen needs down.   - CXR shows new RLL; on vanc and zosyn started 10/28     ________________________________________________________________      CC:   Chief Complaint   Patient presents with     Respiratory Distress     HPI: Lavonne Talbot is a 67 year old cachectic female with history of severe emphysema, tobacco user.  Recent diagnosis of COVID-19 viral infection 1 week ago who presented to [facility] ED on 10/8/2022 due to respiratory distress now admitted to the ICU with acute respiratory failure requiring mechanical ventilation. Patient was treated for H influenza pneumonia and pulmonary embolism. Echocardiogram showed reduced EF 30-35%, per cardiology stress induced cardiomyopathy.   N/V/abdominal distention. CT scan showed dilated bowel loops.   Extubated 10/20 to BiPAP. Moved out of ICU and is recovering out on the floor. Attempting to get stronger to get to TCU. Not quite ready for Hospice.     ROS: She is using her incentie spirometer. She is down to 8L NC. She is in a good mood. No complaints    Objective:   Vitals:    10/30/22 0553 10/30/22 0749 10/30/22 0840 10/30/22 1116   BP: 95/54  (!) 88/50    BP Location:   Right arm    Patient Position:       Cuff Size:       Pulse: 96  94    Resp:  20 16 20   Temp:   98.6  F (37  C)    TempSrc:   Oral    SpO2:  92% 100% 94%   Weight:       Height:           I/O:     Intake/Output Summary (Last 24 hours) at 10/22/2022 1119  Last data filed at 10/22/2022 0600  Gross per 24 hour   Intake 652.57 ml   Output 1800 ml   Net -1147.43  ml     Weight change:     Medications Reviewed: yes    Physical Exam:   General: no distress; cachectic, laying in bed  HEENT: NC in place  NEURO: grossly nonfocal, very weak.   CARDIAC: RRR S1S2  PULMONARY: unlabored on NC  INTEGUMENT: thin, fragile skin.   PSYCH: calm, pleasant    Lab Results Reviewed:   Recent Labs   Lab 10/30/22  0713      CO2 38*   BUN 17.2       Recent Labs   Lab 10/30/22  0713   WBC 6.8   HGB 11.5*   HCT 36.4          Micro: cultures reviewed    Imaging: all imaging personally reviewed   10/27 CXR - Patient's rotated to the left. Patient's been extubated. Right IJ central line overlies the mid SVC.     Lungs are hyperexpanded consistent with COPD.     New hazy opacification of the right lower lobe with trace effusion. Unclear if this reflects some atelectasis due to volume loss or a bronchopneumonia. Endobronchial secretions noted on the recent CT of the abdomen within the lung bases.      Left lung is clear. No pneumothorax. Heart and pulmonary vascularity are normal.        10/19 CT Abd - 1.  Moderate to marked fluid and gas distention of the distal jejunum, ileum, colon and rectum with no transition point or obstructing mass compatible with severe adynamic ileus and/or a nonspecific enterocolitis. No free air or pneumatosis.  2.  Wedge-shaped perfusion defect lateral aspect mid to upper spleen is most likely an acute infarct with posttraumatic contusion possible in the appropriate clinical setting. Etiology unclear but in the setting of recently diagnosed pulmonary emboli,   paradoxical emboli related to PFO should be considered.  3.  Trace abdominal and pelvic ascites and generalized mesenteric and subcutaneous edema.  4.  Bilateral adnexal enlargement measuring up to 5 cm on the right and 3 cm on the left. Recommend pelvic ultrasound.  5.  Incidental probable 1 cm polyp distal transverse colon.    10/17 CXR - Endotracheal tube tip 3.7 cm above the izabela. Nasogastric tube  tip and the distal sidehole are within the stomach. Right IJ central venous catheter tip low SVC in good position. Underlying lung hyperinflation, severe emphysema and linear   opacities radiating from the sara into the periphery of both lungs that could represent bronchial wall thickening, interstitial edema and/or interstitial inflammation. No pneumothorax.

## 2022-10-30 NOTE — PLAN OF CARE
Shift from 0700 to 1930-      Problem: Gas Exchange Impaired  Goal: Optimal Gas Exchange  10/29/2022 1951 by Mary Carmen Barnes RN  Outcome: Progressing  10/29/2022 1951 by Mary Carmen Barnes RN  Outcome: Not Progressing  Intervention: Optimize Oxygenation and Ventilation  Recent Flowsheet Documentation  Taken 10/29/2022 1800 by Mary Carmen Barnes RN  Head of Bed (HOB) Positioning: HOB at 30-45 degrees  Taken 10/29/2022 1512 by Mary Carmen Barnes RN  Head of Bed (HOB) Positioning: HOB at 20-30 degrees  Taken 10/29/2022 1214 by Mary Carmen Barnes RN  Head of Bed (HOB) Positioning: HOB at 20-30 degrees  Taken 10/29/2022 0831 by Mary Carmen Barnes RN  Head of Bed (HOB) Positioning: HOB at 20-30 degrees  Taken 10/29/2022 0829 by Mary Carmen Barnes RN  Head of Bed (HOB) Positioning: HOB at 30 degrees     Problem: Malnutrition  Goal: Improved Nutritional Intake  10/29/2022 1951 by Mary Carmen Barnes RN  Outcome: Progressing  10/29/2022 1951 by Mary Carmen Barnes RN  Outcome: Not Progressing     Problem: Activity Intolerance  Goal: Enhanced Capacity and Energy  10/29/2022 1951 by Mary Carmen Barnes RN  Outcome: Progressing  10/29/2022 1951 by Mary Carmen Barnes RN  Outcome: Not Progressing  Intervention: Optimize Activity Tolerance  Recent Flowsheet Documentation  Taken 10/29/2022 1800 by Mary Camren Barnes RN  Activity Management: back to bed  Taken 10/29/2022 1513 by Mary Carmen Barnes RN  Activity Management: up in chair  Taken 10/29/2022 1512 by Mary Carmen Barnes RN  Activity Management: activity adjusted per tolerance  Taken 10/29/2022 1214 by Mary Carmen Barnes RN  Activity Management: activity adjusted per tolerance  Taken 10/29/2022 0831 by Mary Carmen Barnes RN  Activity Management: activity adjusted per tolerance  Taken 10/29/2022 0829 by Mary Carmen Barnes RN  Activity Management: bedrest       Goal Outcome Evaluation:    Patient sat up in the chair for a couple hours in the afternoon. Seen by PT.   Ate fairly well for meals.   Lungs very diminished; On 9  liters high flow.   Continued on zosyn and vanco IV.   Hypotensive in am; early am lasix held and a bolus 500ml NS finished on dayshift. Continued to be hypotensive; See VS. MD aware.   Recheck at 1600 was 117/59; evening lasix given.

## 2022-10-30 NOTE — PROGRESS NOTES
Pt currently on 7 lt of o2; Transitional Care Unit not able to meet needs unless on 6 or lower. SWCM following for Transitional Care Unit placement; messages left for Brandi Perez, [place], [place], [place], LUNA zuluaga, [facility], [place] and [place].  3:55 PM    JG Carroll

## 2022-10-30 NOTE — PROGRESS NOTES
"Respiratory Therapy Progress note    Duoneb given X 2 and Pulmicort neb given X 1 this time. Patient tolerated well with HR 94-98, RR 20, and oxygen saturation 92-95% on 7L bubbler HFNC. BS diminished pre neb with slight increase in aeration post neb. Patient is using Aerobika flutter valve independently and states that it \"really makes a difference in how I feel\".    Gisell Walton, BS, RRT, CTTS           "

## 2022-10-31 ENCOUNTER — APPOINTMENT (OUTPATIENT)
Dept: PHYSICAL THERAPY | Facility: HOSPITAL | Age: 67
DRG: 207 | End: 2022-10-31
Payer: COMMERCIAL

## 2022-10-31 LAB
ANION GAP SERPL CALCULATED.3IONS-SCNC: <1 MMOL/L (ref 7–15)
BUN SERPL-MCNC: 17.1 MG/DL (ref 8–23)
CALCIUM SERPL-MCNC: 8.3 MG/DL (ref 8.8–10.2)
CHLORIDE SERPL-SCNC: 94 MMOL/L (ref 98–107)
CREAT SERPL-MCNC: 0.52 MG/DL (ref 0.51–0.95)
DEPRECATED HCO3 PLAS-SCNC: 45 MMOL/L (ref 22–29)
ERYTHROCYTE [DISTWIDTH] IN BLOOD BY AUTOMATED COUNT: 13.4 % (ref 10–15)
GFR SERPL CREATININE-BSD FRML MDRD: >90 ML/MIN/1.73M2
GLUCOSE SERPL-MCNC: 80 MG/DL (ref 70–99)
HCT VFR BLD AUTO: 37.8 % (ref 35–47)
HGB BLD-MCNC: 11.6 G/DL (ref 11.7–15.7)
MAGNESIUM SERPL-MCNC: 2 MG/DL (ref 1.7–2.3)
MCH RBC QN AUTO: 31.1 PG (ref 26.5–33)
MCHC RBC AUTO-ENTMCNC: 30.7 G/DL (ref 31.5–36.5)
MCV RBC AUTO: 101 FL (ref 78–100)
PHOSPHATE SERPL-MCNC: 3.1 MG/DL (ref 2.5–4.5)
PLATELET # BLD AUTO: 415 10E3/UL (ref 150–450)
POTASSIUM SERPL-SCNC: 3.6 MMOL/L (ref 3.4–5.3)
PROCALCITONIN SERPL IA-MCNC: 0.15 NG/ML
RBC # BLD AUTO: 3.73 10E6/UL (ref 3.8–5.2)
SODIUM SERPL-SCNC: 138 MMOL/L (ref 136–145)
WBC # BLD AUTO: 6.9 10E3/UL (ref 4–11)

## 2022-10-31 PROCEDURE — 99233 SBSQ HOSP IP/OBS HIGH 50: CPT | Performed by: INTERNAL MEDICINE

## 2022-10-31 PROCEDURE — 84145 PROCALCITONIN (PCT): CPT | Performed by: INTERNAL MEDICINE

## 2022-10-31 PROCEDURE — 250N000009 HC RX 250: Performed by: INTERNAL MEDICINE

## 2022-10-31 PROCEDURE — 120N000001 HC R&B MED SURG/OB

## 2022-10-31 PROCEDURE — 250N000013 HC RX MED GY IP 250 OP 250 PS 637: Performed by: INTERNAL MEDICINE

## 2022-10-31 PROCEDURE — 250N000011 HC RX IP 250 OP 636: Performed by: NURSE PRACTITIONER

## 2022-10-31 PROCEDURE — 94640 AIRWAY INHALATION TREATMENT: CPT | Mod: 76

## 2022-10-31 PROCEDURE — 94640 AIRWAY INHALATION TREATMENT: CPT

## 2022-10-31 PROCEDURE — 250N000011 HC RX IP 250 OP 636: Performed by: HOSPITALIST

## 2022-10-31 PROCEDURE — 80048 BASIC METABOLIC PNL TOTAL CA: CPT | Performed by: INTERNAL MEDICINE

## 2022-10-31 PROCEDURE — 999N000157 HC STATISTIC RCP TIME EA 10 MIN

## 2022-10-31 PROCEDURE — 84100 ASSAY OF PHOSPHORUS: CPT | Performed by: INTERNAL MEDICINE

## 2022-10-31 PROCEDURE — 85027 COMPLETE CBC AUTOMATED: CPT | Performed by: INTERNAL MEDICINE

## 2022-10-31 PROCEDURE — 99233 SBSQ HOSP IP/OBS HIGH 50: CPT | Performed by: HOSPITALIST

## 2022-10-31 PROCEDURE — 99232 SBSQ HOSP IP/OBS MODERATE 35: CPT | Performed by: PHYSICIAN ASSISTANT

## 2022-10-31 PROCEDURE — 36415 COLL VENOUS BLD VENIPUNCTURE: CPT | Performed by: INTERNAL MEDICINE

## 2022-10-31 PROCEDURE — 94799 UNLISTED PULMONARY SVC/PX: CPT

## 2022-10-31 PROCEDURE — 83735 ASSAY OF MAGNESIUM: CPT | Performed by: INTERNAL MEDICINE

## 2022-10-31 PROCEDURE — 258N000003 HC RX IP 258 OP 636: Performed by: HOSPITALIST

## 2022-10-31 PROCEDURE — 99207 PR CDG-CUT & PASTE-POTENTIAL IMPACT ON LEVEL: CPT | Performed by: HOSPITALIST

## 2022-10-31 PROCEDURE — 97116 GAIT TRAINING THERAPY: CPT | Mod: GP

## 2022-10-31 PROCEDURE — 97530 THERAPEUTIC ACTIVITIES: CPT | Mod: GP

## 2022-10-31 PROCEDURE — 97110 THERAPEUTIC EXERCISES: CPT | Mod: GP

## 2022-10-31 PROCEDURE — 250N000013 HC RX MED GY IP 250 OP 250 PS 637: Performed by: PHYSICIAN ASSISTANT

## 2022-10-31 PROCEDURE — 250N000012 HC RX MED GY IP 250 OP 636 PS 637: Performed by: INTERNAL MEDICINE

## 2022-10-31 RX ORDER — PREDNISONE 20 MG/1
40 TABLET ORAL DAILY
Status: COMPLETED | OUTPATIENT
Start: 2022-10-31 | End: 2022-11-03

## 2022-10-31 RX ORDER — PREDNISONE 5 MG/1
5 TABLET ORAL DAILY
Status: DISCONTINUED | OUTPATIENT
Start: 2022-11-19 | End: 2022-11-11 | Stop reason: HOSPADM

## 2022-10-31 RX ORDER — PREDNISONE 5 MG/1
10 TABLET ORAL DAILY
Status: DISCONTINUED | OUTPATIENT
Start: 2022-11-14 | End: 2022-11-11 | Stop reason: HOSPADM

## 2022-10-31 RX ORDER — MAGNESIUM OXIDE 400 MG/1
400 TABLET ORAL EVERY 4 HOURS
Status: COMPLETED | OUTPATIENT
Start: 2022-10-31 | End: 2022-10-31

## 2022-10-31 RX ORDER — MIRTAZAPINE 15 MG/1
15 TABLET, FILM COATED ORAL AT BEDTIME
Status: DISCONTINUED | OUTPATIENT
Start: 2022-10-31 | End: 2022-11-11 | Stop reason: HOSPADM

## 2022-10-31 RX ORDER — PREDNISONE 20 MG/1
20 TABLET ORAL DAILY
Status: DISCONTINUED | OUTPATIENT
Start: 2022-11-09 | End: 2022-11-11 | Stop reason: HOSPADM

## 2022-10-31 RX ADMIN — Medication 5 MG: at 09:06

## 2022-10-31 RX ADMIN — MIRTAZAPINE 15 MG: 15 TABLET, FILM COATED ORAL at 22:03

## 2022-10-31 RX ADMIN — PIPERACILLIN AND TAZOBACTAM 3.38 G: 3; .375 INJECTION, POWDER, LYOPHILIZED, FOR SOLUTION INTRAVENOUS at 06:08

## 2022-10-31 RX ADMIN — Medication 400 MG: at 13:14

## 2022-10-31 RX ADMIN — IPRATROPIUM BROMIDE AND ALBUTEROL SULFATE 3 ML: 2.5; .5 SOLUTION RESPIRATORY (INHALATION) at 19:42

## 2022-10-31 RX ADMIN — PIPERACILLIN AND TAZOBACTAM 3.38 G: 3; .375 INJECTION, POWDER, LYOPHILIZED, FOR SOLUTION INTRAVENOUS at 22:01

## 2022-10-31 RX ADMIN — PIPERACILLIN AND TAZOBACTAM 3.38 G: 3; .375 INJECTION, POWDER, LYOPHILIZED, FOR SOLUTION INTRAVENOUS at 13:14

## 2022-10-31 RX ADMIN — POLYETHYLENE GLYCOL 3350 17 G: 17 POWDER, FOR SOLUTION ORAL at 09:06

## 2022-10-31 RX ADMIN — THIAMINE HCL TAB 100 MG 100 MG: 100 TAB at 09:06

## 2022-10-31 RX ADMIN — PREDNISONE 40 MG: 20 TABLET ORAL at 18:27

## 2022-10-31 RX ADMIN — VANCOMYCIN HYDROCHLORIDE 750 MG: 5 INJECTION, POWDER, LYOPHILIZED, FOR SOLUTION INTRAVENOUS at 06:08

## 2022-10-31 RX ADMIN — IPRATROPIUM BROMIDE AND ALBUTEROL SULFATE 3 ML: 2.5; .5 SOLUTION RESPIRATORY (INHALATION) at 11:40

## 2022-10-31 RX ADMIN — BUDESONIDE 1 MG: 0.5 INHALANT ORAL at 19:42

## 2022-10-31 RX ADMIN — APIXABAN 5 MG: 5 TABLET, FILM COATED ORAL at 22:01

## 2022-10-31 RX ADMIN — APIXABAN 5 MG: 5 TABLET, FILM COATED ORAL at 09:06

## 2022-10-31 RX ADMIN — Medication 400 MG: at 09:06

## 2022-10-31 RX ADMIN — VANCOMYCIN HYDROCHLORIDE 750 MG: 5 INJECTION, POWDER, LYOPHILIZED, FOR SOLUTION INTRAVENOUS at 18:28

## 2022-10-31 RX ADMIN — BUDESONIDE 1 MG: 0.5 INHALANT ORAL at 08:08

## 2022-10-31 RX ADMIN — MELATONIN TAB 3 MG 3 MG: 3 TAB at 22:01

## 2022-10-31 RX ADMIN — IPRATROPIUM BROMIDE AND ALBUTEROL SULFATE 3 ML: 2.5; .5 SOLUTION RESPIRATORY (INHALATION) at 08:12

## 2022-10-31 RX ADMIN — IPRATROPIUM BROMIDE AND ALBUTEROL SULFATE 3 ML: 2.5; .5 SOLUTION RESPIRATORY (INHALATION) at 16:15

## 2022-10-31 RX ADMIN — SODIUM CHLORIDE 500 ML: 9 INJECTION, SOLUTION INTRAVENOUS at 16:30

## 2022-10-31 ASSESSMENT — ACTIVITIES OF DAILY LIVING (ADL)
ADLS_ACUITY_SCORE: 35
ADLS_ACUITY_SCORE: 37
ADLS_ACUITY_SCORE: 37
ADLS_ACUITY_SCORE: 35
ADLS_ACUITY_SCORE: 37
ADLS_ACUITY_SCORE: 35
ADLS_ACUITY_SCORE: 37
ADLS_ACUITY_SCORE: 35
ADLS_ACUITY_SCORE: 35
ADLS_ACUITY_SCORE: 37

## 2022-10-31 NOTE — PLAN OF CARE
Problem: Malnutrition  Goal: Improved Nutritional Intake  Outcome: Progressing   Goal Outcome Evaluation:       Regular diet: 25-75% of meals  Supplements: chocolate Ensure enlive 2 times daily, pt is drinking on ice, she says she is [place] to get both in per day.   Magic cup - dislikes (she is not an ice cream person)  Pt will try gelatein plus and special k bar.

## 2022-10-31 NOTE — PROGRESS NOTES
Pulmonary Progress Note:    Assessment & Plan:  Ms. Bernal is a 67 year old lady with a past medical history significant for oxygen dependent Gold D COPD, tobacco abuse, recent Covid-19 infection, severe protein calorie malnutrition amongst other medical issues who was admitted to the hospital on 10/8/22 with acute respiratory failure requiring intubation with mechanical ventilation and septic shock from H. Flu. Her hospital stay was complicated by the development of subsegmental pulmonary emboli, Takatsubo cardiomyopathy and splenic infarcts. She was stepped out of the ICU with plans to transition to hospice        Plan:   1. Gold D COPD: Has severe emphysema and presented with acute hypoxemic respiratory failure secondary to a infection with H. Flu. Additionally, she had recently contracted Covid-19 which likely led to her COPD exacerbation initially. She has been extubated and off Bilevel ventilation but continues to have very high oxygen requirements (8-9 lpm) and struggles with air hunger. There had been some discussions with the ICU team about transitioning to hospice, however, in the interim her  suffered from a CVA and she has reversed her code status and would like to transition to a TCU.    Continue supplemental oxygen to maintain saturations >88%.    Continue Budesonide and scheduled duonebs.    Was noted to have a new RLL infiltrate and was initiated on Vancomycin and Pip-Tazo for this. Her leukocytosis has since resolved and no repeat cultures or procalcitonin have been ordered. Will recheck procalcitonin today and plan to discontinue Vancomycin if normal.     Will resume prednisone taper to see if this alleviates hypoxia (given GOC).  2. Subsegmental PE:Likely secondary to her Covid-19 infection. Has completed Remdesivir and Decadron for this.    Is on Eliquis for this same.  3. Splenic infarct: Unclear etiology. Thought to be secondary to a PFO. Treatment as above.  4. Goals of care: Was  "transitioned to hospice upon discharge from the ICU but then, her  had a CVA and she decided to change her goals of care. Is being seen by palliative care and was receiving Morphine for air hunger--this has since been held today due to hypotensive episodes. The patient is focusing on being transferred to a TCU.    Peggy Barron MD  Pulmonary and Critical Care  (P) [phone]       ________________________________________________      CC:   Chief Complaint   Patient presents with     Respiratory Distress     HPI: Lavonne Talbot is a 67 year old cachectic female with history of severe emphysema, tobacco user.  Recent diagnosis of COVID-19 viral infection 1 week ago who presented to [facility] ED on 10/8/2022 due to respiratory distress now admitted to the ICU with acute respiratory failure requiring mechanical ventilation. Patient was treated for H influenza pneumonia and pulmonary embolism. Echocardiogram showed reduced EF 30-35%, per cardiology stress induced cardiomyopathy.   N/V/abdominal distention. CT scan showed dilated bowel loops.   Extubated 10/20 to BiPAP. Moved out of ICU and is recovering out on the floor. Attempting to get stronger to get to TCU. Not quite ready for Hospice.     ROS: She is using her incentive spirometer. She is down to 7L.    Objective:   BP (!) 80/59 (BP Location: Right arm)   Pulse 94   Temp 97.8  F (36.6  C) (Oral)   Resp 18   Ht 1.702 m (5' 7\")   Wt 36 kg (79 lb 6.4 oz)   SpO2 93%   BMI 12.44 kg/m    Physical Exam:   Physical Exam  Constitutional:       Comments: Slight lady in NAD.   HENT:      Head: Normocephalic.      Mouth/Throat:      Mouth: Mucous membranes are moist.   Cardiovascular:      Rate and Rhythm: Regular rhythm.      Heart sounds: Normal heart sounds.      Comments: Sinus tachycardia.  S1 and S2 audible.  Pulmonary:      Effort: Pulmonary effort is normal.      Comments: Diminished BS BL.  Abdominal:      General: Abdomen is flat.      " Palpations: Abdomen is soft.   Skin:     General: Skin is warm and dry.   Neurological:      General: No focal deficit present.      Mental Status: She is oriented to person, place, and time.           Lab Results Reviewed:   Recent Labs   Lab 10/31/22  0628      CO2 45*   BUN 17.1       Recent Labs   Lab 10/31/22  0628   WBC 6.9   HGB 11.6*   HCT 37.8          Micro: cultures reviewed    Imaging: all imaging personally reviewed   10/27 CXR - Patient's rotated to the left. Patient's been extubated. Right IJ central line overlies the mid SVC.     Lungs are hyperexpanded consistent with COPD.     New hazy opacification of the right lower lobe with trace effusion. Unclear if this reflects some atelectasis due to volume loss or a bronchopneumonia. Endobronchial secretions noted on the recent CT of the abdomen within the lung bases.      Left lung is clear. No pneumothorax. Heart and pulmonary vascularity are normal.        10/19 CT Abd - 1.  Moderate to marked fluid and gas distention of the distal jejunum, ileum, colon and rectum with no transition point or obstructing mass compatible with severe adynamic ileus and/or a nonspecific enterocolitis. No free air or pneumatosis.  2.  Wedge-shaped perfusion defect lateral aspect mid to upper spleen is most likely an acute infarct with posttraumatic contusion possible in the appropriate clinical setting. Etiology unclear but in the setting of recently diagnosed pulmonary emboli, paradoxical emboli related to PFO should be considered.  3.  Trace abdominal and pelvic ascites and generalized mesenteric and subcutaneous edema.  4.  Bilateral adnexal enlargement measuring up to 5 cm on the right and 3 cm on the left. Recommend pelvic ultrasound.  5.  Incidental probable 1 cm polyp distal transverse colon.    10/17 CXR - Endotracheal tube tip 3.7 cm above the izabela. Nasogastric tube tip and the distal sidehole are within the stomach. Right IJ central venous catheter  tip low SVC in good position. Underlying lung hyperinflation, severe emphysema and linear opacities radiating from the sara into the periphery of both lungs that could represent bronchial wall thickening, interstitial edema and/or interstitial inflammation. No pneumothorax.

## 2022-10-31 NOTE — PROGRESS NOTES
Pt on 7L  HFNC, sats 94%. Breath sounds are diminished. No change in BS. Patient tolerated nebs well. Uses aerobika independently.     Mita Gutierrez, RT

## 2022-10-31 NOTE — PROGRESS NOTES
Hospitalist Progress Note    Assessment/Plan    Active Problems:    Acute respiratory failure with hypoxia (H)    Hypotension, unspecified hypotension type    Infection due to 2019 novel coronavirus    67-year-old patient with history of emphysem who presented to the hospital on October 8th for acute respiratory failure secondary to pneumonia, COVID-19 infection, PE, circulatory shock required vasopressor, she was intubated on October 8 extubated to BiPAP on October 20.     , was treated for haemophilus influenza tracheobronchitis, received dexamethasone, blood culture grew diphtheroid which was likely contamination, tracheal fluid cultures from October 19 grew Candida, she received furosemide for pulmonary edema, she was considering going home with hospice but the patient later declined and, wants to go to TCU     but due to high oxygen requirement, unable to find a facility that can accept her     Severe COPD  Hypoxic respiratory failure requiring intubation haemophilus influenza tracheobronchitis,  Right lower lobe pneumonia   recovered COVID-19 infection,  Extubated to BiPAP on October 20, pulmonary started on Vanco and Zosyn on October 28 for treatment of pneumonia, after period of being off antibiotic,  Still requiring 7 L oxygen and able to wean down,  -Palliative care was initially starting her on scheduled morphine but today was discontinued secondary to low blood pressure,     Subsegmental PE  Was on heparin infusion transition to Eliquis     Recent COVID-19 infection  Was treated with dexamethasone, off precautions     Splenic infarct  Unclear etiology, radiology suggested paradoxical emboli related to PFO, patient is being, anticoagulated with Eliquis        Stress-induced cardiomyopathy  Echo showed an EF of 30 to 35% likely secondary to hypoxia with COVID-19 infection  Will need to repeat echo to see if that ejection fraction improved otherwise needs to have ischemic evaluation if she does not elect  hospice  She was on IV is 20 twice daily held today due to low blood pressure      severe protein calorie malnutrition  Secondary to chronic illness and acute illness, dietitian is following     Prolonged QTC,  During hospitalization in the ICU she had a prolonged QTC was attributed to hypomagnesemia and it was replaced     Cardiogenic and distributive shock  Requiring pressor support while in the ICU, now resolved     Hypotension  Likely secondary to dehydration from diuresis, will hold on Lasix and give fluid bolus patient is asymptomatic    Goals of care discussion  Discussed with the patient regarding hospice she is not interested and she wants to go to transitional care to get stronger I explained to her that she has end-stage COPD and she will not have permanent recovery    Barriers to Discharge: Not clinically ready for discharge high oxygen requirement, no TCU availability    Anticipated discharge date/Disposition: TCU in few days once oxygen requirement improves    Subjective  Patient was seen today, she denies shortness of breath and she feels that she will do better if she goes to transitional care, she declined hospice,    Objective    Vital signs in last 24 hours  Temp:  [98  F (36.7  C)-98.7  F (37.1  C)] 98  F (36.7  C)  Pulse:  [] 92  Resp:  [18] 18  BP: (88-96)/(53-60) 89/58  SpO2:  [90 %-98 %] 97 % [unfilled] O2 Device: High Flow Nasal Cannula (HFNC)    Weight:   [unfilled] Weight change:     Intake/Output last 3 shifts  I/O last 3 completed shifts:  In: 340 [P.O.:340]  Out: -   Body mass index is 12.44 kg/m .    Physical Exam:  General Appearance: Alert, oriented x3, does not appear in distress.  HEENT: Normocephalic. No scleral icterus, . Mucous membranes moist.  Heart: :Regular rate and rhythm, normal S1 ,S2, No murmurs, no JVD, no pedal edema   Lungs: Diminished breath sounds bilaterally. No wheezing or crackles  Abdomen: Soft, non tender, no rebound or rigidity, non distended, bowel  sounds present.    Pertinent Labs   Lab Results: personally reviewed.   Recent Labs   Lab 10/31/22  0628 10/30/22  0713 10/29/22  0820    138 136   CO2 45* 38* 37*   BUN 17.1 17.2 25.5*     Recent Labs   Lab 10/31/22  0628 10/30/22  0713 10/29/22  0820   WBC 6.9 6.8 10.2   HGB 11.6* 11.5* 12.4   HCT 37.8 36.4 39.0    362 355     No results for input(s): CKTOTAL, TROPONINI in the last 168 hours.    Invalid input(s): TROPONINT, CKMBINDEX  Invalid input(s): POCGLUFGR        Advanced Care Planning:  Discharge Planning discussed with patient  Total time with this patient is 40 min with 50% of time spent in examining the patient, reviewing records, discussing plan of care and counseling, 50% of time spent in coordination of care.  Care discussed and coordinated with RN, intensivist, palliative care team, care manager.      Melvin Townsend MD  Internal Medicine Hospitalist  10/31/2022

## 2022-10-31 NOTE — PLAN OF CARE
Problem: Gas Exchange Impaired  Goal: Optimal Gas Exchange  Outcome: Progressing   Goal Outcome Evaluation:         Alert and oriented x 4. Able to make needs know.   BP 88/56 and 88/50 (map 65). Asymptomatic while lying in bed tonight. Encouraged to drink fluids.    Lungs very diminished. IS up to 500.   O2 SATs wdl with HFNC 7 L.    Patient used bedpan to void tonight. Sacral mepilex for protection changed.

## 2022-10-31 NOTE — PROGRESS NOTES
[facility]  Palliative Care Daily Progress Note       Recommendations & Counseling     Lavonne has been weaned to 7L HFNC and remains hopeful that she will continue to improve. She is engaged in care and actively trying to improve her nutrition. Lavonne indicated today that she feels she needs to get better so she can support her  Jermaine who recently suffered a stroke.    Goals of Care: life-prolonging with limit of DNR/DNI    Advanced Care Planning:  Advance directive: No  POLST: No  Code status: DNR/DNI  Health care agent/surrogate: Frantz (son) along with Jess (daughter), Jermaine () recently had a stroke and has limited ability to communicate    Symptom Management:  #Dyspnea, tachypnea,hypoxia  - Appreciate Pulm input  - Hold scheduled morphine for soft BPs  - Continue prn morphine for dyspnea    #Anorexia, cachexia, insomnia  - Increase mirtazapine to 15mg    Psychosocial & Spiritual:   Appreciate Palliative SW and Spiritual Health involvement      Assessments          Lavonne is a 68 y/o woman, active smoker, with history of lymphedema, polycythemia, and recent diagnosis of Covid infection, admitted to ICU on 10/8/2022 with acute respiratory failure requiring ventilation and circulatory shock requiring vasopressors. Respiratory failure attributed to CAP vs aspiration pneumonia with small PE. 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  - Extubated on 10/20 to BiPAP    Today, the patient was seen for:  Goals of care, emotional support, symptom management    Prognosis, Goals, or Advance Care Planning was addressed today with: Yes.  Mood, coping, and/or meaning in the context of serious illness were addressed today: Yes.              Interval History:     Chart review/discussion with unit or clinical team members:   - Weaned to 7L oxygen  - On antibiotics for pneumonia  - Soft Bps. Discussed with Hospitalist and agreed to hold scheduled morphine    Per patient or family/caregivers  today:  Lavonne is feeling well today. She knows they have been weaning her oxygen and that we are stopping morphine. Lavonne is worried stopping the morphine could make her breathing worse, discussed prn available. She is drinking Ensure with ice which she has found helpful for getting more calories. Lavonne notes ongoing insomnia. She expressed continued concern for her  Jermaine who recently suffered a stroke. Lavonne feels she should be at home to care for him and is struggling with her current reality. Provided emotional support.    Key Palliative Symptoms:  Denies anxiety or pain, +dyspnea with exertion  +Insomnia           Review of Systems:     3 system review negative          Medications:     I have reviewed this patient's medication profile and medications during this hospitalization.    Noted meds:    Current Facility-Administered Medications   Medication     apixaban ANTICOAGULANT (ELIQUIS) tablet 5 mg     budesonide (PULMICORT) neb solution 1 mg     dextrose 10% infusion     glucose gel 15-30 g    Or     dextrose 50 % injection 25-50 mL    Or     glucagon injection 1 mg     furosemide (LASIX) injection 20 mg     ipratropium - albuterol 0.5 mg/2.5 mg/3 mL (DUONEB) neb solution 3 mL     LubriFresh P.M. OINT     magnesium oxide (MAG-OX) tablet 400 mg     melatonin tablet 3 mg     mirtazapine (REMERON) tablet TABS 7.5 mg     morphine solution 5 mg     naloxone (NARCAN) injection 0.1 mg     naloxone (NARCAN) injection 0.2 mg     naloxone (NARCAN) injection 0.2 mg    Or     naloxone (NARCAN) injection 0.4 mg    Or     naloxone (NARCAN) injection 0.2 mg    Or     naloxone (NARCAN) injection 0.4 mg     ondansetron (ZOFRAN ODT) ODT tab 4 mg    Or     ondansetron (ZOFRAN) injection 4 mg     ondansetron (ZOFRAN) injection 4 mg     piperacillin-tazobactam (ZOSYN) 3.375 g vial to attach to  mL bag     polyethylene glycol (MIRALAX) Packet 17 g     thiamine (B-1) tablet 100 mg     vancomycin (VANCOCIN) 750 mg in 0.9%  "NaCl 250 mL intermittent infusion                Physical Exam:   Vital Signs: Blood pressure (!) 89/58, pulse 92, temperature 98  F (36.7  C), temperature source Oral, resp. rate 18, height 1.702 m (5' 7\"), weight 36 kg (79 lb 6.4 oz), SpO2 97 %.   GENERAL: laying in bed, on nasal cannula, awake and alert  SKIN: Warm and dry   HEENT: +temporal muscle wasting  LUNGS: non-labored  ABDOMINAL: soft, nondistended, nontender  MUSKL: no gross joint deformities   EXTREMITIES: No edema              Data Reviewed:     Reviewed recent pertinent imaging:   No new imaging    Reviewed recent labs:   CMP  Recent Labs   Lab 10/31/22  0628 10/30/22  0713 10/29/22  0820 10/28/22  0644    138 136 140   POTASSIUM 3.6 3.6 3.7 3.6   CHLORIDE 94* 95* 96* 93*   CO2 45* 38* 37* 37*   ANIONGAP <1* 5* 3* 10   GLC 80 83 85 87   BUN 17.1 17.2 25.5* 27.6*   CR 0.52 0.52 0.54 0.56   GFRESTIMATED >90 >90 >90 >90   JASON 8.3* 8.1* 8.3* 8.7*   MAG 2.0 1.9 2.0 1.7   PHOS 3.1 2.8 3.1 3.1     CBC  Recent Labs   Lab 10/31/22  0628 10/30/22  0713 10/29/22  0820 10/28/22  0644   WBC 6.9 6.8 10.2 14.4*   RBC 3.73* 3.61* 3.92 4.31   HGB 11.6* 11.5* 12.4 13.5   HCT 37.8 36.4 39.0 42.1   * 101* 100 98   MCH 31.1 31.9 31.6 31.3   MCHC 30.7* 31.6 31.8 32.1   RDW 13.4 13.5 13.6 13.6    362 355 392         Jessica Richards PA-C  [facility], Palliative Care  Dept phone: [phone]  Securely message with the Vocera Web Console  "

## 2022-10-31 NOTE — PLAN OF CARE
Problem: Plan of Care - These are the overarching goals to be used throughout the patient stay.    Goal: Absence of Hospital-Acquired Illness or Injury  Intervention: Identify and Manage Fall Risk  Recent Flowsheet Documentation  Taken 10/30/2022 1630 by Sasha Caldera RN  Safety Promotion/Fall Prevention: activity supervised     Problem: Plan of Care - These are the overarching goals to be used throughout the patient stay.    Goal: Readiness for Transition of Care  Outcome: Progressing     Problem: COPD (Chronic Obstructive Pulmonary Disease) Comorbidity  Goal: Maintenance of COPD Symptom Control  Outcome: Progressing     Problem: Gas Exchange Impaired  Goal: Optimal Gas Exchange  Outcome: Progressing     Problem: Malnutrition  Goal: Improved Nutritional Intake  Outcome: Progressing     Problem: Anxiety Signs/Symptoms  Goal: Optimized Energy Level (Anxiety Signs/Symptoms)  Outcome: Progressing     Goal Outcome Evaluation: Patient alert and oriented, reported no pain during the shift, ate 25% of her dinner, Bp between 89/54 and 92/53. Patient sleeping off and on throughout the shift.

## 2022-10-31 NOTE — PROGRESS NOTES
"Pt weaned to 7L HFNC, sats 93%, breath sounds diminished, patient states nebs help.  Uses aerobika independently.      Will continue to monitor.    BP 92/53 (BP Location: Right arm)   Pulse 101   Temp 98.6  F (37  C) (Oral)   Resp 18   Ht 1.702 m (5' 7\")   Wt 36 kg (79 lb 6.4 oz)   SpO2 93%   BMI 12.44 kg/m      Leah Evans, RT on 10/30/2022 at 9:23 PM    "

## 2022-10-31 NOTE — PROGRESS NOTES
"O2 7 lpm bubbler HFNC, SpO2 93 %, BS diminished bilat. Duoneb/flutter valve tx given. Tolerated well, BS same after. Loose np cough.    BP (!) 80/59 (BP Location: Right arm)   Pulse 94   Temp 97.8  F (36.6  C) (Oral)   Resp 18   Ht 1.702 m (5' 7\")   Wt 36 kg (79 lb 6.4 oz)   SpO2 93%   BMI 12.44 kg/m      RT to follow    "

## 2022-10-31 NOTE — PLAN OF CARE
Goal Outcome Evaluation:             Pleasant, A&O, cooperative. Up to chair this afternoon. Denies pain, or difficulty breathing. VSS. Appetite fair and food and supplement drink encouraged. Voided in brief this morning and reports having no difficulty voiding and no feeling of bladder fullness.

## 2022-11-01 ENCOUNTER — APPOINTMENT (OUTPATIENT)
Dept: PHYSICAL THERAPY | Facility: HOSPITAL | Age: 67
DRG: 207 | End: 2022-11-01
Payer: COMMERCIAL

## 2022-11-01 LAB
ANION GAP SERPL CALCULATED.3IONS-SCNC: 1 MMOL/L (ref 7–15)
BUN SERPL-MCNC: 20.2 MG/DL (ref 8–23)
CALCIUM SERPL-MCNC: 8.1 MG/DL (ref 8.8–10.2)
CHLORIDE SERPL-SCNC: 99 MMOL/L (ref 98–107)
CREAT SERPL-MCNC: 0.38 MG/DL (ref 0.51–0.95)
DEPRECATED HCO3 PLAS-SCNC: 40 MMOL/L (ref 22–29)
ERYTHROCYTE [DISTWIDTH] IN BLOOD BY AUTOMATED COUNT: 13.2 % (ref 10–15)
GFR SERPL CREATININE-BSD FRML MDRD: >90 ML/MIN/1.73M2
GLUCOSE SERPL-MCNC: 134 MG/DL (ref 70–99)
HCT VFR BLD AUTO: 35.6 % (ref 35–47)
HGB BLD-MCNC: 11.2 G/DL (ref 11.7–15.7)
MAGNESIUM SERPL-MCNC: 2.4 MG/DL (ref 1.7–2.3)
MCH RBC QN AUTO: 31.6 PG (ref 26.5–33)
MCHC RBC AUTO-ENTMCNC: 31.5 G/DL (ref 31.5–36.5)
MCV RBC AUTO: 101 FL (ref 78–100)
PHOSPHATE SERPL-MCNC: 3 MG/DL (ref 2.5–4.5)
PLATELET # BLD AUTO: 409 10E3/UL (ref 150–450)
POTASSIUM SERPL-SCNC: 4 MMOL/L (ref 3.4–5.3)
RBC # BLD AUTO: 3.54 10E6/UL (ref 3.8–5.2)
SODIUM SERPL-SCNC: 140 MMOL/L (ref 136–145)
WBC # BLD AUTO: 5.4 10E3/UL (ref 4–11)

## 2022-11-01 PROCEDURE — 36415 COLL VENOUS BLD VENIPUNCTURE: CPT | Performed by: INTERNAL MEDICINE

## 2022-11-01 PROCEDURE — 94640 AIRWAY INHALATION TREATMENT: CPT

## 2022-11-01 PROCEDURE — 250N000013 HC RX MED GY IP 250 OP 250 PS 637: Performed by: INTERNAL MEDICINE

## 2022-11-01 PROCEDURE — 250N000009 HC RX 250: Performed by: INTERNAL MEDICINE

## 2022-11-01 PROCEDURE — 120N000001 HC R&B MED SURG/OB

## 2022-11-01 PROCEDURE — 999N000157 HC STATISTIC RCP TIME EA 10 MIN

## 2022-11-01 PROCEDURE — 250N000011 HC RX IP 250 OP 636: Performed by: NURSE PRACTITIONER

## 2022-11-01 PROCEDURE — 99232 SBSQ HOSP IP/OBS MODERATE 35: CPT | Performed by: INTERNAL MEDICINE

## 2022-11-01 PROCEDURE — 94640 AIRWAY INHALATION TREATMENT: CPT | Mod: 76

## 2022-11-01 PROCEDURE — 99232 SBSQ HOSP IP/OBS MODERATE 35: CPT | Performed by: PHYSICIAN ASSISTANT

## 2022-11-01 PROCEDURE — 97530 THERAPEUTIC ACTIVITIES: CPT | Mod: GP

## 2022-11-01 PROCEDURE — 94799 UNLISTED PULMONARY SVC/PX: CPT

## 2022-11-01 PROCEDURE — 250N000012 HC RX MED GY IP 250 OP 636 PS 637: Performed by: INTERNAL MEDICINE

## 2022-11-01 PROCEDURE — 97110 THERAPEUTIC EXERCISES: CPT | Mod: GP

## 2022-11-01 PROCEDURE — 80048 BASIC METABOLIC PNL TOTAL CA: CPT | Performed by: INTERNAL MEDICINE

## 2022-11-01 PROCEDURE — 83735 ASSAY OF MAGNESIUM: CPT | Performed by: INTERNAL MEDICINE

## 2022-11-01 PROCEDURE — 250N000011 HC RX IP 250 OP 636: Performed by: HOSPITALIST

## 2022-11-01 PROCEDURE — 250N000013 HC RX MED GY IP 250 OP 250 PS 637: Performed by: PHYSICIAN ASSISTANT

## 2022-11-01 PROCEDURE — 258N000003 HC RX IP 258 OP 636: Performed by: HOSPITALIST

## 2022-11-01 PROCEDURE — 85027 COMPLETE CBC AUTOMATED: CPT | Performed by: INTERNAL MEDICINE

## 2022-11-01 PROCEDURE — 84100 ASSAY OF PHOSPHORUS: CPT | Performed by: INTERNAL MEDICINE

## 2022-11-01 RX ADMIN — VANCOMYCIN HYDROCHLORIDE 750 MG: 5 INJECTION, POWDER, LYOPHILIZED, FOR SOLUTION INTRAVENOUS at 17:48

## 2022-11-01 RX ADMIN — APIXABAN 5 MG: 5 TABLET, FILM COATED ORAL at 22:35

## 2022-11-01 RX ADMIN — BUDESONIDE 1 MG: 0.5 INHALANT ORAL at 08:21

## 2022-11-01 RX ADMIN — IPRATROPIUM BROMIDE AND ALBUTEROL SULFATE 3 ML: 2.5; .5 SOLUTION RESPIRATORY (INHALATION) at 08:21

## 2022-11-01 RX ADMIN — MIRTAZAPINE 15 MG: 15 TABLET, FILM COATED ORAL at 22:35

## 2022-11-01 RX ADMIN — PIPERACILLIN AND TAZOBACTAM 3.38 G: 3; .375 INJECTION, POWDER, LYOPHILIZED, FOR SOLUTION INTRAVENOUS at 22:35

## 2022-11-01 RX ADMIN — PIPERACILLIN AND TAZOBACTAM 3.38 G: 3; .375 INJECTION, POWDER, LYOPHILIZED, FOR SOLUTION INTRAVENOUS at 06:02

## 2022-11-01 RX ADMIN — IPRATROPIUM BROMIDE AND ALBUTEROL SULFATE 3 ML: 2.5; .5 SOLUTION RESPIRATORY (INHALATION) at 16:06

## 2022-11-01 RX ADMIN — APIXABAN 5 MG: 5 TABLET, FILM COATED ORAL at 09:13

## 2022-11-01 RX ADMIN — THIAMINE HCL TAB 100 MG 100 MG: 100 TAB at 09:13

## 2022-11-01 RX ADMIN — BUDESONIDE 1 MG: 0.5 INHALANT ORAL at 20:09

## 2022-11-01 RX ADMIN — MELATONIN TAB 3 MG 3 MG: 3 TAB at 22:25

## 2022-11-01 RX ADMIN — PREDNISONE 40 MG: 20 TABLET ORAL at 09:13

## 2022-11-01 RX ADMIN — IPRATROPIUM BROMIDE AND ALBUTEROL SULFATE 3 ML: 2.5; .5 SOLUTION RESPIRATORY (INHALATION) at 11:28

## 2022-11-01 RX ADMIN — IPRATROPIUM BROMIDE AND ALBUTEROL SULFATE 3 ML: 2.5; .5 SOLUTION RESPIRATORY (INHALATION) at 20:09

## 2022-11-01 RX ADMIN — VANCOMYCIN HYDROCHLORIDE 750 MG: 5 INJECTION, POWDER, LYOPHILIZED, FOR SOLUTION INTRAVENOUS at 06:03

## 2022-11-01 RX ADMIN — PIPERACILLIN AND TAZOBACTAM 3.38 G: 3; .375 INJECTION, POWDER, LYOPHILIZED, FOR SOLUTION INTRAVENOUS at 14:33

## 2022-11-01 ASSESSMENT — ACTIVITIES OF DAILY LIVING (ADL)
ADLS_ACUITY_SCORE: 33
ADLS_ACUITY_SCORE: 34
ADLS_ACUITY_SCORE: 37
ADLS_ACUITY_SCORE: 37
ADLS_ACUITY_SCORE: 34
ADLS_ACUITY_SCORE: 33
ADLS_ACUITY_SCORE: 35
ADLS_ACUITY_SCORE: 34

## 2022-11-01 NOTE — PROGRESS NOTES
RESPIRATORY CARE NOTE    Pt cont on 6lnc, no distress, bs diminished with fine crackles on bases, strong dry cough. Received duoneb x3, pulmicort x1 via face mask. Minimal changed after neb. Does Flutter valve independently 2 cycles of 10 rep. Needs reminder.    Cont monitoring    Rose Kinney RT

## 2022-11-01 NOTE — PLAN OF CARE
"Goal Outcome Evaluation:      Plan of Care Reviewed With: patient    Outcome Evaluation: Pt hypoxia slowly improving. Pt agreeable to antibiotics and discharging to TCU. She denied any pain or discomfort. On 7LNC during NOC. Will continue to monitor.    BP 90/60 (BP Location: Right leg)   Pulse 66   Temp 97.9  F (36.6  C) (Oral)   Resp 16   Ht 1.702 m (5' 7\")   Wt 36 kg (79 lb 6.4 oz)   SpO2 96%   BMI 12.44 kg/m      "

## 2022-11-01 NOTE — PROGRESS NOTES
"O2 7 lpm bubbler HFNC , SpO2 98 %, decreased to 6 lpm. BS diminished bilat. Duoneb/Pulmicort 1 mg given with flutter valve, tolerated well, loose np cough. BS unchanged  after.     BP 92/55 (BP Location: Right leg)   Pulse 65   Temp 97.8  F (36.6  C) (Oral)   Resp 16   Ht 1.702 m (5' 7\")   Wt 36 kg (79 lb 6.4 oz)   SpO2 98%   BMI 12.44 kg/m      RT to monitor.  "

## 2022-11-01 NOTE — PLAN OF CARE
Goal Outcome Evaluation:      [id number]... Pt is a/o x4, on O2 7 LPM via HFNC and satting at 94-95%, she had 1-2 episodes of low SpO2, 76-79%, RT was notified. Pt was started on Prednisone today, on IV ABXs, no c/o pain. Voided in bedpan (200 mls).

## 2022-11-01 NOTE — PROGRESS NOTES
[facility]  Palliative Care Daily Progress Note       Recommendations & Counseling     Lavonne is improving and consistent in her goal of getting to TCU.   Discussed with Care Manager.    Goals of Care: life-prolonging with limit of DNR/DNI    Advanced Care Planning:  Advance directive: No  POLST: No  Code status: DNR/DNI  Health care agent/surrogate: Frantz (son) along with Jess (daughter), Jermaine () recently had a stroke and has limited ability to communicate    Symptom Management:  #Dyspnea, tachypnea,hypoxia - improving  - Appreciate Pulm input  - Hold scheduled morphine for soft BPs  - Continue prn morphine for dyspnea    #Anorexia, cachexia, insomnia  - Continue mirtazapine 15mg    Psychosocial & Spiritual:   Appreciate Palliative SW and Spiritual Health      Assessments          Lavonne is a 66 y/o woman, active smoker, with history of lymphedema, polycythemia, and recent diagnosis of Covid infection, admitted to ICU on 10/8/2022 with acute respiratory failure requiring ventilation and circulatory shock requiring vasopressors. Respiratory failure attributed to CAP vs aspiration pneumonia with small PE. 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  - Extubated on 10/20 to BiPAP    Today, the patient was seen for:  Symptom management, support    Prognosis, Goals, or Advance Care Planning was addressed today with: Yes.  Mood, coping, and/or meaning in the context of serious illness were addressed today: Yes.              Interval History:     Chart review/discussion with unit or clinical team members:   - Weaned to 7L oxygen    Per patient or family/caregivers today:  Lavonne is feeling well today. Notes she is on her 3rd Ensure today. Worked with PT earlier and got out of bed to the chair. Still working on weaning oxygen down to 6L so she can get to TCU. Did not notice a difference with or without morphine. Denies dyspnea or pain at this time.    Called son Frantz and provided update.  Reviewed Lavonne's code status which was made DNR/DNI while she was sedated and intubated in the ICU. Frantz plans to speak with his mom about whether she would want to remain DNR/DNI after discharge. I will follow up with Lavonne as well.    [facility] Symptoms:  Denies pain or dyspnea           Review of Systems:     3 system review negative          Medications:     I have reviewed this patient's medication profile and medications during this hospitalization.    Noted meds:    Current Facility-Administered Medications   Medication     apixaban ANTICOAGULANT (ELIQUIS) tablet 5 mg     budesonide (PULMICORT) neb solution 1 mg     dextrose 10% infusion     glucose gel 15-30 g    Or     dextrose 50 % injection 25-50 mL    Or     glucagon injection 1 mg     [Held by provider] furosemide (LASIX) injection 20 mg     ipratropium - albuterol 0.5 mg/2.5 mg/3 mL (DUONEB) neb solution 3 mL     LubriFresh P.M. OINT     melatonin tablet 3 mg     mirtazapine (REMERON) tablet 15 mg     morphine solution 5 mg     naloxone (NARCAN) injection 0.1 mg     naloxone (NARCAN) injection 0.2 mg     naloxone (NARCAN) injection 0.2 mg    Or     naloxone (NARCAN) injection 0.4 mg    Or     naloxone (NARCAN) injection 0.2 mg    Or     naloxone (NARCAN) injection 0.4 mg     ondansetron (ZOFRAN ODT) ODT tab 4 mg    Or     ondansetron (ZOFRAN) injection 4 mg     ondansetron (ZOFRAN) injection 4 mg     piperacillin-tazobactam (ZOSYN) 3.375 g vial to attach to  mL bag     polyethylene glycol (MIRALAX) Packet 17 g     predniSONE (DELTASONE) tablet 40 mg    Followed by     [START ON 11/4/2022] predniSONE (DELTASONE) tablet 30 mg    Followed by     [START ON 11/9/2022] predniSONE (DELTASONE) tablet 20 mg    Followed by     [START ON 11/14/2022] predniSONE (DELTASONE) tablet 10 mg    Followed by     [START ON 11/19/2022] predniSONE (DELTASONE) tablet 5 mg     thiamine (B-1) tablet 100 mg     vancomycin (VANCOCIN) 750 mg in 0.9% NaCl 250 mL  "intermittent infusion                Physical Exam:   Vital Signs: Blood pressure (!) 89/51, pulse 101, temperature 98.1  F (36.7  C), temperature source Oral, resp. rate 26, height 1.702 m (5' 7\"), weight 36 kg (79 lb 6.4 oz), SpO2 90 %.   GENERAL: laying in bed, on nasal cannula, awake and alert  SKIN: Warm and dry   HEENT: +temporal muscle wasting  LUNGS: non-labored  MUSKL: no gross joint deformities   EXTREMITIES: No edema              Data Reviewed:     Reviewed recent pertinent imaging:   No new imaging    Reviewed recent labs:   CMP  Recent Labs   Lab 11/01/22  0654 10/31/22  0628 10/30/22  0713 10/29/22  0820    138 138 136   POTASSIUM 4.0 3.6 3.6 3.7   CHLORIDE 99 94* 95* 96*   CO2 40* 45* 38* 37*   ANIONGAP 1* <1* 5* 3*   * 80 83 85   BUN 20.2 17.1 17.2 25.5*   CR 0.38* 0.52 0.52 0.54   GFRESTIMATED >90 >90 >90 >90   JASON 8.1* 8.3* 8.1* 8.3*   MAG 2.4* 2.0 1.9 2.0   PHOS 3.0 3.1 2.8 3.1     CBC  Recent Labs   Lab 11/01/22  0654 10/31/22  0628 10/30/22  0713 10/29/22  0820   WBC 5.4 6.9 6.8 10.2   RBC 3.54* 3.73* 3.61* 3.92   HGB 11.2* 11.6* 11.5* 12.4   HCT 35.6 37.8 36.4 39.0   * 101* 101* 100   MCH 31.6 31.1 31.9 31.6   MCHC 31.5 30.7* 31.6 31.8   RDW 13.2 13.4 13.5 13.6    415 362 355         Jessica Richards PA-C  [facility], Palliative Care  Dept phone: [phone]  Securely message with the Vocera Web Console  "

## 2022-11-01 NOTE — PLAN OF CARE
[id number]  Problem: COPD (Chronic Obstructive Pulmonary Disease) Comorbidity  Goal: Maintenance of COPD Symptom Control  Intervention: Maintain COPD-Symptom Control  Recent Flowsheet Documentation  Taken 11/1/2022 0900 by Chrissy Spencer RN  Supportive Measures: active listening utilized     Problem: Gas Exchange Impaired  Goal: Optimal Gas Exchange  Intervention: Optimize Oxygenation and Ventilation  Recent Flowsheet Documentation  Taken 11/1/2022 0900 by Chrissy Spencer RN  Head of Bed (HOB) Positioning: HOB at 20-30 degrees   Goal Outcome Evaluation:l  Patient alert and oriented X4.  Sating 93% on 7L of NC.   Denies Pain.   Appetite improving drinking her ensure.  Up in the chair for meals.  Incontinent of large urine.  Bp 89/51 recheck 99/64 MD notified. No treatment at this time.

## 2022-11-01 NOTE — PROGRESS NOTES
"CLINICAL NUTRITION SERVICES - REASSESSMENT NOTE      RECOMMENDATIONS FOR MD/PROVIDER TO ORDER:       Recommendations Ordered by Registered Dietitian (RD):   Pt tried cherry Gelatein plus and Special K bar  Liked special K bar, not cherry gelatein plus - she will try a cold pineapple gelatein plus.  RD adjusted supplements.       Future/Additional Recommendations: monitor po, wt   Malnutrition: Severe     EVALUATION OF PROGRESS TOWARD GOALS   Diet: Regular, Ensure Enlive BID, Gelatein plus, special k bar    Intake/Tolerance: 0-75% intake, poor.    NEW FINDINGS:   Ht. 5' 7\"  Wt. 36 kg (79 lb 6.4 oz)  (10/28/22)  Admit weight: 35 kg (77 lb 2.6 oz)  BMI: 12.44 Underweight    GI: Last BM, today - loose, brown    Malnutrition Diagnosis: Severe malnutrition  In Context of:  Chronic illness or disease    CURRENT NUTRITION DIAGNOSIS  Severe protein-calorie malnutrition related to chronic illness as evidenced by meeting less than 75% of energy needs for greater than 1 month, greater than 20% weight loss in 1 year, and severe generalized muscle and adipose tissue depletion.     Underweight related to chronic illness as evidenced by BMI of 12.31.    INTERVENTIONS  Recommendations / Nutrition Prescription  Adjusted supplements per pt preferences    Goals  Pt will meet >75% of kcal and protein needs through oral intake - not meeting  Wt gain 1-2lbs/week - progressing, need new weight    MONITORING AND EVALUATION:  Progress towards goals will be monitored and evaluated per protocol and Practice Guidelines        "

## 2022-11-01 NOTE — PROGRESS NOTES
Care Management Follow Up    Length of Stay (days): 24    Expected Discharge Date: 11/02/2022     Concerns to be Addressed:  (O2 status - pt needs to be at 6L O2 or less to qualify for TCU; placement)     Patient plan of care discussed at interdisciplinary rounds: Yes    Anticipated Discharge Disposition: Transitional Care vs. ?     Anticipated Discharge Services:    Anticipated Discharge DME:    Referrals Placed by CM/SW:  Post-Acute Facilities  Private pay costs discussed: Not applicable    Additional Information:    Palliative continues to follow patient for GOC discussions. TCU referrals sent and pending due to need for lowered O2 status. CM following.        Assessment hx: Pt lives in a split level house with spouse. Spouse Jermaine discharged from TCU after recent stroke and dtr Jess is staying with him. Patient has been independent with ADLs and IADLs. Son Frantz is primary family contact.     MEL OcampoSW

## 2022-11-02 ENCOUNTER — APPOINTMENT (OUTPATIENT)
Dept: PHYSICAL THERAPY | Facility: HOSPITAL | Age: 67
DRG: 207 | End: 2022-11-02
Payer: COMMERCIAL

## 2022-11-02 ENCOUNTER — APPOINTMENT (OUTPATIENT)
Dept: OCCUPATIONAL THERAPY | Facility: HOSPITAL | Age: 67
DRG: 207 | End: 2022-11-02
Attending: INTERNAL MEDICINE
Payer: COMMERCIAL

## 2022-11-02 LAB
ANION GAP SERPL CALCULATED.3IONS-SCNC: 5 MMOL/L (ref 7–15)
BUN SERPL-MCNC: 19.6 MG/DL (ref 8–23)
CALCIUM SERPL-MCNC: 8.2 MG/DL (ref 8.8–10.2)
CHLORIDE SERPL-SCNC: 100 MMOL/L (ref 98–107)
CREAT SERPL-MCNC: 0.41 MG/DL (ref 0.51–0.95)
DEPRECATED HCO3 PLAS-SCNC: 38 MMOL/L (ref 22–29)
ERYTHROCYTE [DISTWIDTH] IN BLOOD BY AUTOMATED COUNT: 13.3 % (ref 10–15)
GFR SERPL CREATININE-BSD FRML MDRD: >90 ML/MIN/1.73M2
GLUCOSE SERPL-MCNC: 80 MG/DL (ref 70–99)
HCT VFR BLD AUTO: 32 % (ref 35–47)
HGB BLD-MCNC: 10.1 G/DL (ref 11.7–15.7)
MAGNESIUM SERPL-MCNC: 2.1 MG/DL (ref 1.7–2.3)
MCH RBC QN AUTO: 31.3 PG (ref 26.5–33)
MCHC RBC AUTO-ENTMCNC: 31.6 G/DL (ref 31.5–36.5)
MCV RBC AUTO: 99 FL (ref 78–100)
PHOSPHATE SERPL-MCNC: 2.3 MG/DL (ref 2.5–4.5)
PLATELET # BLD AUTO: 401 10E3/UL (ref 150–450)
POTASSIUM SERPL-SCNC: 3.4 MMOL/L (ref 3.4–5.3)
RBC # BLD AUTO: 3.23 10E6/UL (ref 3.8–5.2)
SODIUM SERPL-SCNC: 143 MMOL/L (ref 136–145)
VANCOMYCIN SERPL-MCNC: 13.3 UG/ML
WBC # BLD AUTO: 13.5 10E3/UL (ref 4–11)

## 2022-11-02 PROCEDURE — 80202 ASSAY OF VANCOMYCIN: CPT | Performed by: INTERNAL MEDICINE

## 2022-11-02 PROCEDURE — 250N000012 HC RX MED GY IP 250 OP 636 PS 637: Performed by: INTERNAL MEDICINE

## 2022-11-02 PROCEDURE — 250N000011 HC RX IP 250 OP 636: Performed by: NURSE PRACTITIONER

## 2022-11-02 PROCEDURE — 250N000009 HC RX 250: Performed by: INTERNAL MEDICINE

## 2022-11-02 PROCEDURE — 250N000013 HC RX MED GY IP 250 OP 250 PS 637: Performed by: INTERNAL MEDICINE

## 2022-11-02 PROCEDURE — 120N000001 HC R&B MED SURG/OB

## 2022-11-02 PROCEDURE — 94640 AIRWAY INHALATION TREATMENT: CPT

## 2022-11-02 PROCEDURE — 99233 SBSQ HOSP IP/OBS HIGH 50: CPT | Mod: FS | Performed by: INTERNAL MEDICINE

## 2022-11-02 PROCEDURE — 250N000011 HC RX IP 250 OP 636: Performed by: INTERNAL MEDICINE

## 2022-11-02 PROCEDURE — 97116 GAIT TRAINING THERAPY: CPT | Mod: GP

## 2022-11-02 PROCEDURE — 83735 ASSAY OF MAGNESIUM: CPT | Performed by: INTERNAL MEDICINE

## 2022-11-02 PROCEDURE — 99232 SBSQ HOSP IP/OBS MODERATE 35: CPT | Performed by: PHYSICIAN ASSISTANT

## 2022-11-02 PROCEDURE — 97530 THERAPEUTIC ACTIVITIES: CPT | Mod: GP

## 2022-11-02 PROCEDURE — 97166 OT EVAL MOD COMPLEX 45 MIN: CPT | Mod: GO

## 2022-11-02 PROCEDURE — 94640 AIRWAY INHALATION TREATMENT: CPT | Mod: 76

## 2022-11-02 PROCEDURE — 36415 COLL VENOUS BLD VENIPUNCTURE: CPT | Performed by: INTERNAL MEDICINE

## 2022-11-02 PROCEDURE — 85027 COMPLETE CBC AUTOMATED: CPT | Performed by: INTERNAL MEDICINE

## 2022-11-02 PROCEDURE — 250N000013 HC RX MED GY IP 250 OP 250 PS 637: Performed by: PHYSICIAN ASSISTANT

## 2022-11-02 PROCEDURE — 250N000011 HC RX IP 250 OP 636: Performed by: HOSPITALIST

## 2022-11-02 PROCEDURE — 258N000003 HC RX IP 258 OP 636: Performed by: INTERNAL MEDICINE

## 2022-11-02 PROCEDURE — 99233 SBSQ HOSP IP/OBS HIGH 50: CPT | Mod: CS | Performed by: INTERNAL MEDICINE

## 2022-11-02 PROCEDURE — 80048 BASIC METABOLIC PNL TOTAL CA: CPT | Performed by: INTERNAL MEDICINE

## 2022-11-02 PROCEDURE — 258N000003 HC RX IP 258 OP 636: Performed by: HOSPITALIST

## 2022-11-02 PROCEDURE — 84100 ASSAY OF PHOSPHORUS: CPT | Performed by: INTERNAL MEDICINE

## 2022-11-02 PROCEDURE — 97535 SELF CARE MNGMENT TRAINING: CPT | Mod: GO

## 2022-11-02 PROCEDURE — 999N000157 HC STATISTIC RCP TIME EA 10 MIN

## 2022-11-02 PROCEDURE — 97110 THERAPEUTIC EXERCISES: CPT | Mod: GP

## 2022-11-02 RX ADMIN — THIAMINE HCL TAB 100 MG 100 MG: 100 TAB at 09:50

## 2022-11-02 RX ADMIN — IPRATROPIUM BROMIDE AND ALBUTEROL SULFATE 3 ML: 2.5; .5 SOLUTION RESPIRATORY (INHALATION) at 11:55

## 2022-11-02 RX ADMIN — APIXABAN 5 MG: 5 TABLET, FILM COATED ORAL at 09:49

## 2022-11-02 RX ADMIN — APIXABAN 5 MG: 5 TABLET, FILM COATED ORAL at 21:21

## 2022-11-02 RX ADMIN — MIRTAZAPINE 15 MG: 15 TABLET, FILM COATED ORAL at 21:21

## 2022-11-02 RX ADMIN — IPRATROPIUM BROMIDE AND ALBUTEROL SULFATE 3 ML: 2.5; .5 SOLUTION RESPIRATORY (INHALATION) at 15:39

## 2022-11-02 RX ADMIN — PREDNISONE 40 MG: 20 TABLET ORAL at 09:49

## 2022-11-02 RX ADMIN — PIPERACILLIN AND TAZOBACTAM 3.38 G: 3; .375 INJECTION, POWDER, LYOPHILIZED, FOR SOLUTION INTRAVENOUS at 15:04

## 2022-11-02 RX ADMIN — POTASSIUM & SODIUM PHOSPHATES POWDER PACK 280-160-250 MG 1 PACKET: 280-160-250 PACK at 22:45

## 2022-11-02 RX ADMIN — POTASSIUM & SODIUM PHOSPHATES POWDER PACK 280-160-250 MG 1 PACKET: 280-160-250 PACK at 19:30

## 2022-11-02 RX ADMIN — PIPERACILLIN AND TAZOBACTAM 3.38 G: 3; .375 INJECTION, POWDER, LYOPHILIZED, FOR SOLUTION INTRAVENOUS at 06:11

## 2022-11-02 RX ADMIN — PIPERACILLIN AND TAZOBACTAM 3.38 G: 3; .375 INJECTION, POWDER, LYOPHILIZED, FOR SOLUTION INTRAVENOUS at 22:45

## 2022-11-02 RX ADMIN — VANCOMYCIN HYDROCHLORIDE 750 MG: 5 INJECTION, POWDER, LYOPHILIZED, FOR SOLUTION INTRAVENOUS at 06:11

## 2022-11-02 RX ADMIN — BUDESONIDE 1 MG: 0.5 INHALANT ORAL at 19:46

## 2022-11-02 RX ADMIN — MELATONIN TAB 3 MG 3 MG: 3 TAB at 21:27

## 2022-11-02 RX ADMIN — IPRATROPIUM BROMIDE AND ALBUTEROL SULFATE 3 ML: 2.5; .5 SOLUTION RESPIRATORY (INHALATION) at 08:01

## 2022-11-02 RX ADMIN — VANCOMYCIN HYDROCHLORIDE 750 MG: 5 INJECTION, POWDER, LYOPHILIZED, FOR SOLUTION INTRAVENOUS at 17:35

## 2022-11-02 RX ADMIN — POTASSIUM & SODIUM PHOSPHATES POWDER PACK 280-160-250 MG 1 PACKET: 280-160-250 PACK at 15:04

## 2022-11-02 RX ADMIN — BUDESONIDE 1 MG: 0.5 INHALANT ORAL at 08:01

## 2022-11-02 RX ADMIN — IPRATROPIUM BROMIDE AND ALBUTEROL SULFATE 3 ML: 2.5; .5 SOLUTION RESPIRATORY (INHALATION) at 19:46

## 2022-11-02 ASSESSMENT — ACTIVITIES OF DAILY LIVING (ADL)
ADLS_ACUITY_SCORE: 29
ADLS_ACUITY_SCORE: 29
ADLS_ACUITY_SCORE: 35
ADLS_ACUITY_SCORE: 33
ADLS_ACUITY_SCORE: 35
ADLS_ACUITY_SCORE: 29
ADLS_ACUITY_SCORE: 35

## 2022-11-02 NOTE — PLAN OF CARE
"Goal Outcome Evaluation:      Plan of Care Reviewed With: patient    Outcome Evaluation: Pt spoke about some frustration with her decline in strength. She stated she would like to be home where \"I can use my own bathroom, sit in my own chair, and do what I want to do\". Denied discomfort. Wanted something to \"sleep\"; administered prn melatonin. Otherwise uneventful.    BP 91/54 (BP Location: Left arm)   Pulse 92   Temp 98  F (36.7  C) (Oral)   Resp 18   Ht 1.702 m (5' 7\")   Wt 36 kg (79 lb 6.4 oz)   SpO2 97%   BMI 12.44 kg/m      "

## 2022-11-02 NOTE — PROGRESS NOTES
RESPIRATORY CARE NOTE     Patient was on 6 lpm HFNC and had an Sp02 of 91%. They received Duonebb x2. And Pulmicort x1   Breath sounds prior to treatment where diminished but clear pre and post.    They have completed aerobika therapy and demonstrated good technique.    RT will continue to assess and monitor cardiopulmonary status.     Leah Crawford, RT

## 2022-11-02 NOTE — PHARMACY-VANCOMYCIN DOSING SERVICE
Pharmacy Vancomycin Note  Date of Service 2022  Patient's  1955   67 year old, female    Indication: Healthcare-Associated Pneumonia  Day of Therapy: 6  Current vancomycin regimen:  750 mg IV q12h  Current vancomycin monitoring method: AUC  Current vancomycin therapeutic monitoring goal: 400-600 mg*h/L    InsightRX Prediction of Current Vancomycin Regimen  Loading dose: N/A  Regimen: 750 mg IV every 12 hours.  Start time: 09:03 on 2022  Exposure target: AUC24 (range)400-600 mg/L.hr   AUC24,ss: 448 mg/L.hr  Probability of AUC24 > 400: 78 %  Ctrough,ss: 12.3 mg/L  Probability of Ctrough,ss > 20: 0 %  Probability of nephrotoxicity (Lodise KALEB ): 8 %    Current estimated CrCl = Estimated Creatinine Clearance: 74.6 mL/min (A) (based on SCr of 0.41 mg/dL (L)).    Creatinine for last 3 days  10/31/2022:  6:28 AM Creatinine 0.52 mg/dL  2022:  6:54 AM Creatinine 0.38 mg/dL  2022:  5:09 AM Creatinine 0.41 mg/dL    Recent Vancomycin Levels (past 3 days)  10/30/2022:  4:53 PM Vancomycin 10.3 ug/mL  2022:  5:09 AM Vancomycin 13.3 ug/mL    Vancomycin IV Administrations (past 72 hours)                   vancomycin (VANCOCIN) 750 mg in 0.9% NaCl 250 mL intermittent infusion (mg) 750 mg Given 22 0611     750 mg Given 22 1748     750 mg Given  0603     750 mg Given 10/31/22 1828     750 mg Given  0608    vancomycin (VANCOCIN) 600 mg in sodium chloride 0.9 % 250 mL intermittent infusion (mg) 600 mg New Bag 10/30/22 1827                Nephrotoxins and other renal medications (From now, onward)    Start     Dose/Rate Route Frequency Ordered Stop    10/31/22 0600  vancomycin (VANCOCIN) 750 mg in 0.9% NaCl 250 mL intermittent infusion        See Hyperspace for full Linked Orders Report.    750 mg  over 60 Minutes Intravenous EVERY 12 HOURS 10/30/22 1901      10/28/22 2200  piperacillin-tazobactam (ZOSYN) 3.375 g vial to attach to  mL bag        Note to Pharmacy: Extended  infusion dosing to start 6 hours after initial infusion.   See Hyperspace for full Linked Orders Report.    3.375 g  over 240 Minutes Intravenous EVERY 8 HOURS 10/28/22 1515      10/17/22 1800  [Held by provider]  furosemide (LASIX) injection 20 mg        (Held by provider since Mon 10/31/2022 at 1405 by Melvin Townsend MD.Hold Reason: Change in Vitals)    20 mg  over 1-3 Minutes Intravenous EVERY 12 HOURS 10/17/22 1326               Contrast Orders - past 72 hours (72h ago, onward)    None          Interpretation of levels and current regimen:  Vancomycin level is reflective of -600    Has serum creatinine changed greater than 50% in last 72 hours: No    Urine output:  good urine output    Renal Function: Stable    InsightRX Prediction of Planned New Vancomycin Regimen      Plan:  1. Continue Current Dose  2. Vancomycin monitoring method: AUC  3. Vancomycin therapeutic monitoring goal: 400-600 mg*h/L  4. Pharmacy will check vancomycin levels as appropriate in 3-5 Days.  5. Serum creatinine levels will be ordered daily for the first week of therapy and at least twice weekly for subsequent weeks.    VIRGINIE SANFORD [facility]     Anesthesia Volume In Cc: 0.5 Consent: The patient's consent was obtained including but not limited to risks of crusting, scabbing, blistering, scarring, darker or lighter pigmentary change, recurrence, incomplete removal and infection. Post-Care Instructions: I reviewed with the patient in detail post-care instructions. Patient is to wear sunprotection, and avoid picking at any of the treated lesions. Pt may apply Vaseline to crusted or scabbing areas. Detail Level: Simple Price (Use Numbers Only, No Special Characters Or $): 0

## 2022-11-02 NOTE — PROGRESS NOTES
[facility]    Medicine Progress Note - Hospitalist Service    Date of Admission:  10/8/2022    Assessment & Plan   Lavonne Talbot is a 67 year old female with a past medical history significant for oxygen dependent COPD/emphysema, tobacco abuse, recent Covid-19 infection, severe protein calorie malnutrition amongst other medical issues who was admitted to the hospital on 10/8/22 with acute respiratory failure requiring intubation with mechanical ventilation and septic shock from H. Flu. Her hospital stay was complicated by the development of subsegmental pulmonary emboli, Takatsubo cardiomyopathy and splenic infarcts. She was stepped out of the ICU with plans to transition to hospice        Plan:   1. Acute respiratory failure with hypoxia requiring intubation likely a combination of COPD exacerbation, COVID-19 infection, subsegmental PE: Patient is now extubated and remains on oxygen via high flow nasal cannula.  Slowly wean oxygen as tolerated.  2. COPD: Has severe emphysema and presented with acute hypoxemic respiratory failure secondary to a infection with H. Flu. Additionally, she had recently contracted Covid-19 which likely led to her COPD exacerbation initially. She has been extubated and off Bilevel ventilation but continues to have very high oxygen requirements and struggles with air hunger. There had been some discussions with the ICU team about transitioning to hospice, however, in the interim her  suffered from a CVA and she has reversed her code status and would like to transition to a TCU.    Continue supplemental oxygen to maintain saturations >88%.    Continue Budesonide and scheduled duonebs.    Was noted to have a new RLL infiltrate and was initiated on Vancomycin and Pip-Tazo for this. Her leukocytosis has since resolved and no repeat cultures or procalcitonin have been ordered.  Procalcitonin on 1031 was normal.  We will stop Zosyn and Vanco after 7 days of  "treatment.    Will resume prednisone taper   Subsegmental PE:Likely secondary to her Covid-19 infection. Has completed Remdesivir and Decadron for this.  Eliquis 5 mg p.o. twice daily.  Would treat for 3 to 6 months  3. Splenic infarct: Unclear etiology. Thought to be secondary to a PFO. Treatment as above.  4. Goals of care: Was transitioned to hospice upon discharge from the ICU but then, her  had a CVA and she decided to change her goals of care.  She is is being seen by palliative care and was receiving Morphine for air hunger--this has since been held due to hypotensive episodes. The patient is focusing on being transferred to a TCU  5. Dizziness: Unclear etiology.  Could be related to orthostatic hypotension.  Check orthostatic vital signs.  6. Deconditioning: Request PT/OT evaluation         Diet: Advance Diet as Tolerated: Regular Diet Adult  Snacks/Supplements Adult: Ensure Enlive; With Meals  Snacks/Supplements Adult: Other; Special K bar; Between Meals  Snacks/Supplements Adult: Gelatein Plus; With Meals    DVT Prophylaxis: DOAC  [place] Catheter: Not present  Central Lines: None  Cardiac Monitoring: None  Code Status: No CPR- Do NOT Intubate      Disposition Plan     Expected Discharge Date: 11/03/2022    Discharge Delays: Placement - TCU  Oxygen Needs - Arrange Home O2    Discharge Comments: TCU  wean O2        The patient's care was discussed with the Patient.    Lo Murphy MD  Hospitalist Service  [facility]  Securely message with the Vocera Web Console (learn more here)  Text page via [employer] Paging/Directory     Clinically Significant Risk Factors              # Hypoalbuminemia: Lowest albumin = 2.6 g/dL (Ref range: 3.5-5.2) at 10/8/2022 11:32 AM, will monitor as appropriate          # Cachexia: Estimated body mass index is 12.44 kg/m  as calculated from the following:    Height as of this encounter: 1.702 m (5' 7\").    Weight as of this encounter: 36 kg (79 lb " 6.4 oz).   # Severe Malnutrition: based on nutrition assessment      _____________________________________________________________________    Interval History   The dizziness is very bothersome to her.  She feels lightheaded when she stands up but otherwise is doing okay.  No new symptoms.    Data reviewed today: I reviewed all medications, new labs and imaging results over the last 24 hours. I personally reviewed no images or EKG's today.    Physical Exam   Vital Signs: Temp: 98  F (36.7  C) Temp src: Oral BP: 107/66 Pulse: 91   Resp: 16 SpO2: 93 % O2 Device: High Flow Nasal Cannula (HFNC) Oxygen Delivery: 6 LPM  Weight: 79 lbs 6.4 oz  Physical Exam  Constitutional:       Appearance: She is ill-appearing.   HENT:      Head: Normocephalic and atraumatic.      Mouth/Throat:      Mouth: Mucous membranes are dry.   Eyes:      Extraocular Movements: Extraocular movements intact.      Conjunctiva/sclera: Conjunctivae normal.   Cardiovascular:      Rate and Rhythm: Normal rate and regular rhythm.   Pulmonary:      Comments: Diminished breath sounds throughout.  Mild expiratory wheeze.  Abdominal:      General: Abdomen is flat.      Palpations: Abdomen is soft.   Musculoskeletal:      Cervical back: Neck supple.      Right lower leg: No edema.      Left lower leg: No edema.   Skin:     General: Skin is warm and dry.   Neurological:      General: No focal deficit present.      Mental Status: She is alert and oriented to person, place, and time.       Data   Recent Labs   Lab 11/02/22  0509 11/01/22  0654 10/31/22  0628   WBC 13.5* 5.4 6.9   HGB 10.1* 11.2* 11.6*   MCV 99 101* 101*    409 415    140 138   POTASSIUM 3.4 4.0 3.6   CHLORIDE 100 99 94*   CO2 38* 40* 45*   BUN 19.6 20.2 17.1   CR 0.41* 0.38* 0.52   ANIONGAP 5* 1* <1*   JASON 8.2* 8.1* 8.3*   GLC 80 134* 80     No results found for this or any previous visit (from the past 24 hour(s)).  Medications     dextrose         apixaban ANTICOAGULANT  5 mg Oral  BID     budesonide  1 mg Nebulization 2 times daily     ipratropium - albuterol 0.5 mg/2.5 mg/3 mL  3 mL Nebulization 4x daily     mirtazapine  15 mg Oral At Bedtime     piperacillin-tazobactam  3.375 g Intravenous Q8H     polyethylene glycol  17 g Oral Daily     potassium & sodium phosphates  1 packet Oral or Feeding Tube Q4H     predniSONE  40 mg Oral Daily    Followed by     [START ON 11/4/2022] predniSONE  30 mg Oral Daily    Followed by     [START ON 11/9/2022] predniSONE  20 mg Oral Daily    Followed by     [START ON 11/14/2022] predniSONE  10 mg Oral Daily    Followed by     [START ON 11/19/2022] predniSONE  5 mg Oral Daily     thiamine  100 mg Per Feeding Tube Daily     vancomycin  750 mg Intravenous Q12H

## 2022-11-02 NOTE — PROGRESS NOTES
11/02/22 1500   Appointment Info   Signing Clinician's Name / Credentials (OT) Margareth Ramirez OT OTD   Living Environment   People in Home spouse   Current Living Arrangements house   Home Accessibility stairs to enter home;stairs within home   Number of Stairs, Main Entrance 2   Living Environment Comments walk in shower off of bedroom, tub/shower down the vides, shower chair (does not use)   Self-Care   Equipment Currently Used at Home none   Fall history within last six months no   Activity/Exercise/Self-Care Comment ind with all ADLs and IADLs, driving   General Information   Onset of Illness/Injury or Date of Surgery 10/08/22   Referring Physician Lo Murphy MD   Patient/Family Therapy Goal Statement (OT) To return home   Additional Occupational Profile Info/Pertinent History of Current Problem Lavonne Talbot is a 67 year old female with a past medical history significant for oxygen dependent COPD/emphysema, tobacco abuse, recent Covid-19 infection, severe protein calorie malnutrition   Existing Precautions/Restrictions oxygen therapy device and L/min  (6 L NC)   General Observations and Info On 6L NC throughout session - not on O2 at baseline   Cognitive Status Examination   Orientation Status orientation to person, place and time   Visual Perception   Visual Impairment/Limitations WFL;corrective lenses for reading   Posture   Posture forward head position   Range of Motion Comprehensive   General Range of Motion bilateral upper extremity ROM WFL   Strength Comprehensive (MMT)   General Manual Muscle Testing (MMT) Assessment upper extremity strength deficits identified   Comment, General Manual Muscle Testing (MMT) Assessment Generalized weakness   Coordination   Upper Extremity Coordination No deficits were identified   Bed Mobility   Bed Mobility supine-sit;sit-supine   Supine-Sit [place] (Bed Mobility) modified independence   Comment (Bed Mobility) HOB elevated - bed rails   Transfers    Transfers sit-stand transfer   Sit-Stand Transfer   Sit-Stand [place] (Transfers) minimum assist (75% patient effort)   Assistive Device (Sit-Stand Transfers) walker, standard   Clinical Impression   Criteria for Skilled Therapeutic Interventions Met (OT) Yes, treatment indicated   OT Diagnosis Decreased ind with ADLs and safety   Influenced by the following impairments Acute respiratory failure, post-COVID, hypotension, deconditioning   OT Problem List-Impairments impacting ADL activity tolerance impaired;mobility;strength   Assessment of Occupational Performance 3-5 Performance Deficits   Identified Performance Deficits toileting, bathing, g/h, home mgmt   Planned Therapy Interventions (OT) ADL retraining;strengthening;progressive activity/exercise   Clinical Decision Making Complexity (OT) moderate complexity   Anticipated Equipment Needs Upon Discharge (OT) shower chair   Risk & Benefits of therapy have been explained evaluation/treatment results reviewed;care plan/treatment goals reviewed;risks/benefits reviewed;current/potential barriers reviewed;patient   OT Total Evaluation Time   OT Eval, Moderate Complexity Minutes (32672) 10   OT Goals   Therapy Frequency (OT) Daily   OT Predicted Duration/Target Date for Goal Attainment 11/09/22   OT Goals Hygiene/Grooming;Toilet Transfer/Toileting;OT Goal 1   OT: Hygiene/Grooming modified independent;while standing  (at least 5 minutes)   OT: Toilet Transfer/Toileting Modified independent;toilet transfer;cleaning and garment management   OT: Goal 1 Pt will verbalize 1-2 EC/WS techniques to assist with activity tolerance and completion of ADL tasks at home   Interventions   Interventions Quick Adds Self-Care/Home Management   Self-Care/Home Management   Self-Care/Home Mgmt/ADL, Compensatory, Meal Prep Minutes (96190) 10   Symptoms Noted During/After Treatment (Meal Preparation/Planning Training) fatigue   Treatment Detail/Skilled Intervention STS from EOB w/ FWW  "min A, cueing to push from bed - pt reporting feeling weaker than earlier today. CGA to stand and march at EOB. CGA to walk ~3 feet to/from bed to simulate short household distances. Pt demos fatigue and agitation with further activity - declines other movement. Able to don/doff socks from EOB using figure 4 technique. Educated on EC concepts to assist with activity tolerance. Returned to supine mod (I), mod A to assist with scoot in bed as pt reports  \"I'm sick of doing it alone.\"   OT Discharge Planning   OT Plan EC/WS handouts/education, toileting, stand G/h   OT Discharge Recommendation (DC Rec) Transitional Care Facility;home with home care occupational therapy;home with assist   OT Rationale for DC Rec Pt currently requires assist X1 for all ADLs and mobility, activity tolerance decreased and requires 6LPM NC O2 (none at baseline) - recommending TCU to increase strength. Pt reporting \"there's no way I could go home right now\"   OT Brief overview of current status CGA short distances/mobility   Total Session Time   Timed Code Treatment Minutes 10   Total Session Time (sum of timed and untimed services) 20     "

## 2022-11-02 NOTE — PROGRESS NOTES
"Pulmonary Progress Note:  November 2, 2022      Assessment & Plan:  Ms. Bernal is a 67 year old lady with a past medical history significant for oxygen dependent Gold D COPD, tobacco abuse, recent Covid-19 infection, severe protein calorie malnutrition amongst other medical issues who was admitted to the hospital on 10/8/22 with acute respiratory failure requiring intubation with mechanical ventilation and septic shock from H. Flu. Her hospital stay was complicated by the development of subsegmental pulmonary emboli, Takatsubo cardiomyopathy and splenic infarcts. She was stepped out of the ICU with plans to transition to hospice      Interval history:  On exam today she is laying in bed and states her breathing is \"fine\". It is difficult to assess her status as she gives very short, clipped answers and does not seem engaged in conversation. She states she wants to go home and is confused why she can't lay in bed at home since that is all she is doing here. We discussed her being able to make that decision and that more palliative measures are always an option. She declined discussing this further.     Plan:   1. Gold D COPD: Has severe emphysema and presented with acute hypoxemic respiratory failure secondary to a infection with H. Flu. Additionally, she had recently contracted Covid-19 which likely led to her COPD exacerbation initially. She has been extubated and off Bilevel ventilation but continues to have very high oxygen requirements (8-9 lpm) and struggles with air hunger. There had been some discussions with the ICU team about transitioning to hospice, however, in the interim her  suffered from a CVA and she has reversed her code status and would like to transition to a TCU.    Continue supplemental oxygen to maintain saturations >88%.    Continue Budesonide and scheduled duonebs.    Was noted to have a new RLL infiltrate and was initiated on Vancomycin and Pip-Tazo for this. Her leukocytosis has " "since resolved and no repeat cultures or procalcitonin have been ordered. Will recheck procalcitonin today and plan to discontinue Vancomycin if normal.     Will resume prednisone taper to see if this alleviates hypoxia (given GOC).  2. Subsegmental PE:Likely secondary to her Covid-19 infection. Has completed Remdesivir and Decadron for this.    Is on Eliquis for this same.  3. Splenic infarct: Unclear etiology. Thought to be secondary to a PFO. Treatment as above.  4. Goals of care: Was transitioned to hospice upon discharge from the ICU but then, her  had a CVA and she decided to change her goals of care. Is being seen by palliative care and was receiving Morphine for air hunger--this has since been held today due to hypotensive episodes. The patient is focusing on being transferred to a TCU.    Sybil Chilel, MANSOOR  Pulmonary Medicine  [facility]   [phone]         ________________________________________________      CC:   Chief Complaint   Patient presents with     Respiratory Distress     HPI: Lavonne Talbot is a 67 year old cachectic female with history of severe emphysema, tobacco user.  Recent diagnosis of COVID-19 viral infection 1 week ago who presented to [facility] ED on 10/8/2022 due to respiratory distress now admitted to the ICU with acute respiratory failure requiring mechanical ventilation. Patient was treated for H influenza pneumonia and pulmonary embolism. Echocardiogram showed reduced EF 30-35%, per cardiology stress induced cardiomyopathy.   N/V/abdominal distention. CT scan showed dilated bowel loops.   Extubated 10/20 to BiPAP. Moved out of ICU and is recovering out on the floor. Attempting to get stronger to get to TCU. Not quite ready for Hospice.          ROS: She is using her incentive spirometer. She is down to 6L.    Objective:   /64 (BP Location: Left arm)   Pulse 100   Temp 98  F (36.7  C) (Oral)   Resp 17   Ht 1.702 m (5' 7\")   Wt 36 kg (79 lb 6.4 " oz)   SpO2 95%   BMI 12.44 kg/m      Physical Exam  Constitutional:       Appearance: She is cachectic.      Comments: Laying in bed, alert     HENT:      Head: Normocephalic.      Mouth/Throat:      Mouth: Mucous membranes are moist.   Cardiovascular:      Rate and Rhythm: Normal rate and regular rhythm.      Heart sounds: Normal heart sounds.   Pulmonary:      Effort: Pulmonary effort is normal.      Breath sounds: Decreased breath sounds (diminished throughout) present.   Abdominal:      General: Abdomen is flat.      Palpations: Abdomen is soft.   Skin:     General: Skin is warm and dry.   Neurological:      General: No focal deficit present.      Mental Status: She is alert and oriented to person, place, and time.         Lab Results Reviewed:   Recent Labs   Lab 11/02/22  0509      CO2 38*   BUN 19.6       Recent Labs   Lab 11/02/22  0509   WBC 13.5*   HGB 10.1*   HCT 32.0*          Micro: cultures reviewed    Imaging: all imaging personally reviewed   10/27 CXR - Patient's rotated to the left. Patient's been extubated. Right IJ central line overlies the mid SVC.     Lungs are hyperexpanded consistent with COPD.     New hazy opacification of the right lower lobe with trace effusion. Unclear if this reflects some atelectasis due to volume loss or a bronchopneumonia. Endobronchial secretions noted on the recent CT of the abdomen within the lung bases.      Left lung is clear. No pneumothorax. Heart and pulmonary vascularity are normal.        10/19 CT Abd - 1.  Moderate to marked fluid and gas distention of the distal jejunum, ileum, colon and rectum with no transition point or obstructing mass compatible with severe adynamic ileus and/or a nonspecific enterocolitis. No free air or pneumatosis.  2.  Wedge-shaped perfusion defect lateral aspect mid to upper spleen is most likely an acute infarct with posttraumatic contusion possible in the appropriate clinical setting. Etiology unclear but in the  setting of recently diagnosed pulmonary emboli, paradoxical emboli related to PFO should be considered.  3.  Trace abdominal and pelvic ascites and generalized mesenteric and subcutaneous edema.  4.  Bilateral adnexal enlargement measuring up to 5 cm on the right and 3 cm on the left. Recommend pelvic ultrasound.  5.  Incidental probable 1 cm polyp distal transverse colon.    10/17 CXR - Endotracheal tube tip 3.7 cm above the izabela. Nasogastric tube tip and the distal sidehole are within the stomach. Right IJ central venous catheter tip low SVC in good position. Underlying lung hyperinflation, severe emphysema and linear opacities radiating from the sara into the periphery of both lungs that could represent bronchial wall thickening, interstitial edema and/or interstitial inflammation. No pneumothorax.

## 2022-11-02 NOTE — PROGRESS NOTES
[facility]    Medicine Progress Note - Hospitalist Service    Date of Admission:  10/8/2022    Assessment & Plan   Ms. Bernal is a 67 year old lady with a past medical history significant for oxygen dependent Gold D COPD, tobacco abuse, recent Covid-19 infection, severe protein calorie malnutrition amongst other medical issues who was admitted to the hospital on 10/8/22 with acute respiratory failure requiring intubation with mechanical ventilation and septic shock from H. Flu. Her hospital stay was complicated by the development of subsegmental pulmonary emboli, Takatsubo cardiomyopathy and splenic infarcts. She was stepped out of the ICU with plans to transition to hospice        Plan:   1. Gold D COPD: Has severe emphysema and presented with acute hypoxemic respiratory failure secondary to a infection with H. Flu. Additionally, she had recently contracted Covid-19 which likely led to her COPD exacerbation initially. She has been extubated and off Bilevel ventilation but continues to have very high oxygen requirements and struggles with air hunger. There had been some discussions with the ICU team about transitioning to hospice, however, in the interim her  suffered from a CVA and she has reversed her code status and would like to transition to a TCU.    Continue supplemental oxygen to maintain saturations >88%.    Continue Budesonide and scheduled duonebs.    Was noted to have a new RLL infiltrate and was initiated on Vancomycin and Pip-Tazo for this. Her leukocytosis has since resolved and no repeat cultures or procalcitonin have been ordered. Will recheck procalcitonin today and plan to discontinue Vancomycin if normal.     Will resume prednisone taper to see if this alleviates hypoxia (given GOC)    Oxygen needs are down to 6 L nasal cannula  2. Subsegmental PE:Likely secondary to her Covid-19 infection. Has completed Remdesivir and Decadron for this.    Is on Eliquis for this  "same.  3. Splenic infarct: Unclear etiology. Thought to be secondary to a PFO. Treatment as above.  4. Goals of care: Was transitioned to hospice upon discharge from the ICU but then, her  had a CVA and she decided to change her goals of care. Is being seen by palliative care and was receiving Morphine for air hunger--this has since been held today due to hypotensive episodes. The patient is focusing on being transferred to a TCU.      Diet: Advance Diet as Tolerated: Regular Diet Adult  Snacks/Supplements Adult: Ensure Enlive; With Meals  Snacks/Supplements Adult: Other; Special K bar; Between Meals  Snacks/Supplements Adult: Gelatein Plus; With Meals    DVT Prophylaxis: DOAC  [place] Catheter: Not present  Central Lines: None  Cardiac Monitoring: None  Code Status: No CPR- Do NOT Intubate      Disposition Plan      Expected Discharge Date: 11/03/2022    Discharge Delays: Placement - TCU  Oxygen Needs - Arrange Home O2    Discharge Comments: TCU  wean O2        The patient's care was discussed with the Patient.    Lo Murphy MD  Hospitalist Service  [facility]  Securely message with the Vocera Web Console (learn more here)  Text page via [employer] Paging/Directory       Clinically Significant Risk Factors              # Hypoalbuminemia: Lowest albumin = 2.6 g/dL (Ref range: 3.5-5.2) at 10/8/2022 11:32 AM, will monitor as appropriate          # Cachexia: Estimated body mass index is 12.44 kg/m  as calculated from the following:    Height as of this encounter: 1.702 m (5' 7\").    Weight as of this encounter: 36 kg (79 lb 6.4 oz).   # Severe Malnutrition: based on nutrition assessment      ______________________________________________________________________    Interval History   Patient denies being short of breath.  She did say that she was up out of bed today and felt very dizzy.  She has not moved around all that much.  Denies any new symptoms.    Data reviewed today: I reviewed " all medications, new labs and imaging results over the last 24 hours. I personally reviewed no images or EKG's today.    Physical Exam   Vital Signs: Temp: 98.1  F (36.7  C) Temp src: Oral BP: 99/64 Pulse: 95   Resp: 24 SpO2: 91 % O2 Device: Nasal cannula with humidification Oxygen Delivery: 6 LPM  Weight: 79 lbs 6.4 oz  Physical Exam  Constitutional:       Appearance: She is ill-appearing.   HENT:      Head: Normocephalic and atraumatic.      Mouth/Throat:      Mouth: Mucous membranes are dry.   Eyes:      Extraocular Movements: Extraocular movements intact.      Conjunctiva/sclera: Conjunctivae normal.   Cardiovascular:      Rate and Rhythm: Normal rate and regular rhythm.   Pulmonary:      Comments: Diminished breath sounds throughout.  Mild expiratory wheeze.  Abdominal:      General: Abdomen is flat.      Palpations: Abdomen is soft.   Musculoskeletal:      Cervical back: Neck supple.      Right lower leg: No edema.      Left lower leg: No edema.   Skin:     General: Skin is warm and dry.   Neurological:      General: No focal deficit present.      Mental Status: She is alert and oriented to person, place, and time.       Data   Recent Labs   Lab 11/01/22  0654 10/31/22  0628 10/30/22  0713   WBC 5.4 6.9 6.8   HGB 11.2* 11.6* 11.5*   * 101* 101*    415 362    138 138   POTASSIUM 4.0 3.6 3.6   CHLORIDE 99 94* 95*   CO2 40* 45* 38*   BUN 20.2 17.1 17.2   CR 0.38* 0.52 0.52   ANIONGAP 1* <1* 5*   JASON 8.1* 8.3* 8.1*   * 80 83     No results found for this or any previous visit (from the past 24 hour(s)).  Medications     dextrose         apixaban ANTICOAGULANT  5 mg Oral BID     budesonide  1 mg Nebulization 2 times daily     [Held by provider] furosemide  20 mg Intravenous Q12H     ipratropium - albuterol 0.5 mg/2.5 mg/3 mL  3 mL Nebulization 4x daily     mirtazapine  15 mg Oral At Bedtime     piperacillin-tazobactam  3.375 g Intravenous Q8H     polyethylene glycol  17 g Oral Daily      predniSONE  40 mg Oral Daily    Followed by     [START ON 11/4/2022] predniSONE  30 mg Oral Daily    Followed by     [START ON 11/9/2022] predniSONE  20 mg Oral Daily    Followed by     [START ON 11/14/2022] predniSONE  10 mg Oral Daily    Followed by     [START ON 11/19/2022] predniSONE  5 mg Oral Daily     thiamine  100 mg Per Feeding Tube Daily     vancomycin  750 mg Intravenous Q12H

## 2022-11-02 NOTE — PROGRESS NOTES
"Patient received scheduled neb, breath sounds diminished pre and post neb. On 6 L HFNC, sats of 97%.       BP 91/54 (BP Location: Left arm)   Pulse 92   Temp 98  F (36.7  C) (Oral)   Resp 18   Ht 1.702 m (5' 7\")   Wt 36 kg (79 lb 6.4 oz)   SpO2 97%   BMI 12.44 kg/m      "

## 2022-11-02 NOTE — PROGRESS NOTES
[facility]  Palliative Care Daily Progress Note       Recommendations & Counseling     Reviewed code status with Lavonne today. She currently remains DNR/DNI based on discussions with family while Lavonne was intubated and sedated in ICU. Lavonne is unsure whether she wants to remain DNR/DNI on discharge. Will allow time for her to process and discuss with family. If all in agreement, can complete POLST before discharge.    Goals of Care: life-prolonging with limit of DNR/DNI    Advanced Care Planning:  Advance directive: No  POLST: No  Code status: DNR/DNI  Health care agent/surrogate: Frantz (son) along with Jess (daughter), Jermaine () recently had a stroke and has limited ability to communicate    Symptom Management:  #Dyspnea, tachypnea,hypoxia - improving  - Appreciate Pulm input  - Hold scheduled morphine for soft BPs  - Continue prn morphine for dyspnea    #Anorexia, cachexia, insomnia  - Continue mirtazapine 15mg    Psychosocial & Spiritual:   Appreciate Palliative SW and Spiritual Health      Assessments          Lavonne is a 68 y/o woman, active smoker, with history of lymphedema, polycythemia, and recent diagnosis of Covid infection, admitted to ICU on 10/8/2022 with acute respiratory failure requiring ventilation and circulatory shock requiring vasopressors. Respiratory failure attributed to CAP vs aspiration pneumonia with small PE. 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  - Extubated on 10/20 to BiPAP    Today, the patient was seen for:  Symptom management, support    Prognosis, Goals, or Advance Care Planning was addressed today with: Yes.  Mood, coping, and/or meaning in the context of serious illness were addressed today: Yes.              Interval History:     Chart review/discussion with unit or clinical team members:   - Weaned to 6L oxygen    Per patient or family/caregivers today:  Lavonne is feeling ok today. Notes continued frustration with weakness which limits her  "mobility. Again notes that \"none of this would have happened\" if she hadn't gotten Covid. Lavonne appreciated visit from her sister Rosette over the weekend.     Briefly reviewed my conversation yesterday with son Frantz over the phone. Explained that while Lavonne was in the ICU, her family decided that she would not have wanted a tracheostomy. The planned for extubation without re-intubation even if her time appeared short (DNI). Lavonne confirmed today that she would not want a tracheostomy. She hopes she never has to return to the ICU or be intubated again, but she is not sure what she would want if she does get sicker. She appeared to shut down during our conversation and did not seem to want to engage further in this discussion.     Key Palliative Symptoms:  Denies pain or dyspnea           Review of Systems:     3 system review negative          Medications:     I have reviewed this patient's medication profile and medications during this hospitalization.    Noted meds:    Current Facility-Administered Medications   Medication     apixaban ANTICOAGULANT (ELIQUIS) tablet 5 mg     budesonide (PULMICORT) neb solution 1 mg     dextrose 10% infusion     glucose gel 15-30 g    Or     dextrose 50 % injection 25-50 mL    Or     glucagon injection 1 mg     ipratropium - albuterol 0.5 mg/2.5 mg/3 mL (DUONEB) neb solution 3 mL     LubriFresh P.M. OINT     melatonin tablet 3 mg     mirtazapine (REMERON) tablet 15 mg     morphine solution 5 mg     naloxone (NARCAN) injection 0.1 mg     naloxone (NARCAN) injection 0.2 mg     naloxone (NARCAN) injection 0.2 mg    Or     naloxone (NARCAN) injection 0.4 mg    Or     naloxone (NARCAN) injection 0.2 mg    Or     naloxone (NARCAN) injection 0.4 mg     ondansetron (ZOFRAN ODT) ODT tab 4 mg    Or     ondansetron (ZOFRAN) injection 4 mg     ondansetron (ZOFRAN) injection 4 mg     piperacillin-tazobactam (ZOSYN) 3.375 g vial to attach to  mL bag     polyethylene glycol (MIRALAX) Packet 17 g " "    predniSONE (DELTASONE) tablet 40 mg    Followed by     [START ON 11/4/2022] predniSONE (DELTASONE) tablet 30 mg    Followed by     [START ON 11/9/2022] predniSONE (DELTASONE) tablet 20 mg    Followed by     [START ON 11/14/2022] predniSONE (DELTASONE) tablet 10 mg    Followed by     [START ON 11/19/2022] predniSONE (DELTASONE) tablet 5 mg     thiamine (B-1) tablet 100 mg     vancomycin (VANCOCIN) 750 mg in 0.9% NaCl 250 mL intermittent infusion                Physical Exam:   Vital Signs: Blood pressure 107/66, pulse 91, temperature 98  F (36.7  C), temperature source Oral, resp. rate 16, height 1.702 m (5' 7\"), weight 36 kg (79 lb 6.4 oz), SpO2 93 %.   GENERAL: laying in bed, on nasal cannula, awake and alert  SKIN: Warm and dry   HEENT: +temporal muscle wasting  LUNGS: non-labored  MUSKL: no gross joint deformities   EXTREMITIES: No edema              Data Reviewed:     Reviewed recent pertinent imaging:   No new imaging    Reviewed recent labs:   CMP  Recent Labs   Lab 11/02/22  0509 11/01/22  0654 10/31/22  0628 10/30/22  0713    140 138 138   POTASSIUM 3.4 4.0 3.6 3.6   CHLORIDE 100 99 94* 95*   CO2 38* 40* 45* 38*   ANIONGAP 5* 1* <1* 5*   GLC 80 134* 80 83   BUN 19.6 20.2 17.1 17.2   CR 0.41* 0.38* 0.52 0.52   GFRESTIMATED >90 >90 >90 >90   JASON 8.2* 8.1* 8.3* 8.1*   MAG 2.1 2.4* 2.0 1.9   PHOS 2.3* 3.0 3.1 2.8     CBC  Recent Labs   Lab 11/02/22  0509 11/01/22 0654 10/31/22  0628 10/30/22  0713   WBC 13.5* 5.4 6.9 6.8   RBC 3.23* 3.54* 3.73* 3.61*   HGB 10.1* 11.2* 11.6* 11.5*   HCT 32.0* 35.6 37.8 36.4   MCV 99 101* 101* 101*   MCH 31.3 31.6 31.1 31.9   MCHC 31.6 31.5 30.7* 31.6   RDW 13.3 13.2 13.4 13.5    409 415 362         Jessica Richards PA-C  [facility], Palliative Care  Dept phone: [phone]  Securely message with the Vocera Web Console  "

## 2022-11-02 NOTE — PROGRESS NOTES
Care Management Follow Up    Length of Stay (days): 25    Expected Discharge Date: 11/03/2022     Concerns to be Addressed:  (O2 status - pt needs to be at 6L O2 or less to qualify for TCU; placement)     Patient plan of care discussed at interdisciplinary rounds: Yes    Anticipated Discharge Disposition: Transitional Care     Anticipated Discharge Services:    Anticipated Discharge DME:      Referrals Placed by CM/SW: Post Acute Facilities  Private pay costs discussed: Not applicable    Additional Information:    Seeking TCU as O2 needs decrease. SWCM spoke with respiratory - additional tubing with humidification is being used so pt is not quite at 6L NC yet. Pt still needs some weaning, but it getting closer to 6L and below. Updated referrals sent to TCU. CM to f/u with a [facility] network to see if any facilities could handle higher than 6L O2 mgmt. CM following.        MEL OcampoSW

## 2022-11-03 ENCOUNTER — APPOINTMENT (OUTPATIENT)
Dept: CT IMAGING | Facility: HOSPITAL | Age: 67
DRG: 207 | End: 2022-11-03
Attending: INTERNAL MEDICINE
Payer: COMMERCIAL

## 2022-11-03 ENCOUNTER — APPOINTMENT (OUTPATIENT)
Dept: OCCUPATIONAL THERAPY | Facility: HOSPITAL | Age: 67
DRG: 207 | End: 2022-11-03
Payer: COMMERCIAL

## 2022-11-03 LAB
ANION GAP SERPL CALCULATED.3IONS-SCNC: 3 MMOL/L (ref 7–15)
BUN SERPL-MCNC: 19.2 MG/DL (ref 8–23)
CALCIUM SERPL-MCNC: 7.9 MG/DL (ref 8.8–10.2)
CHLORIDE SERPL-SCNC: 103 MMOL/L (ref 98–107)
CREAT SERPL-MCNC: 0.34 MG/DL (ref 0.51–0.95)
DEPRECATED HCO3 PLAS-SCNC: 35 MMOL/L (ref 22–29)
ERYTHROCYTE [DISTWIDTH] IN BLOOD BY AUTOMATED COUNT: 13.7 % (ref 10–15)
GFR SERPL CREATININE-BSD FRML MDRD: >90 ML/MIN/1.73M2
GLUCOSE SERPL-MCNC: 76 MG/DL (ref 70–99)
HCT VFR BLD AUTO: 33 % (ref 35–47)
HGB BLD-MCNC: 10.3 G/DL (ref 11.7–15.7)
MAGNESIUM SERPL-MCNC: 1.9 MG/DL (ref 1.7–2.3)
MCH RBC QN AUTO: 31.1 PG (ref 26.5–33)
MCHC RBC AUTO-ENTMCNC: 31.2 G/DL (ref 31.5–36.5)
MCV RBC AUTO: 100 FL (ref 78–100)
PHOSPHATE SERPL-MCNC: 3 MG/DL (ref 2.5–4.5)
PLATELET # BLD AUTO: 411 10E3/UL (ref 150–450)
POTASSIUM SERPL-SCNC: 3.4 MMOL/L (ref 3.4–5.3)
POTASSIUM SERPL-SCNC: 3.9 MMOL/L (ref 3.4–5.3)
RBC # BLD AUTO: 3.31 10E6/UL (ref 3.8–5.2)
SODIUM SERPL-SCNC: 141 MMOL/L (ref 136–145)
WBC # BLD AUTO: 13.5 10E3/UL (ref 4–11)

## 2022-11-03 PROCEDURE — 97535 SELF CARE MNGMENT TRAINING: CPT | Mod: GO

## 2022-11-03 PROCEDURE — 250N000012 HC RX MED GY IP 250 OP 636 PS 637: Performed by: INTERNAL MEDICINE

## 2022-11-03 PROCEDURE — 120N000001 HC R&B MED SURG/OB

## 2022-11-03 PROCEDURE — 250N000013 HC RX MED GY IP 250 OP 250 PS 637: Performed by: PHYSICIAN ASSISTANT

## 2022-11-03 PROCEDURE — 99406 BEHAV CHNG SMOKING 3-10 MIN: CPT

## 2022-11-03 PROCEDURE — 83735 ASSAY OF MAGNESIUM: CPT | Performed by: INTERNAL MEDICINE

## 2022-11-03 PROCEDURE — 36415 COLL VENOUS BLD VENIPUNCTURE: CPT | Performed by: INTERNAL MEDICINE

## 2022-11-03 PROCEDURE — 99233 SBSQ HOSP IP/OBS HIGH 50: CPT | Performed by: INTERNAL MEDICINE

## 2022-11-03 PROCEDURE — 250N000013 HC RX MED GY IP 250 OP 250 PS 637: Performed by: INTERNAL MEDICINE

## 2022-11-03 PROCEDURE — 85027 COMPLETE CBC AUTOMATED: CPT | Performed by: INTERNAL MEDICINE

## 2022-11-03 PROCEDURE — 250N000009 HC RX 250: Performed by: INTERNAL MEDICINE

## 2022-11-03 PROCEDURE — 70450 CT HEAD/BRAIN W/O DYE: CPT

## 2022-11-03 PROCEDURE — 94640 AIRWAY INHALATION TREATMENT: CPT

## 2022-11-03 PROCEDURE — 250N000011 HC RX IP 250 OP 636: Performed by: INTERNAL MEDICINE

## 2022-11-03 PROCEDURE — 80048 BASIC METABOLIC PNL TOTAL CA: CPT | Performed by: INTERNAL MEDICINE

## 2022-11-03 PROCEDURE — 258N000003 HC RX IP 258 OP 636: Performed by: INTERNAL MEDICINE

## 2022-11-03 PROCEDURE — 999N000157 HC STATISTIC RCP TIME EA 10 MIN

## 2022-11-03 PROCEDURE — 999N000032 HC STATISTIC CHRONIC DISEASE SPECIALIST RT CONSULT

## 2022-11-03 PROCEDURE — 84100 ASSAY OF PHOSPHORUS: CPT | Performed by: INTERNAL MEDICINE

## 2022-11-03 PROCEDURE — 94640 AIRWAY INHALATION TREATMENT: CPT | Mod: 76

## 2022-11-03 PROCEDURE — 84132 ASSAY OF SERUM POTASSIUM: CPT | Performed by: INTERNAL MEDICINE

## 2022-11-03 PROCEDURE — 99232 SBSQ HOSP IP/OBS MODERATE 35: CPT | Performed by: INTERNAL MEDICINE

## 2022-11-03 RX ORDER — POTASSIUM CHLORIDE 1500 MG/1
20 TABLET, EXTENDED RELEASE ORAL ONCE
Status: COMPLETED | OUTPATIENT
Start: 2022-11-03 | End: 2022-11-03

## 2022-11-03 RX ORDER — MAGNESIUM OXIDE 400 MG/1
400 TABLET ORAL EVERY 4 HOURS
Status: COMPLETED | OUTPATIENT
Start: 2022-11-03 | End: 2022-11-03

## 2022-11-03 RX ADMIN — PREDNISONE 40 MG: 20 TABLET ORAL at 08:12

## 2022-11-03 RX ADMIN — Medication 400 MG: at 08:12

## 2022-11-03 RX ADMIN — THIAMINE HCL TAB 100 MG 100 MG: 100 TAB at 08:12

## 2022-11-03 RX ADMIN — PIPERACILLIN AND TAZOBACTAM 3.38 G: 3; .375 INJECTION, POWDER, LYOPHILIZED, FOR SOLUTION INTRAVENOUS at 06:19

## 2022-11-03 RX ADMIN — IPRATROPIUM BROMIDE AND ALBUTEROL SULFATE 3 ML: 2.5; .5 SOLUTION RESPIRATORY (INHALATION) at 16:02

## 2022-11-03 RX ADMIN — IPRATROPIUM BROMIDE AND ALBUTEROL SULFATE 3 ML: 2.5; .5 SOLUTION RESPIRATORY (INHALATION) at 07:21

## 2022-11-03 RX ADMIN — APIXABAN 5 MG: 5 TABLET, FILM COATED ORAL at 21:10

## 2022-11-03 RX ADMIN — PIPERACILLIN AND TAZOBACTAM 3.38 G: 3; .375 INJECTION, POWDER, LYOPHILIZED, FOR SOLUTION INTRAVENOUS at 13:26

## 2022-11-03 RX ADMIN — MIRTAZAPINE 15 MG: 15 TABLET, FILM COATED ORAL at 21:10

## 2022-11-03 RX ADMIN — BUDESONIDE 1 MG: 0.5 INHALANT ORAL at 19:03

## 2022-11-03 RX ADMIN — BUDESONIDE 1 MG: 0.5 INHALANT ORAL at 07:21

## 2022-11-03 RX ADMIN — POTASSIUM CHLORIDE 20 MEQ: 1500 TABLET, EXTENDED RELEASE ORAL at 08:12

## 2022-11-03 RX ADMIN — IPRATROPIUM BROMIDE AND ALBUTEROL SULFATE 3 ML: 2.5; .5 SOLUTION RESPIRATORY (INHALATION) at 19:03

## 2022-11-03 RX ADMIN — APIXABAN 5 MG: 5 TABLET, FILM COATED ORAL at 08:12

## 2022-11-03 RX ADMIN — MELATONIN TAB 3 MG 3 MG: 3 TAB at 22:04

## 2022-11-03 RX ADMIN — IPRATROPIUM BROMIDE AND ALBUTEROL SULFATE 3 ML: 2.5; .5 SOLUTION RESPIRATORY (INHALATION) at 11:59

## 2022-11-03 RX ADMIN — VANCOMYCIN HYDROCHLORIDE 750 MG: 5 INJECTION, POWDER, LYOPHILIZED, FOR SOLUTION INTRAVENOUS at 05:02

## 2022-11-03 RX ADMIN — Medication 400 MG: at 13:25

## 2022-11-03 RX ADMIN — PIPERACILLIN AND TAZOBACTAM 3.38 G: 3; .375 INJECTION, POWDER, LYOPHILIZED, FOR SOLUTION INTRAVENOUS at 23:08

## 2022-11-03 ASSESSMENT — ACTIVITIES OF DAILY LIVING (ADL)
ADLS_ACUITY_SCORE: 35

## 2022-11-03 NOTE — PROGRESS NOTES
11:10 AM  ASHWIN called and spoke to Hannah with [facility] to follow up on referral. ASHWIN updated Hannah that Pt's O2 needs are now down to 6 L NC. Hannah states she will review Pt again and will call ASHWIN back. [facility] to follow up on referrals.     DARNELL Hewitt

## 2022-11-03 NOTE — PLAN OF CARE
[id number]  Problem: Anxiety Signs/Symptoms  Goal: Improved Mood Symptoms (Anxiety Signs/Symptoms)  Intervention: Optimize Emotion and Mood  Recent Flowsheet Documentation  Taken 11/2/2022 1705 by Chrissy Spencer RN  Supportive Measures: active listening utilized  Taken 11/2/2022 1015 by Chrissy Spencer RN  Supportive Measures: active listening utilized  Goal: Enhanced Social, Occupational or Functional Skills (Anxiety Signs/Symptoms)  Intervention: Promote Social, Occupational and Functional Ability  Recent Flowsheet Documentation  Taken 11/2/2022 0801 by Chrissy Spencer RN  Trust Relationship/Rapport:    emotional support provided    questions answered    empathic listening provided    reassurance provided     Problem: Heart Failure Comorbidity  Goal: Maintenance of Heart Failure Symptom Control  Intervention: Maintain Heart Failure Management  Recent Flowsheet Documentation  Taken 11/2/2022 1705 by Chrissy Spencer RN  Medication Review/Management: medications reviewed  Taken 11/2/2022 1015 by Chrissy Spencer RN  Medication Review/Management: medications reviewed   Goal Outcome Evaluation:  Alert and oriented X4.  Patient on High flow NC at 6L sating at 92-94%  Denies pain. Eating 50-75% of meals. Drinking ensure X2.  Up to the bedside commode.

## 2022-11-03 NOTE — PROGRESS NOTES
RESPIRATORY CARE NOTE     Patient Name: Lavonne Talbot  Today's Date: 11/3/2022       Pt continues to receive duo neb. BS are dem. Pt is on 6lpm of oxygen via NC, SpO2 is 96%. Post treatment there is no change.  RT will continue to monitor and assess.

## 2022-11-03 NOTE — PLAN OF CARE
Problem: Malnutrition  Goal: Improved Nutritional Intake  Outcome: Progressing   Goal Outcome Evaluation:       Pt consumed % meals 11/2-3 and is taking supplements

## 2022-11-03 NOTE — PLAN OF CARE
Problem: COPD (Chronic Obstructive Pulmonary Disease) Comorbidity  Goal: Maintenance of COPD Symptom Control  Intervention: Maintain COPD-Symptom Control  Recent Flowsheet Documentation  Taken 11/3/2022 0813 by Chrissy Spencer RN  Supportive Measures: active listening utilized  Medication Review/Management: medications reviewed     Problem: Malnutrition  Goal: Improved Nutritional Intake  Outcome: Progressing     Problem: Gas Exchange Impaired  Goal: Optimal Gas Exchange  Outcome: Progressing  Intervention: Optimize Oxygenation and Ventilation  Recent Flowsheet Documentation  Taken 11/3/2022 0813 by Chrissy Spencer RN  Head of Bed (HOB) Positioning: HOB at 20-30 degrees   Goal Outcome Evaluation:  Alert and oriented  Patient continue on high flow 6L NC sating 94% .   Denies pain .  Consumed 75% for breakfast and 100% Lunch. Good fluid intake.  Hard 3 large loose BM this shift. Sacral mepilex changed.

## 2022-11-03 NOTE — CONSULTS
Tobacco Treatment Consult  11/3/2022, 10:44 AM    Patient Admitted for: Acute respiratory failure with hypoxia (H) [J96.01]  Hypotension, unspecified hypotension type [I95.9]  Single subsegmental pulmonary embolism without acute cor pulmonale (H) [I26.93]  Infection due to 2019 novel coronavirus [U07.1] on 10/8/2022    History of Tobacco Use:   Tobacco Product(s):cigarette  Amount used: No longer smokes every day but smokes an 5-6 cigarettes per week  Assessment:   Lavonne was too tired to speak in length with me today. She states that she is not having any problem with cravings during this admission and she feels to sick to want to smoke.  .    Education done during visit:  -Educated on physical benefits to health of tobacco cessation  -Made sure she knows that NRT is available to assist if she has problems with nicotine withdrawal or cravings  -She accepted tobacco cessation materials and resource information.    Recommendations:  -In the hospital recommend the patient have the following pharmacotherapy: NRT  Patch if patient starts having problems with cravings or withdrawal symptoms.  -Upon discharge recommend the patient have the following pharmacotherapy: Patient is not interested in at this time. She has been given resource information should she want to try any in the future. Would recommend NRT patch for her comfort should she start to have cravings or withdrawal symptoms.     Will continue to be available to patient throughout hospital stay.    Total 5 minutes spent in smoking cessation, and 30 minutes spent in chart review,care coordination, and documentation.    Yordy Gonzalez RT, Chronic Pulmonary Disease Specialist, Certified Tobacco Treatment Specialist  Phone [phone]

## 2022-11-03 NOTE — PROGRESS NOTES
"CLINICAL NUTRITION SERVICES - REASSESSMENT NOTE     Nutrition Prescription    RECOMMENDATIONS FOR MDs/PROVIDERS TO ORDER:    Malnutrition Status:    Severe in chronic illness    Recommendations already ordered by Registered Dietitian (RD):  Continue current nutritional supplements    Future/Additional Recommendations:  Follow po intake, weight, labs, poc     EVALUATION OF THE PROGRESS TOWARD GOALS   Diet: Regular  Nutrition Supplements: chocolate ensure enlive twice per day, special K bar at HS and pineapple gelatein plus with lunch meal  Intake: Ate 100% breakfast this am (528 Calories and 28 g protein).  Ate 75% lunch meal yesterday (533 Calories and 28 g protein-supplement included)  Pt states she is taking supplements but doesn't remember if she likes the pineapple gelatein or not. She doesn't know how much she's been eating at meal times. (% 11/2-11/3): (25-50% 11/1)       ANTHROPOMETRICS  Height: 170.2 cm (5' 7\")  Most Recent Weight: 36 kg (79 lb 6.4 oz)    Weight History:   Wt Readings from Last 10 Encounters:   10/28/22 36 kg (79 lb 6.4 oz)   Admit weight: 35 Kg (77 lb 2.6 oz)    GI CONCERNS  Fecal incontinence  Hypoactive BS LUQ and RUQ  Last BM 11/3 (moderate mucoid, green)    LABS  Reviewed: Na 141, K 3.4, Mg 1.9, phos 3, Glu 76    MEDICATIONS  Reviewed: Mag-Ox, remeron, zosyn, miralax, prednisone, thiamine, vancocin    Malnutrition Diagnosis: Severe malnutrition  In Context of:  Chronic illness or disease    CURRENT NUTRITION DIAGNOSIS  Malnutrition related to chronic illness as evidenced by meeting < 75% of energy needs for > 1 month, >20% weight loss in 1 year and severe generalized muscle and adipose tissue depletion      INTERVENTIONS  Implementation  Continue current supplements  Encouraged po intake    Goals  Pt will meet > 75% Kcal and protein needs through oral intake-progressing  Weight gain 102 lb/week-progressing    Monitoring/Evaluation  Progress toward goals will be monitored and " evaluated per protocol.

## 2022-11-03 NOTE — PROGRESS NOTES
[facility]  Palliative Care Chart Check Note    Palliative medicine was consulted for goals of care. Continued to follow for symptom management and emotional support.    Chart reviewed and recent events noted from preceding day. Weaning oxygen with goal of discharge to TCU. Discussed with Hospitalist team, plan for CT head to evaluate for cause of dizziness.    Goals of care: life-prolonging with limit of DNR/DNI    Advanced care planning: None - attempted to discuss POLST yesterday but Lavonne was not open to further discussion    Symptoms:  #Dyspnea, tachypnea,hypoxia  - Currently improving, weaning oxygen  - Continue prn morphine for dyspnea     #Anorexia, cachexia, insomnia  - Continue mirtazapine 15mg    Jessica Richards PA-C  [facility], Palliative Care  Dept phone: [phone]  Securely message with the Vocera Web Console

## 2022-11-03 NOTE — PROGRESS NOTES
[facility]    Medicine Progress Note - Hospitalist Service    Date of Admission:  10/8/2022    Assessment & Plan   Lavonne Talbot is a 67 year old female with a past medical history significant for oxygen dependent COPD/emphysema, tobacco abuse, recent Covid-19 infection, severe protein calorie malnutrition amongst other medical issues who was admitted to the hospital on 10/8/22 with acute respiratory failure requiring intubation with mechanical ventilation and septic shock from H. Flu. Her hospital stay was complicated by the development of subsegmental pulmonary emboli, Takatsubo cardiomyopathy and splenic infarcts.       Plan:   1. Acute respiratory failure with hypoxia requiring intubation likely a combination of COPD exacerbation, COVID-19 infection, subsegmental PE: Patient is now extubated and remains on oxygen via high flow nasal cannula.  Slowly wean oxygen as tolerated.  Patient does not wear oxygen at home  2. COPD: Has severe emphysema and presented with acute hypoxemic respiratory failure secondary to a infection with H. Flu. Additionally, she had recently contracted Covid-19 which likely led to her COPD exacerbation initially. She has been extubated and off Bilevel ventilation but continues to have very high oxygen requirements and struggles with air hunger. There had been some discussions with the ICU team about transitioning to hospice, however, in the interim her  suffered from a CVA and she has reversed her code status and would like to transition to a TCU.    Continue supplemental oxygen to maintain saturations >88%.    Continue Budesonide and scheduled duonebs.    Was noted to have a new RLL infiltrate and was initiated on Vancomycin and Pip-Tazo for this. Her leukocytosis has since resolved and no repeat cultures or procalcitonin have been ordered.  Procalcitonin on 1031 was normal.  We will stop Zosyn and Vanco after 7 days of treatment.    Will resume  prednisone taper   Subsegmental PE: Likely secondary to her Covid-19 infection. Has completed Remdesivir and Decadron for this.  Eliquis 5 mg p.o. twice daily.  Would treat for 3 to 6 months.  3. Splenic infarct: Unclear etiology. Thought to be secondary to a PFO. Treatment as above.  4. Goals of care: Was transitioned to hospice upon discharge from the ICU but then, her  had a CVA and she decided to change her goals of care.  She is is being seen by palliative care and was receiving Morphine for air hunger--this has since been held due to hypotensive episodes. The patient is focusing on being transferred to a TCU  5. Dizziness: Unclear etiology.  Orthostatic vital signs were negative.  Patient says that when she stands up for therapy, she feels dizzy but then after a little while this resolves.  No history of vertigo prior to her hospitalization.  We talked about doing an MRI and she does not feel that she will be able to tolerate it.  CT scan of head with contrast ordered instead.  6. Deconditioning: Continue PT/OT  7. Tobacco dependence: Patient was given smoking cessation information by RT       Diet: Advance Diet as Tolerated: Regular Diet Adult  Snacks/Supplements Adult: Ensure Enlive; With Meals  Snacks/Supplements Adult: Other; Special K bar; Between Meals  Snacks/Supplements Adult: Gelatein Plus; With Meals    DVT Prophylaxis: DOAC  Monaco Catheter: Not present  Central Lines: None  Cardiac Monitoring: None  Code Status: No CPR- Do NOT Intubate      Disposition Plan      Expected Discharge Date: 11/04/2022    Discharge Delays: Placement - TCU  Oxygen Needs - Arrange Home O2    Discharge Comments: TCU  wean O2        The patient's care was discussed with the Patient.    Lo Murphy MD  Hospitalist Service  [facility]  Securely message with the Vocera Web Console (learn more here)  Text page via [employer] Paging/Directory     Clinically Significant Risk Factors             "  # Hypoalbuminemia: Lowest albumin = 2.6 g/dL (Ref range: 3.5-5.2) at 10/8/2022 11:32 AM, will monitor as appropriate          # Cachexia: Estimated body mass index is 14.21 kg/m  as calculated from the following:    Height as of this encounter: 1.702 m (5' 7\").    Weight as of this encounter: 41.1 kg (90 lb 11.2 oz).   # Severe Malnutrition: based on nutrition assessment      _____________________________________________________________________    Interval History   Continues to have symptoms of dizziness.  Otherwise she denies any other symptoms.  Breathing is about the same.    Data reviewed today: I reviewed all medications, new labs and imaging results over the last 24 hours.    Physical Exam   Vital Signs: Temp: 99.2  F (37.3  C) Temp src: Oral BP: 114/70 Pulse: 96   Resp: 18 SpO2: 95 % O2 Device: None (Room air) Oxygen Delivery: 6 LPM  Weight: 90 lbs 11.2 oz  Physical Exam  Constitutional:       Appearance: She is ill-appearing.   HENT:      Head: Normocephalic and atraumatic.      Mouth/Throat:      Mouth: Mucous membranes are dry.   Eyes:      Extraocular Movements: Extraocular movements intact.      Conjunctiva/sclera: Conjunctivae normal.   Cardiovascular:      Rate and Rhythm: Normal rate and regular rhythm.   Pulmonary:      Comments: Diminished breath sounds throughout.  Mild expiratory wheeze.  Abdominal:      General: Abdomen is flat.      Palpations: Abdomen is soft.   Musculoskeletal:      Cervical back: Neck supple.      Right lower leg: No edema.      Left lower leg: No edema.   Skin:     General: Skin is warm and dry.   Neurological:      General: No focal deficit present.      Mental Status: She is alert and oriented to person, place, and time.       Data   Recent Labs   Lab 11/03/22  1148 11/03/22  0549 11/02/22  0509 11/01/22  0654   WBC  --  13.5* 13.5* 5.4   HGB  --  10.3* 10.1* 11.2*   MCV  --  100 99 101*   PLT  --  411 401 409   NA  --  141 143 140   POTASSIUM 3.9 3.4 3.4 4.0   CHLORIDE "  --  103 100 99   CO2  --  35* 38* 40*   BUN  --  19.2 19.6 20.2   CR  --  0.34* 0.41* 0.38*   ANIONGAP  --  3* 5* 1*   JASON  --  7.9* 8.2* 8.1*   GLC  --  76 80 134*     No results found for this or any previous visit (from the past 24 hour(s)).  Medications     dextrose         apixaban ANTICOAGULANT  5 mg Oral BID     budesonide  1 mg Nebulization 2 times daily     ipratropium - albuterol 0.5 mg/2.5 mg/3 mL  3 mL Nebulization 4x daily     mirtazapine  15 mg Oral At Bedtime     piperacillin-tazobactam  3.375 g Intravenous Q8H     polyethylene glycol  17 g Oral Daily     [START ON 11/4/2022] predniSONE  30 mg Oral Daily    Followed by     [START ON 11/9/2022] predniSONE  20 mg Oral Daily    Followed by     [START ON 11/14/2022] predniSONE  10 mg Oral Daily    Followed by     [START ON 11/19/2022] predniSONE  5 mg Oral Daily     thiamine  100 mg Per Feeding Tube Daily     vancomycin  750 mg Intravenous Q12H

## 2022-11-03 NOTE — PLAN OF CARE
"Goal Outcome Evaluation:      Problem: COPD (Chronic Obstructive Pulmonary Disease) Comorbidity  Goal: Maintenance of COPD Symptom Control  Outcome: Progressing     Problem: Anxiety Signs/Symptoms  Goal: Optimized Energy Level (Anxiety Signs/Symptoms)  Outcome: Progressing     Problem: Malnutrition  Goal: Improved Nutritional Intake  Outcome: Progressing     Pt remains A&Ox4, calls approprietly. One loose incontinent bowel movement on shift. Pt has a flat affect and expresses frustration with being in the hospital and states \"what's the point of eating when I just poop it all out right away\".  Remains on 6L NC sating 92-95%.     Leah Feliciano RN    "

## 2022-11-03 NOTE — PROGRESS NOTES
RESPIRATORY CARE NOTE     Patient was on 6 lpm high flow canula and had an Sp02 of 91%. They received Duonebb x2 and Pulmicort this morning.   Breath sounds prior to treatment where diminished pre and post treatment.   They have completed aerobika IS therapy and demonstrated good technique.    RT will continue to assess and monitor cardiopulmonary status.     Leah Crawford, RT

## 2022-11-04 ENCOUNTER — APPOINTMENT (OUTPATIENT)
Dept: PHYSICAL THERAPY | Facility: HOSPITAL | Age: 67
DRG: 207 | End: 2022-11-04
Attending: INTERNAL MEDICINE
Payer: COMMERCIAL

## 2022-11-04 LAB
ANION GAP SERPL CALCULATED.3IONS-SCNC: 1 MMOL/L (ref 7–15)
BUN SERPL-MCNC: 19.9 MG/DL (ref 8–23)
CALCIUM SERPL-MCNC: 8.1 MG/DL (ref 8.8–10.2)
CHLORIDE SERPL-SCNC: 103 MMOL/L (ref 98–107)
CREAT SERPL-MCNC: 0.43 MG/DL (ref 0.51–0.95)
DEPRECATED HCO3 PLAS-SCNC: 35 MMOL/L (ref 22–29)
ERYTHROCYTE [DISTWIDTH] IN BLOOD BY AUTOMATED COUNT: 13.9 % (ref 10–15)
GFR SERPL CREATININE-BSD FRML MDRD: >90 ML/MIN/1.73M2
GLUCOSE SERPL-MCNC: 85 MG/DL (ref 70–99)
HCT VFR BLD AUTO: 30.9 % (ref 35–47)
HGB BLD-MCNC: 9.7 G/DL (ref 11.7–15.7)
MAGNESIUM SERPL-MCNC: 2 MG/DL (ref 1.7–2.3)
MCH RBC QN AUTO: 31.9 PG (ref 26.5–33)
MCHC RBC AUTO-ENTMCNC: 31.4 G/DL (ref 31.5–36.5)
MCV RBC AUTO: 102 FL (ref 78–100)
PHOSPHATE SERPL-MCNC: 3 MG/DL (ref 2.5–4.5)
PLATELET # BLD AUTO: 426 10E3/UL (ref 150–450)
POTASSIUM SERPL-SCNC: 3.9 MMOL/L (ref 3.4–5.3)
RBC # BLD AUTO: 3.04 10E6/UL (ref 3.8–5.2)
SODIUM SERPL-SCNC: 139 MMOL/L (ref 136–145)
WBC # BLD AUTO: 10.1 10E3/UL (ref 4–11)

## 2022-11-04 PROCEDURE — 85014 HEMATOCRIT: CPT | Performed by: INTERNAL MEDICINE

## 2022-11-04 PROCEDURE — 94640 AIRWAY INHALATION TREATMENT: CPT

## 2022-11-04 PROCEDURE — 99232 SBSQ HOSP IP/OBS MODERATE 35: CPT | Mod: CS | Performed by: INTERNAL MEDICINE

## 2022-11-04 PROCEDURE — 84100 ASSAY OF PHOSPHORUS: CPT | Performed by: INTERNAL MEDICINE

## 2022-11-04 PROCEDURE — 97530 THERAPEUTIC ACTIVITIES: CPT | Mod: GP

## 2022-11-04 PROCEDURE — 999N000157 HC STATISTIC RCP TIME EA 10 MIN

## 2022-11-04 PROCEDURE — 36415 COLL VENOUS BLD VENIPUNCTURE: CPT | Performed by: INTERNAL MEDICINE

## 2022-11-04 PROCEDURE — 94640 AIRWAY INHALATION TREATMENT: CPT | Mod: 76

## 2022-11-04 PROCEDURE — 250N000009 HC RX 250: Performed by: INTERNAL MEDICINE

## 2022-11-04 PROCEDURE — 250N000013 HC RX MED GY IP 250 OP 250 PS 637: Performed by: INTERNAL MEDICINE

## 2022-11-04 PROCEDURE — 94799 UNLISTED PULMONARY SVC/PX: CPT

## 2022-11-04 PROCEDURE — 120N000001 HC R&B MED SURG/OB

## 2022-11-04 PROCEDURE — 83735 ASSAY OF MAGNESIUM: CPT | Performed by: INTERNAL MEDICINE

## 2022-11-04 PROCEDURE — 99207 PR CDG-CUT & PASTE-POTENTIAL IMPACT ON LEVEL: CPT | Performed by: INTERNAL MEDICINE

## 2022-11-04 PROCEDURE — 97116 GAIT TRAINING THERAPY: CPT | Mod: GP

## 2022-11-04 PROCEDURE — 80048 BASIC METABOLIC PNL TOTAL CA: CPT | Performed by: INTERNAL MEDICINE

## 2022-11-04 PROCEDURE — 250N000011 HC RX IP 250 OP 636: Performed by: INTERNAL MEDICINE

## 2022-11-04 PROCEDURE — 250N000013 HC RX MED GY IP 250 OP 250 PS 637: Performed by: PHYSICIAN ASSISTANT

## 2022-11-04 PROCEDURE — 250N000012 HC RX MED GY IP 250 OP 636 PS 637: Performed by: INTERNAL MEDICINE

## 2022-11-04 RX ORDER — MAGNESIUM OXIDE 400 MG/1
400 TABLET ORAL EVERY 4 HOURS
Status: COMPLETED | OUTPATIENT
Start: 2022-11-04 | End: 2022-11-04

## 2022-11-04 RX ADMIN — MELATONIN TAB 3 MG 3 MG: 3 TAB at 20:02

## 2022-11-04 RX ADMIN — APIXABAN 5 MG: 5 TABLET, FILM COATED ORAL at 09:07

## 2022-11-04 RX ADMIN — BUDESONIDE 1 MG: 0.5 INHALANT ORAL at 08:17

## 2022-11-04 RX ADMIN — APIXABAN 5 MG: 5 TABLET, FILM COATED ORAL at 20:02

## 2022-11-04 RX ADMIN — IPRATROPIUM BROMIDE AND ALBUTEROL SULFATE 3 ML: 2.5; .5 SOLUTION RESPIRATORY (INHALATION) at 12:49

## 2022-11-04 RX ADMIN — PREDNISONE 30 MG: 20 TABLET ORAL at 09:06

## 2022-11-04 RX ADMIN — MIRTAZAPINE 15 MG: 15 TABLET, FILM COATED ORAL at 20:02

## 2022-11-04 RX ADMIN — THIAMINE HCL TAB 100 MG 100 MG: 100 TAB at 09:07

## 2022-11-04 RX ADMIN — PIPERACILLIN AND TAZOBACTAM 3.38 G: 3; .375 INJECTION, POWDER, LYOPHILIZED, FOR SOLUTION INTRAVENOUS at 05:15

## 2022-11-04 RX ADMIN — Medication 400 MG: at 18:10

## 2022-11-04 RX ADMIN — IPRATROPIUM BROMIDE AND ALBUTEROL SULFATE 3 ML: 2.5; .5 SOLUTION RESPIRATORY (INHALATION) at 19:41

## 2022-11-04 RX ADMIN — Medication 400 MG: at 13:41

## 2022-11-04 RX ADMIN — IPRATROPIUM BROMIDE AND ALBUTEROL SULFATE 3 ML: 2.5; .5 SOLUTION RESPIRATORY (INHALATION) at 08:18

## 2022-11-04 RX ADMIN — PIPERACILLIN AND TAZOBACTAM 3.38 G: 3; .375 INJECTION, POWDER, LYOPHILIZED, FOR SOLUTION INTRAVENOUS at 13:53

## 2022-11-04 ASSESSMENT — ACTIVITIES OF DAILY LIVING (ADL)
ADLS_ACUITY_SCORE: 35

## 2022-11-04 NOTE — PLAN OF CARE
Problem: Plan of Care - These are the overarching goals to be used throughout the patient stay.    Goal: Optimal Comfort and Wellbeing  Outcome: Adequate for Care Transition  Intervention: Provide Person-Centered Care  Recent Flowsheet Documentation  Taken 11/4/2022 0049 by Lamine Tan, RN  Trust Relationship/Rapport: emotional support provided     Problem: Gas Exchange Impaired  Goal: Optimal Gas Exchange  Outcome: Adequate for Care Transition  Intervention: Optimize Oxygenation and Ventilation  Recent Flowsheet Documentation  Taken 11/4/2022 0049 by Lamine Tan, RN  Head of Bed (HOB) Positioning: HOB at 20-30 degrees     Goal Outcome Evaluation: Pt slept through the night. Denies any pain. ABX given per protocol. On mag, K+, phos protocol. AM check. On high flow 6L stating at 97%. Incontinent of B&B. 1x incontinent urine. No BM on shift.

## 2022-11-04 NOTE — PROGRESS NOTES
Care Management Follow Up    Length of Stay (days): 27    Expected Discharge Date: 11/05/2022     Concerns to be Addressed:  Discharge planning  Patient plan of care discussed at interdisciplinary rounds: Yes    Anticipated Discharge Disposition: Transitional Care     Anticipated Discharge Services: transitional care  Anticipated Discharge DME: per treatment team     Patient/family educated on Medicare website which has current facility and service quality ratings: yes   Education Provided on the Discharge Plan: TBD   Patient/Family in Agreement with the Plan: TBD     Referrals Placed by CM/SW: Post Acute Facilities  Private pay costs discussed: Not applicable    Additional Information:  9:17 AM  [facility] called and spoke to Jada liakae Young to discuss Pt's referral. Hannah reported she was reviewing Pt's information this morning and was not sure about the HFNC. Hannah stated she will reach out to one of their respiratory therapists to determine appropriateness for TCU admission.     2:36 PM  [facility] called and left a VM with Hannah requesting a phone call back to follow up on Pt's case.     3:10 PM  [facility] received a phone call back from Hannah who confirmed the tubing won't make a difference and Pt referral will be assessed. Hannah states the weekend liaison Roxann will be able to work on finding open beds over the weekend. CM to follow up with Jada about open bed availability.    DARNELL Hewitt

## 2022-11-04 NOTE — PLAN OF CARE
Problem: COPD (Chronic Obstructive Pulmonary Disease) Comorbidity  Goal: Maintenance of COPD Symptom Control  Outcome: Progressing  Intervention: Maintain COPD-Symptom Control  Recent Flowsheet Documentation  Taken 11/4/2022 1625 by Kashif Sanchez RN  Medication Review/Management: medications reviewed  Taken 11/4/2022 0816 by Kashif Sanchez RN  Medication Review/Management: medications reviewed     Problem: Gas Exchange Impaired  Goal: Optimal Gas Exchange  Intervention: Optimize Oxygenation and Ventilation  Recent Flowsheet Documentation  Taken 11/4/2022 1625 by Kashif Sanchez RN  Head of Bed (HOB) Positioning: HOB at 20-30 degrees     Problem: Anxiety Signs/Symptoms  Goal: Optimized Energy Level (Anxiety Signs/Symptoms)  Outcome: Progressing  Goal: Enhanced Social, Occupational or Functional Skills (Anxiety Signs/Symptoms)  Intervention: Promote Social, Occupational and Functional Ability  Recent Flowsheet Documentation  Taken 11/4/2022 1625 by Kashif Sanchez RN  Trust Relationship/Rapport:   emotional support provided   care explained  Taken 11/4/2022 0816 by Kashif Sanchez RN  Trust Relationship/Rapport:   emotional support provided   care explained     Problem: Activity Intolerance  Goal: Enhanced Capacity and Energy  Outcome: Progressing  Intervention: Optimize Activity Tolerance  Recent Flowsheet Documentation  Taken 11/4/2022 1625 by Kashif Sanchez RN  Activity Management: activity adjusted per tolerance   Goal Outcome Evaluation:  Pt alert and oriented.  No complaints of pain. Continues on 6 L high flow nasal canula.  Lung sounds diminished.  Encouraging IS and flutter valve.  Did get up to chair for a few minutes, became anxious with increased shortness of breath and demanded to go back to bed.

## 2022-11-04 NOTE — PROGRESS NOTES
RESPIRATORY CARE NOTE    RT attempted to give duoneb treatment and patient requested for Respiratory to come  to 7pm. RT assessed the patient and agreed with the request. RT to continue to monitor status.     Priscilla Doss, RT

## 2022-11-04 NOTE — PROGRESS NOTES
[facility]  Palliative Care Chart Check Note    Palliative medicine was consulted for goals of care. Goals remain life-prolonging with plan for discharge to TCU.    Chart reviewed and recent events noted from preceding day. CTH negative for stroke.    Placed referral for outpatient Palliative Care. Called son Frantz to let him know.    Palliative Care will sign off at this time, please reach out with any questions or concerns.    Discussed with Dr. Jeffrey Richards PA-C  [facility], Palliative Care  Dept phone: [phone]  Securely message with the Vocera Web Console

## 2022-11-04 NOTE — PROGRESS NOTES
RESPIRATORY CARE NOTE:    PT is currently on 6 L HFNC with an SpO2 of 95%.  Breath sounds are diminished pre/post pulmicort and duoneb neb treatment. Pt continues to use aerobika and pt tolerated treatments well.  RT will continue to follow.      Mita Gutierrez, RT  11/4/2022

## 2022-11-04 NOTE — PROGRESS NOTES
[facility]    Medicine Progress Note - Hospitalist Service    Date of Admission:  10/8/2022    Assessment & Plan   Lavonne Talbot is a 67 year old female with a past medical history significant for oxygen dependent COPD/emphysema, tobacco abuse, recent Covid-19 infection, severe protein calorie malnutrition amongst other medical issues who was admitted to the hospital on 10/8/22 with acute respiratory failure requiring intubation with mechanical ventilation and septic shock from H. Flu. Her hospital stay was complicated by the development of subsegmental pulmonary emboli, Takatsubo cardiomyopathy and splenic infarcts.       Plan:   1. Acute respiratory failure with hypoxia requiring intubation likely a combination of COPD exacerbation, COVID-19 infection, subsegmental PE: Patient is now extubated and is on 6 to 7 L of oxygen via nasal cannula.  Continue to wean as tolerated  2. COPD: Has severe emphysema and presented with acute hypoxemic respiratory failure secondary to a infection with H. Flu. Additionally, she had recently contracted Covid-19 which likely led to her COPD exacerbation initially. She has been extubated and off Bilevel ventilation but continues to have very high oxygen requirements and struggles with air hunger. There had been some discussions with the ICU team about transitioning to hospice, however, in the interim her  suffered from a CVA and she has reversed her code status and would like to transition to a TCU.    Continue supplemental oxygen to maintain saturations >88%.    Continue Budesonide and scheduled duonebs.    Was noted to have a new RLL infiltrate and was initiated on Vancomycin and Pip-Tazo for this. Her leukocytosis has since resolved.  Procalcitonin on 10/31 was normal.  IV Zosyn and IV vancomycin for total 7 days.    Continue prednisone taper  Subsegmental PE: Likely secondary to her Covid-19 infection. Has completed Remdesivir and Decadron for  this.  Eliquis 5 mg p.o. twice daily.  Would treat for 3 to 6 months.  3. Splenic infarct: Unclear etiology. Thought to be secondary to a PFO. Treatment as above.  4. Goals of care: Was transitioned to hospice upon discharge from the ICU but then, her  had a CVA and she decided to change her goals of care.  She is is being seen by palliative care and was receiving Morphine for air hunger--this has since been held due to hypotensive episodes. The patient is focusing on being transferred to a TCU.  Palliative care has since signed off unless patient has any new needs or goals.  5. Dizziness: Unclear etiology.  Orthostatic vital signs were negative.  Patient says that when she stands up for therapy, she feels dizzy but then after a little while this resolves.  No prior history of vertigo.  CT head without contrast on 11/3/2022 was negative.  Patient says she was not able to undergo an MRI due to claustrophobia.  Continue supportive care.  Dizziness seems to be improving  6. Deconditioning: Continue PT/OT  7. Tobacco dependence: Patient was given smoking cessation information by RT       Diet: Advance Diet as Tolerated: Regular Diet Adult  Snacks/Supplements Adult: Ensure Enlive; With Meals  Snacks/Supplements Adult: Other; Special K bar; Between Meals  Snacks/Supplements Adult: Gelatein Plus; With Meals    DVT Prophylaxis: DOAC  [place]aco Catheter: Not present  Central Lines: None  Cardiac Monitoring: None  Code Status: No CPR- Do NOT Intubate      Disposition Plan Her higher oxygen needs are a barrier to discharge.     Expected Discharge Date: 11/05/2022, 12:00 PM  Discharge Delays: Placement - TCU  Oxygen Needs - Arrange Home O2    Discharge Comments: TCU  wean O2        The patient's care was discussed with the Patient.    Lo Murphy MD  Hospitalist Service  [facility]  Securely message with the Vocera Web Console (learn more here)  Text page via [employer] Paging/[employer]  "    Clinically Significant Risk Factors              # Hypoalbuminemia: Lowest albumin = 2.6 g/dL (Ref range: 3.5-5.2) at 10/8/2022 11:32 AM, will monitor as appropriate          # Cachexia: Estimated body mass index is 14.21 kg/m  as calculated from the following:    Height as of this encounter: 1.702 m (5' 7\").    Weight as of this encounter: 41.1 kg (90 lb 11.2 oz).   # Severe Malnutrition: based on nutrition assessment      _____________________________________________________________________    Interval History   Continues to have symptoms of dizziness.  Otherwise she denies any other symptoms.  Breathing is about the same.    Data reviewed today: I reviewed all medications, new labs and imaging results over the last 24 hours.    Physical Exam   Vital Signs: Temp: 98  F (36.7  C) Temp src: Oral BP: 102/65 Pulse: 85   Resp: 18 SpO2: 98 % O2 Device: High Flow Nasal Cannula (HFNC) Oxygen Delivery: 6 LPM  Weight: 90 lbs 11.2 oz  Physical Exam  Constitutional:       Appearance: She is ill-appearing.   HENT:      Head: Normocephalic and atraumatic.      Mouth/Throat:      Mouth: Mucous membranes are dry.   Eyes:      Extraocular Movements: Extraocular movements intact.      Conjunctiva/sclera: Conjunctivae normal.   Cardiovascular:      Rate and Rhythm: Normal rate and regular rhythm.   Pulmonary:      Comments: Diminished breath sounds throughout.  Mild expiratory wheeze.  Abdominal:      General: Abdomen is flat.      Palpations: Abdomen is soft.   Musculoskeletal:      Cervical back: Neck supple.      Right lower leg: No edema.      Left lower leg: No edema.   Skin:     General: Skin is warm and dry.   Neurological:      General: No focal deficit present.      Mental Status: She is alert and oriented to person, place, and time.       Data   Recent Labs   Lab 11/04/22  0704 11/04/22  0703 11/03/22  1148 11/03/22  0549 11/02/22  0509   WBC 10.1  --   --  13.5* 13.5*   HGB 9.7*  --   --  10.3* 10.1*   *  --   " --  100 99     --   --  411 401   NA  --  139  --  141 143   POTASSIUM  --  3.9 3.9 3.4 3.4   CHLORIDE  --  103  --  103 100   CO2  --  35*  --  35* 38*   BUN  --  19.9  --  19.2 19.6   CR  --  0.43*  --  0.34* 0.41*   ANIONGAP  --  1*  --  3* 5*   JASON  --  8.1*  --  7.9* 8.2*   GLC  --  85  --  76 80     Recent Results (from the past 24 hour(s))   CT Head w/o Contrast    Narrative    EXAM: CT HEAD W/O CONTRAST  LOCATION: [facility]  DATE/TIME: 11/3/2022 7:39 PM    INDICATION: Dizziness of unclear etiology. Patient is pretty sure she will not tolerate an MRI.  COMPARISON: 10/08/2022  TECHNIQUE: Routine CT Head without IV contrast. Multiplanar reformats. Dose reduction techniques were used.    FINDINGS:  INTRACRANIAL CONTENTS: No intracranial hemorrhage, extraaxial collection, or mass effect.  No CT evidence of acute infarct. Mild presumed chronic small vessel ischemic changes. There is intracranial atherosclerosis. Mild generalized volume loss. No   hydrocephalus.     VISUALIZED ORBITS/SINUSES/MASTOIDS: No intraorbital abnormality. Frothy secretions and air-fluid level noted at the left maxillary sinus. Scattered fluid/membrane thickening in the left mastoid air cells. No apparent mass in the posterior nasopharynx or   skull base.    BONES/SOFT TISSUES: No acute abnormality.      Impression    IMPRESSION:  1.  No CT evidence for acute intracranial process.  2.  Brain atrophy and presumed chronic microvascular ischemic changes as above.  3.  Left maxillary sinusitis, possibly acute.  4.  Partial left mastoid effusion.     Medications     dextrose         apixaban ANTICOAGULANT  5 mg Oral BID     budesonide  1 mg Nebulization 2 times daily     ipratropium - albuterol 0.5 mg/2.5 mg/3 mL  3 mL Nebulization 4x daily     magnesium oxide  400 mg Oral Q4H     mirtazapine  15 mg Oral At Bedtime     piperacillin-tazobactam  3.375 g Intravenous Q8H     polyethylene glycol  17 g Oral Daily      predniSONE  30 mg Oral Daily    Followed by     [START ON 11/9/2022] predniSONE  20 mg Oral Daily    Followed by     [START ON 11/14/2022] predniSONE  10 mg Oral Daily    Followed by     [START ON 11/19/2022] predniSONE  5 mg Oral Daily     thiamine  100 mg Per Feeding Tube Daily

## 2022-11-04 NOTE — PLAN OF CARE
Problem: Plan of Care - These are the overarching goals to be used throughout the patient stay.    Goal: Optimal Comfort and Wellbeing  Outcome: Progressing  Intervention: Provide Person-Centered Care  Recent Flowsheet Documentation  Taken 11/3/2022 1607 by Jovon Mcneill RN  Trust Relationship/Rapport: emotional support provided     Problem: COPD (Chronic Obstructive Pulmonary Disease) Comorbidity  Goal: Maintenance of COPD Symptom Control  Outcome: Progressing  Intervention: Maintain COPD-Symptom Control  Recent Flowsheet Documentation  Taken 11/3/2022 1607 by Jovon Mcneill RN  Supportive Measures: active listening utilized  Medication Review/Management: medications reviewed     Problem: Gas Exchange Impaired  Goal: Optimal Gas Exchange  Outcome: Progressing  Intervention: Optimize Oxygenation and Ventilation  Recent Flowsheet Documentation  Taken 11/3/2022 1607 by Jovon Mcneill RN  Head of Bed (HOB) Positioning: HOB at 20-30 degrees     Problem: Malnutrition  Goal: Improved Nutritional Intake  Outcome: Progressing   Goal Outcome Evaluation:       Pt denies pain, VSS. Pt did not tolerate O2 titration and remains on 6L high flow. Pt ate about 40% of dinner. Prn melatonin given.

## 2022-11-04 NOTE — PROGRESS NOTES
Patient remains on 6 lpm O2 by nasal cannula. sats low 90s. Duoneb and Pulmicort given. BS diminished pre/post treatment. Deep breath and coughing encouraged. Pt did not tolerate O2 titrations. RT following.    Jerrod Arnett, RT

## 2022-11-04 NOTE — PROGRESS NOTES
[facility]:  PULMONARY PROGRESS NOTE     Date / Time of Admission:  10/8/2022  9:38 AM    ID: Lavonne Talbot is a 67 year old cachectic female (BMI 12.09 kg/m ), active smoker, with history of lymphedema on furosemide since 2021, metabolic alkalosis, polycythemia, severe protein calorie malnutrition, and recent diagnosis of COVID-19 viral infection 1 week ago who presented to [facility] ED on 10/8/2022 with acute respiratory distress  (O2 sats 50s%) subsequently requiring intubation for management of community acquired pneumonia vs aspiration pneumonia (cultures grew haemophilus influenzae) and COPD/emphysema exacerbation. Extubated 10/20.  Course complicated by small subsegmental pulmonary embolism, Takotsubo cardiomyopathy, splenic infarcts, and goals of care conversation.    Reason for Consult: Respiratory failure         Assessment: 1.   Acute respiratory failure with hypoxia.  Multifactorial etiologies in the setting of her superimposed bacterial pneumonias, COPD exacerbation.  While she did have COVID infection that was likely the instigating factor of her clinical decline, CT imaging was not consistent with a COVID viral pneumonitis.  Intubated 10/8, extubated 10/20.  Has had protracted course requiring persistent O2 needs, currently on 6 L/min nasal cannula.  2. Healthcare associated pneumonia.  Given leukocytosis on 10/27, was ultimately initiated on empiric antibiotics for healthcare associated infection.  Leukocytosis has been waxing waning since that time.  Initiated on Zosyn IV, received vancomycin 10/28-11/3.  3. End-stage lung disease/Gold D COPD with acute exacerbation, improved.  No prior PFTs, CT imaging with severe emphysema.  Has not required NIPPV for some time.  Given the severity of her malnutrition as well as the extensive emphysema on CT, considered to have end-stage lung disease.  4. Subsegmental pulmonary embolism.  In the setting of her COVID-19 viral  "infection..  5. History of COVID-19 viral infection.  Treated with remdesivir, Decadron.  Interleukin-6 therapy was not indicated.         Plan:     Wean supplemental O2 as tolerates, goal O2 sat> 88%.      Continue budesonide neb BID, DuoNebs 4X/day    Continue prednisone taper as outlined, completed 40 mg today.    Starting on 11/4, 30 mg daily x5, then 20 mg daily x5, then 10 mg daily x5, then 5 mg daily x5    Continue empiric antibiotics with Zosyn IV x 7 days (start 10/28)    Continue apixaban x 3 months for treatment of PE.     Patient and family are discussing potential for Hospice.  At this time, patient's primary goal is \"to go home\" given her longstanding hospitalization.  Appreciate palliative care discussions.    Patient and/or family were educated on the above recommendations.   Thank you for allowing our service to participate in the care of this patient.  Please call with any questions or concerns.  At this time, pulmonary service will sign off.  If patient ultimately decides on continuing outpatient care and not enrolling in hospice, please place referral to pulmonary clinic for continued outpatient follow-up.  We will work to set her up for PFTs and clinic evaluation.    Total patient care time: 35 minutes, with over 50% spent in counseling/coordination of care.      Monica Mae MD, MPH  Pulmonary/Critical Care Medicine  11/03/2022  8:07 PM        Subjective/Interval History:   Patient is exhausted, wants to go home.  Denies any chest pain/chest pressure.  Breathing is \"okay.\"  She is \"tired of staring at 4 walls.\"    Review of Systems:  Pertinent items are noted in HPI.  The Review of Systems is negative other than noted in the HPI        Allergies/Medications:   Allergies:   No Known Allergies    Continuous Infusions:    dextrose       Scheduled Medications:    apixaban ANTICOAGULANT  5 mg Oral BID     budesonide  1 mg Nebulization 2 times daily     ipratropium - albuterol 0.5 mg/2.5 mg/3 mL  3 " "mL Nebulization 4x daily     mirtazapine  15 mg Oral At Bedtime     piperacillin-tazobactam  3.375 g Intravenous Q8H     polyethylene glycol  17 g Oral Daily     [START ON 11/4/2022] predniSONE  30 mg Oral Daily    Followed by     [START ON 11/9/2022] predniSONE  20 mg Oral Daily    Followed by     [START ON 11/14/2022] predniSONE  10 mg Oral Daily    Followed by     [START ON 11/19/2022] predniSONE  5 mg Oral Daily     thiamine  100 mg Per Feeding Tube Daily            Objective:   Vitals:  /70 (BP Location: Left arm)   Pulse 96   Temp 99.2  F (37.3  C) (Oral)   Resp 18   Ht 1.702 m (5' 7\")   Wt 41.1 kg (90 lb 11.2 oz)   SpO2 93%   BMI 14.21 kg/m    GEN: Pleasant female, overwhelmed with hospitalization, sad appearing, severely cachectic  HEENT: Normocephalic, atraumatic.  Temporal wasting bilaterally.  Extraoccular eye movements intact, anicteric sclera.Moist mucous membranes.   NECK: Supple.    PULM: Non-labored breathing.  No use of accessory muscles.  Diminished breath sounds throughout, no wheezing.  CVS: Regular rate and rhythm.  Normal S1, S2.  No rubs, murmurs, or gallops.    ABDOMEN: Normoactive bowel sounds.  Non-tender to palpation.  Non-distended.    EXTREMITES:  No clubbing, cyanosis, or edema.    NEURO:  Awake.  Oriented to person, place, time and situation.  Cranial nerves 2-12 grossly intact.  Moving all extremities.      Intake/Output:  I/O last 3 completed shifts:  In: 1340 [P.O.:1040; I.V.:300]  Out: -         Pertinent Studies:   All laboratory data reviewed  Serum Glucose range:   Recent Labs   Lab 11/03/22  0549 11/02/22  0509 11/01/22  0654 10/31/22  0628 10/30/22  0713 10/29/22  0820   GLC 76 80 134* 80 83 85     ABG: No lab results found in last 7 days.  CBC:   Recent Labs   Lab 11/03/22  0549 11/02/22  0509 11/01/22  0654   WBC 13.5* 13.5* 5.4   HGB 10.3* 10.1* 11.2*   HCT 33.0* 32.0* 35.6    99 101*    401 409     Chemistry:   Recent Labs   Lab 11/03/22  1148 " 11/03/22  0549 11/02/22  0509 11/01/22  0654   NA  --  141 143 140   POTASSIUM 3.9 3.4 3.4 4.0   CHLORIDE  --  103 100 99   CO2  --  35* 38* 40*   BUN  --  19.2 19.6 20.2   CR  --  0.34* 0.41* 0.38*   GFRESTIMATED  --  >90 >90 >90   JASON  --  7.9* 8.2* 8.1*   MAG  --  1.9 2.1 2.4*     Coags:  No results for input(s): INR, PROTIME, PTT in the last 168 hours.    Invalid input(s): APTT  Cardiac Markers:  No results for input(s): CKTOTAL, TROPONINI in the last 168 hours.     Microbiology:  Blood culture x2, 10/19: No growth  Blood cultures x2, 10/12: No growth  Blood culture x2, 10/8: 1 bottle with gram-positive bacilli, resembling diphtheroids.  Sputum culture, 10/18: 1+ Candida albicans  Sputum culture, 10/13: 1+ normal margaret  Sputum culture, 10/8: 1+ normal margaret, 2+ haemophilus influenzae          Cardiology/Radiology:   Cardiac: All cardiac studies reviewed by me.    EKG:  Reviewed    TTE, 10/9/22:  Summary:  1. Left ventricular size and wall thickness are normal. Systolic function is moderately reduced with global hypokinesis. The estimated left ventricular ejection fraction is 30-35%. 2. Right ventricle is not well visualized. Limited views suggest possible enlargement and reduced systolic function. 3. No hemodynamically significant valvular abnormalities. 4. No prior study available for comparison.    Radiology: All imaging studies reviewed by me.    Chest X-Ray, 10/27/22:  Patient's rotated to the left. Patient's been extubated. Right IJ central line overlies the mid SVC. Lungs are hyperexpanded consistent with COPD. New hazy opacification of the right lower lobe with trace effusion. Unclear if this reflects some atelectasis due to volume loss or a bronchopneumonia. Endobronchial secretions noted on the recent CT of the abdomen within the lung bases.   Left lung is clear. No pneumothorax. Heart and pulmonary vascularity are normal.    CTA Chest, 10/13/22:  FINDINGS: ANGIOGRAM CHEST: The main pulmonary artery  remains normal in size. The central pulmonary arteries remain patent. A subsegmental pulmonary embolism in the left lower lobe superior segment 10/8/2022 has resolved (series 4, image 181). However, there is a  new low-attenuation filling defect within proximal subsegmental level pulmonary arteries in the right lower lobe (series 4, image 236) and a peripheral subsegmental filling defect in the posterior basal left lower lobe (series 4, image 276). Normal  caliber thoracic aorta. Unchanged mild arch, proximal great vessel and descending aorta atheromatous calcifications. No acute aortic syndrome. No CT evidence of right heart strain.  LUNGS AND PLEURA: Endotracheal tube is in satisfactory position. Central airway wall thickening is unchanged. Debris within the segmental and proximal subsegmental lower lobe airways is slightly improved from 5 days earlier, however peripheral  subsegmental airway debris in bilateral lower lobe airways which produce tree-in-bud opacity and all segments of the left lower lobe and the basilar segments of the right lower lobe have developed.. The extent of consolidation in the posterior basal left  lower but is mildly increased from 5 days earlier, while inflammation and atelectasis in the left lateral costophrenic sulcus has decreased.. No pleural effusion is present.  MEDIASTINUM: Cardiac chambers remain normal in size. There is no pericardial effusion. No lymphadenopathy. Gastric tube courses through the decompressed esophagus terminating in the stomach body. Right midline catheter terminates in the lateral right  subclavian vein and right jugular approach central venous catheter terminates in the SVC.  CORONARY ARTERY CALCIFICATION: Mild.  UPPER ABDOMEN: No new findings in the imaged upper abdomen.  MUSCULOSKELETAL: Generalized demineralization of the bones. There is subtle superior endplate deformity of L1. There is a paucity of subcutaneous fat. No new bone or chest wall soft tissue  abnormalities. IMPRESSION:   1.  Subsegmental pulmonary embolism in the left lower lobe 5 days earlier has resolved. 2 new subsegmental pulmonary emboli are present in the lower lobes as described. 2.  Mild worsening of focal consolidation in the posterior basal left lower lobe and development of peripheral airway debris/tree-in-bud opacity in both lower lobes consistent with bronchopneumonia/cellular bronchiolitis. 3.  Advanced emphysema.

## 2022-11-05 ENCOUNTER — APPOINTMENT (OUTPATIENT)
Dept: OCCUPATIONAL THERAPY | Facility: HOSPITAL | Age: 67
DRG: 207 | End: 2022-11-05
Payer: COMMERCIAL

## 2022-11-05 LAB
ANION GAP SERPL CALCULATED.3IONS-SCNC: 3 MMOL/L (ref 7–15)
BUN SERPL-MCNC: 15.8 MG/DL (ref 8–23)
CALCIUM SERPL-MCNC: 8.4 MG/DL (ref 8.8–10.2)
CHLORIDE SERPL-SCNC: 102 MMOL/L (ref 98–107)
CREAT SERPL-MCNC: 0.52 MG/DL (ref 0.51–0.95)
DEPRECATED HCO3 PLAS-SCNC: 37 MMOL/L (ref 22–29)
ERYTHROCYTE [DISTWIDTH] IN BLOOD BY AUTOMATED COUNT: 13.8 % (ref 10–15)
GFR SERPL CREATININE-BSD FRML MDRD: >90 ML/MIN/1.73M2
GLUCOSE SERPL-MCNC: 82 MG/DL (ref 70–99)
HCT VFR BLD AUTO: 33 % (ref 35–47)
HGB BLD-MCNC: 10.4 G/DL (ref 11.7–15.7)
MAGNESIUM SERPL-MCNC: 2.1 MG/DL (ref 1.7–2.3)
MCH RBC QN AUTO: 31.5 PG (ref 26.5–33)
MCHC RBC AUTO-ENTMCNC: 31.5 G/DL (ref 31.5–36.5)
MCV RBC AUTO: 100 FL (ref 78–100)
PHOSPHATE SERPL-MCNC: 3 MG/DL (ref 2.5–4.5)
PLATELET # BLD AUTO: 442 10E3/UL (ref 150–450)
POTASSIUM SERPL-SCNC: 4.2 MMOL/L (ref 3.4–5.3)
RBC # BLD AUTO: 3.3 10E6/UL (ref 3.8–5.2)
SODIUM SERPL-SCNC: 142 MMOL/L (ref 136–145)
WBC # BLD AUTO: 10.1 10E3/UL (ref 4–11)

## 2022-11-05 PROCEDURE — 36415 COLL VENOUS BLD VENIPUNCTURE: CPT | Performed by: INTERNAL MEDICINE

## 2022-11-05 PROCEDURE — 250N000012 HC RX MED GY IP 250 OP 636 PS 637: Performed by: INTERNAL MEDICINE

## 2022-11-05 PROCEDURE — 250N000013 HC RX MED GY IP 250 OP 250 PS 637: Performed by: INTERNAL MEDICINE

## 2022-11-05 PROCEDURE — 94640 AIRWAY INHALATION TREATMENT: CPT | Mod: 76

## 2022-11-05 PROCEDURE — 85027 COMPLETE CBC AUTOMATED: CPT | Performed by: INTERNAL MEDICINE

## 2022-11-05 PROCEDURE — 99233 SBSQ HOSP IP/OBS HIGH 50: CPT | Performed by: HOSPITALIST

## 2022-11-05 PROCEDURE — 250N000009 HC RX 250: Performed by: INTERNAL MEDICINE

## 2022-11-05 PROCEDURE — 120N000001 HC R&B MED SURG/OB

## 2022-11-05 PROCEDURE — 83735 ASSAY OF MAGNESIUM: CPT | Performed by: INTERNAL MEDICINE

## 2022-11-05 PROCEDURE — 250N000013 HC RX MED GY IP 250 OP 250 PS 637: Performed by: PHYSICIAN ASSISTANT

## 2022-11-05 PROCEDURE — 250N000013 HC RX MED GY IP 250 OP 250 PS 637: Performed by: HOSPITALIST

## 2022-11-05 PROCEDURE — 82310 ASSAY OF CALCIUM: CPT | Performed by: INTERNAL MEDICINE

## 2022-11-05 PROCEDURE — 94799 UNLISTED PULMONARY SVC/PX: CPT

## 2022-11-05 PROCEDURE — 999N000157 HC STATISTIC RCP TIME EA 10 MIN

## 2022-11-05 PROCEDURE — 84100 ASSAY OF PHOSPHORUS: CPT | Performed by: INTERNAL MEDICINE

## 2022-11-05 PROCEDURE — 97535 SELF CARE MNGMENT TRAINING: CPT | Mod: GO

## 2022-11-05 PROCEDURE — 999N000123 HC STATISTIC OXYGEN O2DAILY TECH TIME

## 2022-11-05 RX ORDER — FUROSEMIDE 20 MG
20 TABLET ORAL DAILY
Status: DISCONTINUED | OUTPATIENT
Start: 2022-11-05 | End: 2022-11-08

## 2022-11-05 RX ADMIN — IPRATROPIUM BROMIDE AND ALBUTEROL SULFATE 3 ML: 2.5; .5 SOLUTION RESPIRATORY (INHALATION) at 12:01

## 2022-11-05 RX ADMIN — BUDESONIDE 1 MG: 0.5 INHALANT ORAL at 07:29

## 2022-11-05 RX ADMIN — IPRATROPIUM BROMIDE AND ALBUTEROL SULFATE 3 ML: 2.5; .5 SOLUTION RESPIRATORY (INHALATION) at 07:29

## 2022-11-05 RX ADMIN — BUDESONIDE 1 MG: 0.5 INHALANT ORAL at 19:10

## 2022-11-05 RX ADMIN — MELATONIN TAB 3 MG 3 MG: 3 TAB at 21:26

## 2022-11-05 RX ADMIN — THIAMINE HCL TAB 100 MG 100 MG: 100 TAB at 09:16

## 2022-11-05 RX ADMIN — PREDNISONE 30 MG: 20 TABLET ORAL at 09:15

## 2022-11-05 RX ADMIN — APIXABAN 5 MG: 5 TABLET, FILM COATED ORAL at 21:26

## 2022-11-05 RX ADMIN — MIRTAZAPINE 15 MG: 15 TABLET, FILM COATED ORAL at 21:26

## 2022-11-05 RX ADMIN — APIXABAN 5 MG: 5 TABLET, FILM COATED ORAL at 09:16

## 2022-11-05 RX ADMIN — IPRATROPIUM BROMIDE AND ALBUTEROL SULFATE 3 ML: 2.5; .5 SOLUTION RESPIRATORY (INHALATION) at 19:10

## 2022-11-05 RX ADMIN — IPRATROPIUM BROMIDE AND ALBUTEROL SULFATE 3 ML: 2.5; .5 SOLUTION RESPIRATORY (INHALATION) at 15:27

## 2022-11-05 RX ADMIN — FUROSEMIDE 20 MG: 20 TABLET ORAL at 09:15

## 2022-11-05 ASSESSMENT — ACTIVITIES OF DAILY LIVING (ADL)
ADLS_ACUITY_SCORE: 35

## 2022-11-05 NOTE — PROGRESS NOTES
Care Management Follow Up    Length of Stay (days): 28    Expected Discharge Date: 11/05/2022     Concerns to be Addressed: discharge planning  Patient plan of care discussed at interdisciplinary rounds: Yes    Anticipated Discharge Disposition: Transitional Care     Anticipated Discharge Services: transitional care   Anticipated Discharge DME:      Patient/family educated on Medicare website which has current facility and service quality ratings: yes   Education Provided on the Discharge Plan: yes   Patient/Family in Agreement with the Plan:  yes    Referrals Placed by CM/SW: Post Acute Facilities  Private pay costs discussed: Not applicable    Additional Information:  12:37 PM  SW called and spoke to Dale Hackett to discuss Pt's referral. Roxann reported that [facility] is not accepting new admits at this time and that CM should follow up on Monday 11/7 to check availability for Jos. Roxann reported that the facilities do not accept Pt's over the weekend unless it is planned beforehand. CM to follow up with Jada on Monday.     SW called Pt's son Frantz to provide an update regarding the status of Pt's discharge plan. Frantz expressed appreciation for the update from CM. During the conversion, Frantz discussed Pt's spouse having discharged from TCU on Thursday 11/3 and that Pt's spouse has been very anxious about Pt being gone. ASHWIN stated that CM will continue to work with Pt and Frantz for discharge planning. CM to coordinate final discharge plans on Monday 11/7.    DARNELL Hewitt

## 2022-11-05 NOTE — PLAN OF CARE
Problem: Gas Exchange Impaired  Goal: Optimal Gas Exchange  Intervention: Optimize Oxygenation and Ventilation    Goal Outcome Evaluation:  RT switched patient to nasal cannula, titrated down the O2 from 6 liters to 3 liters- sating 93 to 95%. Denies any pain or discomfort. Slept mostly during the night.    At 6am- desats down to 83% during kevin care. Increased O2 back to 6 liters until her O2 sat went up 95%. Titrated O2 down till it reached 3 liters- 93 to 95%

## 2022-11-05 NOTE — PLAN OF CARE
Problem: COPD (Chronic Obstructive Pulmonary Disease) Comorbidity  Goal: Maintenance of COPD Symptom Control  Outcome: Progressing  Intervention: Maintain COPD-Symptom Control  Recent Flowsheet Documentation  Taken 11/5/2022 1640 by Kashif Sanchez RN  Supportive Measures: active listening utilized  Medication Review/Management: medications reviewed  Taken 11/5/2022 0900 by Kashif Sanchez RN  Supportive Measures: active listening utilized  Medication Review/Management: medications reviewed     Problem: Gas Exchange Impaired  Goal: Optimal Gas Exchange  Intervention: Optimize Oxygenation and Ventilation  Recent Flowsheet Documentation  Taken 11/5/2022 1640 by Kashif Sanchez RN  Head of Bed (HOB) Positioning: HOB at 20-30 degrees  Taken 11/5/2022 0900 by Kashif Sanchez RN  Head of Bed (HOB) Positioning: HOB at 20-30 degrees     Problem: Anxiety Signs/Symptoms  Goal: Improved Mood Symptoms (Anxiety Signs/Symptoms)  Outcome: Progressing  Intervention: Optimize Emotion and Mood  Recent Flowsheet Documentation  Taken 11/5/2022 1640 by Kashif Sanchez RN  Supportive Measures: active listening utilized  Taken 11/5/2022 0900 by Kashif Sanchez RN  Supportive Measures: active listening utilized  Goal: Enhanced Social, Occupational or Functional Skills (Anxiety Signs/Symptoms)  Intervention: Promote Social, Occupational and Functional Ability  Recent Flowsheet Documentation  Taken 11/5/2022 1640 by Kashif Sanchez RN  Trust Relationship/Rapport:   emotional support provided   care explained  Taken 11/5/2022 0900 by Kashif Sanchez RN  Trust Relationship/Rapport:   emotional support provided   care explained     Problem: Activity Intolerance  Goal: Enhanced Capacity and Energy  Intervention: Optimize Activity Tolerance  Recent Flowsheet Documentation  Taken 11/5/2022 1640 by Kashif Sanchez RN  Activity Management:   activity adjusted per tolerance   up in chair  Taken 11/5/2022 0900 by Kashif Sanchez  RN  Activity Management: activity adjusted per tolerance   Goal Outcome Evaluation:       Pt lung sounds diminished.  Shortness of breath with exertion.  Was up in chair for approximately 1 hour today.  Oxygen was at 4 liters nasal canula.  Desated to 86%  patient was anxious also at this time. After talking to patient and telling her to take nice deep breaths and reassurance, oxygen levels came back up shortly there after.  No need at that time to increase oxygen.  Encouraging activity, IS and deep breathing.

## 2022-11-05 NOTE — PROGRESS NOTES
RESPIRATORY CARE NOTE   Patient is on 6L NC, BS diminished, gave duoneb treatment x1, patient declined her 1600 neb. BS post treatment increased aeration, patient tolerated well. RT to continue to follow.      Priscilla Doss, RT

## 2022-11-05 NOTE — PROGRESS NOTES
Pt remains on 3LNC and sating low to mid 90's, tolerated all scheduled txs. RT will cont to monitor and titrate O2 as able.

## 2022-11-05 NOTE — PROGRESS NOTES
Supplemental oxygen titrated; pt is currently on 3 lpm O2 and is sating low 90s. RT following.    Jerrod Arnett, RT

## 2022-11-06 ENCOUNTER — APPOINTMENT (OUTPATIENT)
Dept: OCCUPATIONAL THERAPY | Facility: HOSPITAL | Age: 67
DRG: 207 | End: 2022-11-06
Payer: COMMERCIAL

## 2022-11-06 ENCOUNTER — APPOINTMENT (OUTPATIENT)
Dept: CARDIOLOGY | Facility: HOSPITAL | Age: 67
DRG: 207 | End: 2022-11-06
Attending: HOSPITALIST
Payer: COMMERCIAL

## 2022-11-06 LAB
ANION GAP SERPL CALCULATED.3IONS-SCNC: 1 MMOL/L (ref 7–15)
BUN SERPL-MCNC: 17.6 MG/DL (ref 8–23)
CALCIUM SERPL-MCNC: 8.3 MG/DL (ref 8.8–10.2)
CHLORIDE SERPL-SCNC: 101 MMOL/L (ref 98–107)
CREAT SERPL-MCNC: 0.43 MG/DL (ref 0.51–0.95)
DEPRECATED HCO3 PLAS-SCNC: 39 MMOL/L (ref 22–29)
ERYTHROCYTE [DISTWIDTH] IN BLOOD BY AUTOMATED COUNT: 14 % (ref 10–15)
GFR SERPL CREATININE-BSD FRML MDRD: >90 ML/MIN/1.73M2
GLUCOSE SERPL-MCNC: 82 MG/DL (ref 70–99)
HCT VFR BLD AUTO: 33.7 % (ref 35–47)
HGB BLD-MCNC: 10.5 G/DL (ref 11.7–15.7)
HOLD SPECIMEN: NORMAL
MAGNESIUM SERPL-MCNC: 2 MG/DL (ref 1.7–2.3)
MCH RBC QN AUTO: 30.9 PG (ref 26.5–33)
MCHC RBC AUTO-ENTMCNC: 31.2 G/DL (ref 31.5–36.5)
MCV RBC AUTO: 99 FL (ref 78–100)
PHOSPHATE SERPL-MCNC: 3.8 MG/DL (ref 2.5–4.5)
PLATELET # BLD AUTO: 473 10E3/UL (ref 150–450)
POTASSIUM SERPL-SCNC: 4.1 MMOL/L (ref 3.4–5.3)
RBC # BLD AUTO: 3.4 10E6/UL (ref 3.8–5.2)
SODIUM SERPL-SCNC: 141 MMOL/L (ref 136–145)
WBC # BLD AUTO: 9 10E3/UL (ref 4–11)

## 2022-11-06 PROCEDURE — 93321 DOPPLER ECHO F-UP/LMTD STD: CPT

## 2022-11-06 PROCEDURE — 93308 TTE F-UP OR LMTD: CPT | Mod: 26 | Performed by: INTERNAL MEDICINE

## 2022-11-06 PROCEDURE — 36415 COLL VENOUS BLD VENIPUNCTURE: CPT | Performed by: INTERNAL MEDICINE

## 2022-11-06 PROCEDURE — 97535 SELF CARE MNGMENT TRAINING: CPT | Mod: GO

## 2022-11-06 PROCEDURE — 250N000013 HC RX MED GY IP 250 OP 250 PS 637: Performed by: HOSPITALIST

## 2022-11-06 PROCEDURE — 93325 DOPPLER ECHO COLOR FLOW MAPG: CPT

## 2022-11-06 PROCEDURE — 84100 ASSAY OF PHOSPHORUS: CPT | Performed by: INTERNAL MEDICINE

## 2022-11-06 PROCEDURE — 94640 AIRWAY INHALATION TREATMENT: CPT | Mod: 76

## 2022-11-06 PROCEDURE — 120N000001 HC R&B MED SURG/OB

## 2022-11-06 PROCEDURE — 93321 DOPPLER ECHO F-UP/LMTD STD: CPT | Mod: 26 | Performed by: INTERNAL MEDICINE

## 2022-11-06 PROCEDURE — 250N000013 HC RX MED GY IP 250 OP 250 PS 637: Performed by: INTERNAL MEDICINE

## 2022-11-06 PROCEDURE — 250N000012 HC RX MED GY IP 250 OP 636 PS 637: Performed by: INTERNAL MEDICINE

## 2022-11-06 PROCEDURE — 99232 SBSQ HOSP IP/OBS MODERATE 35: CPT | Performed by: HOSPITALIST

## 2022-11-06 PROCEDURE — 93325 DOPPLER ECHO COLOR FLOW MAPG: CPT | Mod: 26 | Performed by: INTERNAL MEDICINE

## 2022-11-06 PROCEDURE — 80048 BASIC METABOLIC PNL TOTAL CA: CPT | Performed by: INTERNAL MEDICINE

## 2022-11-06 PROCEDURE — 999N000157 HC STATISTIC RCP TIME EA 10 MIN

## 2022-11-06 PROCEDURE — 83735 ASSAY OF MAGNESIUM: CPT | Performed by: INTERNAL MEDICINE

## 2022-11-06 PROCEDURE — 250N000013 HC RX MED GY IP 250 OP 250 PS 637: Performed by: PHYSICIAN ASSISTANT

## 2022-11-06 PROCEDURE — 99207 PR CDG-CUT & PASTE-POTENTIAL IMPACT ON LEVEL: CPT | Performed by: HOSPITALIST

## 2022-11-06 PROCEDURE — 250N000009 HC RX 250: Performed by: INTERNAL MEDICINE

## 2022-11-06 PROCEDURE — 94640 AIRWAY INHALATION TREATMENT: CPT

## 2022-11-06 PROCEDURE — 85027 COMPLETE CBC AUTOMATED: CPT | Performed by: INTERNAL MEDICINE

## 2022-11-06 RX ORDER — MAGNESIUM OXIDE 400 MG/1
400 TABLET ORAL EVERY 4 HOURS
Status: COMPLETED | OUTPATIENT
Start: 2022-11-06 | End: 2022-11-06

## 2022-11-06 RX ADMIN — APIXABAN 5 MG: 5 TABLET, FILM COATED ORAL at 20:31

## 2022-11-06 RX ADMIN — IPRATROPIUM BROMIDE AND ALBUTEROL SULFATE 3 ML: 2.5; .5 SOLUTION RESPIRATORY (INHALATION) at 19:52

## 2022-11-06 RX ADMIN — MIRTAZAPINE 15 MG: 15 TABLET, FILM COATED ORAL at 20:31

## 2022-11-06 RX ADMIN — FUROSEMIDE 20 MG: 20 TABLET ORAL at 09:10

## 2022-11-06 RX ADMIN — IPRATROPIUM BROMIDE AND ALBUTEROL SULFATE 3 ML: 2.5; .5 SOLUTION RESPIRATORY (INHALATION) at 11:29

## 2022-11-06 RX ADMIN — BUDESONIDE 1 MG: 0.5 INHALANT ORAL at 19:52

## 2022-11-06 RX ADMIN — Medication 400 MG: at 09:10

## 2022-11-06 RX ADMIN — PREDNISONE 30 MG: 20 TABLET ORAL at 09:10

## 2022-11-06 RX ADMIN — THIAMINE HCL TAB 100 MG 100 MG: 100 TAB at 09:10

## 2022-11-06 RX ADMIN — IPRATROPIUM BROMIDE AND ALBUTEROL SULFATE 3 ML: 2.5; .5 SOLUTION RESPIRATORY (INHALATION) at 07:33

## 2022-11-06 RX ADMIN — MELATONIN TAB 3 MG 3 MG: 3 TAB at 20:31

## 2022-11-06 RX ADMIN — Medication 400 MG: at 14:11

## 2022-11-06 RX ADMIN — IPRATROPIUM BROMIDE AND ALBUTEROL SULFATE 3 ML: 2.5; .5 SOLUTION RESPIRATORY (INHALATION) at 15:31

## 2022-11-06 RX ADMIN — BUDESONIDE 1 MG: 0.5 INHALANT ORAL at 07:31

## 2022-11-06 RX ADMIN — APIXABAN 5 MG: 5 TABLET, FILM COATED ORAL at 09:10

## 2022-11-06 ASSESSMENT — ACTIVITIES OF DAILY LIVING (ADL)
ADLS_ACUITY_SCORE: 35
ADLS_ACUITY_SCORE: 33

## 2022-11-06 NOTE — PLAN OF CARE
Problem: COPD (Chronic Obstructive Pulmonary Disease) Comorbidity  Goal: Maintenance of COPD Symptom Control  Outcome: Progressing  Intervention: Maintain COPD-Symptom Control  Recent Flowsheet Documentation  Taken 11/6/2022 1630 by Kashif Sanchez RN  Supportive Measures: active listening utilized  Medication Review/Management: medications reviewed  Taken 11/6/2022 0915 by Kashif Sanchez RN  Supportive Measures: active listening utilized  Medication Review/Management: medications reviewed     Problem: Gas Exchange Impaired  Goal: Optimal Gas Exchange  Intervention: Optimize Oxygenation and Ventilation  Recent Flowsheet Documentation  Taken 11/6/2022 1630 by Kashif Sanchez RN  Head of Bed (HOB) Positioning: HOB at 20-30 degrees  Taken 11/6/2022 0915 by Kashif Sanchez RN  Head of Bed (HOB) Positioning: HOB at 20-30 degrees     Problem: Malnutrition  Goal: Improved Nutritional Intake  Outcome: Progressing     Problem: Anxiety Signs/Symptoms  Goal: Optimized Cognitive Function (Anxiety Signs/Symptoms)  Outcome: Progressing  Goal: Improved Mood Symptoms (Anxiety Signs/Symptoms)  Intervention: Optimize Emotion and Mood  Recent Flowsheet Documentation  Taken 11/6/2022 1630 by Kashif Sanchez RN  Supportive Measures: active listening utilized  Taken 11/6/2022 0915 by Kashif Sanchez RN  Supportive Measures: active listening utilized  Goal: Enhanced Social, Occupational or Functional Skills (Anxiety Signs/Symptoms)  Intervention: Promote Social, Occupational and Functional Ability  Recent Flowsheet Documentation  Taken 11/6/2022 1630 by Kashif Sanchez RN  Trust Relationship/Rapport:   emotional support provided   care explained  Taken 11/6/2022 0915 by Kashif Sanchez RN  Trust Relationship/Rapport:   emotional support provided   care explained     Problem: Activity Intolerance  Goal: Enhanced Capacity and Energy  Outcome: Progressing  Intervention: Optimize Activity Tolerance  Recent Flowsheet  Documentation  Taken 11/6/2022 1630 by Kashif Sanchez, RN  Activity Management:   activity adjusted per tolerance   up in chair  Taken 11/6/2022 0915 by Kashif Sanchez, RN  Activity Management:   activity adjusted per tolerance   up in chair   Goal Outcome Evaluation:      Pt has remained on 4 liters.  Lung sounds diminished with expiratory wheezes present.  Nebs per RT as ordered.  Encouraging IS.  Up in chair for about 2 hours.  Tolerated well. Stating dizziness is getting better.

## 2022-11-06 NOTE — PROGRESS NOTES
Duoneb and Pulmicort given. BS diminished pre/post treatment. Respiratory distress not noted. Pt remains on 3 lpm O2 by nasal cannula; sats 94%. Deep breath and coughing encouraged. RT following.    Jerrod Arnett, RT

## 2022-11-06 NOTE — PROGRESS NOTES
Hospitalist Progress Note    Assessment/Plan    Active Problems:    Acute respiratory failure with hypoxia (H)    Hypotension, unspecified hypotension type    Infection due to 2019 novel coronavirus  67-year-old patient with history of emphysem who presented to the hospital on October 8th for acute respiratory failure secondary to pneumonia, COVID-19 infection, PE, circulatory shock required vasopressor, she was intubated on October 8 extubated to BiPAP on October 20.     , was treated for haemophilus influenza tracheobronchitis, received dexamethasone, blood culture grew diphtheroid which was likely contamination, tracheal fluid cultures from October 19 grew Candida, she received furosemide for pulmonary edema, she was considering going home with hospice but the patient later declined and, wants to go to TCU      but due to high oxygen requirement, unable to find a facility that can accept her     Severe COPD  Hypoxic respiratory failure requiring intubation haemophilus influenza tracheobronchitis,  Right lower lobe pneumonia   recovered COVID-19 infection,  Extubated to BiPAP on October 20, pulmonary started on Vanco and Zosyn on October 28 for treatment of pneumonia completed 7-day course,, after period of being off antibiotic,  Continues to require 7 L oxygen was able to wean down to 4 L in the last 2 days,  -Palliative care was initially starting her on scheduled morphine then it was discontinued secondary to hypotension and patient was not interested in hospice anymore     subsegmental PE  Was on heparin infusion transition to Eliquis   need  3 to 6 months of treatment     Recent COVID-19 infection  Was treated with dexamethasone, remdesivir, off precautions     Splenic infarct  Unclear etiology, radiology suggested paradoxical emboli related to PFO, patient is being, anticoagulated with Eliquis     Dizziness  Had episodes of dizziness few days ago when she stands with therapy, she feels dizzy but then it  takes a while to resolve,  She had a head CT without contrast on November 3 that was negative,  She does not feel she wants to get an MRI due to claustrophobia,  She thinks her dizziness is related to hypoxia but I told her usually not because she is on oxygen and we are monitoring her needs  We will continue to monitor      Deconditioning  PT OT is following and plan to go to transitional care     Stress-induced cardiomyopathy  Echo showed an EF of 30 to 35% likely secondary to hypoxia with COVID-19 infection  Will need to repeat echo to see if that ejection fraction improved otherwise needs to have ischemic evaluation if she does not elect hospice  She was on IV is 20 twice daily that it was held due to hypotension, it was resumed today again we will order an echo to evaluate recovery of her ejection fraction        severe protein calorie malnutrition  Secondary to chronic illness and acute illness, dietitian is following     Prolonged QTC,  During hospitalization in the ICU she had a prolonged QTC was attributed to hypomagnesemia and it was replaced     Cardiogenic and distributive shock  Requiring pressor support while in the ICU, now resolved     Hypotension  Likely secondary to dehydration from diuresis, Lasix was held for a few days and resumed again today with caution    Goals of care discussion  Patient would like to go to transitional care,    Barriers to Discharge: Placement    Anticipated discharge date/Disposition: Likely TCU in 1 to 2 days    Subjective  Patient was seen today she states she feels dizzy when she has minimum activity she feels that she requires more oxygen I told her that it might be due to low blood pressure not due to hypoxia because the oxygen appropriately asked what her oxygen needs are, she feels frail and weak, I also discussed with her regarding her cardiac issues and that she needs to have a repeat echo to evaluate her heart function and may need further work-up down the  road    Objective    Vital signs in last 24 hours  Temp:  [97.7  F (36.5  C)-98.3  F (36.8  C)] 98.3  F (36.8  C)  Pulse:  [] 88  Resp:  [16-20] 18  BP: ()/(53-69) 85/59  SpO2:  [90 %-95 %] 90 % [unfilled] O2 Device: Nasal cannula with humidification    Weight:   [unfilled] Weight change:     Intake/Output last 3 shifts  No intake/output data recorded.  Body mass index is 14.21 kg/m .    Physical Exam:  General Appearance: Alert, oriented x3, does not appear in distress.  HEENT: Normocephalic. No scleral icterus, . Mucous membranes moist.  Heart: :Regular rate and rhythm, normal S1 ,S2, No murmurs, no JVD, no pedal edema   Lungs: Clear to auscultation bilaterally. No wheezing or crackles  Abdomen: Soft, non tender, no rebound or rigidity, non distended, bowel sounds present.      Pertinent Labs   Lab Results: personally reviewed.   Recent Labs   Lab 11/06/22  0601 11/05/22 0613 11/04/22  0703    142 139   CO2 39* 37* 35*   BUN 17.6 15.8 19.9     Recent Labs   Lab 11/06/22  0601 11/05/22 0613 11/04/22  0704   WBC 9.0 10.1 10.1   HGB 10.5* 10.4* 9.7*   HCT 33.7* 33.0* 30.9*   * 442 426     No results for input(s): CKTOTAL, TROPONINI in the last 168 hours.    Invalid input(s): TROPONINT, CKMBINDEX  Invalid input(s): POCGLUFGR      Advanced Care Planning:  Discharge Planning discussed with patient  Total time with this patient is 35 min with 50% of time spent in examining the patient, reviewing records, discussing plan of care and counseling, 50% of time spent in coordination of care.  Care discussed and coordinated with RN, csre management .      Melvin Townsend MD  Internal Medicine Hospitalist  11/6/2022

## 2022-11-06 NOTE — PROGRESS NOTES
Respiratory Care Note    Patient remains on 4 lpm via nasal cannula with oxygen saturation 90%. Duoneb x1 and pulmicort x1 given as scheduled with flutter valve inline without adverse effects. , RR 18, and breath sounds dimnished. No changes in BBS post neb tx. RT following.      Jacklyn Tan, RT

## 2022-11-06 NOTE — PLAN OF CARE
Problem: COPD (Chronic Obstructive Pulmonary Disease) Comorbidity  Goal: Maintenance of COPD Symptom Control  Outcome: Progressing     Problem: Gas Exchange Impaired  Goal: Optimal Gas Exchange  Intervention: Optimize Oxygenation and Ventilation  Recent Flowsheet Documentation  Taken 11/6/2022 0018 by Alejandra Knapp, RN  Head of Bed (HOB) Positioning: HOB at 20-30 degrees  Taken 11/5/2022 2126 by Alejandra Knapp, RN  Head of Bed (HOB) Positioning: HOB at 20-30 degrees     Problem: Anxiety Signs/Symptoms  Goal: Improved Sleep (Anxiety Signs/Symptoms)  Outcome: Progressing   Goal Outcome Evaluation:       Pt continued with 4 L NC O2 overnight. Sats remained above 90% all night. Denied pain. PRN melatonin given at HS to promote sleep; effective.

## 2022-11-06 NOTE — PROGRESS NOTES
Hospitalist Progress Note    Assessment/Plan    Active Problems:    Acute respiratory failure with hypoxia (H)    Hypotension, unspecified hypotension type    Infection due to 2019 novel coronavirus  67-year-old patient with history of emphysema who presented to the hospital on October 8th for acute respiratory failure secondary to pneumonia, COVID-19 infection, PE, circulatory shock required vasopressor, she was intubated on October 8 extubated to BiPAP on October 20.     ,was treated for haemophilus influenza tracheobronchitis, received dexamethasone, blood culture grew diphtheroid which was likely contamination, tracheal fluid cultures from October 19 grew Candida, she received furosemide for pulmonary edema, she was considering going home with hospice but the patient later declined and, wants to go to TCU      but due to high oxygen requirement, unable to find a facility that can accept her for many days now  oxygen requirements improving and pending TCU placement     Severe COPD  Hypoxic respiratory failure requiring intubation haemophilus influenza tracheobronchitis,  Right lower lobe pneumonia   recovered COVID-19 infection,  Extubated to BiPAP on October 20, pulmonary started on Vanco and Zosyn on October 28 for treatment of pneumonia completed 7-day course,, after period of being off antibiotic,  For some time she required 7 L oxygen   No  able to wean down to 4 L in the last 2 days,  -Palliative care was initially starting her on scheduled morphine then it was discontinued secondary to hypotension and patient was not interested in hospice anymore  -Likely discharge to TCU in 1 to 2 days on oxygen      Subsegmental PE  Was on heparin infusion transition to Eliquis   need  3 to 6 months of treatment     Recent COVID-19 infection  Was treated with dexamethasone, remdesivir, off precautions     Splenic infarct  Unclear etiology, radiology suggested paradoxical emboli related to PFO, patient is being,  anticoagulated with Eliquis     Dizziness  Had episodes of dizziness few days ago when she stands with therapy, she feels dizzy but then it takes a while to resolve,  She had a head CT without contrast on November 3 that was negative,  She does not feel she wants to get an MRI due to claustrophobia,  Looks like her dizziness is improving today,        Deconditioning  PT OT is following and plan to go to transitional care     Stress-induced cardiomyopathy resolved  Echo showed an EF of 30 to 35% likely secondary to hypoxia with COVID-19 infection previously cardiology recommended repeat echo and further ischemic evaluation if she is not going on hospice,  I ordered repeat echo for today  Echo showed noticeable improvement of the left ventricle and right ventricular systolic performance with a EF of 65 to 70% and normal right ventricular systolic performance, normal regional wall motion of the left ventricle normal left ventricular thickness  -We will stop Lasix for now         severe protein calorie malnutrition  Secondary to chronic illness and acute illness, dietitian is following     Prolonged QTC,  During hospitalization in the ICU she had a prolonged QTC was attributed to hypomagnesemia and it was replaced     Cardiogenic and distributive shock  Requiring pressor support while in the ICU, now resolved     Hypotension  Likely secondary to dehydration from diuresis, Lasix was held for a few days and resumed again she continues to be hypotensive but asymptomatic,   will stop Lasix     Goals of care discussion  Patient would like to go to transitional care,      Barriers to Discharge: Placement    Anticipated discharge date/Disposition: TCU in 1 to 2 days    Subjective  Patient was seen today she states I feel better my breathing is easier, she says I would never get a cardiac surgery if needed, we were waiting for her echo results to come back when I saw her    Objective    Vital signs in last 24 hours  Temp:   [97.7  F (36.5  C)-98.3  F (36.8  C)] 98  F (36.7  C)  Pulse:  [] 101  Resp:  [18] 18  BP: ()/(53-67) 105/67  SpO2:  [90 %-95 %] 90 % [unfilled] O2 Device: Nasal cannula with humidification    Weight:   [unfilled] Weight change:     Intake/Output last 3 shifts  No intake/output data recorded.  Body mass index is 14.21 kg/m .    Physical Exam:  General Appearance: Alert, oriented x3, does not appear in distress.  HEENT: Normocephalic. No scleral icterus, . Mucous membranes moist.  Heart: :Regular rate and rhythm, normal S1 ,S2, No murmurs, no JVD, NO pedal edema   Lungs: Diminished breath sounds bilaterally clear to auscultation bilaterally. No wheezing or crackles  Abdomen: Soft, non tender, no rebound or rigidity, non distended, bowel sounds present.      Pertinent Labs   Lab Results: personally reviewed.   Recent Labs   Lab 11/06/22  0601 11/05/22 0613 11/04/22  0703    142 139   CO2 39* 37* 35*   BUN 17.6 15.8 19.9     Recent Labs   Lab 11/06/22  0601 11/05/22  0613 11/04/22  0704   WBC 9.0 10.1 10.1   HGB 10.5* 10.4* 9.7*   HCT 33.7* 33.0* 30.9*   * 442 426     No results for input(s): CKTOTAL, TROPONINI in the last 168 hours.    Invalid input(s): TROPONINT, CKMBINDEX  Invalid input(s): POCGLUFGR        Advanced Care Planning:  Discharge Planning discussed with patient  Total time with this patient is 25 min with 50% of time spent in examining the patient, reviewing records, discussing plan of care and counseling, 50% of time spent in coordination of care.  Care discussed and coordinated with RN, care manager.      Melvin Townsend MD  Internal Medicine Hospitalist  11/6/2022

## 2022-11-07 ENCOUNTER — APPOINTMENT (OUTPATIENT)
Dept: OCCUPATIONAL THERAPY | Facility: HOSPITAL | Age: 67
DRG: 207 | End: 2022-11-07
Payer: COMMERCIAL

## 2022-11-07 ENCOUNTER — APPOINTMENT (OUTPATIENT)
Dept: PHYSICAL THERAPY | Facility: HOSPITAL | Age: 67
DRG: 207 | End: 2022-11-07
Payer: COMMERCIAL

## 2022-11-07 LAB
ANION GAP SERPL CALCULATED.3IONS-SCNC: 1 MMOL/L (ref 7–15)
BUN SERPL-MCNC: 17.9 MG/DL (ref 8–23)
CALCIUM SERPL-MCNC: 8.3 MG/DL (ref 8.8–10.2)
CHLORIDE SERPL-SCNC: 102 MMOL/L (ref 98–107)
CREAT SERPL-MCNC: 0.5 MG/DL (ref 0.51–0.95)
DEPRECATED HCO3 PLAS-SCNC: 37 MMOL/L (ref 22–29)
ERYTHROCYTE [DISTWIDTH] IN BLOOD BY AUTOMATED COUNT: 14.2 % (ref 10–15)
GFR SERPL CREATININE-BSD FRML MDRD: >90 ML/MIN/1.73M2
GLUCOSE SERPL-MCNC: 80 MG/DL (ref 70–99)
HCT VFR BLD AUTO: 33.6 % (ref 35–47)
HGB BLD-MCNC: 10.6 G/DL (ref 11.7–15.7)
MAGNESIUM SERPL-MCNC: 2 MG/DL (ref 1.7–2.3)
MCH RBC QN AUTO: 31.2 PG (ref 26.5–33)
MCHC RBC AUTO-ENTMCNC: 31.5 G/DL (ref 31.5–36.5)
MCV RBC AUTO: 99 FL (ref 78–100)
PHOSPHATE SERPL-MCNC: 3.1 MG/DL (ref 2.5–4.5)
PLATELET # BLD AUTO: 472 10E3/UL (ref 150–450)
POTASSIUM SERPL-SCNC: 4.3 MMOL/L (ref 3.4–5.3)
RBC # BLD AUTO: 3.4 10E6/UL (ref 3.8–5.2)
SODIUM SERPL-SCNC: 140 MMOL/L (ref 136–145)
WBC # BLD AUTO: 9.9 10E3/UL (ref 4–11)

## 2022-11-07 PROCEDURE — 97116 GAIT TRAINING THERAPY: CPT | Mod: GP

## 2022-11-07 PROCEDURE — 250N000013 HC RX MED GY IP 250 OP 250 PS 637: Performed by: INTERNAL MEDICINE

## 2022-11-07 PROCEDURE — 250N000013 HC RX MED GY IP 250 OP 250 PS 637: Performed by: PHYSICIAN ASSISTANT

## 2022-11-07 PROCEDURE — 97110 THERAPEUTIC EXERCISES: CPT | Mod: GO

## 2022-11-07 PROCEDURE — 250N000012 HC RX MED GY IP 250 OP 636 PS 637: Performed by: INTERNAL MEDICINE

## 2022-11-07 PROCEDURE — 250N000009 HC RX 250: Performed by: INTERNAL MEDICINE

## 2022-11-07 PROCEDURE — 99233 SBSQ HOSP IP/OBS HIGH 50: CPT | Performed by: INTERNAL MEDICINE

## 2022-11-07 PROCEDURE — 250N000011 HC RX IP 250 OP 636: Performed by: INTERNAL MEDICINE

## 2022-11-07 PROCEDURE — 85027 COMPLETE CBC AUTOMATED: CPT | Performed by: INTERNAL MEDICINE

## 2022-11-07 PROCEDURE — 94799 UNLISTED PULMONARY SVC/PX: CPT

## 2022-11-07 PROCEDURE — 120N000001 HC R&B MED SURG/OB

## 2022-11-07 PROCEDURE — 94640 AIRWAY INHALATION TREATMENT: CPT | Mod: 76

## 2022-11-07 PROCEDURE — 97535 SELF CARE MNGMENT TRAINING: CPT | Mod: GO

## 2022-11-07 PROCEDURE — 94640 AIRWAY INHALATION TREATMENT: CPT

## 2022-11-07 PROCEDURE — 36415 COLL VENOUS BLD VENIPUNCTURE: CPT | Performed by: INTERNAL MEDICINE

## 2022-11-07 PROCEDURE — 83735 ASSAY OF MAGNESIUM: CPT | Performed by: INTERNAL MEDICINE

## 2022-11-07 PROCEDURE — 97110 THERAPEUTIC EXERCISES: CPT | Mod: GP

## 2022-11-07 PROCEDURE — 97530 THERAPEUTIC ACTIVITIES: CPT | Mod: GP

## 2022-11-07 PROCEDURE — 84100 ASSAY OF PHOSPHORUS: CPT | Performed by: INTERNAL MEDICINE

## 2022-11-07 PROCEDURE — 999N000157 HC STATISTIC RCP TIME EA 10 MIN

## 2022-11-07 PROCEDURE — 82310 ASSAY OF CALCIUM: CPT | Performed by: INTERNAL MEDICINE

## 2022-11-07 RX ORDER — MAGNESIUM SULFATE HEPTAHYDRATE 40 MG/ML
2 INJECTION, SOLUTION INTRAVENOUS ONCE
Status: COMPLETED | OUTPATIENT
Start: 2022-11-07 | End: 2022-11-07

## 2022-11-07 RX ORDER — MAGNESIUM OXIDE 400 MG/1
400 TABLET ORAL EVERY 4 HOURS
Status: DISCONTINUED | OUTPATIENT
Start: 2022-11-07 | End: 2022-11-07

## 2022-11-07 RX ADMIN — MAGNESIUM SULFATE HEPTAHYDRATE 2 G: 40 INJECTION, SOLUTION INTRAVENOUS at 10:57

## 2022-11-07 RX ADMIN — MELATONIN TAB 3 MG 3 MG: 3 TAB at 21:09

## 2022-11-07 RX ADMIN — IPRATROPIUM BROMIDE AND ALBUTEROL SULFATE 3 ML: 2.5; .5 SOLUTION RESPIRATORY (INHALATION) at 19:57

## 2022-11-07 RX ADMIN — THIAMINE HCL TAB 100 MG 100 MG: 100 TAB at 09:03

## 2022-11-07 RX ADMIN — PREDNISONE 30 MG: 20 TABLET ORAL at 09:03

## 2022-11-07 RX ADMIN — BUDESONIDE 1 MG: 0.5 INHALANT ORAL at 08:21

## 2022-11-07 RX ADMIN — BUDESONIDE 1 MG: 0.5 INHALANT ORAL at 19:57

## 2022-11-07 RX ADMIN — IPRATROPIUM BROMIDE AND ALBUTEROL SULFATE 3 ML: 2.5; .5 SOLUTION RESPIRATORY (INHALATION) at 15:18

## 2022-11-07 RX ADMIN — MIRTAZAPINE 15 MG: 15 TABLET, FILM COATED ORAL at 21:05

## 2022-11-07 RX ADMIN — IPRATROPIUM BROMIDE AND ALBUTEROL SULFATE 3 ML: 2.5; .5 SOLUTION RESPIRATORY (INHALATION) at 08:20

## 2022-11-07 RX ADMIN — APIXABAN 5 MG: 5 TABLET, FILM COATED ORAL at 09:03

## 2022-11-07 RX ADMIN — APIXABAN 5 MG: 5 TABLET, FILM COATED ORAL at 21:06

## 2022-11-07 ASSESSMENT — ACTIVITIES OF DAILY LIVING (ADL)
ADLS_ACUITY_SCORE: 33

## 2022-11-07 NOTE — PROGRESS NOTES
Care Management Follow Up    Length of Stay (days): 30    Expected Discharge Date: 11/08/2022     Concerns to be Addressed:  (O2 status - pt needs to be at 6L O2 or less to qualify for TCU; placement)     Patient plan of care discussed at interdisciplinary rounds: Yes    Anticipated Discharge Disposition: Transitional Care     Anticipated Discharge Services:    Anticipated Discharge DME:      Patient/family educated on Medicare website which has current facility and service quality ratings:    Education Provided on the Discharge Plan:    Patient/Family in Agreement with the Plan:      Referrals Placed by CM/SW: Post Acute Facilities  Private pay costs discussed: Not applicable    Additional Information:  SW followed up on TCU choices    [facility] will review  [facility] (left VM for admissions)  [facility] (Left VM)  [facility] (left VM for admissions)  [facility] (declined)  [facility] (left VM for admissions)  [facility] (resent referral)            Radha Carey

## 2022-11-07 NOTE — PROGRESS NOTES
[facility]    Medicine Progress Note - Hospitalist Service    Date of Admission:  10/8/2022    Assessment & Plan     67-year-old patient with history of severe emphysema who presented to the hospital on October 8th for acute respiratory failure secondary to pneumonia, COVID-19 infection, PE, circulatory shock required vasopressor, she was intubated on October 8 extubated to BiPAP on October 20.   She was treated for haemophilus influenza tracheobronchitis, received dexamethasone, blood culture grew diphtheroid which was likely contamination, tracheal fluid cultures from October 19 grew Candida, she received furosemide for pulmonary edema, she was considering going home with hospice but the patient later declined and, wants to go to TCU   She has had high O2 needs, so that has delayed transfer to TCU     1.Severe COPD  Hypoxic respiratory failure requiring intubation haemophilus influenza tracheobronchitis,  Right lower lobe pneumonia   Recovered COVID-19 infection,  Extubated to BiPAP on 10/20/22,   -pulmonary started on Vanco and Zosyn on October 28 for treatment of pneumonia completed 7-day course, after period of being off antibiotic,  -For some time she required 7 L oxygen--then over past few days down to 4 liters  -Palliative care did see, and pt does NOT want hospice, wants TCU     2.Subsegmental PE  -was on heparin infusion transition to Eliquis   -plan on  3 to 6 months of treatment     3.Recent COVID-19 infection  - treated with dexamethasone, remdesivir, off precautions, recovered     4.Splenic infarct  -Unclear etiology, radiology suggested paradoxical emboli related to PFO, patient is being, anticoagulated with Eliquis     5.Dizziness  Had episodes of dizziness few days ago when she stands with therapy, she feels dizzy but then it takes a while to resolve,  -She had a head CT without contrast on November 3 that was negative,  -She does not feel she wants to get an MRI due to  claustrophobia,  -dizziness improving     6.Deconditioning  -PT OT is following and plan to go to transitional care     7.Stress-induced cardiomyopathy resolved  -Echo showed an EF of 30 to 35% likely secondary to hypoxia with COVID-19 infection previously cardiology recommended repeat echo and further ischemic evaluation if she is not going on hospice,  I ordered repeat echo for today  -Echo showed noticeable improvement of the left ventricle and right ventricular systolic performance with a EF of 65 to 70% and normal right ventricular systolic performance, normal regional wall motion of the left ventricle normal left ventricular thickness  - stopped Lasix      8.severe protein calorie malnutrition/cachexia/hypoalbumin  -Secondary to chronic illness and acute illness, dietitian is following     9.Prolonged QTC,  -During hospitalization in the ICU she had a prolonged QTC was attributed to hypomagnesemia and it was replaced     10.Cardiogenic and distributive shock  S/p in ICU and had  pressor support while in the ICU, now resolved     11.Hypotension  Likely secondary to dehydration from diuresis, Lasix was held for a few days and resumed again she continues to be hypotensive but asymptomatic,  - stopped Lasix  -she has had losses with loose stools    12.Loose stools  -watery  -check c diff now  -stop lasix    13.Hypomag  -iv mag          Diet: Advance Diet as Tolerated: Regular Diet Adult  Snacks/Supplements Adult: Ensure Enlive; With Meals  Snacks/Supplements Adult: Other; Special K bar; Between Meals  Snacks/Supplements Adult: Gelatein Plus; With Meals    DVT Prophylaxis: DOAC  [place] Catheter: Not present  Central Lines: None  Cardiac Monitoring: None  Code Status: No CPR- Do NOT Intubate      Disposition Plan      Expected Discharge Date: 11/08/2022    Discharge Delays: Placement - TCU  Oxygen Needs - Arrange Home O2    Discharge Comments: TCU  wean O2        The patient's care was discussed with the Bedside  Nurse, Care Coordinator/ and Patient. I called son Frantz at 1503 to update    Zainab Aguilar MD  Hospitalist Service  [facility]  Securely message with the [employer] Web Console (learn more here)  Text page via [employer] Paging/Directory            ______________________________________________________________________    Interval History   She notes loose stools, multiple  Watery  No abd pain  O2 weaned to 4 liters  Did now use any O2 at home before    Data reviewed today: I reviewed all medications, new labs and imaging results over the last 24 hours. I personally reviewed no images or EKG's today.    Physical Exam   Vital Signs: Temp: 98.3  F (36.8  C) Temp src: Oral BP: 91/59 Pulse: 102   Resp: 18 SpO2: 94 % O2 Device: Nasal cannula Oxygen Delivery: 4 LPM  Weight: 90 lbs 11.2 oz  Constitutional: awake, fatigued, alert, cooperative and no apparent distress  Eyes: sclera clear  Respiratory: no increased work of breathing, moderate air exchange, no retractions and diminished breath sounds throughout lungs  Cardiovascular: Normal apical impulse, regular rate and rhythm, normal S1 and S2, no S3 or S4, and no murmur noted  GI: normal bowel sounds, soft, non-distended and non-tender  Skin: no bruising or bleeding  Musculoskeletal: no lower extremity pitting edema present  Neurologic: Mental Status Exam:  Level of Alertness:   awake  Neuropsychiatric: General: normal, calm and normal eye contact    Data   Recent Labs   Lab 11/07/22  0649 11/06/22  0601 11/05/22  0613   WBC 9.9 9.0 10.1   HGB 10.6* 10.5* 10.4*   MCV 99 99 100   * 473* 442    141 142   POTASSIUM 4.3 4.1 4.2   CHLORIDE 102 101 102   CO2 37* 39* 37*   BUN 17.9 17.6 15.8   CR 0.50* 0.43* 0.52   ANIONGAP 1* 1* 3*   JASON 8.3* 8.3* 8.4*   GLC 80 82 82     Recent Results (from the past 24 hour(s))   Echocardiogram Limited    Narrative    [id number]  [id number]  [id number]  [id number]^[address]^ALONDRA^[facility]  "[place] [address]     Name: ANGEL FORTUNE  MRN: [id number]  : 1955  Study Date: 2022 11:35 AM  Age: 67 yrs  Gender: Female  Patient Location: [facility]  Reason For Study: CHF  Ordering Physician: ALONDRA MERRILL  Performed By: CM     BSA: 1.4 m2  Height: 67 in  Weight: 90 lb  HR: 104  ______________________________________________________________________________  Procedure  Limited Echo Adult.  ______________________________________________________________________________  Interpretation Summary     1. Technically difficult study (imaging could only be obtained from the apical  window).  2. Limited 2-dimensional echocardiogram.  3. Normal left ventricular size and systolic performance with a visually  estimated ejection fraction of 65-70%.  4. Normal right ventricular size and systolic performance.     When compared to the prior real-time echocardiogram dated 2022,  there has been a noticeable improvement in left ventricular and right  ventricular systolic performance.     Additional comments: As aforementioned, images could only be obtained from the  apical window. Nonetheless, imaging from the apical acoustic window is good.  It is felt a reasonable assessment of left ventricular right ventricular  systolic function could be offered.  ______________________________________________________________________________  Left ventricle:  Normal left ventricular size and systolic performance with a visually  estimated ejection fraction of 65-70%. There is normal regional wall motion  involving the segments visualized. Left ventricular wall thickness is normal.     Assessment of LV Diastolic Function: Examination was performed as a \"limited  study\". Assessment of diastolic filling consequently not performed.     Right ventricle:  Normal right ventricular size and systolic performance.     Left atrium:  The left atrium is of normal size.     Right atrium:  The right " atrium is of normal size.     IVC:  The IVC is not well-visualized.     Aortic valve:  The aortic valve is not well visualized.     Mitral valve:  The mitral valve appears grossly morphologically normal.     Tricuspid valve:  The tricuspid valve is grossly morphologically normal.     Pulmonic valve:  The pulmonic valve is not well visualized.     Thoracic aorta:  The visualized portions of the thoracic aorta appear to be of normal  dimension.     Pericardium:  There is no significant pericardial effusion.  ______________________________________________________________________________  ______________________________________________________________________________  Report approved by: Ruperto Fermin 11/06/2022 12:10 PM     ______________________________________________________________________________

## 2022-11-07 NOTE — PROGRESS NOTES
"CLINICAL NUTRITION SERVICES - REASSESSMENT NOTE     Nutrition Prescription    RECOMMENDATIONS FOR MDs/PROVIDERS TO ORDER:    Malnutrition Status:    Severe in chronic illness    Recommendations already ordered by Registered Dietitian (RD):  Continue nutritional supplements except for pineapple gelaten plus  As pt does not like it    Future/Additional Recommendations:  Follow po intake, weight, labs, poc     EVALUATION OF THE PROGRESS TOWARD GOALS   Diet: Regular  Nutrition Supplements: Chocolate ensure enlive with breakfast and dinner meals, galetein plus with lunch and special K bar as a night time snack  Intake: % meals documented.  Pt states she's taking 2-3 ensure enlive per day and eating the special K bar.  She is not taking the high protein jello (will discontinue it).  Supplements provide: 1230 Calories and 72 grams of protein/day       ANTHROPOMETRICS  Height: 170.2 cm (5' 7\")  Most Recent Weight: 41.1 kg (90 lb 11.2 oz)    Weight History:   Wt Readings from Last 10 Encounters:   11/03/22 41.1 kg (90 lb 11.2 oz)   10/28/2022 weight: 36 Kg (79 lb 6.4 oz)  Admit weight: 35 Kg (77 lb 2.6 oz)    GI CONCERNS  Fecal incontinence  Hypoactive BS all quadrants  Last BM 11/3/2022 (moderate, mucoid, green)    LABS  Reviewed: Na 140, K 4.3, Cr 0.5, Mg 2, phos 3.1, Glu 80    MEDICATIONS  Reviewed: lasix, remeron, miralax, prednisone, thaimine    Malnutrition Diagnosis: Severe malnutrition  In Context of:  Chronic illness or disease    CURRENT NUTRITION DIAGNOSIS  Malnutrition related to chronic illness as evidenced by meeting < 75% of energy needs for > 1 month, > 20% weight loss in 1 year and severe generalized muscle and adipose tissue depletion      INTERVENTIONS  Implementation  Discontinue gelatein plus as pt dislikes it  Continue chocolate ensure 2-3 times per day and special K bar daily    Goals  Have BM  Gain weight 1-2 lb per week-progressing  Meet > 75% Kcal and protein needs through oral " intake-progressing    Monitoring/Evaluation  Progress toward goals will be monitored and evaluated per protocol.

## 2022-11-07 NOTE — PLAN OF CARE
Problem: Plan of Care - These are the overarching goals to be used throughout the patient stay.    Goal: Optimal Comfort and Wellbeing  Intervention: Provide Person-Centered Care  Recent Flowsheet Documentation  Taken 11/6/2022 2352 by Alejandra Knapp RN  Trust Relationship/Rapport:   emotional support provided   care explained  Taken 11/6/2022 2031 by Alejandra Knapp RN  Trust Relationship/Rapport:   emotional support provided   care explained     Problem: Risk for Delirium  Goal: Optimal Coping  Intervention: Optimize Psychosocial Adjustment to Delirium  Recent Flowsheet Documentation  Taken 11/6/2022 2352 by Alejandra Knapp RN  Supportive Measures: active listening utilized  Taken 11/6/2022 2031 by Alejandra Knapp RN  Supportive Measures: active listening utilized   Goal Outcome Evaluation:       Denied pain overnight. Remained on 4 lpm NC O2. No acute concerns overnight.

## 2022-11-08 ENCOUNTER — APPOINTMENT (OUTPATIENT)
Dept: PHYSICAL THERAPY | Facility: HOSPITAL | Age: 67
DRG: 207 | End: 2022-11-08
Payer: COMMERCIAL

## 2022-11-08 ENCOUNTER — APPOINTMENT (OUTPATIENT)
Dept: OCCUPATIONAL THERAPY | Facility: HOSPITAL | Age: 67
DRG: 207 | End: 2022-11-08
Payer: COMMERCIAL

## 2022-11-08 ENCOUNTER — APPOINTMENT (OUTPATIENT)
Dept: RADIOLOGY | Facility: HOSPITAL | Age: 67
DRG: 207 | End: 2022-11-08
Attending: INTERNAL MEDICINE
Payer: COMMERCIAL

## 2022-11-08 LAB
ANION GAP SERPL CALCULATED.3IONS-SCNC: 4 MMOL/L (ref 7–15)
BUN SERPL-MCNC: 22 MG/DL (ref 8–23)
CALCIUM SERPL-MCNC: 8.5 MG/DL (ref 8.8–10.2)
CHLORIDE SERPL-SCNC: 102 MMOL/L (ref 98–107)
CREAT SERPL-MCNC: 0.47 MG/DL (ref 0.51–0.95)
DEPRECATED HCO3 PLAS-SCNC: 35 MMOL/L (ref 22–29)
ERYTHROCYTE [DISTWIDTH] IN BLOOD BY AUTOMATED COUNT: 14.2 % (ref 10–15)
GFR SERPL CREATININE-BSD FRML MDRD: >90 ML/MIN/1.73M2
GLUCOSE SERPL-MCNC: 93 MG/DL (ref 70–99)
HCT VFR BLD AUTO: 32.8 % (ref 35–47)
HGB BLD-MCNC: 10.2 G/DL (ref 11.7–15.7)
MAGNESIUM SERPL-MCNC: 2 MG/DL (ref 1.7–2.3)
MCH RBC QN AUTO: 30.8 PG (ref 26.5–33)
MCHC RBC AUTO-ENTMCNC: 31.1 G/DL (ref 31.5–36.5)
MCV RBC AUTO: 99 FL (ref 78–100)
PHOSPHATE SERPL-MCNC: 3.7 MG/DL (ref 2.5–4.5)
PLATELET # BLD AUTO: 448 10E3/UL (ref 150–450)
POTASSIUM SERPL-SCNC: 3.9 MMOL/L (ref 3.4–5.3)
RBC # BLD AUTO: 3.31 10E6/UL (ref 3.8–5.2)
SODIUM SERPL-SCNC: 141 MMOL/L (ref 136–145)
WBC # BLD AUTO: 12.8 10E3/UL (ref 4–11)

## 2022-11-08 PROCEDURE — 250N000009 HC RX 250: Performed by: INTERNAL MEDICINE

## 2022-11-08 PROCEDURE — 99232 SBSQ HOSP IP/OBS MODERATE 35: CPT | Performed by: INTERNAL MEDICINE

## 2022-11-08 PROCEDURE — 99207 PR CDG-CUT & PASTE-POTENTIAL IMPACT ON LEVEL: CPT | Performed by: INTERNAL MEDICINE

## 2022-11-08 PROCEDURE — 250N000011 HC RX IP 250 OP 636: Performed by: INTERNAL MEDICINE

## 2022-11-08 PROCEDURE — 97535 SELF CARE MNGMENT TRAINING: CPT | Mod: GO

## 2022-11-08 PROCEDURE — 97116 GAIT TRAINING THERAPY: CPT | Mod: GP

## 2022-11-08 PROCEDURE — 999N000157 HC STATISTIC RCP TIME EA 10 MIN

## 2022-11-08 PROCEDURE — 85027 COMPLETE CBC AUTOMATED: CPT | Performed by: INTERNAL MEDICINE

## 2022-11-08 PROCEDURE — 250N000013 HC RX MED GY IP 250 OP 250 PS 637: Performed by: INTERNAL MEDICINE

## 2022-11-08 PROCEDURE — 94640 AIRWAY INHALATION TREATMENT: CPT

## 2022-11-08 PROCEDURE — 36415 COLL VENOUS BLD VENIPUNCTURE: CPT | Performed by: INTERNAL MEDICINE

## 2022-11-08 PROCEDURE — 250N000013 HC RX MED GY IP 250 OP 250 PS 637: Performed by: PHYSICIAN ASSISTANT

## 2022-11-08 PROCEDURE — 84100 ASSAY OF PHOSPHORUS: CPT | Performed by: INTERNAL MEDICINE

## 2022-11-08 PROCEDURE — 120N000001 HC R&B MED SURG/OB

## 2022-11-08 PROCEDURE — 97110 THERAPEUTIC EXERCISES: CPT | Mod: GP

## 2022-11-08 PROCEDURE — 82310 ASSAY OF CALCIUM: CPT | Performed by: INTERNAL MEDICINE

## 2022-11-08 PROCEDURE — 83735 ASSAY OF MAGNESIUM: CPT | Performed by: INTERNAL MEDICINE

## 2022-11-08 PROCEDURE — 250N000012 HC RX MED GY IP 250 OP 636 PS 637: Performed by: INTERNAL MEDICINE

## 2022-11-08 PROCEDURE — 94640 AIRWAY INHALATION TREATMENT: CPT | Mod: 76

## 2022-11-08 PROCEDURE — 71045 X-RAY EXAM CHEST 1 VIEW: CPT

## 2022-11-08 RX ORDER — FUROSEMIDE 20 MG/1
10 TABLET ORAL DAILY
Status: DISCONTINUED | OUTPATIENT
Start: 2022-11-09 | End: 2022-11-11 | Stop reason: HOSPADM

## 2022-11-08 RX ORDER — MAGNESIUM SULFATE HEPTAHYDRATE 40 MG/ML
2 INJECTION, SOLUTION INTRAVENOUS ONCE
Status: COMPLETED | OUTPATIENT
Start: 2022-11-08 | End: 2022-11-08

## 2022-11-08 RX ADMIN — IPRATROPIUM BROMIDE AND ALBUTEROL SULFATE 3 ML: 2.5; .5 SOLUTION RESPIRATORY (INHALATION) at 07:57

## 2022-11-08 RX ADMIN — IPRATROPIUM BROMIDE AND ALBUTEROL SULFATE 3 ML: 2.5; .5 SOLUTION RESPIRATORY (INHALATION) at 15:40

## 2022-11-08 RX ADMIN — IPRATROPIUM BROMIDE AND ALBUTEROL SULFATE 3 ML: 2.5; .5 SOLUTION RESPIRATORY (INHALATION) at 12:45

## 2022-11-08 RX ADMIN — THIAMINE HCL TAB 100 MG 100 MG: 100 TAB at 10:21

## 2022-11-08 RX ADMIN — APIXABAN 5 MG: 5 TABLET, FILM COATED ORAL at 10:20

## 2022-11-08 RX ADMIN — MELATONIN TAB 3 MG 3 MG: 3 TAB at 21:25

## 2022-11-08 RX ADMIN — MIRTAZAPINE 15 MG: 15 TABLET, FILM COATED ORAL at 21:21

## 2022-11-08 RX ADMIN — MAGNESIUM SULFATE HEPTAHYDRATE 2 G: 40 INJECTION, SOLUTION INTRAVENOUS at 10:23

## 2022-11-08 RX ADMIN — BUDESONIDE 0.5 MG: 0.5 INHALANT ORAL at 20:18

## 2022-11-08 RX ADMIN — IPRATROPIUM BROMIDE AND ALBUTEROL SULFATE 3 ML: 2.5; .5 SOLUTION RESPIRATORY (INHALATION) at 20:17

## 2022-11-08 RX ADMIN — PREDNISONE 30 MG: 20 TABLET ORAL at 10:18

## 2022-11-08 RX ADMIN — APIXABAN 5 MG: 5 TABLET, FILM COATED ORAL at 21:21

## 2022-11-08 RX ADMIN — BUDESONIDE 1 MG: 0.5 INHALANT ORAL at 07:56

## 2022-11-08 ASSESSMENT — ACTIVITIES OF DAILY LIVING (ADL)
ADLS_ACUITY_SCORE: 33

## 2022-11-08 NOTE — PROGRESS NOTES
"RESPIRATORY CARE NOTE:    PT is currently on 4 L NC with an SpO2 of 94%.  Breathing pattern dyspnea on exertion Breath sounds diminished with exp wheezes Cough type  Sputum Type infrequent, non-productive  Duoneb nebulizer given x2, Pulmicort nebulizer given x1.  PT tolerated treatments well.  RT will continue to follow.      BP 98/70 (Patient Position: Sitting)   Pulse 104   Temp 98.2  F (36.8  C) (Oral)   Resp 18   Ht 1.702 m (5' 7\")   Wt 41.1 kg (90 lb 11.2 oz)   SpO2 94%   BMI 14.21 kg/m      Jer Piedra, RT  11/8/2022      "

## 2022-11-08 NOTE — PROGRESS NOTES
"Pt continues on 4lnc.  Pt received neb x2 as ordered.  Pt doing flutter valve on her own.  Pt encouraged to sit up in chair, d/b, and cough.  BRS:  Increased aeration after neb: clear and diminished. Blood pressure 109/66, pulse 113, temperature 97.9  F (36.6  C), temperature source Oral, resp. rate 18, height 1.702 m (5' 7\"), weight 41.1 kg (90 lb 11.2 oz), SpO2 93 %.      "

## 2022-11-08 NOTE — PLAN OF CARE
Problem: COPD (Chronic Obstructive Pulmonary Disease) Comorbidity  Goal: Maintenance of COPD Symptom Control  Outcome: Progressing  Intervention: Maintain COPD-Symptom Control  Recent Flowsheet Documentation  Taken 11/7/2022 1600 by Celeste Montilla RN  Medication Review/Management: medications reviewed  Taken 11/7/2022 0900 by Celeste Montilla RN  Medication Review/Management: medications reviewed     Problem: Diarrhea  Goal: Effective Diarrhea Management  Outcome: Progressing  Intervention: Manage Diarrhea  Recent Flowsheet Documentation  Taken 11/7/2022 1600 by Celeste Montilla RN  Medication Review/Management: medications reviewed  Taken 11/7/2022 0900 by Celeste Montilla RN  Medication Review/Management: medications reviewed   Goal Outcome Evaluation:         Alert and oriented. Denies pain and any other complain.   Having loose stools. New orders to check for C. Diff. Had one loose stool in the morning. No more BM today. Stool sample to be collected. Enteric precautions.   Pt on Mg protocol, new IV placed for IV replacement.   K and Ph and Mg prot to be recheck in am.

## 2022-11-08 NOTE — PLAN OF CARE
Problem: COPD (Chronic Obstructive Pulmonary Disease) Comorbidity  Goal: Maintenance of COPD Symptom Control  Intervention: Maintain COPD-Symptom Control  Recent Flowsheet Documentation  Taken 11/8/2022 0900 by Celeste Montilla, RN  Medication Review/Management: medications reviewed     Problem: Diarrhea  Goal: Effective Diarrhea Management  Outcome: Progressing  Intervention: Manage Diarrhea  Recent Flowsheet Documentation  Taken 11/8/2022 0900 by Celeste Montilla, RN  Isolation Precautions: contact precautions maintained  Medication Review/Management: medications reviewed   Goal Outcome Evaluation:         Alert and oriented. Denies pain and any other complain.     Lungs diminished, wheezes noted at times. Nebs done by RT with improvement.   O2 SATs wdl in NC 4 L.   IS up to 1000. Encouraged use of IS.  Chest Xray done today.     No BM since yesterday morning. MD aware. Continue enteric precautions and collect stool sample for testing if more loose stool per Dr. Aguilar.     IV Mg replacement, on protocol, to be recheck in am.   K protocol, to be recheck in am.    Plan to discharge to TCU. Waiting for placement.

## 2022-11-08 NOTE — PROGRESS NOTES
[facility]    Medicine Progress Note - Hospitalist Service    Date of Admission:  10/8/2022    Assessment & Plan       67-year-old patient with history of severe emphysema who presented to the hospital on October 8th for acute respiratory failure secondary to pneumonia, COVID-19 infection, PE, circulatory shock required vasopressor, she was intubated on October 8 extubated to BiPAP on October 20.   She was treated for haemophilus influenza tracheobronchitis, received dexamethasone, blood culture grew diphtheroid which was likely contamination, tracheal fluid cultures from October 19 grew Candida, she received furosemide for pulmonary edema, she was considering going home with hospice but the patient later declined and, wants to go to TCU   She has had high O2 needs, so that has delayed transfer to TCU     1.Severe COPD  Hypoxic respiratory failure requiring intubation haemophilus influenza tracheobronchitis,  Right lower lobe pneumonia   Recovered COVID-19 infection,  Extubated to BiPAP on 10/20/22,   -pulmonary started on Vanco and Zosyn on October 28 for treatment of pneumonia completed 7-day course, after period of being off antibiotic,  -For some time she required 7 L oxygen--then over past few days down to 4 liters  -Palliative care did see, and pt does NOT want hospice, wants TCU  -checking cxr today       2.Subsegmental PE  -was on heparin infusion transition to Eliquis   -plan on  3 to 6 months of treatment     3.Recent COVID-19 infection  - treated with dexamethasone, remdesivir, off precautions, recovered     4.Splenic infarct  -Unclear etiology, radiology suggested paradoxical emboli related to PFO, patient is being, anticoagulated with Eliquis     5.Dizziness  Had episodes of dizziness few days ago when she stands with therapy, she feels dizzy but then it takes a while to resolve,  -She had a head CT without contrast on November 3 that was negative,  -She does not feel she wants  to get an MRI due to claustrophobia,  -dizziness improving--suspect related to lower bp's     6.Deconditioning  -PT OT is following and plan to go to transitional care     7.Stress-induced cardiomyopathy resolved  -Echo showed an EF of 30 to 35% likely secondary to hypoxia with COVID-19 infection previously cardiology recommended repeat echo and further ischemic evaluation if she is not going on hospice,  I ordered repeat echo for today  -Echo showed noticeable improvement of the left ventricle and right ventricular systolic performance with a EF of 65 to 70% and normal right ventricular systolic performance, normal regional wall motion of the left ventricle normal left ventricular thickness  - stopped Lasix and will continue to hold for lower bp--PTA she notes she was on very low dose of only 5-10 mg of lasix     8.severe protein calorie malnutrition/cachexia/hypoalbumin  -Secondary to chronic illness and acute illness, dietitian is following     9.Prolonged QTC,  -During hospitalization in the ICU she had a prolonged QTC was attributed to hypomagnesemia and it was replaced     10.Cardiogenic and distributive shock  S/p in ICU and had  pressor support while in the ICU, now resolved     11.Hypotension  Likely secondary to dehydration from diuresis, Lasix was held for a few days and resumed again she continues to be hypotensive but asymptomatic,  - stopped Lasix  -she has had losses with loose stools     12.Loose stools  -check c diff , not done, stools stopped since yesterday am, if restart and are loose will check   -stop lasix     13.Hypomag  -iv mag            Diet: Advance Diet as Tolerated: Regular Diet Adult  Snacks/Supplements Adult: Ensure Enlive; With Meals  Snacks/Supplements Adult: Other; Special K bar; Between Meals    DVT Prophylaxis: DOAC  Monaco Catheter: Not present  Central Lines: None  Cardiac Monitoring: None  Code Status: No CPR- Do NOT Intubate      Disposition Plan      Expected Discharge Date:  11/09/2022    Discharge Delays: Placement - TCU  Oxygen Needs - Arrange Home O2    Discharge Comments: TCU  wean O2        The patient's care was discussed with the Care Coordinator/ and Patient. I called son Frantz to update, left message at 1412    Zainab Aguilar MD  Hospitalist Service  [facility]  Securely message with the Vocera Web Console (learn more here)  Text page via [employer] Paging/Directory           ______________________________________________________________________    Interval History   She feels at times dizziness, better when in bed  bp still getting low at times  Still on 4 liters  Eating some  No looses stools since I saw her yesterday am      Data reviewed today: I reviewed all medications, new labs and imaging results over the last 24 hours. I personally reviewed no images or EKG's today.    Physical Exam   Vital Signs: Temp: 98.2  F (36.8  C) Temp src: Oral BP: 98/70 Pulse: 104   Resp: 18 SpO2: 90 % O2 Device: Nasal cannula Oxygen Delivery: 4 LPM  Weight: 90 lbs 11.2 oz  Constitutional: awake, alert, cooperative and no apparent distress  Eyes: sclera clear  Respiratory: no increased work of breathing, moderate air exchange, no retractions and clear to auscultation  Cardiovascular: Normal apical impulse, regular rate and rhythm, normal S1 and S2, no S3 or S4, and no murmur noted  GI: normal bowel sounds, soft, non-distended and non-tender  Skin: no bruising or bleeding  Musculoskeletal: there is no redness, warmth, or swelling of the joints, no edema at all in legs or hands  Neurologic: Mental Status Exam:  Level of Alertness:   awake  Neuropsychiatric: General: normal, calm and normal eye contact    Data   Recent Labs   Lab 11/08/22  0631 11/07/22  0649 11/06/22  0601   WBC 12.8* 9.9 9.0   HGB 10.2* 10.6* 10.5*   MCV 99 99 99    472* 473*    140 141   POTASSIUM 3.9 4.3 4.1   CHLORIDE 102 102 101   CO2 35* 37* 39*   BUN 22.0 17.9 17.6   CR  0.47* 0.50* 0.43*   ANIONGAP 4* 1* 1*   JASON 8.5* 8.3* 8.3*   GLC 93 80 82     No results found for this or any previous visit (from the past 24 hour(s)).

## 2022-11-08 NOTE — PROVIDER NOTIFICATION
" Dr. Aguilar paged: \"FYI: Patient has not have a bowel movement since yesterday morning. She has not have po Mg since yesterday, now IV replacement. Do you still want stool sample for C. Diff?\"   Discussed with provider. Collect stool sample if loose stools.   Continue Enteric precautions.    "

## 2022-11-08 NOTE — PROGRESS NOTES
Care Management Follow Up    Length of Stay (days): 31    Expected Discharge Date: 11/09/2022     Concerns to be Addressed:  (O2 status - pt needs to be at 6L O2 or less to qualify for TCU; placement)     Patient plan of care discussed at interdisciplinary rounds: Yes    Anticipated Discharge Disposition: Transitional Care     Anticipated Discharge Services:    Anticipated Discharge DME:      Patient/family educated on Medicare website which has current facility and service quality ratings:    Education Provided on the Discharge Plan:    Patient/Family in Agreement with the Plan:      Referrals Placed by CM/SW: Post Acute Facilities  Private pay costs discussed: Not applicable    Additional Information:  SW followed up on TCU choices     [facility] - (sent to facilities for review)  [facility] (left VM for admissions)  [facility] (Left [facility])  [facility] (left VM for admissions)  [facility] ([facility] for admissions)    [facility] (declined)  [facility] (declined)      Radha Carey

## 2022-11-08 NOTE — PLAN OF CARE
Goal Outcome Evaluation:  Denies pain.  Tolerating regular diet.  Still need stool sample sent to check for cdiff. No stool since early this morning. Reports loose stools are not new and have been occurring for days now.  Electrolyte protocols K, Mg and Phos are all rechecks in am.  Encourage repositioning/shifting weight. Mepilex to sacral area.  Bed alarm on, reminded to call for assistance when needed.

## 2022-11-08 NOTE — PLAN OF CARE
Problem: Plan of Care - These are the overarching goals to be used throughout the patient stay.    Goal: Optimal Comfort and Wellbeing  Intervention: Monitor Pain and Promote Comfort  Recent Flowsheet Documentation  Taken 11/8/2022 0000 by Blu Reza RN  Pain Management Interventions: emotional support  Intervention: Provide Person-Centered Care  Recent Flowsheet Documentation  Taken 11/8/2022 0000 by Blu Reza RN  Trust Relationship/Rapport: care explained   Goal Outcome Evaluation:       Patient slept during th night. Denied need foe a pain medication. Her vital signs are stable. Repositioned for comfort. No stool during the night. Stool sample is still needed. Continue to monitor.

## 2022-11-09 ENCOUNTER — APPOINTMENT (OUTPATIENT)
Dept: OCCUPATIONAL THERAPY | Facility: HOSPITAL | Age: 67
DRG: 207 | End: 2022-11-09
Payer: COMMERCIAL

## 2022-11-09 LAB
ANION GAP SERPL CALCULATED.3IONS-SCNC: 4 MMOL/L (ref 7–15)
BUN SERPL-MCNC: 22 MG/DL (ref 8–23)
CALCIUM SERPL-MCNC: 8.4 MG/DL (ref 8.8–10.2)
CHLORIDE SERPL-SCNC: 104 MMOL/L (ref 98–107)
CREAT SERPL-MCNC: 0.45 MG/DL (ref 0.51–0.95)
DEPRECATED HCO3 PLAS-SCNC: 35 MMOL/L (ref 22–29)
ERYTHROCYTE [DISTWIDTH] IN BLOOD BY AUTOMATED COUNT: 14.8 % (ref 10–15)
GFR SERPL CREATININE-BSD FRML MDRD: >90 ML/MIN/1.73M2
GLUCOSE SERPL-MCNC: 83 MG/DL (ref 70–99)
HCT VFR BLD AUTO: 32.2 % (ref 35–47)
HGB BLD-MCNC: 10.3 G/DL (ref 11.7–15.7)
MAGNESIUM SERPL-MCNC: 1.9 MG/DL (ref 1.7–2.3)
MCH RBC QN AUTO: 31.4 PG (ref 26.5–33)
MCHC RBC AUTO-ENTMCNC: 32 G/DL (ref 31.5–36.5)
MCV RBC AUTO: 98 FL (ref 78–100)
PHOSPHATE SERPL-MCNC: 3.6 MG/DL (ref 2.5–4.5)
PLATELET # BLD AUTO: 447 10E3/UL (ref 150–450)
POTASSIUM SERPL-SCNC: 4.2 MMOL/L (ref 3.4–5.3)
RBC # BLD AUTO: 3.28 10E6/UL (ref 3.8–5.2)
SODIUM SERPL-SCNC: 143 MMOL/L (ref 136–145)
WBC # BLD AUTO: 18.9 10E3/UL (ref 4–11)

## 2022-11-09 PROCEDURE — 94640 AIRWAY INHALATION TREATMENT: CPT | Mod: 76

## 2022-11-09 PROCEDURE — 36415 COLL VENOUS BLD VENIPUNCTURE: CPT | Performed by: INTERNAL MEDICINE

## 2022-11-09 PROCEDURE — 80048 BASIC METABOLIC PNL TOTAL CA: CPT | Performed by: INTERNAL MEDICINE

## 2022-11-09 PROCEDURE — 250N000013 HC RX MED GY IP 250 OP 250 PS 637: Performed by: INTERNAL MEDICINE

## 2022-11-09 PROCEDURE — 94640 AIRWAY INHALATION TREATMENT: CPT

## 2022-11-09 PROCEDURE — 84100 ASSAY OF PHOSPHORUS: CPT | Performed by: INTERNAL MEDICINE

## 2022-11-09 PROCEDURE — 250N000013 HC RX MED GY IP 250 OP 250 PS 637: Performed by: PHYSICIAN ASSISTANT

## 2022-11-09 PROCEDURE — 999N000157 HC STATISTIC RCP TIME EA 10 MIN

## 2022-11-09 PROCEDURE — 250N000012 HC RX MED GY IP 250 OP 636 PS 637: Performed by: INTERNAL MEDICINE

## 2022-11-09 PROCEDURE — 97535 SELF CARE MNGMENT TRAINING: CPT | Mod: GO

## 2022-11-09 PROCEDURE — 99232 SBSQ HOSP IP/OBS MODERATE 35: CPT | Performed by: INTERNAL MEDICINE

## 2022-11-09 PROCEDURE — 85027 COMPLETE CBC AUTOMATED: CPT | Performed by: INTERNAL MEDICINE

## 2022-11-09 PROCEDURE — 250N000009 HC RX 250: Performed by: INTERNAL MEDICINE

## 2022-11-09 PROCEDURE — 99207 PR CDG-CUT & PASTE-POTENTIAL IMPACT ON LEVEL: CPT | Performed by: INTERNAL MEDICINE

## 2022-11-09 PROCEDURE — 120N000001 HC R&B MED SURG/OB

## 2022-11-09 PROCEDURE — 83735 ASSAY OF MAGNESIUM: CPT | Performed by: INTERNAL MEDICINE

## 2022-11-09 RX ADMIN — APIXABAN 5 MG: 5 TABLET, FILM COATED ORAL at 08:21

## 2022-11-09 RX ADMIN — BUDESONIDE 1 MG: 0.5 INHALANT ORAL at 07:55

## 2022-11-09 RX ADMIN — BUDESONIDE 1 MG: 0.5 INHALANT ORAL at 20:03

## 2022-11-09 RX ADMIN — PREDNISONE 20 MG: 20 TABLET ORAL at 08:22

## 2022-11-09 RX ADMIN — MELATONIN TAB 3 MG 3 MG: 3 TAB at 21:29

## 2022-11-09 RX ADMIN — IPRATROPIUM BROMIDE AND ALBUTEROL SULFATE 3 ML: 2.5; .5 SOLUTION RESPIRATORY (INHALATION) at 07:55

## 2022-11-09 RX ADMIN — MIRTAZAPINE 15 MG: 15 TABLET, FILM COATED ORAL at 20:14

## 2022-11-09 RX ADMIN — APIXABAN 5 MG: 5 TABLET, FILM COATED ORAL at 20:14

## 2022-11-09 RX ADMIN — IPRATROPIUM BROMIDE AND ALBUTEROL SULFATE 3 ML: 2.5; .5 SOLUTION RESPIRATORY (INHALATION) at 15:35

## 2022-11-09 RX ADMIN — IPRATROPIUM BROMIDE AND ALBUTEROL SULFATE 3 ML: 2.5; .5 SOLUTION RESPIRATORY (INHALATION) at 20:05

## 2022-11-09 RX ADMIN — THIAMINE HCL TAB 100 MG 100 MG: 100 TAB at 08:22

## 2022-11-09 RX ADMIN — IPRATROPIUM BROMIDE AND ALBUTEROL SULFATE 3 ML: 2.5; .5 SOLUTION RESPIRATORY (INHALATION) at 12:40

## 2022-11-09 ASSESSMENT — ACTIVITIES OF DAILY LIVING (ADL)
ADLS_ACUITY_SCORE: 33
ADLS_ACUITY_SCORE: 37
ADLS_ACUITY_SCORE: 37
ADLS_ACUITY_SCORE: 33
ADLS_ACUITY_SCORE: 37
ADLS_ACUITY_SCORE: 33
ADLS_ACUITY_SCORE: 38
ADLS_ACUITY_SCORE: 37
ADLS_ACUITY_SCORE: 33
ADLS_ACUITY_SCORE: 33
ADLS_ACUITY_SCORE: 37
ADLS_ACUITY_SCORE: 33

## 2022-11-09 NOTE — PROGRESS NOTES
Care Management Follow Up    Length of Stay (days): 32    Expected Discharge Date: 11/09/2022     Concerns to be Addressed:  (O2 status - pt needs to be at 6L O2 or less to qualify for TCU; placement)     Patient plan of care discussed at interdisciplinary rounds: Yes    Anticipated Discharge Disposition: Transitional Care     Anticipated Discharge Services:    Anticipated Discharge DME:      Patient/family educated on Medicare website which has current facility and service quality ratings:    Education Provided on the Discharge Plan:    Patient/Family in Agreement with the Plan:      Referrals Placed by CM/SW: Post Acute Facilities  Private pay costs discussed: Not applicable    Additional Information:  SW followed up on TCU choices     [facility] - (sent to facilities for review)  [facility] (resent referral)  [facility] (Left [facility])  [facility] (left VM for admissions)    [facility] ([facility])   [facility] (declined)  [facility] ([facility])      Radha Carey

## 2022-11-09 NOTE — PLAN OF CARE
Problem: COPD (Chronic Obstructive Pulmonary Disease) Comorbidity  Goal: Maintenance of COPD Symptom Control  Outcome: Adequate for Care Transition  Intervention: Maintain COPD-Symptom Control  Recent Flowsheet Documentation  Taken 11/9/2022 1000 by Kashif Sanchez RN  Supportive Measures:   active listening utilized   relaxation techniques promoted     Problem: Activity Intolerance  Goal: Enhanced Capacity and Energy  Outcome: Adequate for Care Transition  Intervention: Optimize Activity Tolerance  Recent Flowsheet Documentation  Taken 11/9/2022 1000 by Kashif Sanchez RN  Activity Management:   activity adjusted per tolerance   activity encouraged   Goal Outcome Evaluation:  Pt displays shortness of breath with exertion.  Oxygen remains on 3-4 liters via nasal canula.  Breath sounds diminished.  Encouraging IS.  No complaints a pain.  Encouraging up in chair and increased activity.

## 2022-11-09 NOTE — PROGRESS NOTES
[facility]    Medicine Progress Note - Hospitalist Service    Date of Admission:  10/8/2022    Assessment & Plan     67-year-old patient with history of severe emphysema who presented to the hospital on October 8th for acute respiratory failure secondary to pneumonia, COVID-19 infection, PE, circulatory shock required vasopressor, she was intubated on October 8 extubated to BiPAP on October 20.   She was treated for haemophilus influenza tracheobronchitis, received dexamethasone, blood culture grew diphtheroid which was likely contamination, tracheal fluid cultures from October 19 grew Candida, she received furosemide for pulmonary edema, she was considering going home with hospice but the patient later declined and, wants to go to TCU   She has had high O2 needs, so that has delayed transfer to TCU     1.Severe COPD  Hypoxic respiratory failure requiring intubation haemophilus influenza tracheobronchitis,  Right lower lobe pneumonia   Recovered COVID-19 infection,  -Extubated to BiPAP on 10/20/22,   -pulmonary started on Vanco and Zosyn on October 28 for treatment of pneumonia completed 7-day course, after period of being off antibiotic,  -For some time she required 7 L oxygen--then over past few days down to 4 liters  -Palliative care did see, and pt does NOT want hospice, wants TCU  -checking cxr 11/8--some edema still  -still on prednisone taper, wbc up likely from this       2.Subsegmental PE  -was on heparin infusion transition to Eliquis   -plan on  3 to 6 months of treatment     3.Recent COVID-19 infection  - treated with dexamethasone, remdesivir, off precautions, recovered     4.Splenic infarct  -Unclear etiology, radiology suggested paradoxical emboli related to PFO, patient is being, anticoagulated with Eliquis     5.Dizziness  Had episodes of dizziness few days ago when she stands with therapy, she feels dizzy but then it takes a while to resolve,  -She had a head CT without  contrast on November 3 that was negative,  -She does not feel she wants to get an MRI due to claustrophobia,  -dizziness improving--suspect related to lower bp's     6.Deconditioning  -PT OT is following and plan to go to transitional care     7.Stress-induced cardiomyopathy resolved  -Echo showed an EF of 30 to 35% likely secondary to hypoxia with COVID-19 infection previously cardiology recommended repeat echo and further ischemic evaluation if she is not going on hospice,  I ordered repeat echo for today  -Echo showed noticeable improvement of the left ventricle and right ventricular systolic performance with a EF of 65 to 70% and normal right ventricular systolic performance, normal regional wall motion of the left ventricle normal left ventricular thickness  - stopped Lasix and will continue to hold for lower bp--PTA she notes she was on very low dose of only 5-10 mg of lasix  -11/9/22 attempt to restart lower dose lasix, we are limited by bp here     8.severe protein calorie malnutrition/cachexia/hypoalbumin  -Secondary to chronic illness and acute illness, dietitian is following     9.Prolonged QTC,  -During hospitalization in the ICU she had a prolonged QTC was attributed to hypomagnesemia and it was replaced     10.Cardiogenic and distributive shock  S/p in ICU and had  pressor support while in the ICU, now resolved     11.Hypotension  Likely secondary to dehydration from diuresis, Lasix was held for a few days and resumed again she continues to be hypotensive but asymptomatic,  - stopped Lasix, then try to restart with lower dose     12.Loose stools  -check c diff , not done, stools stopped since yesterday am, if restart and are loose will check   -stop lasix     13.Hypomag  -has been replaced    14.Leukocytosis  -on prednisone, no fevers       Diet: Advance Diet as Tolerated: Regular Diet Adult  Snacks/Supplements Adult: Ensure Enlive; With Meals  Snacks/Supplements Adult: Other; Special K bar; Between  Meals    DVT Prophylaxis: DOAC  [place]aco Catheter: Not present  Central Lines: None  Cardiac Monitoring: None  Code Status: No CPR- Do NOT Intubate      Disposition Plan   Looking for TCU        The patient's care was discussed with the Care Coordinator/ and Patient.    Zainab Aguialr MD  Hospitalist Service  [facility]  Securely message with the Vocera Web Console (learn more here)  Text page via [employer] Paging/Directory         ______________________________________________________________________    Interval History   No loose stools  Dizziness better  Eating ok  Not much cough  Still on 4 liters      Data reviewed today: I reviewed all medications, new labs and imaging results over the last 24 hours. I personally reviewed no images or EKG's today.    Physical Exam   Vital Signs: Temp: 98.4  F (36.9  C) Temp src: Oral BP: 90/60 Pulse: 91   Resp: 18 SpO2: 98 % O2 Device: Nasal cannula Oxygen Delivery: 3 LPM  Weight: 93 lbs 14.4 oz  Constitutional: awake, fatigued, alert, cooperative, no apparent distress and appears older than stated age  Eyes: sclera clear  Respiratory: no increased work of breathing, good air exchange, no retractions and diminished breath sounds throughout lungs  Cardiovascular: Normal apical impulse, regular rate and rhythm, normal S1 and S2, no S3 or S4, and no murmur noted  GI: normal bowel sounds, soft, non-distended and non-tender  Skin: no bruising or bleeding  Musculoskeletal: no lower extremity pitting edema present  Neurologic: Mental Status Exam:  Level of Alertness:   awake  Neuropsychiatric: General: normal, calm and normal eye contact    Data   Recent Labs   Lab 11/09/22  0617 11/08/22  0631 11/07/22  0649   WBC 18.9* 12.8* 9.9   HGB 10.3* 10.2* 10.6*   MCV 98 99 99    448 472*    141 140   POTASSIUM 4.2 3.9 4.3   CHLORIDE 104 102 102   CO2 35* 35* 37*   BUN 22.0 22.0 17.9   CR 0.45* 0.47* 0.50*   ANIONGAP 4* 4* 1*   JASON 8.4* 8.5*  8.3*   GLC 83 93 80     Recent Results (from the past 24 hour(s))   XR Chest Port 1 View    Narrative    EXAM: XR CHEST PORT 1 VIEW  LOCATION: [facility]  DATE/TIME: 11/8/2022 12:36 PM    INDICATION: 67 y o severe copd, treated for pulm infection, check pulm edema  COMPARISON: 10/27/2022      Impression    IMPRESSION: Hyperinflation consistent with obstructive pulmonary disease. Trace left and small right pleural effusions. Prominent pulmonary vessels suggest pulmonary vascular congestion, as does peripheral curly B lines.    Main pulmonary arteries are prominent suggesting pulmonary arterial hypertension.

## 2022-11-09 NOTE — PROGRESS NOTES
RESPIRATORY CARE NOTE   Patient is on 4L NC, BS diminished, gave duoneb treatment x2 and Pulmicort treatment x1, BS post treatment unchanged, patient perceives moderate improvement, patient tolerated well. RT to continue to monitor.      Priscilla Doss, RT

## 2022-11-09 NOTE — PROGRESS NOTES
RT NOTE    Patient seen for scheduled nebs today: Pulmicort BID and Duoneb QID. No acute respiratory concerns at this time. Slight SOB at rest this afternoon. Infrequent coughing with little to no secretions. RT to continue to follow medication regimine. Encourage IS and aerobika.     Rudy Newamn, RT on 11/9/2022 at 12:45 PM

## 2022-11-09 NOTE — PLAN OF CARE
Problem: COPD (Chronic Obstructive Pulmonary Disease) Comorbidity  Goal: Maintenance of COPD Symptom Control  Outcome: Progressing     Problem: Diarrhea  Goal: Effective Diarrhea Management  Outcome: Progressing  Intervention: Manage Diarrhea  Recent Flowsheet Documentation  Taken 11/8/2022 2130 by Ro Castillo, RN  Isolation Precautions: enteric precautions maintained    Pt denies pain or nausea overnight. Pt had no loose stools overnight. Pt slept overnight. Pt on 3L from 4L O2 NC with O2 in upper 90s. VS unchanged.    Ro Castillo, RN

## 2022-11-10 ENCOUNTER — APPOINTMENT (OUTPATIENT)
Dept: OCCUPATIONAL THERAPY | Facility: HOSPITAL | Age: 67
DRG: 207 | End: 2022-11-10
Payer: COMMERCIAL

## 2022-11-10 ENCOUNTER — APPOINTMENT (OUTPATIENT)
Dept: PHYSICAL THERAPY | Facility: HOSPITAL | Age: 67
DRG: 207 | End: 2022-11-10
Payer: COMMERCIAL

## 2022-11-10 LAB
ANION GAP SERPL CALCULATED.3IONS-SCNC: 5 MMOL/L (ref 7–15)
BUN SERPL-MCNC: 22.4 MG/DL (ref 8–23)
CALCIUM SERPL-MCNC: 8.5 MG/DL (ref 8.8–10.2)
CHLORIDE SERPL-SCNC: 103 MMOL/L (ref 98–107)
CREAT SERPL-MCNC: 0.5 MG/DL (ref 0.51–0.95)
DEPRECATED HCO3 PLAS-SCNC: 34 MMOL/L (ref 22–29)
ERYTHROCYTE [DISTWIDTH] IN BLOOD BY AUTOMATED COUNT: 14.9 % (ref 10–15)
GFR SERPL CREATININE-BSD FRML MDRD: >90 ML/MIN/1.73M2
GLUCOSE SERPL-MCNC: 94 MG/DL (ref 70–99)
HCT VFR BLD AUTO: 33.8 % (ref 35–47)
HGB BLD-MCNC: 10.7 G/DL (ref 11.7–15.7)
MCH RBC QN AUTO: 31.3 PG (ref 26.5–33)
MCHC RBC AUTO-ENTMCNC: 31.7 G/DL (ref 31.5–36.5)
MCV RBC AUTO: 99 FL (ref 78–100)
PHOSPHATE SERPL-MCNC: 3.2 MG/DL (ref 2.5–4.5)
PLATELET # BLD AUTO: 449 10E3/UL (ref 150–450)
POTASSIUM SERPL-SCNC: 4.2 MMOL/L (ref 3.4–5.3)
RBC # BLD AUTO: 3.42 10E6/UL (ref 3.8–5.2)
SODIUM SERPL-SCNC: 142 MMOL/L (ref 136–145)
WBC # BLD AUTO: 17.4 10E3/UL (ref 4–11)

## 2022-11-10 PROCEDURE — 80048 BASIC METABOLIC PNL TOTAL CA: CPT | Performed by: INTERNAL MEDICINE

## 2022-11-10 PROCEDURE — 250N000012 HC RX MED GY IP 250 OP 636 PS 637: Performed by: INTERNAL MEDICINE

## 2022-11-10 PROCEDURE — 84100 ASSAY OF PHOSPHORUS: CPT | Performed by: INTERNAL MEDICINE

## 2022-11-10 PROCEDURE — 97110 THERAPEUTIC EXERCISES: CPT | Mod: GO

## 2022-11-10 PROCEDURE — 250N000013 HC RX MED GY IP 250 OP 250 PS 637: Performed by: INTERNAL MEDICINE

## 2022-11-10 PROCEDURE — 99232 SBSQ HOSP IP/OBS MODERATE 35: CPT | Performed by: INTERNAL MEDICINE

## 2022-11-10 PROCEDURE — 250N000009 HC RX 250: Performed by: INTERNAL MEDICINE

## 2022-11-10 PROCEDURE — 36415 COLL VENOUS BLD VENIPUNCTURE: CPT | Performed by: INTERNAL MEDICINE

## 2022-11-10 PROCEDURE — 94640 AIRWAY INHALATION TREATMENT: CPT | Mod: 76

## 2022-11-10 PROCEDURE — 97535 SELF CARE MNGMENT TRAINING: CPT | Mod: GO

## 2022-11-10 PROCEDURE — 999N000157 HC STATISTIC RCP TIME EA 10 MIN

## 2022-11-10 PROCEDURE — 250N000013 HC RX MED GY IP 250 OP 250 PS 637: Performed by: PHYSICIAN ASSISTANT

## 2022-11-10 PROCEDURE — 94640 AIRWAY INHALATION TREATMENT: CPT

## 2022-11-10 PROCEDURE — 99207 PR CDG-CUT & PASTE-POTENTIAL IMPACT ON LEVEL: CPT | Performed by: INTERNAL MEDICINE

## 2022-11-10 PROCEDURE — 85027 COMPLETE CBC AUTOMATED: CPT | Performed by: INTERNAL MEDICINE

## 2022-11-10 PROCEDURE — 97530 THERAPEUTIC ACTIVITIES: CPT | Mod: GP

## 2022-11-10 PROCEDURE — 97116 GAIT TRAINING THERAPY: CPT | Mod: GP

## 2022-11-10 PROCEDURE — 120N000001 HC R&B MED SURG/OB

## 2022-11-10 RX ADMIN — MELATONIN TAB 3 MG 3 MG: 3 TAB at 20:26

## 2022-11-10 RX ADMIN — APIXABAN 5 MG: 5 TABLET, FILM COATED ORAL at 20:27

## 2022-11-10 RX ADMIN — IPRATROPIUM BROMIDE AND ALBUTEROL SULFATE 3 ML: 2.5; .5 SOLUTION RESPIRATORY (INHALATION) at 15:08

## 2022-11-10 RX ADMIN — THIAMINE HCL TAB 100 MG 100 MG: 100 TAB at 08:03

## 2022-11-10 RX ADMIN — BUDESONIDE 1 MG: 0.5 INHALANT ORAL at 08:28

## 2022-11-10 RX ADMIN — MIRTAZAPINE 15 MG: 15 TABLET, FILM COATED ORAL at 20:27

## 2022-11-10 RX ADMIN — IPRATROPIUM BROMIDE AND ALBUTEROL SULFATE 3 ML: 2.5; .5 SOLUTION RESPIRATORY (INHALATION) at 12:49

## 2022-11-10 RX ADMIN — IPRATROPIUM BROMIDE AND ALBUTEROL SULFATE 3 ML: 2.5; .5 SOLUTION RESPIRATORY (INHALATION) at 08:27

## 2022-11-10 RX ADMIN — FUROSEMIDE 10 MG: 20 TABLET ORAL at 08:03

## 2022-11-10 RX ADMIN — APIXABAN 5 MG: 5 TABLET, FILM COATED ORAL at 08:03

## 2022-11-10 RX ADMIN — PREDNISONE 20 MG: 20 TABLET ORAL at 08:02

## 2022-11-10 RX ADMIN — IPRATROPIUM BROMIDE AND ALBUTEROL SULFATE 3 ML: 2.5; .5 SOLUTION RESPIRATORY (INHALATION) at 20:00

## 2022-11-10 RX ADMIN — BUDESONIDE 1 MG: 0.5 INHALANT ORAL at 20:00

## 2022-11-10 ASSESSMENT — ACTIVITIES OF DAILY LIVING (ADL)
ADLS_ACUITY_SCORE: 38
ADLS_ACUITY_SCORE: 39
ADLS_ACUITY_SCORE: 38
ADLS_ACUITY_SCORE: 39
ADLS_ACUITY_SCORE: 35
ADLS_ACUITY_SCORE: 35
ADLS_ACUITY_SCORE: 38
ADLS_ACUITY_SCORE: 35
ADLS_ACUITY_SCORE: 39

## 2022-11-10 NOTE — PLAN OF CARE
Problem: Anxiety Signs/Symptoms  Goal: Optimized Energy Level (Anxiety Signs/Symptoms)  Outcome: Progressing  Goal: Optimized Cognitive Function (Anxiety Signs/Symptoms)  Outcome: Progressing  Goal: Improved Mood Symptoms (Anxiety Signs/Symptoms)  Outcome: Progressing  Goal: Improved Sleep (Anxiety Signs/Symptoms)  Outcome: Progressing  Goal: Enhanced Social, Occupational or Functional Skills (Anxiety Signs/Symptoms)  Outcome: Progressing  Goal: Improved Somatic Symptoms (Anxiety Signs/Symptoms)  Outcome: Progressing     Problem: Diarrhea  Goal: Effective Diarrhea Management  Intervention: Manage Diarrhea  Recent Flowsheet Documentation  Taken 11/10/2022 0000 by Ro Castillo, RN  Isolation Precautions: enteric precautions maintained  Medication Review/Management: medications reviewed    Pt denies pain or nausea overnight. Pt slept overnight. No loose stools overnight. Pt continues to be 100% on 4L of O2 NC. Other VS unchanged.    Ro Castillo, RN

## 2022-11-10 NOTE — PROGRESS NOTES
Care Management Follow Up    Length of Stay (days): 33    Expected Discharge Date: 11/11/2022     Concerns to be Addressed:  (O2 status - pt needs to be at 6L O2 or less to qualify for TCU; placement)     Patient plan of care discussed at interdisciplinary rounds: Yes    Anticipated Discharge Disposition: Transitional Care     Anticipated Discharge Services:    Anticipated Discharge DME:      Patient/family educated on Medicare website which has current facility and service quality ratings:    Education Provided on the Discharge Plan:    Patient/Family in Agreement with the Plan:      Referrals Placed by CM/SW: Post Acute Facilities  Private pay costs discussed: Not applicable    Additional Information:  SW sent additional referrals to TCUs.    SW followed up on TCU referrals     [facility] - (sent to facilities for review)  [facility] (Left [facility] for admissions)  [facility] (Left message)  [facility] (reviewing)  [facility] (Clinically accepted, running insurance)  [facility] (Left VM for admissions)  [facility] (fax failed, resent referral)       [facility] ([facility])   [facility] (declined)  [facility] ([facility])  [facility] (Declined)    # [id number]    2:53 PM  [facility] can accept patient tomorrow. [facility] transport set up for 11am. PAS done.    SW notified patient's son of ride time.     Radha Carey

## 2022-11-10 NOTE — PROGRESS NOTES
[facility]    Medicine Progress Note - Hospitalist Service    Date of Admission:  10/8/2022    Assessment & Plan       67-year-old patient with history of severe emphysema who presented to the hospital on October 8th for acute respiratory failure secondary to pneumonia, COVID-19 infection, PE, circulatory shock required vasopressor, she was intubated on October 8 extubated to BiPAP on October 20.   She was treated for haemophilus influenza tracheobronchitis, received dexamethasone, blood culture grew diphtheroid which was likely contamination, tracheal fluid cultures from October 19 grew Candida, she received furosemide for pulmonary edema, she was considering going home with hospice but the patient later declined and, wants to go to TCU   She has had high O2 needs, so that has delayed transfer to TCU     1.Severe COPD  Hypoxic respiratory failure requiring intubation haemophilus influenza tracheobronchitis,  Right lower lobe pneumonia   Recovered COVID-19 infection,  -Extubated to BiPAP on 10/20/22,   -pulmonary started on Vanco and Zosyn on October 28 for treatment of pneumonia completed 7-day course, after period of being off antibiotic,  -For some time she required 7 L oxygen--then over past few days down to 4 liters  -Palliative care did see, and pt does NOT want hospice, wants TCU  -checking cxr 11/8/22--some edema still  -still on prednisone taper, wbc up likely from this       2.Subsegmental PE  -was on heparin infusion transition to Eliquis   -plan on  3 to 6 months of treatment     3.Recent COVID-19 infection  - treated with dexamethasone, remdesivir, off precautions, recovered     4.Splenic infarct  -Unclear etiology, radiology suggested paradoxical emboli related to PFO, patient is being, anticoagulated with Eliquis     5.Dizziness  Had episodes of dizziness few days ago when she stands with therapy, she feels dizzy but then it takes a while to resolve,  -She had a head CT  without contrast on November 3 that was negative,  -She does not feel she wants to get an MRI due to claustrophobia,  -dizziness improving--suspect related to lower bp's     6.Deconditioning  -PT OT is following and plan to go to transitional care     7.Stress-induced cardiomyopathy resolved  -Echo showed an EF of 30 to 35% likely secondary to hypoxia with COVID-19 infection previously cardiology recommended repeat echo and further ischemic evaluation if she is not going on hospice,  I ordered repeat echo for today  -Echo showed noticeable improvement of the left ventricle and right ventricular systolic performance with a EF of 65 to 70% and normal right ventricular systolic performance, normal regional wall motion of the left ventricle normal left ventricular thickness  - stopped Lasix and will continue to hold for lower bp--PTA she notes she was on very low dose of only 5-10 mg of lasix  -11/9/22 attempt to restart lower dose lasix, we are limited by bp here     8.severe protein calorie malnutrition/cachexia/hypoalbumin-severe  -Secondary to chronic illness and acute illness, dietitian is following     9.Prolonged QTC,  -During hospitalization in the ICU she had a prolonged QTC was attributed to hypomagnesemia and it was replaced     10.Cardiogenic and distributive shock  S/p in ICU and had  pressor support while in the ICU, now resolved     11.Hypotension  Likely secondary to dehydration from diuresis, Lasix was held for a few days and resumed again she continues to be hypotensive but asymptomatic,  - stopped Lasix, then try to restart with lower dose     12.Loose stools  -check c diff , not done, stools stopped and again formed  - if restart and are loose will check      13.Hypomag  -has been replaced     14.Leukocytosis  -on prednisone, no fevers                Diet: Advance Diet as Tolerated: Regular Diet Adult  Snacks/Supplements Adult: Ensure Enlive; With Meals  Snacks/Supplements Adult: Other; Special K  bar; Between Meals    DVT Prophylaxis: DOAC-for pe  [place] Catheter: Not present  Central Lines: None  Cardiac Monitoring: None  Code Status: No CPR- Do NOT Intubate      Disposition Plan      Expected Discharge Date: 11/11/2022    Discharge Delays: Placement - TCU  Oxygen Needs - Arrange Home O2    Discharge Comments: TCU  wean O2        The patient's care was discussed with the Care Coordinator/ and Patient. I called son Frantz to update -he is aware TCU tomorrow    Zainab Aguilar MD  Hospitalist Service  [facility]  Securely message with the Vocera Web Console (learn more here)  Text page via [employer] Paging/Directory       ______________________________________________________________________    Interval History   She feels ok  Had stool soft  Still on 4 liters and has been a little resistant to wanting to wean  Not dizzy with PT today  Eating ok  Frustrated we have not yet found TCU    Data reviewed today: I reviewed all medications, new labs and imaging results over the last 24 hours. I personally reviewed no images or EKG's today.    Physical Exam   Vital Signs: Temp: 98.4  F (36.9  C) Temp src: Oral BP: 94/66 Pulse: 101   Resp: 18 SpO2: 94 % O2 Device: Nasal cannula Oxygen Delivery: 4 LPM  Weight: 93 lbs 14.4 oz  Constitutional: awake, fatigued, alert, cooperative and no apparent distress, looks older than stated age, cachectic  Respiratory: No increased work of breathing, good air exchange, clear to auscultation bilaterally, decreased BS thru out  Cardiovascular: Normal apical impulse, regular rate and rhythm, normal S1 and S2, no S3 or S4, and no murmur noted  GI: normal bowel sounds, soft, non-distended and non-tender  Skin: no bruising or bleeding  Musculoskeletal: no lower extremity pitting edema present  Neurologic: Mental Status Exam:  Level of Alertness:   awake  Neuropsychiatric: General: normal, calm and normal eye contact    Data   Recent Labs   Lab  11/10/22  0627 11/09/22  0617 11/08/22  0631   WBC 17.4* 18.9* 12.8*   HGB 10.7* 10.3* 10.2*   MCV 99 98 99    447 448    143 141   POTASSIUM 4.2 4.2 3.9   CHLORIDE 103 104 102   CO2 34* 35* 35*   BUN 22.4 22.0 22.0   CR 0.50* 0.45* 0.47*   ANIONGAP 5* 4* 4*   JASON 8.5* 8.4* 8.5*   GLC 94 83 93     No results found for this or any previous visit (from the past 24 hour(s)).

## 2022-11-10 NOTE — PLAN OF CARE
Problem: Plan of Care - These are the overarching goals to be used throughout the patient stay.    Goal: Absence of Hospital-Acquired Illness or Injury  Outcome: Progressing  Goal: Readiness for Transition of Care  Outcome: Progressing     Problem: Malnutrition  Goal: Improved Nutritional Intake  Outcome: Progressing     Problem: Anxiety Signs/Symptoms  Goal: Improved Mood Symptoms (Anxiety Signs/Symptoms)  Outcome: Progressing  Goal: Improved Somatic Symptoms (Anxiety Signs/Symptoms)  Outcome: Progressing   Goal Outcome Evaluation:  Ate 75% of dinner roast beef and mash potatoes and drank supplement.  Up to commode and had soft, large bowel movement. Denies pain. O2sats 96% on 4 liters pt does not want it turned down at this time. Up to sink to brush teeth.

## 2022-11-11 ENCOUNTER — APPOINTMENT (OUTPATIENT)
Dept: OCCUPATIONAL THERAPY | Facility: HOSPITAL | Age: 67
DRG: 207 | End: 2022-11-11
Payer: COMMERCIAL

## 2022-11-11 VITALS
SYSTOLIC BLOOD PRESSURE: 96 MMHG | HEART RATE: 107 BPM | WEIGHT: 93.9 LBS | OXYGEN SATURATION: 92 % | RESPIRATION RATE: 20 BRPM | TEMPERATURE: 98 F | HEIGHT: 67 IN | DIASTOLIC BLOOD PRESSURE: 67 MMHG | BODY MASS INDEX: 14.74 KG/M2

## 2022-11-11 LAB
ANION GAP SERPL CALCULATED.3IONS-SCNC: 4 MMOL/L (ref 7–15)
BUN SERPL-MCNC: 23.2 MG/DL (ref 8–23)
CALCIUM SERPL-MCNC: 8.3 MG/DL (ref 8.8–10.2)
CHLORIDE SERPL-SCNC: 102 MMOL/L (ref 98–107)
CREAT SERPL-MCNC: 0.5 MG/DL (ref 0.51–0.95)
DEPRECATED HCO3 PLAS-SCNC: 35 MMOL/L (ref 22–29)
ERYTHROCYTE [DISTWIDTH] IN BLOOD BY AUTOMATED COUNT: 15 % (ref 10–15)
GFR SERPL CREATININE-BSD FRML MDRD: >90 ML/MIN/1.73M2
GLUCOSE SERPL-MCNC: 74 MG/DL (ref 70–99)
HCT VFR BLD AUTO: 35.5 % (ref 35–47)
HGB BLD-MCNC: 11.2 G/DL (ref 11.7–15.7)
MAGNESIUM SERPL-MCNC: 1.8 MG/DL (ref 1.7–2.3)
MCH RBC QN AUTO: 31.3 PG (ref 26.5–33)
MCHC RBC AUTO-ENTMCNC: 31.5 G/DL (ref 31.5–36.5)
MCV RBC AUTO: 99 FL (ref 78–100)
PHOSPHATE SERPL-MCNC: 3.3 MG/DL (ref 2.5–4.5)
PLATELET # BLD AUTO: 452 10E3/UL (ref 150–450)
POTASSIUM SERPL-SCNC: 3.8 MMOL/L (ref 3.4–5.3)
RBC # BLD AUTO: 3.58 10E6/UL (ref 3.8–5.2)
SODIUM SERPL-SCNC: 141 MMOL/L (ref 136–145)
WBC # BLD AUTO: 14.7 10E3/UL (ref 4–11)

## 2022-11-11 PROCEDURE — 99239 HOSP IP/OBS DSCHRG MGMT >30: CPT | Performed by: HOSPITALIST

## 2022-11-11 PROCEDURE — 97535 SELF CARE MNGMENT TRAINING: CPT | Mod: GO

## 2022-11-11 PROCEDURE — 250N000012 HC RX MED GY IP 250 OP 636 PS 637: Performed by: INTERNAL MEDICINE

## 2022-11-11 PROCEDURE — 250N000009 HC RX 250: Performed by: INTERNAL MEDICINE

## 2022-11-11 PROCEDURE — 250N000013 HC RX MED GY IP 250 OP 250 PS 637: Performed by: INTERNAL MEDICINE

## 2022-11-11 PROCEDURE — 83735 ASSAY OF MAGNESIUM: CPT | Performed by: INTERNAL MEDICINE

## 2022-11-11 PROCEDURE — 85027 COMPLETE CBC AUTOMATED: CPT | Performed by: INTERNAL MEDICINE

## 2022-11-11 PROCEDURE — 84100 ASSAY OF PHOSPHORUS: CPT | Performed by: INTERNAL MEDICINE

## 2022-11-11 PROCEDURE — 94640 AIRWAY INHALATION TREATMENT: CPT

## 2022-11-11 PROCEDURE — 82310 ASSAY OF CALCIUM: CPT | Performed by: INTERNAL MEDICINE

## 2022-11-11 PROCEDURE — 999N000157 HC STATISTIC RCP TIME EA 10 MIN

## 2022-11-11 PROCEDURE — 36415 COLL VENOUS BLD VENIPUNCTURE: CPT | Performed by: INTERNAL MEDICINE

## 2022-11-11 RX ORDER — PREDNISONE 10 MG/1
10 TABLET ORAL DAILY
Qty: 5 TABLET | Refills: 0
Start: 2022-11-14 | End: 2022-11-19

## 2022-11-11 RX ORDER — PREDNISONE 5 MG/1
5 TABLET ORAL DAILY
Qty: 5 TABLET | Refills: 0
Start: 2022-11-19 | End: 2022-11-24

## 2022-11-11 RX ORDER — FUROSEMIDE 20 MG
10 TABLET ORAL DAILY PRN
Start: 2022-11-11

## 2022-11-11 RX ORDER — BUDESONIDE 0.5 MG/2ML
1 INHALANT ORAL 2 TIMES DAILY
Start: 2022-11-11

## 2022-11-11 RX ORDER — PREDNISONE 20 MG/1
20 TABLET ORAL DAILY
Qty: 5 TABLET | Refills: 0
Start: 2022-11-12 | End: 2022-11-17

## 2022-11-11 RX ORDER — IPRATROPIUM BROMIDE AND ALBUTEROL SULFATE 2.5; .5 MG/3ML; MG/3ML
3 SOLUTION RESPIRATORY (INHALATION) 4 TIMES DAILY
Qty: 90 ML | Status: ON HOLD
Start: 2022-11-11 | End: 2023-01-01

## 2022-11-11 RX ORDER — MIRTAZAPINE 15 MG/1
15 TABLET, FILM COATED ORAL AT BEDTIME
Start: 2022-11-11

## 2022-11-11 RX ORDER — LANOLIN ALCOHOL/MO/W.PET/CERES
100 CREAM (GRAM) TOPICAL DAILY
Start: 2022-11-12 | End: 2023-01-01

## 2022-11-11 RX ADMIN — FUROSEMIDE 10 MG: 20 TABLET ORAL at 07:59

## 2022-11-11 RX ADMIN — APIXABAN 5 MG: 5 TABLET, FILM COATED ORAL at 07:59

## 2022-11-11 RX ADMIN — THIAMINE HCL TAB 100 MG 100 MG: 100 TAB at 08:00

## 2022-11-11 RX ADMIN — PREDNISONE 20 MG: 20 TABLET ORAL at 07:59

## 2022-11-11 RX ADMIN — IPRATROPIUM BROMIDE AND ALBUTEROL SULFATE 3 ML: 2.5; .5 SOLUTION RESPIRATORY (INHALATION) at 07:35

## 2022-11-11 RX ADMIN — BUDESONIDE 1 MG: 0.5 INHALANT ORAL at 07:34

## 2022-11-11 ASSESSMENT — ACTIVITIES OF DAILY LIVING (ADL)
ADLS_ACUITY_SCORE: 35

## 2022-11-11 NOTE — PLAN OF CARE
Goal Outcome Evaluation:    Goal discussed with: Patient    Goal assessed as: Adequate for Care Transition

## 2022-11-11 NOTE — PLAN OF CARE
Occupational Therapy Discharge Summary    Reason for therapy discharge:    Discharged to transitional care facility.    Progress towards therapy goal(s). See goals on Care Plan in [facility] electronic health record for goal details.  Goals partially met.  Barriers to achieving goals:   discharge from facility.    Therapy recommendation(s):    Recommend continued OT at TCU

## 2022-11-11 NOTE — PLAN OF CARE
Problem: Anxiety Signs/Symptoms  Goal: Improved Mood Symptoms (Anxiety Signs/Symptoms)  Outcome: Progressing  Goal: Improved Sleep (Anxiety Signs/Symptoms)  Outcome: Progressing   Goal Outcome Evaluation:       Patient has been in good spirit, smiling and happy with her progress specially with her improved breathing and oxygenation.Pt denies pain. Pt had no loose stools overnight. Pt slept overnight. Patient had dose of Melatonin. Pt on 4 liters- sating upper 90's.

## 2022-11-11 NOTE — PROGRESS NOTES
Patient currently on 4L nasal cannula. Sp)2 97%. BS diminished. Duoneb and Pulmicort given as scheduled. RT will continue to monitor.

## 2022-11-11 NOTE — PROGRESS NOTES
"O2 4 lpm/NC, decreased to 3 lpm. BS diminished bilat, Duoneb/Pulmicort 1 mg/mask tx given (home regimen). Tolerated well. BS unchanged after. Pt instructed on the effects of high SpO2 and hypercapnia. Pt acknowledges. Pt performing IS and flutter valve on her own, Will change to PRN RT.    BP 98/58 (BP Location: Right arm)   Pulse 97   Temp 97.5  F (36.4  C) (Oral)   Resp 20   Ht 1.702 m (5' 7\")   Wt 42.6 kg (93 lb 14.4 oz)   SpO2 98%   BMI 14.71 kg/m      RT to follow.  "

## 2022-11-11 NOTE — PROGRESS NOTES
"Care Management Discharge Note    Discharge Date: 11/11/2022       Discharge Disposition: Transitional Care    Discharge Services:  (Therapy services)    Discharge DME: Oxygen    Discharge Transportation:      Private pay costs discussed: transportation costs    PAS Confirmation Code:  ([id number])  Patient/family educated on Medicare website which has current facility and service quality ratings: yes    Education Provided on the Discharge Plan:    Persons Notified of Discharge Plans: MD, RN, SW, patient  Patient/Family in Agreement with the Plan:      Handoff Referral Completed: Yes    Additional Information:  SW met with patient in room and she stated that she has concerns about [place] [facility] [place]. SW stated that as per our conversation yesterday, none of the TCU's close to her home have accepted her and most have declined due to no bed available or high acuity. Patient stated that she can't go home so \"I guess I'll have to go\" and as SW was walking out of room patient stated \"you guys need to get your shit together\".     Patient will [place] [facility] [place] at 11 via [facility].    Radha Carey        "

## 2022-11-11 NOTE — PLAN OF CARE
Problem: COPD (Chronic Obstructive Pulmonary Disease) Comorbidity  Goal: Maintenance of COPD Symptom Control  Intervention: Maintain COPD-Symptom Control  Recent Flowsheet Documentation  Taken 11/10/2022 1700 by Kashif Sanchez RN  Supportive Measures:   active listening utilized   relaxation techniques promoted  Taken 11/10/2022 0800 by Kashif Sanchez, RN  Supportive Measures:   active listening utilized   relaxation techniques promoted   Goal Outcome Evaluation:      Plan of Care Reviewed With: patient    Overall Patient Progress: improvingOverall Patient Progress: improving     No complaints of pain.  Oxygen remains at 4 liters via nasal canula.  Shortness of breath with exertion.  Lung sounds diminished.  Up in chair today.  Encouraged activity.

## 2022-11-11 NOTE — PLAN OF CARE
Physical Therapy Discharge Summary    Reason for therapy discharge:    Discharged to transitional care facility.    Progress towards therapy goal(s). See goals on Care Plan in [facility] electronic health record for goal details.  Goals partially met.  Barriers to achieving goals:   Pt was still working on the goals .    Therapy recommendation(s):    Continued therapy is recommended.  Rationale/Recommendations:  to improve mobility and strength. .

## 2022-11-11 NOTE — DISCHARGE SUMMARY
[facility] MEDICINE  DISCHARGE SUMMARY     Primary Care Physician: Cinthya Cabral  Admission Date: 10/8/2022   Discharge Provider: Jacquie Lisa MD Discharge Date: 11/11/2022   Diet:   Active Diet and Nourishment Order   Procedures     Snacks/Supplements Adult: Ensure Enlive; With Meals     Snacks/Supplements Adult: Other; Special K bar; Between Meals     Advance Diet as Tolerated: Regular Diet Adult     Diet       Code Status: No CPR- Pre-arrest intubation OK   Activity: DCACTIVITY: Activity as tolerated        Condition at Discharge: Stable     REASON FOR PRESENTATION(See Admission Note for Details)     SOB    PRINCIPAL & ACTIVE DISCHARGE DIAGNOSES     Active Problems:    Acute respiratory failure with hypoxia (H)    Hypotension, unspecified hypotension type    Infection due to 2019 novel coronavirus      PENDING LABS     Unresulted Labs Ordered in the Past 30 Days of this Admission     No orders found from 9/8/2022 to 10/9/2022.            PROCEDURES ( this hospitalization only)          RECOMMENDATIONS TO OUTPATIENT PROVIDER FOR F/U VISIT     Follow-up Appointments     Follow Up and recommended labs and tests      Follow up with Nursing home physician.   Referral to Pulmonary sent for advanced COPD               DISPOSITION     Skilled Nursing Facility    SUMMARY OF HOSPITAL COURSE:         67F with severe emphysema who presented October 8th for SOB and found to have for acute respiratory failure secondary to pneumonia, COVID-19 infection, PE, circulatory shock required vasopressor.  She was intubated on October 8 and extubated to BiPAP on October 20.  Completed 7 day course of abx for haemophilus influenza tracheobronchitis, PE with anticoagulation, remdesivir and dexamethasone for COVID, and diuresed or acute heart failure.   Was considering going home with hospice but the patient later declined and decided upon TCU.       #Severe COPD with chronic hypoxic respiratory  failure  #COPD cachexia  #Hypoxic respiratory failure requiring intubation haemophilus influenza tracheobronchitis,  #Right lower lobe pneumonia  #Recovered COVID-19 infection,  -now down to 4L O2  -pred taper, duonebs and pulmicort nebs  -pulm referral.  May benefit from outpatient pulm rehab     #Subsegmental PE - started on eliquis.  plan on  3 to 6 months of treatment     #Recent COVID-19 infection - completed dexamethasone, remdesivir.  Recovered     #Stress cardiomyopathy - already with EF recovery.  Lasix changed to PRN due to soft BP.    #Splenic infarct  -Unclear etiology, radiology suggested paradoxical emboli related to PFO, patient is being, anticoagulated with Eliquis     #Dizziness  Had episodes of dizziness few days ago when she stands with therapy, she feels dizzy but then it takes a while to resolve,  -She had a head CT without contrast on November 3 that was negative,  -She does not feel she wants to get an MRI due to claustrophobia,  -dizziness improving--suspect related to lower bp's     #Deconditioning  -PT OT is following and plan to go to transitional care     #Severe protein calorie malnutrition/cachexia/hypoalbumin-severe     #Prolonged QTC,  -During hospitalization in the ICU she had a prolonged QTC was attributed to hypomagnesemia and it was replaced     #Cardiogenic and distributive shock - resolved     #Hypomag - replaced  -has been replaced    #Incidental imaging findings -   -Bilateral adnexal enlargement measuring up to 5 cm on the right and 3 cm on the left. Recommend pelvic ultrasound.  -Incidental probable 1 cm polyp distal transverse colon.  -outpatient f/u      Discharge Medications with Med changes:     Current Discharge Medication List      START taking these medications    Details   apixaban ANTICOAGULANT (ELIQUIS) 5 MG tablet Take 1 tablet (5 mg) by mouth 2 times daily    Associated Diagnoses: Single subsegmental pulmonary embolism without acute cor pulmonale (H)       budesonide (PULMICORT) 0.5 MG/2ML neb solution Take 4 mLs (1 mg) by nebulization 2 times daily    Associated Diagnoses: Chronic obstructive pulmonary disease, unspecified COPD type (H)      furosemide (LASIX) 20 MG tablet Take 0.5 tablets (10 mg) by mouth daily as needed (edema)    Associated Diagnoses: Acute heart failure with preserved ejection fraction (HFpEF) (H)      ipratropium - albuterol 0.5 mg/2.5 mg/3 mL (DUONEB) 0.5-2.5 (3) MG/3ML neb solution Take 1 vial (3 mLs) by nebulization 4 times daily  Qty: 90 mL    Associated Diagnoses: Chronic obstructive pulmonary disease, unspecified COPD type (H)      mirtazapine (REMERON) 15 MG tablet Take 1 tablet (15 mg) by mouth At Bedtime    Associated Diagnoses: Depression, unspecified depression type      !! predniSONE (DELTASONE) 10 MG tablet Take 1 tablet (10 mg) by mouth daily for 5 days  Qty: 5 tablet, Refills: 0    Associated Diagnoses: Chronic obstructive pulmonary disease, unspecified COPD type (H)      !! predniSONE (DELTASONE) 20 MG tablet Take 1 tablet (20 mg) by mouth daily for 5 days  Qty: 5 tablet, Refills: 0    Associated Diagnoses: Chronic obstructive pulmonary disease, unspecified COPD type (H)      !! predniSONE (DELTASONE) 5 MG tablet Take 1 tablet (5 mg) by mouth daily for 5 days  Qty: 5 tablet, Refills: 0    Associated Diagnoses: Chronic obstructive pulmonary disease, unspecified COPD type (H)      thiamine (B-1) 100 MG tablet 1 tablet (100 mg) by Per Feeding Tube route daily    Associated Diagnoses: Chronic obstructive pulmonary disease, unspecified COPD type (H)       !! - Potential duplicate medications found. Please discuss with provider.                Rationale for medication changes:      As above        Consults     PHARMACY TO DOSE VANCO  PHARMACY IP CONSULT  PHARMACY IP CONSULT  PHYSICAL THERAPY ADULT IP CONSULT  OCCUPATIONAL THERAPY ADULT IP CONSULT  NUTRITION SERVICES ADULT IP CONSULT  PHARMACY IP CONSULT  CARE MANAGEMENT / SOCIAL WORK  IP CONSULT  PHARMACY IP CONSULT  PHARMACY IP CONSULT  PHARMACY TO DOSE VANCO  PALLIATIVE CARE ADULT IP CONSULT  SPIRITUAL HEALTH SERVICES IP CONSULT  [facility] INPATIENT HOSPICE ADULT CONSULT  PHYSICAL THERAPY ADULT IP CONSULT  HOSPITALIST IP CONSULT  PHARMACY TEST CLAIM IP CONSULT  PULMONARY IP CONSULT  NUTRITION SERVICES ADULT IP CONSULT  PALLIATIVE CARE ADULT IP CONSULT  PHARMACY TO DOSE VANCO  PHYSICAL THERAPY ADULT IP CONSULT  OCCUPATIONAL THERAPY ADULT IP CONSULT  SMOKING CESSATION PROGRAM IP CONSULT  PHYSICAL THERAPY ADULT IP CONSULT  OCCUPATIONAL THERAPY ADULT IP CONSULT    Immunizations given this encounter     Most Recent Immunizations   Administered Date(s) Administered     COVID-19,PF,Moderna 11/10/2021     Influenza, Quad, High Dose, Pf, 65yr+ (Fluzone HD) 10/29/2022   Deferred Date(s) Deferred     Influenza, Quad, High Dose, Pf, 65yr+ (Fluzone HD) 10/20/2022           Anticoagulation Information      Started on Eliquis      SIGNIFICANT IMAGING FINDINGS     Results for orders placed or performed during the hospital encounter of 10/08/22   XR Chest Port 1 View    Impression    IMPRESSION: ETT is 6.2 cm from the izabela. Enteric tube enters the stomach and out of the field-of-view. The lungs are hyperinflated. There may be trace pleural effusions versus pleural thickening. Patchy bibasilar reticular opacities likely represent   atelectasis and scarring. Superimposed infection is also in the differential. Heart size is normal. No acute osseous findings.   CT Head w/o Contrast    Impression    IMPRESSION:  1.  No acute intracranial process.   CT Chest Pulmonary Embolism w Contrast   Result Value Ref Range    Radiologist flags New diagnosis of pulmonary embolism (AA)     Impression    IMPRESSION:  1.  A single segmental left lower lobe pulmonary embolus. No evidence of right heart strain.  2.  Small left lower lobe consolidations which either represent pneumonia or aspiration.  3.  Multiple foci of mucus  plugging.  4.  Severe emphysema.  5.  Right internal jugular central venous catheter with a small amount of thrombus around the tip.      [Critical Result: New diagnosis of pulmonary embolism]    Finding was identified on 10/8/2022 4:26 PM.     Dr. Simons was contacted by me on 10/8/2022 4:26 PM and verbalized understanding of the critical result.    XR Chest Port 1 View    Impression    IMPRESSION: New right IJ central venous catheter with tip in the mid SVC. No pneumothorax. Endotracheal and nasogastric tubes are unchanged. Lungs hyperinflated. Mild infiltrate left lung base.     XR Abdomen Port 1 View    Impression    IMPRESSION: Orogastric tube terminates over the stomach. Side-port is above the GE junction and advancement by approximately 7 cm is suggested. Excreted contrast is noted in the renal collecting systems.   XR Chest Port 1 View    Impression    IMPRESSION: The ET tube, right IJ, and NG tube are all unchanged in position. The lungs are hyperinflated bilaterally, unchanged from prior exam. Persistent bibasilar reticular opacities are again noted, favored reflect fibrotic changes   XR Chest Port 1 View    Impression    IMPRESSION: The ET tube, right IJ, and NG tube are all unchanged in position. The lungs remain hyperinflated bilaterally, unchanged. The lower most right lung base is excluded by collimation, however there are no obvious pleural effusions. Persistent   bibasilar opacities are again noted, favored to represent fibrotic changes.   US Lower Extremity Venous Duplex Bilateral    Impression    IMPRESSION:  1.  No deep venous thrombosis in the bilateral lower extremities.   XR Chest Port 1 View    Impression    IMPRESSION: EKG leads now coiled over the chest.    Endotracheal tube is in good position the mid trachea, 4 cm from the izabela. Right IJ central line is in good position. NG tube can be seen coursing into the stomach.    No pneumothorax. Lungs are hyperexpanded consistent with COPD with  some basilar fibrosis. No new parenchymal disease. Heart and pulmonary vascularity are normal.       CT Chest Pulmonary Embolism w Contrast    Impression    IMPRESSION:    1.  Subsegmental pulmonary embolism in the left lower lobe 5 days earlier has resolved. 2 new subsegmental pulmonary emboli are present in the lower lobes as described.  2.  Mild worsening of focal consolidation in the posterior basal left lower lobe and development of peripheral airway debris/tree-in-bud opacity in both lower lobes consistent with bronchopneumonia/cellular bronchiolitis.  3.  Advanced emphysema.   XR Chest 1 View    Impression    IMPRESSION: Endotracheal tube tip 3.7 cm above the izabela. Nasogastric tube tip and the distal sidehole are within the stomach. Right IJ central venous catheter tip low SVC in good position. Underlying lung hyperinflation, severe emphysema and linear   opacities radiating from the sara into the periphery of both lungs that could represent bronchial wall thickening, interstitial edema and/or interstitial inflammation. No pneumothorax.   CT Abdomen Pelvis w Contrast    Impression    IMPRESSION:   1.  Moderate to marked fluid and gas distention of the distal jejunum, ileum, colon and rectum with no transition point or obstructing mass compatible with severe adynamic ileus and/or a nonspecific enterocolitis. No free air or pneumatosis.  2.  Wedge-shaped perfusion defect lateral aspect mid to upper spleen is most likely an acute infarct with posttraumatic contusion possible in the appropriate clinical setting. Etiology unclear but in the setting of recently diagnosed pulmonary emboli,   paradoxical emboli related to PFO should be considered.  3.  Trace abdominal and pelvic ascites and generalized mesenteric and subcutaneous edema.  4.  Bilateral adnexal enlargement measuring up to 5 cm on the right and 3 cm on the left. Recommend pelvic ultrasound.  5.  Incidental probable 1 cm polyp distal transverse  colon.    Results discussed with patient's nurse Shayna KRISHNAN 10:45 AM 10/19/2022   XR Chest Port 1 View    Impression    IMPRESSION: Patient's rotated to the left. Patient's been extubated. Right IJ central line overlies the mid SVC.    Lungs are hyperexpanded consistent with COPD.    New hazy opacification of the right lower lobe with trace effusion. Unclear if this reflects some atelectasis due to volume loss or a bronchopneumonia. Endobronchial secretions noted on the recent CT of the abdomen within the lung bases.     Left lung is clear. No pneumothorax. Heart and pulmonary vascularity are normal.       CT Head w/o Contrast    Impression    IMPRESSION:  1.  No CT evidence for acute intracranial process.  2.  Brain atrophy and presumed chronic microvascular ischemic changes as above.  3.  Left maxillary sinusitis, possibly acute.  4.  Partial left mastoid effusion.   XR Chest Port 1 View    Impression    IMPRESSION: Hyperinflation consistent with obstructive pulmonary disease. Trace left and small right pleural effusions. Prominent pulmonary vessels suggest pulmonary vascular congestion, as does peripheral curly B lines.    Main pulmonary arteries are prominent suggesting pulmonary arterial hypertension.   Echocardiogram Complete   Result Value Ref Range    LVEF  30-35%          Discharge Orders        Palliative Care Referral      Adult Pulmonary Medicine Referral      General info for SNF    Length of Stay Estimate: Short Term Care: Estimated # of Days <30  Condition at Discharge: Stable  Level of care:skilled   Rehabilitation Potential: Fair  Admission H&P remains valid and up-to-date: Yes  Recent Chemotherapy: N/A  Use Nursing Home Standing Orders: Yes     Mantoux instructions    Give two-step Mantoux (PPD) Per Facility Policy Yes     Activity - Up with nursing assistance     Reason for your hospital stay    You were admitted with a COPD exacerbation     Follow Up and recommended labs and tests    Follow up with  Nursing home physician.   Referral to Pulmonary sent for advanced COPD     No CPR- Pre-arrest intubation OK     Physical Therapy Adult Consult    Evaluate and treat as clinically indicated.    Reason:  Strengthening     Occupational Therapy Adult Consult    Evaluate and treat as clinically indicated.    Reason:  ADLs     Oxygen (SNF/TCU) Discharge     Fall precautions     Diet    Follow this diet upon discharge: Orders Placed This Encounter      Snacks/Supplements Adult: Ensure Enlive; With Meals      Snacks/Supplements Adult: Other; Special K bar; Between Meals      Advance Diet as Tolerated: Regular Diet Adult       Examination   Physical Exam   Temp:  [97.5  F (36.4  C)-98.4  F (36.9  C)] 98  F (36.7  C)  Pulse:  [] 107  Resp:  [18-20] 20  BP: ()/(58-67) 96/67  SpO2:  [92 %-98 %] 92 %  Wt Readings from Last 1 Encounters:   11/08/22 42.6 kg (93 lb 14.4 oz)       Lungs clear.  No conversational dyspnea.  Ready for discharge but unhappy with TCU selected      Please see EMR for more detailed significant labs, imaging, consultant notes etc.    IJacquie MD, personally saw the patient today and spent greater than 30 minutes discharging this patient.    Jacquie Lisa MD  [facility]    CC:Cinthya Cabral

## 2022-11-12 ENCOUNTER — PATIENT OUTREACH (OUTPATIENT)
Dept: CARE COORDINATION | Facility: CLINIC | Age: 67
End: 2022-11-12

## 2022-11-12 NOTE — PROGRESS NOTES
[facility] Care Resource [place]    Background: Transitional Care Management program identified per system criteria and reviewed by [facility] team for possible outreach.    Assessment: Upon chart review, [facility] Team member will not proceed with patient outreach related to this episode of Transitional Care Management program due to reason below:    Non-[facility] TCU: Patient is not established within [facility] and [facility] team member noted patient discharged to TCU/ARU/LTACH.    Plan: Transitional Care Management episode addressed appropriately per reason noted above.      Wendy Stone MA  Connected Care Resource [place], [facility]    *Connected Care Resource Team does NOT follow patient ongoing. Referrals are identified based on internal discharge reports and the outreach is to ensure patient has an understanding of their discharge instructions.

## 2022-11-14 ENCOUNTER — LAB REQUISITION (OUTPATIENT)
Dept: LAB | Facility: CLINIC | Age: 67
End: 2022-11-14
Payer: COMMERCIAL

## 2022-11-14 DIAGNOSIS — I95.9 HYPOTENSION, UNSPECIFIED: ICD-10-CM

## 2022-11-14 DIAGNOSIS — F32.A DEPRESSION, UNSPECIFIED: ICD-10-CM

## 2022-11-15 LAB
ANION GAP SERPL CALCULATED.3IONS-SCNC: 6 MMOL/L (ref 7–15)
BUN SERPL-MCNC: 23.1 MG/DL (ref 8–23)
CALCIUM SERPL-MCNC: 8.3 MG/DL (ref 8.8–10.2)
CHLORIDE SERPL-SCNC: 101 MMOL/L (ref 98–107)
CREAT SERPL-MCNC: 0.47 MG/DL (ref 0.51–0.95)
DEPRECATED HCO3 PLAS-SCNC: 34 MMOL/L (ref 22–29)
ERYTHROCYTE [DISTWIDTH] IN BLOOD BY AUTOMATED COUNT: 15.2 % (ref 10–15)
GFR SERPL CREATININE-BSD FRML MDRD: >90 ML/MIN/1.73M2
GLUCOSE SERPL-MCNC: 122 MG/DL (ref 70–99)
HCT VFR BLD AUTO: 35.9 % (ref 35–47)
HGB BLD-MCNC: 10.9 G/DL (ref 11.7–15.7)
MCH RBC QN AUTO: 30.7 PG (ref 26.5–33)
MCHC RBC AUTO-ENTMCNC: 30.4 G/DL (ref 31.5–36.5)
MCV RBC AUTO: 101 FL (ref 78–100)
PLATELET # BLD AUTO: 414 10E3/UL (ref 150–450)
POTASSIUM SERPL-SCNC: 4.5 MMOL/L (ref 3.4–5.3)
RBC # BLD AUTO: 3.55 10E6/UL (ref 3.8–5.2)
SODIUM SERPL-SCNC: 141 MMOL/L (ref 136–145)
WBC # BLD AUTO: 17.8 10E3/UL (ref 4–11)

## 2022-11-15 PROCEDURE — 85027 COMPLETE CBC AUTOMATED: CPT | Mod: ORL | Performed by: INTERNAL MEDICINE

## 2022-11-15 PROCEDURE — P9604 ONE-WAY ALLOW PRORATED TRIP: HCPCS | Mod: ORL | Performed by: INTERNAL MEDICINE

## 2022-11-15 PROCEDURE — 80048 BASIC METABOLIC PNL TOTAL CA: CPT | Mod: ORL | Performed by: INTERNAL MEDICINE

## 2022-11-15 PROCEDURE — 36415 COLL VENOUS BLD VENIPUNCTURE: CPT | Mod: ORL | Performed by: INTERNAL MEDICINE

## 2022-11-18 ENCOUNTER — LAB REQUISITION (OUTPATIENT)
Dept: LAB | Facility: CLINIC | Age: 67
End: 2022-11-18
Payer: COMMERCIAL

## 2022-11-18 DIAGNOSIS — F32.A DEPRESSION, UNSPECIFIED: ICD-10-CM

## 2022-11-18 DIAGNOSIS — D64.9 ANEMIA, UNSPECIFIED: ICD-10-CM

## 2022-11-18 DIAGNOSIS — I95.9 HYPOTENSION, UNSPECIFIED: ICD-10-CM

## 2022-11-21 ENCOUNTER — LAB REQUISITION (OUTPATIENT)
Dept: LAB | Facility: CLINIC | Age: 67
End: 2022-11-21
Payer: COMMERCIAL

## 2022-11-21 DIAGNOSIS — J44.9 COPD (CHRONIC OBSTRUCTIVE PULMONARY DISEASE) (H): Primary | ICD-10-CM

## 2022-11-21 DIAGNOSIS — R19.7 DIARRHEA, UNSPECIFIED: ICD-10-CM

## 2022-11-21 LAB
FERRITIN SERPL-MCNC: 452 NG/ML (ref 11–328)
FOLATE SERPL-MCNC: 14.7 NG/ML (ref 4.6–34.8)
HBA1C MFR BLD: 5.8 %
HGB BLD-MCNC: 11.1 G/DL (ref 11.7–15.7)
IRON BINDING CAPACITY (ROCHE): 149 UG/DL (ref 240–430)
IRON SATN MFR SERPL: 13 % (ref 15–46)
IRON SERPL-MCNC: 19 UG/DL (ref 37–145)
MAGNESIUM SERPL-MCNC: 1.9 MG/DL (ref 1.7–2.3)
TSH SERPL DL<=0.005 MIU/L-ACNC: 1.96 UIU/ML (ref 0.3–4.2)
VIT B12 SERPL-MCNC: 1055 PG/ML (ref 232–1245)

## 2022-11-21 PROCEDURE — 83550 IRON BINDING TEST: CPT | Mod: ORL | Performed by: INTERNAL MEDICINE

## 2022-11-21 PROCEDURE — 36415 COLL VENOUS BLD VENIPUNCTURE: CPT | Mod: ORL | Performed by: INTERNAL MEDICINE

## 2022-11-21 PROCEDURE — 84443 ASSAY THYROID STIM HORMONE: CPT | Mod: ORL | Performed by: INTERNAL MEDICINE

## 2022-11-21 PROCEDURE — 87493 C DIFF AMPLIFIED PROBE: CPT | Mod: ORL | Performed by: INTERNAL MEDICINE

## 2022-11-21 PROCEDURE — 82746 ASSAY OF FOLIC ACID SERUM: CPT | Mod: ORL | Performed by: INTERNAL MEDICINE

## 2022-11-21 PROCEDURE — 83036 HEMOGLOBIN GLYCOSYLATED A1C: CPT | Mod: ORL | Performed by: INTERNAL MEDICINE

## 2022-11-21 PROCEDURE — P9604 ONE-WAY ALLOW PRORATED TRIP: HCPCS | Mod: ORL | Performed by: INTERNAL MEDICINE

## 2022-11-21 PROCEDURE — 82607 VITAMIN B-12: CPT | Mod: ORL | Performed by: INTERNAL MEDICINE

## 2022-11-21 PROCEDURE — 83735 ASSAY OF MAGNESIUM: CPT | Mod: ORL | Performed by: INTERNAL MEDICINE

## 2022-11-21 PROCEDURE — 87324 CLOSTRIDIUM AG IA: CPT | Mod: ORL | Performed by: INTERNAL MEDICINE

## 2022-11-21 PROCEDURE — 82728 ASSAY OF FERRITIN: CPT | Mod: ORL | Performed by: INTERNAL MEDICINE

## 2022-11-21 PROCEDURE — 85018 HEMOGLOBIN: CPT | Mod: ORL | Performed by: INTERNAL MEDICINE

## 2022-11-22 LAB
C DIFF GDH STL QL IA: POSITIVE
C DIFF TOX A+B STL QL IA: POSITIVE
C DIFF TOX B STL QL: POSITIVE

## 2023-01-01 ENCOUNTER — TELEPHONE (OUTPATIENT)
Dept: PULMONOLOGY | Facility: CLINIC | Age: 68
End: 2023-01-01
Payer: COMMERCIAL

## 2023-01-01 ENCOUNTER — APPOINTMENT (OUTPATIENT)
Dept: PHYSICAL THERAPY | Facility: HOSPITAL | Age: 68
DRG: 190 | End: 2023-01-01
Attending: INTERNAL MEDICINE
Payer: COMMERCIAL

## 2023-01-01 ENCOUNTER — OFFICE VISIT (OUTPATIENT)
Dept: PULMONOLOGY | Facility: CLINIC | Age: 68
End: 2023-01-01
Payer: COMMERCIAL

## 2023-01-01 ENCOUNTER — OFFICE VISIT (OUTPATIENT)
Dept: PULMONOLOGY | Facility: CLINIC | Age: 68
End: 2023-01-01
Attending: NURSE PRACTITIONER
Payer: COMMERCIAL

## 2023-01-01 ENCOUNTER — OFFICE VISIT (OUTPATIENT)
Dept: PULMONOLOGY | Facility: CLINIC | Age: 68
End: 2023-01-01
Attending: HOSPITALIST
Payer: COMMERCIAL

## 2023-01-01 ENCOUNTER — HOSPITAL ENCOUNTER (OUTPATIENT)
Dept: RESPIRATORY THERAPY | Facility: CLINIC | Age: 68
Discharge: HOME | End: 2023-02-10
Payer: COMMERCIAL

## 2023-01-01 ENCOUNTER — MEDICAL CORRESPONDENCE (OUTPATIENT)
Dept: HEALTH INFORMATION MANAGEMENT | Facility: CLINIC | Age: 68
End: 2023-01-01
Payer: COMMERCIAL

## 2023-01-01 ENCOUNTER — DOCUMENTATION ONLY (OUTPATIENT)
Dept: HOME HEALTH SERVICES | Facility: CLINIC | Age: 68
End: 2023-01-01

## 2023-01-01 ENCOUNTER — APPOINTMENT (OUTPATIENT)
Dept: OCCUPATIONAL THERAPY | Facility: HOSPITAL | Age: 68
DRG: 190 | End: 2023-01-01
Attending: INTERNAL MEDICINE
Payer: COMMERCIAL

## 2023-01-01 ENCOUNTER — HOSPITAL ENCOUNTER (INPATIENT)
Facility: HOSPITAL | Age: 68
LOS: 3 days | Discharge: HOME HEALTH | DRG: 190 | End: 2023-07-16
Attending: EMERGENCY MEDICINE | Admitting: INTERNAL MEDICINE
Payer: COMMERCIAL

## 2023-01-01 ENCOUNTER — APPOINTMENT (OUTPATIENT)
Dept: RADIOLOGY | Facility: HOSPITAL | Age: 68
DRG: 190 | End: 2023-01-01
Attending: EMERGENCY MEDICINE
Payer: COMMERCIAL

## 2023-01-01 VITALS
BODY MASS INDEX: 18.39 KG/M2 | DIASTOLIC BLOOD PRESSURE: 62 MMHG | HEART RATE: 123 BPM | SYSTOLIC BLOOD PRESSURE: 110 MMHG | OXYGEN SATURATION: 93 % | WEIGHT: 117.4 LBS

## 2023-01-01 VITALS
DIASTOLIC BLOOD PRESSURE: 59 MMHG | SYSTOLIC BLOOD PRESSURE: 102 MMHG | HEART RATE: 97 BPM | WEIGHT: 111.33 LBS | TEMPERATURE: 97.7 F | BODY MASS INDEX: 17.47 KG/M2 | HEIGHT: 67 IN | RESPIRATION RATE: 24 BRPM | OXYGEN SATURATION: 95 %

## 2023-01-01 VITALS
OXYGEN SATURATION: 97 % | HEART RATE: 114 BPM | SYSTOLIC BLOOD PRESSURE: 118 MMHG | DIASTOLIC BLOOD PRESSURE: 62 MMHG | BODY MASS INDEX: 19.58 KG/M2 | WEIGHT: 125 LBS

## 2023-01-01 VITALS
SYSTOLIC BLOOD PRESSURE: 116 MMHG | HEIGHT: 67 IN | WEIGHT: 96 LBS | OXYGEN SATURATION: 100 % | HEART RATE: 106 BPM | DIASTOLIC BLOOD PRESSURE: 64 MMHG | BODY MASS INDEX: 15.07 KG/M2

## 2023-01-01 DIAGNOSIS — J96.11 CHRONIC RESPIRATORY FAILURE WITH HYPOXIA (H): ICD-10-CM

## 2023-01-01 DIAGNOSIS — J44.9 CHRONIC OBSTRUCTIVE PULMONARY DISEASE, UNSPECIFIED COPD TYPE (H): Primary | ICD-10-CM

## 2023-01-01 DIAGNOSIS — J44.1 CHRONIC OBSTRUCTIVE PULMONARY DISEASE WITH ACUTE EXACERBATION (H): ICD-10-CM

## 2023-01-01 DIAGNOSIS — Z86.711 HISTORY OF PULMONARY EMBOLISM: ICD-10-CM

## 2023-01-01 DIAGNOSIS — J96.22 ACUTE ON CHRONIC RESPIRATORY FAILURE WITH HYPOXIA AND HYPERCAPNIA (H): ICD-10-CM

## 2023-01-01 DIAGNOSIS — I26.93 SINGLE SUBSEGMENTAL PULMONARY EMBOLISM WITHOUT ACUTE COR PULMONALE (H): ICD-10-CM

## 2023-01-01 DIAGNOSIS — J96.01 ACUTE RESPIRATORY FAILURE WITH HYPOXIA AND HYPERCAPNIA (H): ICD-10-CM

## 2023-01-01 DIAGNOSIS — R64 PULMONARY CACHEXIA DUE TO COPD (H): ICD-10-CM

## 2023-01-01 DIAGNOSIS — R41.82 ALTERED MENTAL STATUS, UNSPECIFIED ALTERED MENTAL STATUS TYPE: ICD-10-CM

## 2023-01-01 DIAGNOSIS — R53.81 PHYSICAL DECONDITIONING: ICD-10-CM

## 2023-01-01 DIAGNOSIS — R29.898 SEVERE MUSCLE DECONDITIONING: ICD-10-CM

## 2023-01-01 DIAGNOSIS — J44.9 PULMONARY CACHEXIA DUE TO COPD (H): ICD-10-CM

## 2023-01-01 DIAGNOSIS — J96.02 ACUTE RESPIRATORY FAILURE WITH HYPOXIA AND HYPERCAPNIA (H): ICD-10-CM

## 2023-01-01 DIAGNOSIS — J96.21 ACUTE ON CHRONIC RESPIRATORY FAILURE WITH HYPOXIA AND HYPERCAPNIA (H): ICD-10-CM

## 2023-01-01 DIAGNOSIS — R00.0 SINUS TACHYCARDIA: ICD-10-CM

## 2023-01-01 DIAGNOSIS — J44.9 COPD (CHRONIC OBSTRUCTIVE PULMONARY DISEASE) (H): Primary | ICD-10-CM

## 2023-01-01 DIAGNOSIS — R06.89 HYPOVENTILATION: Primary | ICD-10-CM

## 2023-01-01 LAB
ABO/RH(D): NORMAL
ALLEN'S TEST: NO
ALLEN'S TEST: YES
ANION GAP SERPL CALCULATED.3IONS-SCNC: 4 MMOL/L (ref 7–15)
ANION GAP SERPL CALCULATED.3IONS-SCNC: 4 MMOL/L (ref 7–15)
ANION GAP SERPL CALCULATED.3IONS-SCNC: 5 MMOL/L (ref 7–15)
ANTIBODY SCREEN: NEGATIVE
APTT PPP: 29 SECONDS (ref 22–38)
ATRIAL RATE - MUSE: 122 BPM
BACTERIA BLD CULT: ABNORMAL
BACTERIA BLD CULT: ABNORMAL
BACTERIA BLD CULT: NO GROWTH
BASE EXCESS BLDA CALC-SCNC: 10.2 MMOL/L
BASE EXCESS BLDA CALC-SCNC: ABNORMAL MMOL/L
BASOPHILS # BLD AUTO: 0.1 10E3/UL (ref 0–0.2)
BASOPHILS NFR BLD AUTO: 0 %
BUN SERPL-MCNC: 22.5 MG/DL (ref 8–23)
BUN SERPL-MCNC: 33.3 MG/DL (ref 8–23)
BUN SERPL-MCNC: 41.8 MG/DL (ref 8–23)
CALCIUM SERPL-MCNC: 8.5 MG/DL (ref 8.8–10.2)
CALCIUM SERPL-MCNC: 9 MG/DL (ref 8.8–10.2)
CALCIUM SERPL-MCNC: 9.3 MG/DL (ref 8.8–10.2)
CHLORIDE SERPL-SCNC: 101 MMOL/L (ref 98–107)
CHLORIDE SERPL-SCNC: 101 MMOL/L (ref 98–107)
CHLORIDE SERPL-SCNC: 103 MMOL/L (ref 98–107)
COHGB MFR BLD: 100 % (ref 95–96)
COHGB MFR BLD: 99.5 % (ref 95–96)
CREAT SERPL-MCNC: 0.83 MG/DL (ref 0.51–0.95)
CREAT SERPL-MCNC: 1.13 MG/DL (ref 0.51–0.95)
CREAT SERPL-MCNC: 1.37 MG/DL (ref 0.51–0.95)
DEPRECATED HCO3 PLAS-SCNC: 38 MMOL/L (ref 22–29)
DEPRECATED HCO3 PLAS-SCNC: 39 MMOL/L (ref 22–29)
DEPRECATED HCO3 PLAS-SCNC: 39 MMOL/L (ref 22–29)
DIASTOLIC BLOOD PRESSURE - MUSE: NORMAL MMHG
ENTEROCOCCUS FAECALIS: NOT DETECTED
ENTEROCOCCUS FAECIUM: NOT DETECTED
EOSINOPHIL # BLD AUTO: 0.3 10E3/UL (ref 0–0.7)
EOSINOPHIL NFR BLD AUTO: 2 %
ERV-%PRED-PRE: 37 %
ERV-PRE: 0.53 L
ERV-PRED: 1.43 L
ERYTHROCYTE [DISTWIDTH] IN BLOOD BY AUTOMATED COUNT: 13.8 % (ref 10–15)
EXPTIME-PRE: 6.63 SEC
FEF2575-%PRED-POST: 15 %
FEF2575-%PRED-PRE: 10 %
FEF2575-POST: 0.31 L/SEC
FEF2575-PRE: 0.2 L/SEC
FEF2575-PRED: 1.97 L/SEC
FEFMAX-%PRED-PRE: 19 %
FEFMAX-PRE: 1.21 L/SEC
FEFMAX-PRED: 6.26 L/SEC
FEV1-%PRED-PRE: 20 %
FEV1-PRE: 0.48 L
FEV1FEV6-PRE: 40 %
FEV1FEV6-PRED: 79 %
FEV1FVC-PRE: 39 %
FEV1FVC-PRED: 79 %
FEV1SVC-PRE: 36 %
FEV1SVC-PRED: 67 %
FIFMAX-PRE: 2.22 L/SEC
FVC-%PRED-PRE: 41 %
FVC-PRE: 1.22 L
FVC-PRED: 2.97 L
GFR SERPL CREATININE-BSD FRML MDRD: 42 ML/MIN/1.73M2
GFR SERPL CREATININE-BSD FRML MDRD: 52 ML/MIN/1.73M2
GFR SERPL CREATININE-BSD FRML MDRD: 76 ML/MIN/1.73M2
GLUCOSE SERPL-MCNC: 125 MG/DL (ref 70–99)
GLUCOSE SERPL-MCNC: 265 MG/DL (ref 70–99)
GLUCOSE SERPL-MCNC: 90 MG/DL (ref 70–99)
HCO3 BLD-SCNC: 38 MMOL/L (ref 23–29)
HCO3 BLD-SCNC: ABNORMAL MMOL/L
HCT VFR BLD AUTO: 41 % (ref 35–47)
HEMOCCULT STL QL: POSITIVE
HGB BLD-MCNC: 12 G/DL (ref 11.7–15.7)
HGB BLD-MCNC: 9.9 G/DL (ref 11.7–15.7)
HOLD SPECIMEN: NORMAL
IC-%PRED-PRE: 38 %
IC-PRE: 0.8 L
IC-PRED: 2.06 L
IMM GRANULOCYTES # BLD: 0.2 10E3/UL
IMM GRANULOCYTES NFR BLD: 1 %
INR PPP: 1.14 (ref 0.85–1.15)
INTERPRETATION ECG - MUSE: NORMAL
LACTATE SERPL-SCNC: 0.8 MMOL/L (ref 0.7–2)
LACTATE SERPL-SCNC: 0.9 MMOL/L (ref 0.7–2)
LISTERIA SPECIES (DETECTED/NOT DETECTED): NOT DETECTED
LYMPHOCYTES # BLD AUTO: 3.1 10E3/UL (ref 0.8–5.3)
LYMPHOCYTES NFR BLD AUTO: 17 %
MCH RBC QN AUTO: 29.4 PG (ref 26.5–33)
MCHC RBC AUTO-ENTMCNC: 29.3 G/DL (ref 31.5–36.5)
MCV RBC AUTO: 101 FL (ref 78–100)
MONOCYTES # BLD AUTO: 1 10E3/UL (ref 0–1.3)
MONOCYTES NFR BLD AUTO: 6 %
NEUTROPHILS # BLD AUTO: 13.3 10E3/UL (ref 1.6–8.3)
NEUTROPHILS NFR BLD AUTO: 74 %
NRBC # BLD AUTO: 0 10E3/UL
NRBC BLD AUTO-RTO: 0 /100
NT-PROBNP SERPL-MCNC: 3197 PG/ML (ref 0–900)
O2/TOTAL GAS SETTING VFR VENT: 50 %
OXYHGB MFR BLD: 98.5 % (ref 95–96)
OXYHGB MFR BLD: >98.5 % (ref 95–96)
P AXIS - MUSE: 83 DEGREES
PCO2 BLD: 94 MM HG (ref 35–45)
PCO2 BLD: >98 MM HG (ref 35–45)
PEEP: 6 CM H2O
PH BLD: 7.02 [PH] (ref 7.37–7.44)
PH BLD: 7.21 [PH] (ref 7.37–7.44)
PLATELET # BLD AUTO: 329 10E3/UL (ref 150–450)
PO2 BLD: 142 MM HG (ref 75–85)
PO2 BLD: 245 MM HG (ref 75–85)
POTASSIUM SERPL-SCNC: 3.6 MMOL/L (ref 3.4–5.3)
POTASSIUM SERPL-SCNC: 4.5 MMOL/L (ref 3.4–5.3)
POTASSIUM SERPL-SCNC: 4.6 MMOL/L (ref 3.4–5.3)
PR INTERVAL - MUSE: 126 MS
QRS DURATION - MUSE: 98 MS
QT - MUSE: 320 MS
QTC - MUSE: 456 MS
R AXIS - MUSE: 90 DEGREES
RATE: 20 RR/MIN
RBC # BLD AUTO: 4.08 10E6/UL (ref 3.8–5.2)
SODIUM SERPL-SCNC: 144 MMOL/L (ref 136–145)
SODIUM SERPL-SCNC: 144 MMOL/L (ref 136–145)
SODIUM SERPL-SCNC: 146 MMOL/L (ref 136–145)
SPECIMEN EXPIRATION DATE: NORMAL
STAPHYLOCOCCUS AUREUS: NOT DETECTED
STAPHYLOCOCCUS EPIDERMIDIS: NOT DETECTED
STAPHYLOCOCCUS LUGDUNENSIS: NOT DETECTED
STAPHYLOCOCCUS SPECIES: DETECTED
STREPTOCOCCUS AGALACTIAE: NOT DETECTED
STREPTOCOCCUS ANGINOSUS GROUP: NOT DETECTED
STREPTOCOCCUS PNEUMONIAE: NOT DETECTED
STREPTOCOCCUS PYOGENES: NOT DETECTED
STREPTOCOCCUS SPECIES: NOT DETECTED
SYSTOLIC BLOOD PRESSURE - MUSE: NORMAL MMHG
T AXIS - MUSE: 66 DEGREES
TEMPERATURE: 37 DEGREES C
TEMPERATURE: 37 DEGREES C
TROPONIN T SERPL HS-MCNC: 37 NG/L
TROPONIN T SERPL HS-MCNC: 42 NG/L
VC-%PRED-PRE: 38 %
VC-PRE: 1.33 L
VC-PRED: 3.48 L
VENTILATION MODE: ABNORMAL
VENTRICULAR RATE- MUSE: 122 BPM
WBC # BLD AUTO: 17.8 10E3/UL (ref 4–11)

## 2023-01-01 PROCEDURE — 85730 THROMBOPLASTIN TIME PARTIAL: CPT | Performed by: EMERGENCY MEDICINE

## 2023-01-01 PROCEDURE — 99232 SBSQ HOSP IP/OBS MODERATE 35: CPT | Performed by: INTERNAL MEDICINE

## 2023-01-01 PROCEDURE — 94726 PLETHYSMOGRAPHY LUNG VOLUMES: CPT | Mod: 26 | Performed by: INTERNAL MEDICINE

## 2023-01-01 PROCEDURE — 87149 DNA/RNA DIRECT PROBE: CPT | Performed by: EMERGENCY MEDICINE

## 2023-01-01 PROCEDURE — 85018 HEMOGLOBIN: CPT | Performed by: INTERNAL MEDICINE

## 2023-01-01 PROCEDURE — 250N000009 HC RX 250: Performed by: INTERNAL MEDICINE

## 2023-01-01 PROCEDURE — 999N000157 HC STATISTIC RCP TIME EA 10 MIN

## 2023-01-01 PROCEDURE — 999N000065 XR CHEST PORT 1 VIEW

## 2023-01-01 PROCEDURE — 250N000013 HC RX MED GY IP 250 OP 250 PS 637: Performed by: INTERNAL MEDICINE

## 2023-01-01 PROCEDURE — 250N000011 HC RX IP 250 OP 636: Performed by: EMERGENCY MEDICINE

## 2023-01-01 PROCEDURE — 99285 EMERGENCY DEPT VISIT HI MDM: CPT

## 2023-01-01 PROCEDURE — 94640 AIRWAY INHALATION TREATMENT: CPT | Mod: 76

## 2023-01-01 PROCEDURE — 82272 OCCULT BLD FECES 1-3 TESTS: CPT | Performed by: EMERGENCY MEDICINE

## 2023-01-01 PROCEDURE — 94726 PLETHYSMOGRAPHY LUNG VOLUMES: CPT

## 2023-01-01 PROCEDURE — 83605 ASSAY OF LACTIC ACID: CPT | Performed by: INTERNAL MEDICINE

## 2023-01-01 PROCEDURE — 86901 BLOOD TYPING SEROLOGIC RH(D): CPT | Performed by: EMERGENCY MEDICINE

## 2023-01-01 PROCEDURE — 96374 THER/PROPH/DIAG INJ IV PUSH: CPT

## 2023-01-01 PROCEDURE — 250N000011 HC RX IP 250 OP 636: Performed by: INTERNAL MEDICINE

## 2023-01-01 PROCEDURE — 86850 RBC ANTIBODY SCREEN: CPT | Performed by: EMERGENCY MEDICINE

## 2023-01-01 PROCEDURE — 99214 OFFICE O/P EST MOD 30 MIN: CPT | Performed by: INTERNAL MEDICINE

## 2023-01-01 PROCEDURE — 94660 CPAP INITIATION&MGMT: CPT

## 2023-01-01 PROCEDURE — 250N000012 HC RX MED GY IP 250 OP 636 PS 637: Performed by: INTERNAL MEDICINE

## 2023-01-01 PROCEDURE — 85610 PROTHROMBIN TIME: CPT | Performed by: EMERGENCY MEDICINE

## 2023-01-01 PROCEDURE — 99223 1ST HOSP IP/OBS HIGH 75: CPT | Mod: 25 | Performed by: PHYSICIAN ASSISTANT

## 2023-01-01 PROCEDURE — 94060 EVALUATION OF WHEEZING: CPT | Mod: 26 | Performed by: INTERNAL MEDICINE

## 2023-01-01 PROCEDURE — 36415 COLL VENOUS BLD VENIPUNCTURE: CPT | Performed by: EMERGENCY MEDICINE

## 2023-01-01 PROCEDURE — 94640 AIRWAY INHALATION TREATMENT: CPT

## 2023-01-01 PROCEDURE — 120N000001 HC R&B MED SURG/OB

## 2023-01-01 PROCEDURE — 97161 PT EVAL LOW COMPLEX 20 MIN: CPT | Mod: GP

## 2023-01-01 PROCEDURE — 94060 EVALUATION OF WHEEZING: CPT

## 2023-01-01 PROCEDURE — 99239 HOSP IP/OBS DSCHRG MGMT >30: CPT | Performed by: INTERNAL MEDICINE

## 2023-01-01 PROCEDURE — 99214 OFFICE O/P EST MOD 30 MIN: CPT | Performed by: NURSE PRACTITIONER

## 2023-01-01 PROCEDURE — 84484 ASSAY OF TROPONIN QUANT: CPT | Performed by: EMERGENCY MEDICINE

## 2023-01-01 PROCEDURE — 36415 COLL VENOUS BLD VENIPUNCTURE: CPT | Performed by: INTERNAL MEDICINE

## 2023-01-01 PROCEDURE — 85025 COMPLETE CBC W/AUTO DIFF WBC: CPT | Performed by: EMERGENCY MEDICINE

## 2023-01-01 PROCEDURE — 5A09457 ASSISTANCE WITH RESPIRATORY VENTILATION, 24-96 CONSECUTIVE HOURS, CONTINUOUS POSITIVE AIRWAY PRESSURE: ICD-10-PCS | Performed by: EMERGENCY MEDICINE

## 2023-01-01 PROCEDURE — 80048 BASIC METABOLIC PNL TOTAL CA: CPT | Performed by: EMERGENCY MEDICINE

## 2023-01-01 PROCEDURE — 97166 OT EVAL MOD COMPLEX 45 MIN: CPT | Mod: GO

## 2023-01-01 PROCEDURE — 97535 SELF CARE MNGMENT TRAINING: CPT | Mod: GO

## 2023-01-01 PROCEDURE — 99497 ADVNCD CARE PLAN 30 MIN: CPT | Mod: 25 | Performed by: PHYSICIAN ASSISTANT

## 2023-01-01 PROCEDURE — 83880 ASSAY OF NATRIURETIC PEPTIDE: CPT | Performed by: INTERNAL MEDICINE

## 2023-01-01 PROCEDURE — 99223 1ST HOSP IP/OBS HIGH 75: CPT | Performed by: INTERNAL MEDICINE

## 2023-01-01 PROCEDURE — 94729 DIFFUSING CAPACITY: CPT

## 2023-01-01 PROCEDURE — 93005 ELECTROCARDIOGRAM TRACING: CPT | Performed by: EMERGENCY MEDICINE

## 2023-01-01 PROCEDURE — 80048 BASIC METABOLIC PNL TOTAL CA: CPT | Performed by: INTERNAL MEDICINE

## 2023-01-01 PROCEDURE — 94729 DIFFUSING CAPACITY: CPT | Mod: 26 | Performed by: INTERNAL MEDICINE

## 2023-01-01 PROCEDURE — 258N000003 HC RX IP 258 OP 636: Performed by: INTERNAL MEDICINE

## 2023-01-01 PROCEDURE — 36600 WITHDRAWAL OF ARTERIAL BLOOD: CPT

## 2023-01-01 PROCEDURE — 82805 BLOOD GASES W/O2 SATURATION: CPT | Performed by: EMERGENCY MEDICINE

## 2023-01-01 PROCEDURE — 99223 1ST HOSP IP/OBS HIGH 75: CPT | Mod: AI | Performed by: INTERNAL MEDICINE

## 2023-01-01 PROCEDURE — 250N000009 HC RX 250: Performed by: EMERGENCY MEDICINE

## 2023-01-01 PROCEDURE — 83605 ASSAY OF LACTIC ACID: CPT | Performed by: EMERGENCY MEDICINE

## 2023-01-01 PROCEDURE — 999N000156 HC STATISTIC RCP CONSULT EA 30 MIN

## 2023-01-01 PROCEDURE — 258N000003 HC RX IP 258 OP 636: Performed by: EMERGENCY MEDICINE

## 2023-01-01 PROCEDURE — 87077 CULTURE AEROBIC IDENTIFY: CPT | Performed by: EMERGENCY MEDICINE

## 2023-01-01 PROCEDURE — G0463 HOSPITAL OUTPT CLINIC VISIT: HCPCS

## 2023-01-01 PROCEDURE — 97530 THERAPEUTIC ACTIVITIES: CPT | Mod: GP

## 2023-01-01 RX ORDER — PREDNISONE 20 MG/1
40 TABLET ORAL DAILY
Status: DISCONTINUED | OUTPATIENT
Start: 2023-01-01 | End: 2023-01-01 | Stop reason: HOSPADM

## 2023-01-01 RX ORDER — ALBUTEROL SULFATE 90 UG/1
2 AEROSOL, METERED RESPIRATORY (INHALATION) EVERY 4 HOURS PRN
Qty: 18 G | Refills: 0 | Status: SHIPPED | OUTPATIENT
Start: 2023-01-01

## 2023-01-01 RX ORDER — PREDNISONE 20 MG/1
20 TABLET ORAL DAILY
Status: DISCONTINUED | OUTPATIENT
Start: 2023-01-01 | End: 2023-01-01 | Stop reason: HOSPADM

## 2023-01-01 RX ORDER — ALBUTEROL SULFATE 0.83 MG/ML
2.5 SOLUTION RESPIRATORY (INHALATION) ONCE
Status: COMPLETED | OUTPATIENT
Start: 2023-01-01 | End: 2023-01-01

## 2023-01-01 RX ORDER — ALBUTEROL SULFATE 0.83 MG/ML
2.5 SOLUTION RESPIRATORY (INHALATION)
Status: DISPENSED | OUTPATIENT
Start: 2023-01-01 | End: 2023-01-01

## 2023-01-01 RX ORDER — PREDNISONE 5 MG/1
10 TABLET ORAL DAILY
Status: DISCONTINUED | OUTPATIENT
Start: 2023-01-01 | End: 2023-01-01 | Stop reason: HOSPADM

## 2023-01-01 RX ORDER — PEAK FLOW METER
EACH MISCELLANEOUS SEE ADMIN INSTRUCTIONS
COMMUNITY
Start: 2023-01-01

## 2023-01-01 RX ORDER — IPRATROPIUM BROMIDE AND ALBUTEROL SULFATE 2.5; .5 MG/3ML; MG/3ML
3 SOLUTION RESPIRATORY (INHALATION) 4 TIMES DAILY
Status: DISCONTINUED | OUTPATIENT
Start: 2023-01-01 | End: 2023-01-01

## 2023-01-01 RX ORDER — METHYLPREDNISOLONE SODIUM SUCCINATE 40 MG/ML
40 INJECTION, POWDER, LYOPHILIZED, FOR SOLUTION INTRAMUSCULAR; INTRAVENOUS EVERY 8 HOURS
Status: DISCONTINUED | OUTPATIENT
Start: 2023-01-01 | End: 2023-01-01

## 2023-01-01 RX ORDER — LEVALBUTEROL INHALATION SOLUTION 0.31 MG/3ML
0.31 SOLUTION RESPIRATORY (INHALATION)
Status: DISCONTINUED | OUTPATIENT
Start: 2023-01-01 | End: 2023-01-01

## 2023-01-01 RX ORDER — AZITHROMYCIN 250 MG/1
250 TABLET, FILM COATED ORAL DAILY
Status: DISCONTINUED | OUTPATIENT
Start: 2023-01-01 | End: 2023-01-01 | Stop reason: HOSPADM

## 2023-01-01 RX ORDER — LEVALBUTEROL INHALATION SOLUTION 0.31 MG/3ML
0.31 SOLUTION RESPIRATORY (INHALATION) 4 TIMES DAILY
Status: DISCONTINUED | OUTPATIENT
Start: 2023-01-01 | End: 2023-01-01 | Stop reason: HOSPADM

## 2023-01-01 RX ORDER — FERROUS SULFATE 325(65) MG
325 TABLET ORAL 2 TIMES DAILY
COMMUNITY

## 2023-01-01 RX ORDER — LEVALBUTEROL INHALATION SOLUTION 0.31 MG/3ML
0.31 SOLUTION RESPIRATORY (INHALATION) EVERY 4 HOURS PRN
Qty: 360 ML | Refills: 0 | Status: SHIPPED | OUTPATIENT
Start: 2023-01-01 | End: 2023-01-01

## 2023-01-01 RX ORDER — PREDNISONE 10 MG/1
TABLET ORAL
Qty: 30 TABLET | Refills: 0 | Status: SHIPPED | OUTPATIENT
Start: 2023-01-01 | End: 2023-01-01

## 2023-01-01 RX ORDER — LEVALBUTEROL INHALATION SOLUTION 0.31 MG/3ML
SOLUTION RESPIRATORY (INHALATION)
Qty: 1575 ML | Refills: 0 | Status: SHIPPED | OUTPATIENT
Start: 2023-01-01

## 2023-01-01 RX ORDER — FLUTICASONE FUROATE AND VILANTEROL 200; 25 UG/1; UG/1
1 POWDER RESPIRATORY (INHALATION) DAILY
Status: DISCONTINUED | OUTPATIENT
Start: 2023-01-01 | End: 2023-01-01 | Stop reason: HOSPADM

## 2023-01-01 RX ORDER — IPRATROPIUM BROMIDE AND ALBUTEROL SULFATE 2.5; .5 MG/3ML; MG/3ML
SOLUTION RESPIRATORY (INHALATION)
COMMUNITY
Start: 2023-01-01 | End: 2023-01-01

## 2023-01-01 RX ORDER — PIPERACILLIN SODIUM, TAZOBACTAM SODIUM 3; .375 G/15ML; G/15ML
3.38 INJECTION, POWDER, LYOPHILIZED, FOR SOLUTION INTRAVENOUS ONCE
Status: COMPLETED | OUTPATIENT
Start: 2023-01-01 | End: 2023-01-01

## 2023-01-01 RX ORDER — AZITHROMYCIN 250 MG/1
250 TABLET, FILM COATED ORAL DAILY
Qty: 1 TABLET | Refills: 0 | Status: SHIPPED | OUTPATIENT
Start: 2023-01-01

## 2023-01-01 RX ORDER — LEVALBUTEROL INHALATION SOLUTION 0.31 MG/3ML
SOLUTION RESPIRATORY (INHALATION)
Qty: 270 ML | Refills: 3 | Status: SHIPPED | OUTPATIENT
Start: 2023-01-01 | End: 2023-01-01

## 2023-01-01 RX ORDER — LEVALBUTEROL INHALATION SOLUTION 0.31 MG/3ML
0.31 SOLUTION RESPIRATORY (INHALATION) EVERY 4 HOURS PRN
Qty: 360 ML | Refills: 3 | Status: SHIPPED | OUTPATIENT
Start: 2023-01-01 | End: 2023-01-01

## 2023-01-01 RX ORDER — LEVALBUTEROL INHALATION SOLUTION 0.31 MG/3ML
0.31 SOLUTION RESPIRATORY (INHALATION) 4 TIMES DAILY
Qty: 360 ML | Refills: 0 | Status: SHIPPED | OUTPATIENT
Start: 2023-01-01 | End: 2023-01-01

## 2023-01-01 RX ORDER — ALBUTEROL SULFATE 90 UG/1
2 AEROSOL, METERED RESPIRATORY (INHALATION) EVERY 4 HOURS PRN
Status: DISCONTINUED | OUTPATIENT
Start: 2023-01-01 | End: 2023-01-01 | Stop reason: HOSPADM

## 2023-01-01 RX ORDER — FLUTICASONE PROPIONATE AND SALMETEROL 500; 50 UG/1; UG/1
1 POWDER RESPIRATORY (INHALATION) 2 TIMES DAILY
Qty: 1 EACH | Refills: 0 | Status: SHIPPED | OUTPATIENT
Start: 2023-01-01 | End: 2023-01-01

## 2023-01-01 RX ORDER — AMOXICILLIN 250 MG
1 CAPSULE ORAL 2 TIMES DAILY PRN
Status: DISCONTINUED | OUTPATIENT
Start: 2023-01-01 | End: 2023-01-01 | Stop reason: HOSPADM

## 2023-01-01 RX ORDER — ACETAMINOPHEN 325 MG/1
650 TABLET ORAL EVERY 4 HOURS PRN
Status: DISCONTINUED | OUTPATIENT
Start: 2023-01-01 | End: 2023-01-01 | Stop reason: HOSPADM

## 2023-01-01 RX ORDER — FUROSEMIDE 10 MG/ML
20 INJECTION INTRAMUSCULAR; INTRAVENOUS ONCE
Status: COMPLETED | OUTPATIENT
Start: 2023-01-01 | End: 2023-01-01

## 2023-01-01 RX ORDER — IPRATROPIUM BROMIDE AND ALBUTEROL SULFATE 2.5; .5 MG/3ML; MG/3ML
3 SOLUTION RESPIRATORY (INHALATION) EVERY 4 HOURS PRN
Status: DISCONTINUED | OUTPATIENT
Start: 2023-01-01 | End: 2023-01-01 | Stop reason: HOSPADM

## 2023-01-01 RX ORDER — VITAMIN B COMPLEX
1 TABLET ORAL DAILY
COMMUNITY
End: 2023-01-01

## 2023-01-01 RX ORDER — METHYLPREDNISOLONE SODIUM SUCCINATE 125 MG/2ML
125 INJECTION, POWDER, LYOPHILIZED, FOR SOLUTION INTRAMUSCULAR; INTRAVENOUS ONCE
Status: COMPLETED | OUTPATIENT
Start: 2023-01-01 | End: 2023-01-01

## 2023-01-01 RX ORDER — THIAMINE HCL 50 MG
50 TABLET ORAL DAILY
COMMUNITY

## 2023-01-01 RX ORDER — AMOXICILLIN 250 MG
2 CAPSULE ORAL 2 TIMES DAILY PRN
Status: DISCONTINUED | OUTPATIENT
Start: 2023-01-01 | End: 2023-01-01 | Stop reason: HOSPADM

## 2023-01-01 RX ORDER — MIRTAZAPINE 15 MG/1
15 TABLET, FILM COATED ORAL AT BEDTIME
Status: DISCONTINUED | OUTPATIENT
Start: 2023-01-01 | End: 2023-01-01 | Stop reason: HOSPADM

## 2023-01-01 RX ORDER — FUROSEMIDE 20 MG/1
10 TABLET ORAL ONCE
Status: COMPLETED | OUTPATIENT
Start: 2023-01-01 | End: 2023-01-01

## 2023-01-01 RX ORDER — BUDESONIDE 0.5 MG/2ML
0.5 INHALANT ORAL 2 TIMES DAILY
Status: DISCONTINUED | OUTPATIENT
Start: 2023-01-01 | End: 2023-01-01 | Stop reason: HOSPADM

## 2023-01-01 RX ORDER — LIDOCAINE 40 MG/G
CREAM TOPICAL
Status: DISCONTINUED | OUTPATIENT
Start: 2023-01-01 | End: 2023-01-01 | Stop reason: HOSPADM

## 2023-01-01 RX ADMIN — METHYLPREDNISOLONE SODIUM SUCCINATE 125 MG: 125 INJECTION, POWDER, FOR SOLUTION INTRAMUSCULAR; INTRAVENOUS at 11:43

## 2023-01-01 RX ADMIN — METHYLPREDNISOLONE SODIUM SUCCINATE 40 MG: 40 INJECTION, POWDER, FOR SOLUTION INTRAMUSCULAR; INTRAVENOUS at 20:33

## 2023-01-01 RX ADMIN — SODIUM CHLORIDE 500 ML: 9 INJECTION, SOLUTION INTRAVENOUS at 14:07

## 2023-01-01 RX ADMIN — IPRATROPIUM BROMIDE AND ALBUTEROL SULFATE 3 ML: 2.5; .5 SOLUTION RESPIRATORY (INHALATION) at 19:08

## 2023-01-01 RX ADMIN — APIXABAN 5 MG: 5 TABLET, FILM COATED ORAL at 21:03

## 2023-01-01 RX ADMIN — IPRATROPIUM BROMIDE AND ALBUTEROL SULFATE 3 ML: 2.5; .5 SOLUTION RESPIRATORY (INHALATION) at 15:06

## 2023-01-01 RX ADMIN — BUDESONIDE INHALATION 0.5 MG: 0.5 SUSPENSION RESPIRATORY (INHALATION) at 08:16

## 2023-01-01 RX ADMIN — IPRATROPIUM BROMIDE AND ALBUTEROL SULFATE 3 ML: 2.5; .5 SOLUTION RESPIRATORY (INHALATION) at 20:02

## 2023-01-01 RX ADMIN — LEVALBUTEROL HYDROCHLORIDE 0.31 MG: 0.31 SOLUTION RESPIRATORY (INHALATION) at 16:08

## 2023-01-01 RX ADMIN — IPRATROPIUM BROMIDE AND ALBUTEROL SULFATE 3 ML: 2.5; .5 SOLUTION RESPIRATORY (INHALATION) at 16:47

## 2023-01-01 RX ADMIN — PREDNISONE 40 MG: 20 TABLET ORAL at 08:36

## 2023-01-01 RX ADMIN — PREDNISONE 40 MG: 20 TABLET ORAL at 08:14

## 2023-01-01 RX ADMIN — ACETAMINOPHEN 650 MG: 325 TABLET ORAL at 12:06

## 2023-01-01 RX ADMIN — BUDESONIDE INHALATION 0.5 MG: 0.5 SUSPENSION RESPIRATORY (INHALATION) at 20:05

## 2023-01-01 RX ADMIN — APIXABAN 5 MG: 5 TABLET, FILM COATED ORAL at 08:36

## 2023-01-01 RX ADMIN — IPRATROPIUM BROMIDE AND ALBUTEROL SULFATE 3 ML: 2.5; .5 SOLUTION RESPIRATORY (INHALATION) at 03:52

## 2023-01-01 RX ADMIN — LEVALBUTEROL HYDROCHLORIDE 0.31 MG: 0.31 SOLUTION RESPIRATORY (INHALATION) at 19:56

## 2023-01-01 RX ADMIN — FUROSEMIDE 10 MG: 20 TABLET ORAL at 14:16

## 2023-01-01 RX ADMIN — MIRTAZAPINE 15 MG: 15 TABLET, FILM COATED ORAL at 21:03

## 2023-01-01 RX ADMIN — LEVALBUTEROL HYDROCHLORIDE 0.31 MG: 0.31 SOLUTION RESPIRATORY (INHALATION) at 07:29

## 2023-01-01 RX ADMIN — IPRATROPIUM BROMIDE AND ALBUTEROL SULFATE 3 ML: 2.5; .5 SOLUTION RESPIRATORY (INHALATION) at 11:07

## 2023-01-01 RX ADMIN — APIXABAN 5 MG: 5 TABLET, FILM COATED ORAL at 08:27

## 2023-01-01 RX ADMIN — IPRATROPIUM BROMIDE AND ALBUTEROL SULFATE 3 ML: 2.5; .5 SOLUTION RESPIRATORY (INHALATION) at 08:14

## 2023-01-01 RX ADMIN — MIRTAZAPINE 15 MG: 15 TABLET, FILM COATED ORAL at 21:00

## 2023-01-01 RX ADMIN — IPRATROPIUM BROMIDE AND ALBUTEROL SULFATE 3 ML: 2.5; .5 SOLUTION RESPIRATORY (INHALATION) at 04:05

## 2023-01-01 RX ADMIN — IPRATROPIUM BROMIDE 0.5 MG: 0.5 SOLUTION RESPIRATORY (INHALATION) at 19:56

## 2023-01-01 RX ADMIN — LEVALBUTEROL HYDROCHLORIDE 0.31 MG: 0.31 SOLUTION RESPIRATORY (INHALATION) at 11:23

## 2023-01-01 RX ADMIN — METHYLPREDNISOLONE SODIUM SUCCINATE 40 MG: 40 INJECTION, POWDER, FOR SOLUTION INTRAMUSCULAR; INTRAVENOUS at 12:34

## 2023-01-01 RX ADMIN — METHYLPREDNISOLONE SODIUM SUCCINATE 40 MG: 40 INJECTION, POWDER, FOR SOLUTION INTRAMUSCULAR; INTRAVENOUS at 04:32

## 2023-01-01 RX ADMIN — IPRATROPIUM BROMIDE AND ALBUTEROL SULFATE 3 ML: 2.5; .5 SOLUTION RESPIRATORY (INHALATION) at 02:46

## 2023-01-01 RX ADMIN — APIXABAN 5 MG: 5 TABLET, FILM COATED ORAL at 20:59

## 2023-01-01 RX ADMIN — AZITHROMYCIN 250 MG: 250 TABLET, FILM COATED ORAL at 08:13

## 2023-01-01 RX ADMIN — FUROSEMIDE 20 MG: 10 INJECTION, SOLUTION INTRAMUSCULAR; INTRAVENOUS at 20:32

## 2023-01-01 RX ADMIN — IPRATROPIUM BROMIDE AND ALBUTEROL SULFATE 3 ML: 2.5; .5 SOLUTION RESPIRATORY (INHALATION) at 12:21

## 2023-01-01 RX ADMIN — AZITHROMYCIN 250 MG: 250 TABLET, FILM COATED ORAL at 08:36

## 2023-01-01 RX ADMIN — IPRATROPIUM BROMIDE AND ALBUTEROL SULFATE 3 ML: 2.5; .5 SOLUTION RESPIRATORY (INHALATION) at 16:01

## 2023-01-01 RX ADMIN — IPRATROPIUM BROMIDE 0.5 MG: 0.5 SOLUTION RESPIRATORY (INHALATION) at 07:29

## 2023-01-01 RX ADMIN — MIRTAZAPINE 15 MG: 15 TABLET, FILM COATED ORAL at 23:24

## 2023-01-01 RX ADMIN — ACETAMINOPHEN 650 MG: 325 TABLET ORAL at 23:24

## 2023-01-01 RX ADMIN — PIPERACILLIN AND TAZOBACTAM 3.38 G: 3; .375 INJECTION, POWDER, LYOPHILIZED, FOR SOLUTION INTRAVENOUS at 12:17

## 2023-01-01 RX ADMIN — BUDESONIDE INHALATION 0.5 MG: 0.5 SUSPENSION RESPIRATORY (INHALATION) at 19:58

## 2023-01-01 RX ADMIN — IPRATROPIUM BROMIDE 0.5 MG: 0.5 SOLUTION RESPIRATORY (INHALATION) at 16:08

## 2023-01-01 RX ADMIN — Medication 1 MG: at 22:43

## 2023-01-01 RX ADMIN — APIXABAN 5 MG: 5 TABLET, FILM COATED ORAL at 20:32

## 2023-01-01 RX ADMIN — AZITHROMYCIN MONOHYDRATE 500 MG: 500 INJECTION, POWDER, LYOPHILIZED, FOR SOLUTION INTRAVENOUS at 12:21

## 2023-01-01 RX ADMIN — APIXABAN 5 MG: 5 TABLET, FILM COATED ORAL at 08:14

## 2023-01-01 RX ADMIN — BUDESONIDE INHALATION 0.5 MG: 0.5 SUSPENSION RESPIRATORY (INHALATION) at 07:30

## 2023-01-01 RX ADMIN — ALBUTEROL SULFATE 2.5 MG: 2.5 SOLUTION RESPIRATORY (INHALATION) at 12:17

## 2023-01-01 RX ADMIN — ALBUTEROL SULFATE 2.5 MG: 2.5 SOLUTION RESPIRATORY (INHALATION) at 14:20

## 2023-01-01 RX ADMIN — FLUTICASONE FUROATE AND VILANTEROL TRIFENATATE 1 PUFF: 200; 25 POWDER RESPIRATORY (INHALATION) at 08:35

## 2023-01-01 RX ADMIN — AZITHROMYCIN 250 MG: 250 TABLET, FILM COATED ORAL at 08:27

## 2023-01-01 RX ADMIN — IPRATROPIUM BROMIDE 0.5 MG: 0.5 SOLUTION RESPIRATORY (INHALATION) at 11:23

## 2023-01-01 RX ADMIN — BUDESONIDE INHALATION 0.5 MG: 0.5 SUSPENSION RESPIRATORY (INHALATION) at 07:27

## 2023-01-01 ASSESSMENT — ACTIVITIES OF DAILY LIVING (ADL)
ADLS_ACUITY_SCORE: 26
ADLS_ACUITY_SCORE: 35
FALL_HISTORY_WITHIN_LAST_SIX_MONTHS: NO
ADLS_ACUITY_SCORE: 35
ADLS_ACUITY_SCORE: 33
ADLS_ACUITY_SCORE: 26
ADLS_ACUITY_SCORE: 33
ADLS_ACUITY_SCORE: 27
WALKING_OR_CLIMBING_STAIRS: OTHER (SEE COMMENTS)
ADLS_ACUITY_SCORE: 27
ADLS_ACUITY_SCORE: 26
TOILETING_ISSUES: NO
TRANSFERRING: 1-->ASSISTANCE (EQUIPMENT/PERSON) NEEDED (NOT DEVELOPMENTALLY APPROPRIATE)
DIFFICULTY_EATING/SWALLOWING: NO
ADLS_ACUITY_SCORE: 33
WALKING_OR_CLIMBING_STAIRS_DIFFICULTY: YES
ADLS_ACUITY_SCORE: 33
ADLS_ACUITY_SCORE: 26
DRESSING/BATHING_DIFFICULTY: NO
ADLS_ACUITY_SCORE: 26
ADLS_ACUITY_SCORE: 26
CHANGE_IN_FUNCTIONAL_STATUS_SINCE_ONSET_OF_CURRENT_ILLNESS/INJURY: YES
ADLS_ACUITY_SCORE: 27
ADLS_ACUITY_SCORE: 30
HEARING_DIFFICULTY_OR_DEAF: NO
CONCENTRATING,_REMEMBERING_OR_MAKING_DECISIONS_DIFFICULTY: NO
ADLS_ACUITY_SCORE: 26
ADLS_ACUITY_SCORE: 24
ADLS_ACUITY_SCORE: 26
ADLS_ACUITY_SCORE: 26
ADLS_ACUITY_SCORE: 27
ADLS_ACUITY_SCORE: 26
ADLS_ACUITY_SCORE: 26
DIFFICULTY_COMMUNICATING: NO
WEAR_GLASSES_OR_BLIND: NO
ADLS_ACUITY_SCORE: 24
ADLS_ACUITY_SCORE: 27
ADLS_ACUITY_SCORE: 26
ADLS_ACUITY_SCORE: 26
ADLS_ACUITY_SCORE: 35
ADLS_ACUITY_SCORE: 26
ADLS_ACUITY_SCORE: 35
EQUIPMENT_CURRENTLY_USED_AT_HOME: WALKER, ROLLING
DOING_ERRANDS_INDEPENDENTLY_DIFFICULTY: YES
TRANSFERRING: 1-->ASSISTANCE (EQUIPMENT/PERSON) NEEDED
ADLS_ACUITY_SCORE: 33
ADLS_ACUITY_SCORE: 25
ADLS_ACUITY_SCORE: 26
ADLS_ACUITY_SCORE: 26
ADLS_ACUITY_SCORE: 30

## 2023-02-10 NOTE — PROGRESS NOTES
Complete PFT attempted. PT unable to do Pleth or DLCO correctly so no results to report. PT did not wish to try more attempts than the ones we did on them due to her SOB. PT wears 3.5 lpm NC continuous at home. PFT maneuvers done on RA.The results of testing meet ATS criteria for acceptability & repeatability for pre and post FVC and SVC.  Good patient effort and cooperation.  Patient was given Albuterol 2.5 mg via nebulizer prior to Post-BD testing.  Patient left PFT lab without complaint and/or distress.  Macie Whitehead, RT

## 2023-03-27 NOTE — PROGRESS NOTES
Pulse dose testing    Resting sats on room air: 93%    After walking 200 feet on pulse dose setting 4 sats are 86%    Vicky Mendoza LPN

## 2023-03-27 NOTE — PROGRESS NOTES
PULMONARY OUTPATIENT FOLLOW UP NOTE        Assessment:      1. GOLD D COPD  Significant tobacco use , emphysematous changes  PFTs showed a very severe obstructive lung disease.   Due to cost of inhalers , patient is using bronchodilators in nebulizer solution   Continue ICS (pulmicort) twice a day  Continue schedule duonebs three times a day and increase to four times a day as needed  Continue O2 supplementation 3 LPM continuously   Unfortunately , patient does not qualify for pulse dose device.   Patient has agreed to continue home PT  2. Pulmonary embolism   Chest CT scan 10/8 showed superior segment left lower lobe pulmonary emboli. Negative DVT.   Follow up CT scan 10/15 showed resolution of superior segment LLL pulmonary emboli but new RLL emboli and peripheral filling defect in the left LL basal segments.   Hypercoagulable state related to COVID19.   Currently on 3 months anticoagulation with apixaban.   Follow up echocardiogram 11/5 showed preserved LV and RV function.   Recommend to continue anticoagulation and complete 6 months treatment   3. S/p treatment of COVID19 viral infection on October 2022  4. Pulmonary cachexia   5. Severe deconditioning      Plan:      1. O2 supplementation 3 LPM continuously  2. Pulmicort twice a day  3. Albuterol / ipratropium nebs three times a day  4. Continue anticoagulation to complete 3 months treatment   5. Home PT referral   6. Follow up in 3 months         Fredi Milan  Pulmonary / Critical Care  March 27, 2023        CC:     Chief Complaint   Patient presents with     Consult     COPD       HPI:         Lavonne Talbot is a 68 year old female who presents for follow up appointment after recent hospitalization.   Patient has history of severe COPD on home O2.   Patient was hospitalized from 10/8/22 to 11/11/22 with diagnosis of acute respiratory failure due to COVID-19 requiring mechanical ventilation, treated with dexamethasone and remdesivir. In  addition patient was treated for H influenza pneumonia and pulmonary embolism.  Admission echocardiogram showed reduced EF 30-35%, thought to be related to stress induced cardiomyopathy. Patient was discharged to TCU on O2 supplementation 4 LPM. Schedule pulmicort and duonebs.   Follow up echocardiogram 11/5/222 showed normal EF 60-65% and normal RV function.   Patient is here for follow up after recent hospitalization.   Reports doing ok, limited activity. Dyspnea after walking over 1/2 block, uses a walker to get around for stability. Wears O2 supplementation 3 LPM continuously.   Denies cough, no wheezes.   Uses pulmicort twice a day and duonebs four times a day.   Denies fever, chills or night sweats.   Denies postnasal drip, headaches, or sinus tenderness.   Denies chest pain, orthopnea, PND, or swelling of LEs.  Denies sleeping problems.   Tobacco user 1ppd for 40 years plus, quit 6 months ago.       Past Medical History :     Severe COPD on home O2  COVID19 viral infection October 2022  Stress induced cardiomyopathy Oct 2022, normalization of EF in follow up echo on Nov 2022  Pulmonary embolism October 2022     Medications:     Current Outpatient Medications   Medication     apixaban ANTICOAGULANT (ELIQUIS) 5 MG tablet     budesonide (PULMICORT) 0.5 MG/2ML neb solution     furosemide (LASIX) 20 MG tablet     ipratropium - albuterol 0.5 mg/2.5 mg/3 mL (DUONEB) 0.5-2.5 (3) MG/3ML neb solution     mirtazapine (REMERON) 15 MG tablet     NONFORMULARY     thiamine (B-1) 100 MG tablet     No current facility-administered medications for this visit.        Social History :     Social History     Socioeconomic History     Marital status:      Spouse name: Not on file     Number of children: Not on file     Years of education: Not on file     Highest education level: Not on file   Occupational History     Not on file   Tobacco Use     Smoking status: Former     Packs/day: 0.10     Types: Cigarettes     Smokeless  "tobacco: Never   Vaping Use     Vaping Use: Never used   Substance and Sexual Activity     Alcohol use: Not on file     Drug use: Not on file     Sexual activity: Not on file   Other Topics Concern     Not on file   Social History Narrative     Not on file     Social Determinants of Health     Financial Resource Strain: Not on file   Food Insecurity: Not on file   Transportation Needs: Not on file   Physical Activity: Not on file   Stress: Not on file   Social Connections: Not on file   Intimate Partner Violence: Not on file   Housing Stability: Not on file          Family History :     Family History   Problem Relation Age of Onset     Diabetes Mother        Review of Systems  A 12 point comprehensive review of systems was negative except as noted.        Objective:     /64 (BP Location: Left arm, Patient Position: Chair, Cuff Size: Adult Regular)   Pulse 106   Ht 1.702 m (5' 7\")   Wt 43.5 kg (96 lb)   SpO2 100%   BMI 15.04 kg/m      Gen: thin, awake, alert, no distress  HEENT: pink conjunctiva, moist mucosa, Mallampati II/IV  Neck: no thyromegaly, masses or JVD  Lungs: decrease breath sounds both HT  CV: regular, no murmurs or gallops appreciated  Abdomen: soft, NT, BS wnl  Ext: no edema  Neuro: CN II-XII intact, non focal      Diagnostic tests:         PFTs: (2/10/2023)          IMAGES:     CT CHEST PULMONARY EMBOLISM W CONTRAST  LOCATION: [facility]  DATE/TIME: 10/13/2022 1:03 PM  INDICATION: Acute respiratory decompensation worsening hypoxemia  COMPARISON: CT pulmonary angiogram 10/8/2022  FINDINGS:  ANGIOGRAM CHEST: The main pulmonary artery remains normal in size. The central pulmonary arteries remain patent. A subsegmental pulmonary embolism in the left lower lobe superior segment 10/8/2022 has resolved (series 4, image 181). However, there is a   new low-attenuation filling defect within proximal subsegmental level pulmonary arteries in the right lower lobe (series 4, " image 236) and a peripheral subsegmental filling defect in the posterior basal left lower lobe (series 4, image 276). Normal caliber thoracic aorta. Unchanged mild arch, proximal great vessel and descending aorta atheromatous calcifications. No acute aortic syndrome. No CT evidence of right heart strain.  LUNGS AND PLEURA: Endotracheal tube is in satisfactory position. Central airway wall thickening is unchanged. Debris within the segmental and proximal subsegmental lower lobe airways is slightly improved from 5 days earlier, however peripheral subsegmental airway debris in bilateral lower lobe airways which produce tree-in-bud opacity and all segments of the left lower lobe and the basilar segments of the right lower lobe have developed.. The extent of consolidation in the posterior basal left lower but is mildly increased from 5 days earlier, while inflammation and atelectasis in the left lateral costophrenic sulcus has decreased.. No pleural effusion is present.  MEDIASTINUM: Cardiac chambers remain normal in size. There is no pericardial effusion. No lymphadenopathy. Gastric tube courses through the decompressed esophagus terminating in the stomach body. Right midline catheter terminates in the lateral right subclavian vein and right jugular approach central venous catheter terminates in the SVC.  CORONARY ARTERY CALCIFICATION: Mild.  UPPER ABDOMEN: No new findings in the imaged upper abdomen.  MUSCULOSKELETAL: Generalized demineralization of the bones. There is subtle superior endplate deformity of L1. There is a paucity of subcutaneous fat. No new bone or chest wall soft tissue abnormalities.                                          IMPRESSION:  1.  Subsegmental pulmonary embolism in the left lower lobe 5 days earlier has resolved. 2 new subsegmental pulmonary emboli are present in the lower lobes as described.  2.  Mild worsening of focal consolidation in the posterior basal left lower lobe and development of  peripheral airway debris/tree-in-bud opacity in both lower lobes consistent with bronchopneumonia / cellular bronchiolitis.  3.  Advanced emphysema.    Echocardiogram 11/6/2022  Interpretation Summary  1. Technically difficult study (imaging could only be obtained from the apical window).  2. Limited 2-dimensional echocardiogram.  3. Normal left ventricular size and systolic performance with a visually estimated ejection fraction of 65-70%.  4. Normal right ventricular size and systolic performance.     When compared to the prior real-time echocardiogram dated 9 October 2022, there has been a noticeable improvement in left ventricular and right ventricular systolic performance.  Additional comments: As aforementioned, images could only be obtained from the apical window. Nonetheless, imaging from the apical acoustic window is good. It is felt a reasonable assessment of left ventricular right ventricular systolic function could be offered.

## 2023-03-27 NOTE — PATIENT INSTRUCTIONS
O2 supplementation 3 LPM continuously  Pulmicort twice a day  Albuterol / ipratropium nebs three times a day  Continue anticoagulation   Home PT  Follow up in 3 months

## 2023-07-13 PROBLEM — J96.21 ACUTE ON CHRONIC RESPIRATORY FAILURE WITH HYPOXIA AND HYPERCAPNIA (H): Status: ACTIVE | Noted: 2023-01-01

## 2023-07-13 PROBLEM — J96.22 ACUTE ON CHRONIC RESPIRATORY FAILURE WITH HYPOXIA AND HYPERCAPNIA (H): Status: ACTIVE | Noted: 2023-01-01

## 2023-07-13 PROBLEM — J96.01 ACUTE RESPIRATORY FAILURE WITH HYPOXIA AND HYPERCAPNIA (H): Status: ACTIVE | Noted: 2023-01-01

## 2023-07-13 PROBLEM — J96.02 ACUTE RESPIRATORY FAILURE WITH HYPOXIA AND HYPERCAPNIA (H): Status: ACTIVE | Noted: 2023-01-01

## 2023-07-13 PROBLEM — R41.82 ALTERED MENTAL STATUS, UNSPECIFIED ALTERED MENTAL STATUS TYPE: Status: ACTIVE | Noted: 2023-01-01

## 2023-07-13 PROBLEM — R00.0 SINUS TACHYCARDIA: Status: ACTIVE | Noted: 2023-01-01

## 2023-07-13 NOTE — H&P
[facility]    History and Physical - Hospitalist Service       Date of Admission:  7/13/2023    Assessment & Plan      Lavonne Talbot is a 68 year old female admitted on 7/13/2023 for acute respiratory failure    Acute on chronic hypoxemic and hypercapnic respiratory failure with severe respiratory acidosis: due to COPD exacerbation. Also concerns CHF exacerbation. Baseline on home oxygen 3 LPM  - Continue BiPAP per RT. Wean as tolerated  - Patient appeared agitated after wearing BiPAP mask for some time. Patient had bad hallucianation after taking sedatives during her admission in Oct 2022. She constantly felt snake crawling on her bed. Family would want to avoid sedative medication as much as possible.     COPD exacerbation: has history of severe COPD  - Solumedrol 40 mg IV q8h  - Duonebs qid  - Continue home budesonide neb  - Received Zosyn and Azithromycin in ER. Chest XR on admission showed no focal infiltrate. Will continue with Azithromycin. Follow up blood culture.    Acute CHF: On admission, she had elevated proBNP. Troponin was mildly elevated. Chest XR suggests pulmonary edema. Has history of stress cardiomyopathy. Last echocardiogram in oct 2022 showed LVEF 65-70% without hemodynamically significant valvular abnormalities.   - Telemetry  - Monitor rtroponin  - After admission, patient had hypotension with BP 84/58. BP improved after 500 ml NS bolus. Not able to use diuretics at this time. Will continue to monitor BP and use Lasix IV if able  - Echocardiogram  - Intake out put and daily weight    Acute metabolic encephalopathy: patient became nonverbal due to her hypercapnic respiratory failure. Resolved after BiPAP. Continue to monitor.     History of pulmonary embolism: provoked due to COVID19 infection. Continue PTA eliquis.     Goal of care: Patient was critically ill on presentation. She is DNI, but wants CPR.   - Palliative care consult for goal of care  discussion.      Plan of care was discussed with patient's , daughter and son by the bedside.          Diet:  Low salt and low fat diet  DVT Prophylaxis: DOAC  [place] Catheter: Not present  Lines: None     Cardiac Monitoring: None  Code Status:  No intubation, but CPR OK (chest compression, defibrillation and drugs)    Clinically Significant Risk Factors Present on Admission               # Drug Induced Coagulation Defect: home medication list includes an anticoagulant medication     # Chronic heart failure with reduced ejection fraction: last echo with EF <40%  # Non-Invasive mechanical ventilation: current O2 Device: BiPAP/CPAP  # Acute hypoxic respiratory failure: continue supplemental O2 as needed              Disposition Plan      Expected Discharge Date: 07/15/2023                  Tammi Fong MD  Hospitalist Service  [facility]  Securely message with [employer] (more info)  Text page via [employer] Paging/Directory     ______________________________________________________________________    Chief Complaint   SOB    History is obtained from the patient and her family.    History of Present Illness   Lavonne Talbot is a 68 year old female with history of severe COPD, chronic respiratory failure on home oxygen 3 LPM, stress cardiomyopathy and pulmonary embolism presents to ER for SOB. This morning, patient's  found patient on respiratory distress so that she was not able to talk. He seeked help from neighbor so that EMS was called. Upon EMS arrival, patient was found to have oxygen saturation 80% on room air. In ER, patient was found to have hypercapnic and hypoxemic respiratory failure. Although there was indication for intubation, patient's wish is DNI.   She was started on BiPAP. Her SOB imrpoved and became able to talk. She reports no fever, chest pain, abdominal pain, nausea and vomiting. Family reports that patient was intubated for 11 days due to COVID19 infection in Oct  2022. She suffered from severe side effects from the sedatives. She had constant hallucination about snake crawling on her bed. Because of that, she does not want to be intubated again.     Past Medical History    Past Medical History:   Diagnosis Date     Tobacco abuse        Past Surgical History   Past Surgical History:   Procedure Laterality Date     NO HISTORY OF SURGERY         Prior to Admission Medications   Prior to Admission Medications   Prescriptions Last Dose Informant Patient Reported? Taking?   apixaban ANTICOAGULANT (ELIQUIS) 5 MG tablet 7/12/2023  No Yes   Sig: Take 1 tablet (5 mg) by mouth 2 times daily   budesonide (PULMICORT) 0.5 MG/2ML neb solution 7/13/2023 at AM  No Yes   Sig: Take 4 mLs (1 mg) by nebulization 2 times daily   ferrous sulfate (FEROSUL) 325 (65 Fe) MG tablet 7/12/2023  Yes Yes   Sig: Take 325 mg by mouth 2 times daily   furosemide (LASIX) 20 MG tablet Past Month  No Yes   Sig: Take 0.5 tablets (10 mg) by mouth daily as needed (edema)   ipratropium - albuterol 0.5 mg/2.5 mg/3 mL (DUONEB) 0.5-2.5 (3) MG/3ML neb solution 7/13/2023 at AM  No Yes   Sig: Take 1 vial (3 mLs) by nebulization 4 times daily   mirtazapine (REMERON) 15 MG tablet 7/12/2023  No Yes   Sig: Take 1 tablet (15 mg) by mouth At Bedtime   vitamin B1 (THIAMINE) 50 MG tablet 7/12/2023  Yes Yes   Sig: Take 50 mg by mouth daily      Facility-Administered Medications: None        Review of Systems    The 10 point Review of Systems is negative other than noted in the HPI or here.      Physical Exam   Vital Signs: Temp: (!) 96.6  F (35.9  C) Temp src: Rectal BP: 99/56 Pulse: 109   Resp: 30 SpO2: 100 % O2 Device: BiPAP/CPAP Oxygen Delivery: 15 LPM  Weight: 0 lbs 0 oz    General appearance: On BiPAP, able to talk, has some shortness of breath  HEENT: PERRL, EOMI  Lungs: Coarse breath sounds in bilateral lung fields  Cardiovascular: Tachycardia, regular rhythm, normal S1-S2  Abdomen: Soft, non tender, no  distension  Musculoskeletal: No joint swelling  Skin: No rash and no edema  Neurology: AAO ×3.  Cranial nerves II - XII normal.  Normal muscle strength in all four extremities.    Medical Decision Making       80 MINUTES SPENT BY ME on the date of service doing chart review, history, exam, documentation & further activities per the note.      Data     I have personally reviewed the following data over the past 24 hrs:    17.8 (H)  \   12.0   / 329     144 101 22.5 /  265 (H)   4.6 39 (H) 0.83 \       Trop: 42 (H) BNP: 3,197 (H)       Procal: N/A CRP: N/A Lactic Acid: 0.9       INR:  1.14 PTT:  29   D-dimer:  N/A Fibrinogen:  N/A       Imaging results reviewed over the past 24 hrs:   Recent Results (from the past 24 hour(s))   XR Chest Port 1 View    Narrative    EXAM: XR CHEST PORT 1 VIEW  LOCATION: [facility]  DATE: 7/13/2023    INDICATION: Shortness of breath  COMPARISON: 11/08/2022      Impression    IMPRESSION: Bibasilar predominant interstitial prominence and vascular indistinctness, along with small pleural effusions, suggesting pulmonary edema. Upper normal heart size. Aortic atherosclerosis. No pneumothorax.

## 2023-07-13 NOTE — ED PROVIDER NOTES
EMERGENCY DEPARTMENT ENCOUNTER      NAME: Lavonne Talbot  AGE: 68 year old female  YOB: 1955  MRN: [id number]  EVALUATION DATE & TIME: 7/13/2023 11:17 AM    PCP: Cinthya Cabral    ED PROVIDER: Renata Benjamin MD    Chief Complaint   Patient presents with     Respiratory Distress         FINAL IMPRESSION:  1. Acute respiratory failure with hypoxia and hypercapnia (H)    2. Altered mental status, unspecified altered mental status type    3. Sinus tachycardia          ED COURSE & MEDICAL DECISION MAKING:    Pertinent Labs & Imaging studies reviewed. (See chart for details)  68 year old female with history of severe COPD on 3 L home O2, previous PE anticoagulated on Eliquis who presents to the Emergency Department for evaluation of shortness of breath today.  Patient arrives somnolent, in respiratory distress tachycardic hypertensive and diaphoretic cyanotic hypoxic on 15 L.  Symptoms are consistent with hypoxic and hypercapnic respiratory failure with associated altered mental status.  I suspect that symptoms are related to COPD exacerbation.  Differential includes pneumonia, pneumothorax.  Per pharmacy fill history, she has been getting the Eliquis filled regularly.  Though family does not know what she is taking, states that she is compliant with medications and so presumably has been taking the Eliquis as prescribed.    Patient met by myself upon emergency department arrival, placed on monitor, IV established and blood obtained.  Per family patient is DNI, and this information was relayed to EMS.  Was placed on BiPAP.  Given Solu-Medrol, and inline nebs.  Covered with Zosyn and azithromycin empirically.  ABG obtained shortly after being placed on BiPAP shows respiratory acidosis with pH of 7.02 and PCO2 of greater than 98.  I confirmed with family that patient is DNI.  Will give trial of BiPAP to see if she improves clinically.  Plan to repeat ABG in an hour.  Laboratory work-up notable for  leukocytosis, this is chronically elevated but is being covered with antibiotics as above.  Chest x-ray really unremarkable.  While here in the ER patient's mental status improved, now awake alert.  Confirms she has been compliant with the Eliquis.  Will admit to medicine for further management.      ED Course as of 07/13/23 1241   u Jul 13, 2023   1111 Met patient, conducted initial exam   1124 Left message for son, emergency contact on file   1125 Spoke with daughter, Jess, confirms DNI.  Daughter states ill sometime today around 1030 when  called daughter.    1125 Rechecked patient   1132 WBC(!): 17.8  Chronically elevated   1137 Notified by RN of dark tarry stool   1149 Ph 7.02, pCO2 >98   1150 XR Chest Port 1 View  X-ray independently interpreted by myself.  Large lung volumes, no pneumothorax, no effusion   1153 Family is at bedside   1153 Updated family. Patient gets her medications filled at [facility], who report regular filling of Eliquis. Family reports that patient does take her filled meds regularly.   1154 pH Arterial(!!): 7.02   1154 pCO2 Arterial(!!): >98  Baseline 49-57   1207 Troponin T, High Sensitivity(!): 37  Indeterminate, will repeat    1210 Spoke with hospitalist Dr. Fong, who accepts patient for admission.   1220 Notified by nursing that patient is awake and talking   1221 Patient endorses taking Eliquis as prescribed   1229 Patient awake alert.  Slightly cantankerous and states nothing wrong with her and she needs to go home       Medical Decision Making    History:    Supplemental history from: Family Member/Significant Other and EMS    External Record(s) reviewed: Outpatient Record: Pulmonology visit March    Work Up:    Chart documentation includes differential considered and any EKGs or imaging independently interpreted by provider, see MDM    In additional to work up documented, I considered the following work up: See MDM    External consultation:    Discussion of management  with another provider: Hospitalist    Complicating factors:    Care impacted by chronic illness: Chronic Lung Disease    Care affected by social determinants of health: Access to Medical Care    Disposition considerations: Admit.        At the conclusion of the encounter I discussed the results of all of the tests and the disposition. The questions were answered. The patient or family acknowledged understanding and was agreeable with the care plan.    CRITICAL CARE:  Critical Care  Performed by: Renata Benjamin MD  Authorized by: Renata Benjamin MD     Total critical care time: 67 minutes  Criticalcare time was exclusive of separately billable procedures and treating other patients.  Critical care was necessary to treat or prevent imminent or life-threatening deterioration of the following conditions: Cardiopulmonary decompensation, death, disability  Critical care was time spent personally by me on the following activities: development of treatment plan with patient or surrogate, discussions with consultants, examination of patient, evaluation of patient's response totreatment, obtaining history from patient or surrogate, ordering and performing treatments and interventions, ordering and review of laboratory studies, ordering and review of radiographic studies and re-evaluation ofpatient's condition, this excludes any separately billable procedures.      MEDICATIONS GIVEN IN THE EMERGENCY:  Medications   albuterol (PROVENTIL) neb solution 2.5 mg (2.5 mg Nebulization $Given 7/13/23 1217)   piperacillin-tazobactam (ZOSYN) 3.375 g vial to attach to  mL bag (3.375 g Intravenous $New Bag 7/13/23 1217)   azithromycin (ZITHROMAX) 500 mg in sodium chloride 0.9 % 250 mL intermittent infusion (500 mg Intravenous $New Bag 7/13/23 1221)   methylPREDNISolone sodium succinate (solu-MEDROL) injection 125 mg (125 mg Intravenous $Given 7/13/23 1143)       NEW PRESCRIPTIONS STARTED AT TODAY'S ER VISIT  New Prescriptions    No  medications on file          =================================================================    HPI    Patient information was obtained from: EMS     Use of Intrepreter: Susy     Lavonne Talbot is a 68 year old female with pertinent medical history of severe COPD, previous pulmonary embolism related to COVID-19, hypotension, and acute respiratory failure who presents by EMS with respiratory distress.    Per chart review, patient was hospitalized from 10/8/22 to 11/11/22 with diagnosis of acute respiratory failure due to COVID-19 requiring mechanical ventilation, treated with dexamethasone and remdesivir. In addition patient was treated for H influenza pneumonia and pulmonary embolism. Patient was seen by Dr. Sonja Milan on 03/27/2023 at [facility] for a pulmonary outpatient follow up. PFTs showed a very severe obstructive lung disease. Patient was using bronchodilators in nebulizer solution, and was told to continue ICS twice a day and duonebs 3 times day, increasing to 4 as needed. Continued O2 supplementation 3LPM continuously. Chest CT scan on 10/8/2022 showed superior segment left lower lobe pulmonary emboli. Negative DVT. Follow up CT scan 10/15 showed resolution of superior segment LLL pulmonary emboli but new RLL emboli and peripheral filling defect in the left LL basal segments. Hypercoagulable state related to COVID19. Was on 3 months anticoagulation with Eliquis.     Patient was found by her  in severe respiratory distress and was nonverbal. Her  knocked on their neighbor's door for help and EMS was called. Upon EMS arrival patient was at 80% O2 saturation per home machine. Went up to 96% by arrival to ED. Per family, Patient gets her medications filled at [facility]. Per pharmacy, Eliquis has been filled regularly. Family states that patient does take her filled medications so presumably, patient has been taking Eliquis as prescribed.    Patient is DNI per  daughter.      PAST MEDICAL HISTORY:  Past Medical History:   Diagnosis Date     Tobacco abuse        PAST SURGICAL HISTORY:  Past Surgical History:   Procedure Laterality Date     NO HISTORY OF SURGERY         CURRENT MEDICATIONS:    Prior to Admission Medications   Prescriptions Last Dose Informant Patient Reported? Taking?   NONFORMULARY   Yes No   Sig: Iron 60mg- take 2 tablet po daily   apixaban ANTICOAGULANT (ELIQUIS) 5 MG tablet   No No   Sig: Take 1 tablet (5 mg) by mouth 2 times daily   budesonide (PULMICORT) 0.5 MG/2ML neb solution   No No   Sig: Take 4 mLs (1 mg) by nebulization 2 times daily   furosemide (LASIX) 20 MG tablet   No No   Sig: Take 0.5 tablets (10 mg) by mouth daily as needed (edema)   ipratropium - albuterol 0.5 mg/2.5 mg/3 mL (DUONEB) 0.5-2.5 (3) MG/3ML neb solution   No No   Sig: Take 1 vial (3 mLs) by nebulization 4 times daily   mirtazapine (REMERON) 15 MG tablet   No No   Sig: Take 1 tablet (15 mg) by mouth At Bedtime   thiamine (B-1) 100 MG tablet   No No   Si tablet (100 mg) by Per Feeding Tube route daily   Patient taking differently: Take 100 mg by mouth daily      Facility-Administered Medications: None       ALLERGIES:  No Known Allergies    FAMILY HISTORY:  Family History   Problem Relation Age of Onset     Diabetes Mother        SOCIAL HISTORY:  Social History     Tobacco Use     Smoking status: Former     Packs/day: 0.10     Types: Cigarettes     Quit date: 3/27/2023     Years since quittin.2     Smokeless tobacco: Never   Vaping Use     Vaping Use: Never used        VITALS:  Patient Vitals for the past 24 hrs:   BP Temp Temp src Pulse Resp SpO2   23 1159 -- -- -- 114 20 94 %   23 1158 -- -- -- 118 21 97 %   23 1157 -- -- -- 119 26 100 %   23 1156 -- -- -- (!) 122 24 99 %   23 1155 -- -- -- (!) 123 (!) 39 99 %   23 1154 -- -- -- (!) 123 22 96 %   23 1153 -- -- -- (!) 123 (!) 32 97 %   23 1152 -- -- -- (!) 121 26 97 %    07/13/23 1151 -- -- -- (!) 122 (!) 32 97 %   07/13/23 1150 -- -- -- (!) 123 (!) 32 95 %   07/13/23 1145 (!) 143/97 -- -- (!) 124 28 (!) 88 %   07/13/23 1130 (!) 153/89 (!) 96.6  F (35.9  C) Rectal 120 23 95 %   07/13/23 1125 (!) 148/81 -- -- (!) 121 23 97 %   07/13/23 1119 (!) 219/102 -- -- 120 23 97 %       PHYSICAL EXAM    General Appearance: Nonverbal, very thin, cyanotic, dusky, diaphoretic. Moans with noxious stimuli.  Head:  Normocephalic  ENT:  Lips, mucosa, and tongue normal; membranes are moist without pallor  Cardio:  Tachycardic in 120s, hypertensive in 200s, no murmur/gallop/rub, 2+ pulses symmetric in all extremities  Pulm:  Severe respiratory distress, poor air movement bilaterally. Tachypneic.  Extremities: No peripheral edema  Skin: Skin is warm but cyanotic with poor perfusion  Neuro: Moans with noxious stimuli.  Eyes closed     GCS:   Motor 4=Withdraws from pain   Verbal 2=Incomprehensible speech   Eye Opening 1=None   Total: 7          RADIOLOGY/LABS:  Reviewed all pertinent imaging. Please see official radiology report. All pertinent labs reviewed and interpreted.    Results for orders placed or performed during the hospital encounter of 07/13/23   XR Chest Port 1 View    Impression    IMPRESSION: Bibasilar predominant interstitial prominence and vascular indistinctness, along with small pleural effusions, suggesting pulmonary edema. Upper normal heart size. Aortic atherosclerosis. No pneumothorax.     Basic metabolic panel   Result Value Ref Range    Sodium 144 136 - 145 mmol/L    Potassium 4.6 3.4 - 5.3 mmol/L    Chloride 101 98 - 107 mmol/L    Carbon Dioxide (CO2) 39 (H) 22 - 29 mmol/L    Anion Gap 4 (L) 7 - 15 mmol/L    Urea Nitrogen 22.5 8.0 - 23.0 mg/dL    Creatinine 0.83 0.51 - 0.95 mg/dL    Calcium 9.0 8.8 - 10.2 mg/dL    Glucose 265 (H) 70 - 99 mg/dL    GFR Estimate 76 >60 mL/min/1.73m2   Result Value Ref Range    Troponin T, High Sensitivity 37 (H) <=14 ng/L   Blood gas arterial    Result Value Ref Range    pH Arterial 7.02 (LL) 7.37 - 7.44    pCO2 Arterial >98 (HH) 35 - 45 mm Hg    pO2 Arterial 245 (H) 75 - 85 mm Hg    Bicarbonate Arterial      O2 Sat, Arterial 100.0 (H) 95.0 - 96.0 %    Oxyhemoglobin >98.5 (H) 95.0 - 96.0 %    Base Excess/Deficit (+/-)      Sample Stabilized Temperature 37.0 degrees C    Solis's Test Yes    CBC with platelets and differential   Result Value Ref Range    WBC Count 17.8 (H) 4.0 - 11.0 10e3/uL    RBC Count 4.08 3.80 - 5.20 10e6/uL    Hemoglobin 12.0 11.7 - 15.7 g/dL    Hematocrit 41.0 35.0 - 47.0 %     (H) 78 - 100 fL    MCH 29.4 26.5 - 33.0 pg    MCHC 29.3 (L) 31.5 - 36.5 g/dL    RDW 13.8 10.0 - 15.0 %    Platelet Count 329 150 - 450 10e3/uL    % Neutrophils 74 %    % Lymphocytes 17 %    % Monocytes 6 %    % Eosinophils 2 %    % Basophils 0 %    % Immature Granulocytes 1 %    NRBCs per 100 WBC 0 <1 /100    Absolute Neutrophils 13.3 (H) 1.6 - 8.3 10e3/uL    Absolute Lymphocytes 3.1 0.8 - 5.3 10e3/uL    Absolute Monocytes 1.0 0.0 - 1.3 10e3/uL    Absolute Eosinophils 0.3 0.0 - 0.7 10e3/uL    Absolute Basophils 0.1 0.0 - 0.2 10e3/uL    Absolute Immature Granulocytes 0.2 <=0.4 10e3/uL    Absolute NRBCs 0.0 10e3/uL   Lactic acid whole blood   Result Value Ref Range    Lactic Acid 0.9 0.7 - 2.0 mmol/L   Result Value Ref Range    INR 1.14 0.85 - 1.15   Result Value Ref Range    aPTT 29 22 - 38 Seconds   Adult Type and Screen   Result Value Ref Range    ABO/RH(D) O POS     Antibody Screen Negative Negative    SPECIMEN EXPIRATION DATE [id number]        EKG:  Performed at: 11:18:07    Impression:   Sinus tachycardia  Rightward axis  Incomplete right bundle branch block  Borderline ECG    Rate: 122 bpm  Axis: 90  IA Interval: 126 ms  QRS Interval: 98 ms  QTc Interval: 456 ms    Comparison: When compared with ECG of 10/19/2022 11:42, ventricular rate has increased by 50 bpm. Incomplete right bundle branch block is now present.    I have independently reviewed  and interpreted the EKG(s) documented above.      The creation of this record is based on the scribe s observations of the work being performed by Renata Benjamin MD and the provider s statements to them. It was created on her behalf by Dalia Carlos, a trained medical scribe. This document has been checked and approved by the attending provider.    Renata Benjamin MD  Emergency Medicine  [facility] EMERGENCY DEPARTMENT  [address] [place] [id number] [phone]  Dept: [phone]       Renata Benjamin MD  07/13/23 7664

## 2023-07-13 NOTE — PHARMACY-ADMISSION MEDICATION HISTORY
Pharmacist Admission Medication History    Admission medication history is complete. The information provided in this note is only as accurate as the sources available at the time of the update.    Medication reconciliation/reorder completed by provider prior to medication history? No    Information Source(s): Patient, Family member and CareEverywhere/SureScripts via in-person    Pertinent Information:     Changes made to PTA medication list:    Added:    Deleted:     Changed: iron    Medication Affordability:       Allergies reviewed with patient and updates made in EHR: unable to assess    Medication History Completed By: Raquel Guaman [facility] 7/13/2023 1:46 PM    PTA Med List   Medication Sig Last Dose     apixaban ANTICOAGULANT (ELIQUIS) 5 MG tablet Take 1 tablet (5 mg) by mouth 2 times daily 7/12/2023     budesonide (PULMICORT) 0.5 MG/2ML neb solution Take 4 mLs (1 mg) by nebulization 2 times daily 7/13/2023 at AM     ferrous sulfate (FEROSUL) 325 (65 Fe) MG tablet Take 325 mg by mouth 2 times daily 7/12/2023     furosemide (LASIX) 20 MG tablet Take 0.5 tablets (10 mg) by mouth daily as needed (edema) Past Month     ipratropium - albuterol 0.5 mg/2.5 mg/3 mL (DUONEB) 0.5-2.5 (3) MG/3ML neb solution Take 1 vial (3 mLs) by nebulization 4 times daily 7/13/2023 at AM     mirtazapine (REMERON) 15 MG tablet Take 1 tablet (15 mg) by mouth At Bedtime 7/12/2023     vitamin B1 (THIAMINE) 50 MG tablet Take 50 mg by mouth daily 7/12/2023

## 2023-07-13 NOTE — ED NOTES
[facility] ED Handoff Report    ED Chief Complaint: Respiratory Distress    ED Diagnosis:  (J96.01,  J96.02) Acute respiratory failure with hypoxia and hypercapnia (H)    (R41.82) Altered mental status, unspecified altered mental status type    (R00.0) Sinus tachycardia     PMH:    Past Medical History:   Diagnosis Date    Tobacco abuse         Code Status:  Special Code     Falls Risk: Yes Band: Applied    Current Living Situation/Residence: lives with a significant other     Elimination Status: Continent: No     Activity Level: 2 assist    Patients Preferred Language:  English     Needed: No    Vital Signs:  BP 95/59   Pulse 109   Temp (!) 96.6  F (35.9  C) (Rectal)   Resp 26   SpO2 100%      Cardiac Rhythm: Sinus Tach    Pain Score: 0/10    Is the Patient Confused:  Yes    Last Food or Drink: 7/12/23    Focused Assessment:  Patient arrives via EMS in severe respiratory arrest. Patient nonverbal, receiving high flow oxygen and neb treatment on arrival. Per EMS  went knock on neighbor's door for help, patient in bed gasping for breath. EMS called, sats 80% on home O2. Patient cyanotic, diaphoretic. Placed on bipap upon arrival.     Tests Performed: Done: Labs and Imaging    Treatments Provided:  Fluids, bear hugger, medications, Bipap    Family Dynamics/Concerns: No    Family Updated On Visitor Policy: Yes    Plan of Care Communicated to Family: Yes    Who Was Updated about Plan of Care: daughter at bedside    Belongings Checklist Done and Signed by Patient: Yes    Medications sent with patient: NA    Additional Information: Pt is a DNI, still wants compressions    RN: Mariela Chavez RN   7/13/2023 4:22 PM

## 2023-07-13 NOTE — ED NOTES
Bed: JNED-18  Expected date:   Expected time:   Means of arrival: Ambulance  Comments:  COPD,  hypoxic unresponsive. DNI?

## 2023-07-13 NOTE — ED TRIAGE NOTES
Patient arrives via EMS in severe respiratory arrest. Patient nonverbal, receiving high flow oxygen and neb treatment on arrival. Per EMS  went knock on neighbor's door for help, patient in bed gasping for breath. EMS called, sats 80% on home O2. Patient cyanotic, diaphoretic. Placed on bipap upon arrival.      Triage Assessment     Row Name 07/13/23 1119       Respiratory WDL    Respiratory WDL X;rhythm/pattern    Rhythm/Pattern, Respiratory agonal;tachypneic       Cardiac WDL    Cardiac WDL X  tachy 120s       Peripheral/Neurovascular WDL    Peripheral Neurovascular WDL X;capillary refill    Capillary Refill, General greater than 3 secs       Cognitive/Neuro/Behavioral WDL    Cognitive/Neuro/Behavioral WDL X    Arousal Level opens eyes spontaneously    Orientation other (see comments)  ALYSON       Lynn Coma Scale    Best Eye Response 4-->(E4) spontaneous    Best Motor Response 5-->(M5) localizes pain    Best Verbal Response 2-->(V2) incomprehensible speech    Lynn Coma Scale Score 11

## 2023-07-14 PROBLEM — G93.41 METABOLIC ENCEPHALOPATHY: Status: ACTIVE | Noted: 2023-01-01

## 2023-07-14 PROBLEM — Z86.711 HISTORY OF PULMONARY EMBOLISM: Status: ACTIVE | Noted: 2023-01-01

## 2023-07-14 PROBLEM — R00.0 SINUS TACHYCARDIA: Status: RESOLVED | Noted: 2023-01-01 | Resolved: 2023-01-01

## 2023-07-14 PROBLEM — I50.31 ACUTE DIASTOLIC CONGESTIVE HEART FAILURE (H): Status: ACTIVE | Noted: 2023-01-01

## 2023-07-14 PROBLEM — J44.1 CHRONIC OBSTRUCTIVE PULMONARY DISEASE WITH ACUTE EXACERBATION (H): Status: ACTIVE | Noted: 2023-01-01

## 2023-07-14 PROBLEM — J96.01 ACUTE RESPIRATORY FAILURE WITH HYPOXIA AND HYPERCAPNIA (H): Status: RESOLVED | Noted: 2023-01-01 | Resolved: 2023-01-01

## 2023-07-14 PROBLEM — J96.02 ACUTE RESPIRATORY FAILURE WITH HYPOXIA AND HYPERCAPNIA (H): Status: RESOLVED | Noted: 2023-01-01 | Resolved: 2023-01-01

## 2023-07-14 NOTE — PLAN OF CARE
Problem: Gas Exchange Impaired  Goal: Optimal Gas Exchange  Outcome: Progressing     Problem: Fluid Volume Excess  Goal: Fluid Balance  Outcome: Progressing     Slept poorly overnight. Has severe activity intolerance. HR up to 140's with stand and pivot nd reports severe SOB. IV lasix per orders. BIPAP on standby in case of ineffective respiration.     Brian Mei, RN

## 2023-07-14 NOTE — CONSULTS
PULMONARY MEDICINE CONSULT  7/14/2023      Admit Date: 7/13/2023  CODE: Full Code    Reason for Consult: acute on chronic respiratory failure with hypoxemia, COPD exacerbation.  How to reduce risk of hallucinations if the patient gets intubated and needs sedation.      Assessment/Plan:   68F with severe O2-dependent COPD, HFpEF, hx of PE on DOAC, presented on 7/13 with worsening SOB. Required bipap initially, then improved with steroids, nebs, abx. She has severe COPD at baseline and has chronic dyspnea. She looks to be at or near her baseline.     In terms of risk of hallucinations and delirium with sedation, I told her that this risk can not be completely eliminated as she is at high risk for delirium given her significant underlying lung disease. She is discussing code status with palliative care.     Plan:  -titrate FiO2 to maintain O2 sat 88-92%, avoid hyperoxia  -scheduled duonebs q4h, albuterol nebs prn, RCAT  -systemic steroids: oral prednisone ordered. Stop IV steroids.   -antibiotics: agree with 5 days of azithromycin  -encourage ambulation, PT/OT, push IS  -Pulm rehab: discuss with Dr. Casillas as outpatient.   -Smoking cessation: former smoker  -Immunizations: ensure appropriate vaccinations prior to discharge  -Follow up: will arrange follow up in clinic with Dr. Casillas in about a month. Our clinic will call him to schedule the appointment.   - ordered urgent outpatient sleep medicine referral to evaluate candidacy for nocturnal NIV (BiPAP or AVAPs), given significant chronic hypercapnia.    No further recommendations, we'll sign off. Please call with additional questions.    Clovis (Emiliano) MD Madeline  [facility] Pulmonary & Critical Care  Pager [phone]  Clinic [phone]  Fax [phone]                                                                                                                                                         HPI:   CCx: acute on chronic  respiratory failure with hypoxemia, COPD exacerbation.  How to reduce risk of hallucinations if the patient gets intubated and needs sedation.    HPI: 68F with severe O2-dependent COPD, HFpEF, hx of PE on DOAC, presented on 7/13 with worsening SOB. Required bipap initially, then improved with steroids, nebs, abx. Weaned down to 4Lpm nc (at home, uses 3Lpm continuously). Followed by Dr. Casillas in clinic.   She is essentially at her baseline today.  She was intubated with covid and respiratory failure last year and had severe hallucinations involving snakes, and she has concerns about whether this would happen again should she require intubation and sedation in the future.                                                                                                                                                       Past Medical History:  Past Medical History:   Diagnosis Date     Tobacco abuse        Past Surgical History  Past Surgical History:   Procedure Laterality Date     NO HISTORY OF SURGERY         Allergies:  No Known Allergies    PTA medications:  Medications Prior to Admission   Medication Sig Dispense Refill Last Dose     apixaban ANTICOAGULANT (ELIQUIS) 5 MG tablet Take 1 tablet (5 mg) by mouth 2 times daily   7/12/2023     budesonide (PULMICORT) 0.5 MG/2ML neb solution Take 4 mLs (1 mg) by nebulization 2 times daily   7/13/2023 at AM     ferrous sulfate (FEROSUL) 325 (65 Fe) MG tablet Take 325 mg by mouth 2 times daily   7/12/2023     furosemide (LASIX) 20 MG tablet Take 0.5 tablets (10 mg) by mouth daily as needed (edema)   Past Month     ipratropium - albuterol 0.5 mg/2.5 mg/3 mL (DUONEB) 0.5-2.5 (3) MG/3ML neb solution Take 1 vial (3 mLs) by nebulization 4 times daily 90 mL  7/13/2023 at AM     mirtazapine (REMERON) 15 MG tablet Take 1 tablet (15 mg) by mouth At Bedtime   7/12/2023     vitamin B1 (THIAMINE) 50 MG tablet Take 50 mg by mouth daily   7/12/2023       Family Hx:  Family History  "  Problem Relation Age of Onset     Diabetes Mother        Social Hx:  Social History     Socioeconomic History     Marital status:      Spouse name: Not on file     Number of children: Not on file     Years of education: Not on file     Highest education level: Not on file   Occupational History     Not on file   Tobacco Use     Smoking status: Former     Packs/day: 0.10     Types: Cigarettes     Quit date: 3/27/2023     Years since quittin.2     Smokeless tobacco: Never   Vaping Use     Vaping Use: Never used   Substance and Sexual Activity     Alcohol use: Not on file     Drug use: Not on file     Sexual activity: Not on file   Other Topics Concern     Not on file   Social History Narrative     Not on file     Social Determinants of Health     Financial Resource Strain: Not on file   Food Insecurity: Not on file   Transportation Needs: Not on file   Physical Activity: Not on file   Stress: Not on file   Social Connections: Not on file   Intimate Partner Violence: Not on file   Housing Stability: Not on file       Exam/Data:     Vitals  /73 (BP Location: Left arm)   Pulse 115   Temp 98.4  F (36.9  C) (Oral)   Resp 22   Ht 1.702 m (5' 7\")   Wt 50.5 kg (111 lb 5.3 oz)   SpO2 94%   BMI 17.44 kg/m    BP - Mean:  [65-83] 70  I/O last 3 completed shifts:  In: 480 [P.O.:480]  Out: 550 [Urine:550]  Weight change:   [unfilled]  EXAM:  /73 (BP Location: Left arm)   Pulse 115   Temp 98.4  F (36.9  C) (Oral)   Resp 22   Ht 1.702 m (5' 7\")   Wt 50.5 kg (111 lb 5.3 oz)   SpO2 94%   BMI 17.44 kg/m      Intake/Output last 3 shifts:  I/O last 3 completed shifts:  In: 480 [P.O.:480]  Out: 550 [Urine:550]  Intake/Output this shift:  I/O this shift:  In: 240 [P.O.:240]  Out: -     Physical Exam  Gen: awake, alert, oriented, no distress  HEENT: NT, no TARUN  CV: RRR, no m/g/r  Resp: very diminished BS, poor air entry. No wheezing.   Abd: soft, nontender, BS+  Skin: no rashes or lesions  Ext: no " edema  Neuro: PERRL, nonfocal exam    ROS: A complete 10-system review of systems was obtained and is negative with the exception of what is noted in the history of present illness.    Medications:       - MEDICATION INSTRUCTIONS -         apixaban ANTICOAGULANT  5 mg Oral BID     azithromycin  250 mg Oral Daily     budesonide  0.5 mg Nebulization BID     ipratropium - albuterol 0.5 mg/2.5 mg/3 mL  3 mL Nebulization 4x Daily     methylPREDNISolone  40 mg Intravenous Q8H     mirtazapine  15 mg Oral At Bedtime     sodium chloride (PF)  3 mL Intracatheter Q8H         DATA  All laboratory and radiology has been personally reviewed by myself today.  Recent Labs   Lab 07/13/23  1125   WBC 17.8*   HGB 12.0   HCT 41.0        Recent Labs   Lab 07/14/23  0531 07/13/23  1125   * 144   CO2 38* 39*   BUN 33.3* 22.5         PFT DATA   FEV1 0.48L, 20%  FVC 41%  +BD response  Flow volume loop confirms obstruction    Micro  Blood NGTD        IMAGING:   Personally reviewed

## 2023-07-14 NOTE — PROVIDER NOTIFICATION
Dr. Fong paged d/t patient stating she would like to change her code status; to be intubated but have different sedation medications.   Discussed with provider and came to see patient.

## 2023-07-14 NOTE — CONSULTS
Palliative Care Consultation Note  [facility]      Patient: Lavonne Talbot  Date of Admission:  7/13/2023    Requesting Clinician / Team: Hospitalist  Reason for consult: Goals of care     Recommendations & Counseling     GOALS OF CARE:     Life-prolonging with limits     Lavonne is well known to me from her prolonged hospitalization last October. After 11 days of intubation, her family made the difficult decision not to reintubate if she failed extubation. Fortunately, Lavonne survive extubation and went on to TCU and ultimately home. She has been doing relatively well at home (on 3L home O2) and has not been hospitalized again until now.    Lavonne had vivid, disturbing hallucinations during her ICU stay last admission. She wants to avoid ever experiencing this again, but does not want to be DNI. Lavonne asks that her medical providers try a different sedative if she has to be intubated again. She understands she remains at high risk for ICU delirium and hallucinations if intubated.    Lavonne is clear that she would never want tracheostomy or a feeding tube. Clarified that she is willing to be intubated again in the future, but not for longer than 1-2 weeks. If she is not showing signs of improvement, Lavonne would want her family to withdraw ventilator support.    Lavonne understands that her advanced COPD is a life-limiting illness and will lead to future hospitalizations. She wants to spend as much time at home as possible, but is willing to return to the hospital for further care if indicated.    ADVANCE CARE PLANNING:    No health care directive on file. Per  informed consent policy next of kin should be involved if patient becomes unable.    Provided blank HCD today    Code status: Full Code     PSYCHOSOCIAL/SPIRITUAL SUPPORT:    Strong family support. Jermaine (spouse) had a stroke last year and is still struggling with significant receptive and expressive aphasia. Jermaine and Lavonne support each other at  home, but Lavonne would benefit from home care services (never started per their report).    Palliative Care will sign off. Thank you for the consult and allowing us to aid in the care of Lavonne Talbot.    These recommendations have been discussed with hospitalist and Pulmonary.    Jessica Richards PA-C  Securely message with [employer] (more info)  Text page via [facility] Paging/Directory       Palliative Summary/HPI     Lavonne is a 69 y/o woman with severe O2-dependent COPD, HFpEF, hx of PE on DOAC, presented on 7/13 with worsening SOB. Required bipap initially, now on 4L nasal cannula. Palliative Care consulted for assistance in GOC as patient was DNI, now full code.    Lavonne is well known to me from her prolonged hospitalization last October. On chart review, appears she went to TCU for several weeks and since then has been home without further admissions. Follows with outpatient Pulm Dr. Casillas.    Today, the patient was seen for:  Goals of care, family support    Palliative Care Summary:   Met with Lavonne and spouse Jermaine at bedside. Jermaine has receptive and expressive aphasia from recent stroke, communicates with assistance of phone. He was unable to follow much of our conversation.    Lavonne denied any dyspnea at time of my visit, though reports shortness of breath with movement. She has not had significant dyspnea at home. She reports her mood has been up and down. She does not leave the house much due to concerns about her her oxygen tank running out.    Returned this afternoon to discuss goals of care with daughter Jess present. See above summary.    Prognosis, Goals, & Planning:      Functional Status just prior to this current hospitalization:    Recent weight gain (86lbs > 111lbs)    Doing well at home with support from Jermaine and kids stop by. Ambulates with walker or independently. Reluctant to leave the house, per family cannot walk far without shortness of breath.      Prognosis, Goals, and/or Advance Care  Planning:    See above      Code Status was addressed today:     Yes, full code.      Patient's decision making preferences: shared with support from loved ones          Patient has decision-making capacity today for complex decisions:Intact            Coping, Meaning, & Spirituality:     Mood, coping, and/or meaning in the context of serious illness were addressed today: Yes    Social:   Living situation:lives with significant other/spouse  Important relationships/caregivers:Jermaine (), Frantz (son), Jess (daughter)    Medications:  I have reviewed this patient's medication profile and medications from this hospitalization. Notable medications:  Prednisone  IV lasix 20mg x1  Remeron 15mg    Physical Exam   Vital Signs with Ranges  Temp:  [96.8  F (36  C)-99  F (37.2  C)] 98.4  F (36.9  C)  Pulse:  [104-126] 115  Resp:  [22-30] 22  BP: ()/(56-91) 115/73  FiO2 (%):  [40 %] 40 %  SpO2:  [94 %-100 %] 94 %  111 lbs 5.32 oz    PHYSICAL EXAM:  General: Laying in bed with NC in place, alert and oriented, talkative  Lungs: Non-labored    Data reviewed:  CMPRecent Labs   Lab 07/14/23  0531 07/13/23  1125   * 144   POTASSIUM 4.5 4.6   CHLORIDE 103 101   CO2 38* 39*   ANIONGAP 5* 4*   * 265*   BUN 33.3* 22.5   CR 1.37* 0.83   GFRESTIMATED 42* 76   JASON 8.5* 9.0     CBC  Recent Labs   Lab 07/13/23  1125   WBC 17.8*   RBC 4.08   HGB 12.0   HCT 41.0   *   MCH 29.4   MCHC 29.3*   RDW 13.8        CXR 7/13  IMPRESSION: Bibasilar predominant interstitial prominence and vascular indistinctness, along with small pleural effusions, suggesting pulmonary edema. Upper normal heart size. Aortic atherosclerosis. No pneumothorax.      Medical Decision Making       MANAGEMENT DISCUSSED with the following over the past 24 hours: Medicine, Pulm   NOTE(S)/MEDICAL RECORDS REVIEWED over the past 24 hours: outpatient Pulm  Medical complexity over the past 24 hours:  - Decision regarding ESCALATION OF LEVEL OF CARE       Advance Care Planning Discussion 7/14/2023. Jessica CARDENAS PA-C met with Patient and their family today at the hospital to discuss Advance Care Planning. Lavonne Talbot does have decisional capacity and was present for this discussion.  Those present were informed of the voluntary nature of this discussion and wished to proceed.  The discussion included: code status, quality of life, goals of care, HCD. This discussion began at 14:15 and ended at 14:35 for a total of 20 minutes.

## 2023-07-14 NOTE — SIGNIFICANT EVENT
Significant Event Note    Time of event: 7:59 PM July 13, 2023    Description of event:  Notified by nursing staff that patient now wants to be intubated.  I discussed with patient again.  She states that she changed her mind. She would like to be intubated and would like to give it a try.  She states that her hallucination during her prolonged intubation last time was because of a specific sedation medication used.  She believes that there are some other sedating medication that are able to sedate her without causing hallucination.  She would like to talk to the lung doctor to find out what the alternative medication could she used during intubation.     Plan:  Change to full code per patient's wish  Pulmonary consult  Palliative care consult        Discussed with: bedside nurse, patient's  by the bedside and her daughter over the phone.      Tammi Fong MD

## 2023-07-14 NOTE — PLAN OF CARE
Problem: Gas Exchange Impaired  Goal: Optimal Gas Exchange  Outcome: Progressing   Goal Outcome Evaluation:         Patient arrived from ED @1800.   Alert and oriented.   Denies pain. Denies sob upon arrival. Arrived to floor on O2 4-5 L NC. O2 SATs wdl with NC.   Pt started having dinner and respiratory pattern got more labored and patient started purse lip breathing. RT notified and came to see pt. Prn nebs given per RT. O2 SATs wdl with NC 5 L.     Telemetry monitoring with sinus tachycardia.

## 2023-07-14 NOTE — PLAN OF CARE
Problem: Plan of Care - These are the overarching goals to be used throughout the patient stay.    Goal: Optimal Comfort and Wellbeing  Outcome: Progressing     Problem: Gas Exchange Impaired  Goal: Optimal Gas Exchange  Outcome: Progressing     Pt on 5 liters O2 sating 93-97% at rest Hr 110 which pt states is her normal.  .  Pt up to commode, sat decreased to 87-88% HR increased to 124.  Did not have to place patient on bi pap this day shift.      Pt getting scheduled neb treatments that help her be able to eat and get up to use commode.

## 2023-07-14 NOTE — PROGRESS NOTES
RESPIRATORY CARE NOTE  Patient is on 4L NC, BS diminished pre and post Duoneb and Pulmicort tx, patient perceives moderate improvement patient tolerated well. Pt currently on V60 on standby.      Mita Gutierrez, RT

## 2023-07-14 NOTE — PROGRESS NOTES
Patient remains on 4L nasal cannula. SpO2 96%. BS diminished expiratory wheezes. Bipap standby in room. PRN given x1 for shortness of breath. RT will continue to monitor.

## 2023-07-14 NOTE — TREATMENT PLAN
RCAT Treatment Plan    Patient Score:12  Patient Acuity: 3    Clinical Indication for Therapy: bronchospasm    Therapy Ordered: Duo QID Pulmicort BID    Assessment Summary: Continue to monitor. RCAT as needed.     Dhaval Nolan RT  7/14/2023

## 2023-07-14 NOTE — PROGRESS NOTES
[facility]    Medicine Progress Note - Hospitalist Service    Date of Admission:  7/13/2023    Assessment & Plan     Lavonne Talbot is a 68 year old female admitted on 7/13/2023 for acute respiratory failure     Acute on chronic hypoxemic and hypercapnic respiratory failure with severe respiratory acidosis: due to COPD exacerbation. Also concerns CHF exacerbation. Baseline on home oxygen 3 LPM  - Needed BiPAP after admission. Now wean off.   - Currently on 4LPM nasal cannula oxygen. Wean as tolerated.     COPD exacerbation: has history of severe COPD  - Received Solumedrol 40 mg IV q8h after admission. Change to oral today per pulmonary consult  - Duonebs qid  - Continue home budesonide neb  - Received Zosyn and Azithromycin in ER. Chest XR on admission showed no focal infiltrate. Will continue with Azithromycin. Blood culture has no growth so far.     Acute CHF: On admission, she had elevated proBNP. Troponin was mildly elevated. Chest XR suggests pulmonary edema. Has history of stress cardiomyopathy. Last echocardiogram in Oct 2022 showed LVEF 65-70% without hemodynamically significant valvular abnormalities.   - Telemetry  - Monitor troponin: has been stable  - After admission, patient received Lasix 20 mg IV.   - Intake out put and daily weight     Acute metabolic encephalopathy: patient became nonverbal due to her hypercapnic respiratory failure. Resolved after BiPAP. Continue to monitor.      History of pulmonary embolism: was thought related to COVID19 infection.   - Continue PTA eliquis. Need discussion with her pulmonologist to determine how long she needs to take it.      Goal of care:   - In ER, patient and family wanted DNI so that she was not intubated despite she met criteria for intubation at that time. Her condition improved on BiPAP. Patient later wants full code.   The reason she did not want intubation was that she had 11-day intubation for COVID in Oct 2022. She had  "prolonged encephalopathy with hallucination. She constantly saw snake crawling on her bed. She did not want to experience that again. However, now she believes that there should be some sedation medication can effectively sedate her during intubation without encephalopathy.   - Palliative care consulted and input appreciated        Diet: Low Saturated Fat Na <2400 mg    DVT Prophylaxis: DOAC  [place] Catheter: Not present  Lines: None     Cardiac Monitoring: None  Code Status: Full Code      Clinically Significant Risk Factors         # Hypernatremia: Highest Na = 146 mmol/L in last 2 days, will monitor as appropriate           # Acute heart failure with reduced ejection fraction: last echo with EF <40% and receiving IV diuretics       # Cachexia: Estimated body mass index is 17.44 kg/m  as calculated from the following:    Height as of this encounter: 1.702 m (5' 7\").    Weight as of this encounter: 50.5 kg (111 lb 5.3 oz)., PRESENT ON ADMISSION          Disposition Plan      Expected Discharge Date: 07/16/2023      Destination: home with family  Discharge Comments: Improvement of respiratory status          Tammi Fong MD  Hospitalist Service  [facility]  Securely message with [employer] (more info)  Text page via [employer] Paging/Directory   ______________________________________________________________________    Interval History   Patient reports that her SOB improved today compared to yesterday. She has been on nasal cannula oxygen 4LPM this morning. She got up to the commode and felt very SOB with tachycardia. Her SOB improved after resting.    Physical Exam   Vital Signs: Temp: 98.4  F (36.9  C) Temp src: Oral BP: 115/73 Pulse: 115   Resp: 22 SpO2: 94 % O2 Device: Nasal cannula Oxygen Delivery: 4 LPM  Weight: 111 lbs 5.32 oz    General appearance: not in acute distress  HEENT: PERRL, EOMI  Lungs: Clear breath sounds in bilateral lung fields  Cardiovascular: Regular rate and rhythm, normal " S1-S2  Abdomen: Soft, non tender, no distension  Musculoskeletal: No joint swelling  Skin: No rash and no edema  Neurology: AAO ×3.  Cranial nerves II - XII normal.  Normal muscle strength in all four extremities.    Medical Decision Making       45 MINUTES SPENT BY ME on the date of service doing chart review, history, exam, documentation & further activities per the note.      Data     I have personally reviewed the following data over the past 24 hrs:    N/A  \   N/A   / N/A     146 (H) 103 33.3 (H) /  125 (H)   4.5 38 (H) 1.37 (H) \       Procal: N/A CRP: N/A Lactic Acid: 0.8         Imaging results reviewed over the past 24 hrs:   No results found for this or any previous visit (from the past 24 hour(s)).

## 2023-07-14 NOTE — CONSULTS
Care Management Follow Up    Length of Stay (days): 1    Expected Discharge Date: 07/16/2023     Concerns to be Addressed: all concerns addressed in this encounter     Patient plan of care discussed at interdisciplinary rounds: Yes    Anticipated Discharge Disposition: Home     Anticipated Discharge Services: None  Anticipated Discharge DME: N/A     Patient/family educated on Medicare website which has current facility and service quality ratings: no  Education Provided on the Discharge Plan:  yes  Patient/Family in Agreement with the Plan: yes    Referrals Placed by CM/SW:  n/a  Private pay costs discussed: Not applicable    Additional Information:  SW met with pt to introduce role of CM, complete  initial assessment, and to discuss needs at time of d/c. Pt comes from home; lives with spouse, and noted to be independent at baseline. Pt feels that there will be no needs from CM at discharge, and will follow with PCP if needs arise post discharge.    CM to continue to follow through hospitalization.  1:20 PM    JG Carroll  7/14/2023     Render In Bullet Format When Appropriate: No Total Number Of Lesions Treated: 5 Medical Necessity Information: It is in your best interest to select a reason for this procedure from the list below. All of these items fulfill various CMS LCD requirements except the new and changing color options. Duration Of Freeze Thaw-Cycle (Seconds): 0 Medical Necessity Clause: This procedure was medically necessary because the lesions that were treated were: Detail Level: Zone Post-Care Instructions: I reviewed with the patient in detail post-care instructions. Patient is to wear sunprotection, and avoid picking at any of the treated lesions. Pt may apply Vaseline to crusted or scabbing areas. Render Post Care In The Note?: yes Consent: The patient's consent was obtained including but not limited to risks of crusting, scabbing, blistering, scarring, darker or lighter pigmentary change, recurrence, incomplete removal and infection.

## 2023-07-15 PROBLEM — N17.9 AKI (ACUTE KIDNEY INJURY) (H): Status: ACTIVE | Noted: 2023-01-01

## 2023-07-15 NOTE — PLAN OF CARE
Problem: Plan of Care - These are the overarching goals to be used throughout the patient stay.    Goal: Optimal Comfort and Wellbeing  Outcome: Progressing     Problem: Plan of Care - These are the overarching goals to be used throughout the patient stay.    Goal: Readiness for Transition of Care  Outcome: Progressing     Problem: Gas Exchange Impaired  Goal: Optimal Gas Exchange  Outcome: Progressing   Pt denies pain today.  Pt on 3-4 L O2 today saturations in mid 90%.  PT said pt can get up to commode independently, pt steady on feet.     Pt ate 100% of breakfast and lunch meal.   Left note to encourage patient to order a evening snack with her dinner meal. She said she was hungry at 730 last evening and we didn't have anything for her.

## 2023-07-15 NOTE — PROGRESS NOTES
Patient remains on 4 lpm Nasal Cannula sating 94%. Receives nebs as ordered and prn neb X1. BiPAP standby in the Room.RT will continue to monitor and give nebs as ordered.

## 2023-07-15 NOTE — PROGRESS NOTES
07/15/23 1445   Appointment Info   Signing Clinician's Name / Credentials (OT) Alexandria Salgado OTR/L   Living Environment   People in Home spouse   Current Living Arrangements house   Home Accessibility no concerns   Transportation Anticipated family or friend will provide   Living Environment Comments pt lives in a house with her , he is able to provide assist with IADLs/ BADLs as needed.  (* has aphasia from previous CVA).  all needs on main level.   Self-Care   Usual Activity Tolerance poor   Current Activity Tolerance poor   Regular Exercise No   Equipment Currently Used at Home walker, rolling   Fall history within last six months no   Activity/Exercise/Self-Care Comment Pt able to complete BADLs mostly at IND level   Instrumental Activities of Daily Living (IADL)   IADL Comments  assists with all IADLs   General Information   Onset of Illness/Injury or Date of Surgery 07/13/23   Referring Physician Tammi Fong MD   Patient/Family Therapy Goal Statement (OT) i want to go home, i will not go back to a TCU   Additional Occupational Profile Info/Pertinent History of Current Problem Lavonne Talbot is a 68 year old female admitted on 7/13/2023 for acute respiratory failure   Existing Precautions/Restrictions oxygen therapy device and L/min;fall  (3L O2 at home)   Limitations/Impairments safety/cognitive   Heart Disease Risk Factors Medical history   General Observations and Info Pt on 3 L O2 with increased WOB   Cognitive Status Examination   Orientation Status orientation to person, place and time   Cognitive Status Comments conversationally appropriate   Range of Motion Comprehensive   General Range of Motion bilateral upper extremity ROM WFL   Transfers   Transfers bed-chair transfer   Transfer Comments SBA   Clinical Impression   Criteria for Skilled Therapeutic Interventions Met (OT) Yes, treatment indicated   OT Diagnosis impaired ADLs   OT Problem List-Impairments impacting ADL  problems related to;activity tolerance impaired;balance;strength   Assessment of Occupational Performance 3-5 Performance Deficits   Planned Therapy Interventions (OT) ADL retraining;bed mobility training;strengthening   Clinical Decision Making Complexity (OT) moderate complexity   Risk & Benefits of therapy have been explained evaluation/treatment results reviewed;patient   Clinical Impression Comments Pt presents with decreased stamina and strength, currently below baseline ADL performance.  Pt will benefit from EC education, COPD HEP training.  Anticipate pt will be able to discharge home.   OT Total Evaluation Time   OT Eval, Moderate Complexity Minutes (22515) 10   OT Goals   Therapy Frequency (OT) Daily   OT Predicted Duration/Target Date for Goal Attainment 07/22/23   OT Goals Hygiene/Grooming;Upper Body Dressing;Lower Body Dressing;Toilet Transfer/Toileting   OT: Hygiene/Grooming modified independent   OT: Upper Body Dressing Modified independent   OT: Lower Body Dressing Modified independent   OT: Toilet Transfer/Toileting Modified independent   OT: Goal 1 I HEP COPD exercsies   Self-Care/Home Management   Self-Care/Home Mgmt/ADL, Compensatory, Meal Prep Minutes (12909) 25   Symptoms Noted During/After Treatment (Meal Preparation/Planning Training) shortness of breath;increased pain;fatigue   Treatment Detail/Skilled Intervention Pt able to transition to sit EOB with SBA, increased WOB- O2 raised to 4L to maintain 90%.  Pt brushed hair- not empoying EC techniques (most likely taught during last hospitalization)- EC training provided, pt receptive, although anxious with WOB.  Pt able to doff/don socks with SBA, CGA for transfer to BSC.  Education for pacing and breathing strategy for repostioning in bed.   OT Discharge Planning   OT Plan bring EC mgmt handouts, bring COPD exercise handout, BADLs to increase stamina   OT Discharge Recommendation (DC Rec) home with assist;home with home care occupational  therapy   OT Rationale for DC Rec if pt has 24hr A, pt could return home   OT Brief overview of current status min A BADLs/ transfers   Total Session Time   Timed Code Treatment Minutes 25   Total Session Time (sum of timed and untimed services) 35     Alexandria Salgado, OTR

## 2023-07-15 NOTE — SIGNIFICANT EVENT
Significant Event Note    Time of event: 11:31 PM July 14, 2023    Description of event:  One of the two bottles of blood culture grows gram positive cocci in clusters.     Plan:  Patient has been afebrile. Lactic acidosis resolved. Positive blood culture likely contamination. Follow up VeriGene result. Hold antibiotics for now.         Tammi Fong MD

## 2023-07-15 NOTE — PROGRESS NOTES
[facility]    Medicine Progress Note - Hospitalist Service    Date of Admission:  7/13/2023    Assessment & Plan     Lavonne Talbot is a 68 year old female with history of severe COPD admitted on 7/13/2023 for acute respiratory failure.      Acute on chronic hypoxemic and hypercapnic respiratory failure with severe respiratory acidosis: due to COPD exacerbation. Also concerns CHF exacerbation. Baseline on home oxygen 3 LPM.  - Needed BiPAP after admission. Now wean off.   - Currently on 4LPM nasal cannula oxygen. Wean as tolerated to keep oxygen saturation 89-92%.     COPD exacerbation: has history of severe COPD  - Received Solumedrol 40 mg IV q8h after admission. Changed to a tapering oral dose on 7/14 per pulmonary consult  - Continue PTA Duonebs qid. Patient has significant sinus tachycardia. Will change to Xopenex and ipratropium.   - Continue home budesonide neb  - Patient reports that the Duoneb does help her SOB. But the effect does not last long. Add Advair (Breo Ellipta while in patient)  - Received Zosyn and Azithromycin in ER. Chest XR on admission showed no focal infiltrate. Will complete Azithromycin for 5 days.   - Pulmonary consulted: recommend urgent outpatient sleep medicine referral to evaluate candidacy for nocturnal NIV (BiPAP or AVAPs), given significant chronic hypercapnia.  - Need to follow up with her pulmonologist Dr Casillas in about a month.  - Incentive spirometer     Acute CHF: On admission, she had elevated proBNP. Troponin was mildly elevated. Chest XR suggests pulmonary edema. Has history of stress cardiomyopathy. Last echocardiogram in Oct 2022 showed LVEF 65-70% without hemodynamically significant valvular abnormalities.   - Telemetry  - Monitor troponin: has been stable  - After admission, patient received Lasix 20 mg IV. Will give 10 mg today.  - Intake out put and daily weight    MENDY: had elevated creatinine after receiving Lasix 20 mg. Now improved.  "Continue to monitor.     Positive blood culture: One of the two bottles of blood culture grows coagulase negative Staph.  Patient has been afebrile. Lactic acidosis resolved. Single positive blood culture likely contamination.      Acute metabolic encephalopathy: patient became nonverbal due to her hypercapnic respiratory failure. Resolved after BiPAP. Continue to monitor.      History of pulmonary embolism: was thought related to COVID19 infection.   - Per her last clinic visit with Dr Casillas on 3/27/23, Continue anticoagulation to complete 3 months treatment. Patient can stop Eliquis after she is discharged.        Goal of care:   - In ER, patient and family wanted DNI so that she was not intubated despite she met criteria for intubation at that time. Her condition improved on BiPAP. Patient later wants full code.   The reason she did not want intubation was that she had 11-day intubation for COVID in Oct 2022. She had prolonged encephalopathy with hallucination. She constantly saw snake crawling on her bed. She did not want to experience that again. However, now she believes that there should be some sedation medication can effectively sedate her during intubation without encephalopathy.   - Palliative care consulted and input appreciated        Diet: Low Saturated Fat Na <2400 mg    DVT Prophylaxis: DOAC  [place] Catheter: Not present  Lines: None     Cardiac Monitoring: None  Code Status: Full Code      Clinically Significant Risk Factors         # Hypernatremia: Highest Na = 146 mmol/L in last 2 days, will monitor as appropriate           # Acute heart failure with reduced ejection fraction: last echo with EF <40% and receiving IV diuretics       # Cachexia: Estimated body mass index is 17.44 kg/m  as calculated from the following:    Height as of this encounter: 1.702 m (5' 7\").    Weight as of this encounter: 50.5 kg (111 lb 5.3 oz)., PRESENT ON ADMISSION          Disposition Plan      Expected Discharge " Date: 07/17/2023      Destination: home with family  Discharge Comments: Improvement of respiratory status          Tammi Fong MD  Hospitalist Service  [facility]  Securely message with [employer] (more info)  Text page via [employer] Paging/Directory   ______________________________________________________________________    Interval History   Patient has worsened SOB compared to yesterday. She feels that she is at her baseline when resting in bed. But she developed SOB when talking for a bit longer or moving on bed. She has been out of breath when getting up to the bedside commode. Per family, patient mainly stay in bed for the past few weeks due to her SOB. She no longer can walk outside of her home. Patient concerns that she would return to the hospital shortly after she is discharged home.     Physical Exam   Vital Signs: Temp: 98.1  F (36.7  C) Temp src: Oral BP: 120/67 Pulse: 101   Resp: 18 SpO2: 97 % O2 Device: Nasal cannula Oxygen Delivery: 4 LPM  Weight: 111 lbs 5.32 oz    General appearance: not in acute distress  HEENT: PERRL, EOMI  Lungs: Coarse breath sounds in bilateral lung fields. Pursed lips when talking  Cardiovascular: Regular rate and rhythm, normal S1-S2  Abdomen: Soft, non tender, no distension  Musculoskeletal: No joint swelling  Skin: No rash and no edema  Neurology: AAO ×3.  Cranial nerves II - XII normal.  Normal muscle strength in all four extremities.    Medical Decision Making       45 MINUTES SPENT BY ME on the date of service doing chart review, history, exam, documentation & further activities per the note.      Data     I have personally reviewed the following data over the past 24 hrs:    N/A  \   N/A   / N/A     144 101 41.8 (H) /  90   3.6 39 (H) 1.13 (H) \       Imaging results reviewed over the past 24 hrs:   No results found for this or any previous visit (from the past 24 hour(s)).

## 2023-07-15 NOTE — PLAN OF CARE
Problem: Gas Exchange Impaired  Goal: Optimal Gas Exchange  Outcome: Not Progressing     Problem: Plan of Care - These are the overarching goals to be used throughout the patient stay.    Goal: Optimal Comfort and Wellbeing  Outcome: Progressing   Goal Outcome Evaluation:       Patient remains on 02@4l, sats has been in the lower 90's, dips down in the upper 80's at times, no acute respiratory distress, denies pain or discomfort, remains on Telemetry, read Sinus Tachycardia, vitals stable, alert and oriented, had lots of visitors.

## 2023-07-15 NOTE — PLAN OF CARE
Problem: Plan of Care - These are the overarching goals to be used throughout the patient stay.    Goal: Optimal Comfort and Wellbeing  Outcome: Progressing     Problem: Gas Exchange Impaired  Goal: Optimal Gas Exchange  Outcome: Progressing  Intervention: Optimize Oxygenation and Ventilation  Recent Flowsheet Documentation  Taken 7/15/2023 0055 by Lamine Tan, RN  Head of Bed (HOB) Positioning: HOB at 30-45 degrees     Goal Outcome Evaluation: Pt pleasant and cooperative. Restless but denies any pain or SOB. VSS. 1x duo neb treatment. On 3L O2 NC stating at 92-93%. Assist of 1 to bathroom. Voided 200ml. Can make needs be known.

## 2023-07-15 NOTE — PROGRESS NOTES
07/15/23 1140   Appointment Info   Signing Clinician's Name / Credentials (PT) Alexandria Roasles DPT   Living Environment   People in Home spouse   Current Living Arrangements house   Home Accessibility no concerns   Transportation Anticipated family or friend will provide   Living Environment Comments lives in house w/ husbsand, able to provide assist with IADLs/BADLs as needed. No need to ascend/descend stairs in house   Self-Care   Usual Activity Tolerance poor   Current Activity Tolerance poor   Regular Exercise No   Equipment Currently Used at Home walker, rolling   Fall history within last six months no   General Information   Onset of Illness/Injury or Date of Surgery 07/13/23   Referring Physician Tammi Fong MD   Patient/Family Therapy Goals Statement (PT) return home   Pertinent History of Current Problem (include personal factors and/or comorbidities that impact the POC) history of severe COPD, chronic respiratory failure on home oxygen 3 LPM, stress cardiomyopathy and pulmonary embolism presents to ER for SOB   Existing Precautions/Restrictions oxygen therapy device and L/min  (3L/min at home)   Cognition   Affect/Mental Status (Cognition) WFL   Pain Assessment   Patient Currently in Pain No   Range of Motion (ROM)   Range of Motion ROM is WFL   Strength (Manual Muscle Testing)   Strength (Manual Muscle Testing) Deficits observed during functional mobility   Bed Mobility   Bed Mobility supine-sit;sit-supine   Supine-Sit Ogle (Bed Mobility) supervision   Sit-Supine Ogle (Bed Mobility) supervision   Comment, (Bed Mobility) inc time and effort, O2 drops on 4L O2 via NC, down to 74% per monitor   Transfers   Transfers sit-stand transfer;bed-chair transfer   Bed-Chair Transfer   Assistive Device (Bed-Chair Transfers)   (none)   Bed-Chair Ogle (Transfers) supervision;verbal cues   Comment, (Bed-Chair Transfer) SOB w/ activity   Sit-Stand Transfer   Sit-Stand Ogle  (Transfers) supervision;verbal cues   Assistive Device (Sit-Stand Transfers) other (see comments)  (none)   Comment, (Sit-Stand Transfer) SOB w/ activity   Gait/Stairs (Locomotion)   Comment, (Gait/Stairs) unable d/t inc SOB/fatigue with ambulation, low O2   Balance   Balance other (describe)   Balance Comments SBA-CGA static standing   Clinical Impression   Criteria for Skilled Therapeutic Intervention Yes, treatment indicated   PT Diagnosis (PT) impaired functional mobility, gait abnormality   Influenced by the following impairments decreased strength, decreased endurance   Functional limitations due to impairments gait, stairs, transfers, bed mob   Clinical Presentation (PT Evaluation Complexity) Stable/Uncomplicated   Clinical Presentation Rationale pt presents as medically diagnosed   Clinical Decision Making (Complexity) low complexity   Planned Therapy Interventions (PT) balance training;bed mobility training;gait training;home exercise program;neuromuscular re-education;patient/family education;strengthening;transfer training;stair training   Anticipated Equipment Needs at Discharge (PT) walker, rolling   Risk & Benefits of therapy have been explained evaluation/treatment results reviewed;patient   PT Total Evaluation Time   PT Eval, Low Complexity Minutes (10205) 10   Physical Therapy Goals   PT Frequency Daily   PT Predicted Duration/Target Date for Goal Attainment 07/22/23   PT Goals Bed Mobility;Transfers;Gait;Stairs   PT: Bed Mobility Independent;Supine to/from sit   PT: Transfers Independent;Sit to/from stand;Bed to/from chair   PT: Gait Modified independent;Assistive device;Rolling walker;25 feet   PT: Stairs Supervision/stand-by assist;4 stairs;Rail on both sides   Interventions   Interventions Quick Adds Therapeutic Activity   Therapeutic Activity   Therapeutic Activities: dynamic activities to improve functional performance Minutes (60367) 8   Symptoms Noted During/After Treatment Shortness of  breath   Treatment Detail/Skilled Intervention additional sit <> stand and stand pivot tx with SBA, cues for breathing techniques. No LOB noted. Instructed pt on use of FWW during mobility for endurance. Pt with inc work of breathing and decreased O2 with mobility on 4L via NC.   PT Discharge Planning   PT Plan amb to doorway and back as able, monitor O2, sit <> stand   PT Discharge Recommendation (DC Rec) home with assist;home with home care physical therapy   PT Rationale for DC Rec may need w/c for home use if breathing worsens. OKay for home d/c d/t ind w/ activity.   PT Brief overview of current status SBA for all mobility   Total Session Time   Timed Code Treatment Minutes 8   Total Session Time (sum of timed and untimed services) 18

## 2023-07-15 NOTE — PROGRESS NOTES
Pulmicort given as scheduled. Duoneb on hold this morning due to high HR. Patient reports improvement with neb treatment. Pursed lip breathing noted with respiratory rate in the low 20s. BBS coarse crackles with expiratory wheezes pre and post treatment.     Jacklyn Tan, RT

## 2023-07-15 NOTE — PROVIDER NOTIFICATION
Notified MD at 2328 PM regarding blood culture.      Spoke with: Tammi Fong    Orders were not obtained.    Comments: Notify provider about pt's blood culture from 07/13 came back positive for gram + cocci in cluster.

## 2023-07-15 NOTE — PROVIDER NOTIFICATION
Notified MD at 146 AM regarding critical results read back.      Spoke with: Dr. Malloy    Orders were not obtained. Verigene showed coag negative staph. It is contaminant. Nothing needs to be done.     Comments: Pt's blood culture from 6/13 came back positive for staphylococcus species.

## 2023-07-16 NOTE — PROGRESS NOTES
Schedule nebs given as ordered; pt on 4L nc saturating well. No SOB noted.  BiPAP removed.  Rt will continue to follow.

## 2023-07-16 NOTE — DISCHARGE SUMMARY
"[facility]  Hospitalist Discharge Summary      Date of Admission:  7/13/2023  Date of Discharge:  7/16/2023  Discharging Provider: Tammi Fong MD  Discharge Service: Hospitalist Service    Discharge Diagnoses     Principal Problem:    Acute on chronic respiratory failure with hypoxia and hypercapnia (H)  Active Problems:    Altered mental status, unspecified altered mental status type    Chronic obstructive pulmonary disease with acute exacerbation (H)    Acute diastolic congestive heart failure (H)    History of pulmonary embolism    Metabolic encephalopathy    MENDY (acute kidney injury) (H)      Clinically Significant Risk Factors     # Cachexia: Estimated body mass index is 17.44 kg/m  as calculated from the following:    Height as of this encounter: 1.702 m (5' 7\").    Weight as of this encounter: 50.5 kg (111 lb 5.3 oz).       Follow-ups Needed After Discharge   Follow-up Appointments     Follow-up and recommended labs and tests       1. Follow up with primary care provider, Cinthya Cabral, within 7 days   for hospital follow- up.  No follow up labs or test are needed. You need   to complete your immunization for pneumonia if you have not got that done.     2.  Follow up with your lung doctor ASAP. Your are also referred to the   sleep clinic to see whether a BiPAP is indicated.            Discharge Disposition   Discharged to home  Condition at discharge: Stable    Hospital Course     Lavonne Talbot is a 68 year old female with history of severe COPD admitted on 7/13/2023 for acute respiratory failure.       Acute on chronic hypoxemic and hypercapnic respiratory failure with severe respiratory acidosis, COPD exacerbation, acute metabolic encephalopathy:  Patient presented with shortness of breath and altered mental status.  In ER, she was found to have COPD exacerbation and severe acute exacerbation of her chronic hypoxemic and hypercapnic respiratory failure.  Patient meets the " criteria for intubation in ER.  However, patient and family decided on DO NOT INTUBATE.  Patient was started on BiPAP.  She received Solu-Medrol IV and scheduled duoneb.  Her symptoms gradually improved.  Her encephalopathy resolved. Chest XR on admission showed no focal infiltrate.  Patient received azithromycin for 5 days.  Pulmonary was consulted.  IV Solu-Medrol was switched to a tapering course of oral prednisone.  Due to her chronic hypercapnia, intermediate outpatient referral for sleep medicine was made to evaluate nocturnal NIV (BiPAP or AVAPs). Patient had sinus tachycardia on albuterol. It was then switched to Xopenex.  With the above treatment, patient's respiratory status returned to her baseline. She returned to her baseline home oxygen of 3 LPM. Patient was discharged home in a stable condition on 7/16. She was advised to follow up with her pulmonologist Dr Casillas in about a month.      Acute CHF:   On admission, patient had elevated proBNP. Troponin was mildly elevated. Chest XR suggests pulmonary edema. She has history of stress cardiomyopathy. Last echocardiogram in Oct 2022 showed LVEF 65-70% without hemodynamically significant valvular abnormalities. Patient was given low dose Lasix. Her creatinine then elevated so that no further dose was given. Patient will continue on low dose as needed at home.     MENDY:   Patient had elevated creatinine after receiving Lasix 20 mg. It later improved.     Positive blood culture:   One of the two bottles of blood culture sent on admission grew Staph capitis.  Patient has been afebrile. Lactic acidosis resolved. Single positive blood culture is most consistent with contamination.      History of pulmonary embolism: was thought related to COVID19 infection.   - Per her last clinic visit with Dr Casillas on 3/27/23, Continue anticoagulation to complete 3 months treatment. Patient can stop Eliquis after she is discharged.       Goal of care:   In ER, patient and  family wanted DNI so that she was not intubated despite she met criteria for intubation at that time. Her condition improved on BiPAP. Patient then decided on full code. Palliative care was consulted and discussed with patient.        Consultations This Hospital Stay   PALLIATIVE CARE ADULT IP CONSULT  PULMONARY IP CONSULT  CARE MANAGEMENT / SOCIAL WORK IP CONSULT  PHYSICAL THERAPY ADULT IP CONSULT  OCCUPATIONAL THERAPY ADULT IP CONSULT    Code Status   Full Code    Time Spent on this Encounter   I, Tammi Fong MD, personally saw the patient today and spent greater than 30 minutes discharging this patient.       Tammi Fong MD  [address] [address]  Phone: [phone]  Fax: [phone]  ______________________________________________________________________    Physical Exam   Vital Signs: Temp: 97.7  F (36.5  C) Temp src: Oral BP: 102/59 Pulse: 97   Resp: 24 SpO2: 95 % O2 Device: Nasal cannula Oxygen Delivery: 4 LPM  Weight: 111 lbs 5.32 oz    General appearance: not in acute distress  HEENT: PERRL, EOMI  Lungs: Clear breath sounds in bilateral lung fields  Cardiovascular: Regular rate and rhythm, normal S1-S2  Abdomen: Soft, non tender, no distension  Musculoskeletal: No joint swelling  Skin: No rash and no edema  Neurology: AAO ×3.  Cranial nerves II - XII normal.  Normal muscle strength in all four extremities.       Primary Care Physician   Cinthya Cabral    Discharge Orders      Adult Sleep Eval & Management Referral      Home Care Referral      Reason for your hospital stay    * COPD exacerbation and acute respiratory failure.     Follow-up and recommended labs and tests     1. Follow up with primary care provider, Cinthya Cabral, within 7 days for hospital follow- up.  No follow up labs or test are needed. You need to complete your immunization for pneumonia if you have not got that done.   2.  Follow up with your lung doctor ASAP. Your  are also referred to the sleep clinic to see whether a BiPAP is indicated.     Activity    Your activity upon discharge: activity as tolerated     Oxygen Adult/Peds    Oxygen Documentation  I certify that this patient, Lavonne Talbot has been under my care (or a nurse practitioner or physican's assistant working with me). This is the face-to-face encounter for oxygen medical necessity.      At the time of this encounter supplemental oxygen is reasonable and necessary and is expected to improve the patient's condition in a home setting.       Patient has continued oxygen desaturation due to Chronic Respiratory Failure with Hypoxia J96.11  COPD J44.9.    If portability is ordered, is the patient mobile within the home? yes     Diet    Follow this diet upon discharge: Orders Placed This Encounter      Low Saturated Fat Na <2400 mg       Chest XR:  IMPRESSION: Bibasilar predominant interstitial prominence and vascular indistinctness, along with small pleural effusions, suggesting pulmonary edema. Upper normal heart size. Aortic atherosclerosis. No pneumothorax.      Significant Results and Procedures   Most Recent 3 CBC's:Recent Labs   Lab Test 07/13/23  1125 02/10/23  1358 11/21/22  0720 11/15/22  1259 11/11/22  0640   WBC 17.8*  --   --  17.8* 14.7*   HGB 12.0 9.9* 11.1* 10.9* 11.2*   *  --   --  101* 99     --   --  414 452*     Most Recent 3 BMP's:Recent Labs   Lab Test 07/15/23  0704 07/14/23  0531 07/13/23  1125    146* 144   POTASSIUM 3.6 4.5 4.6   CHLORIDE 101 103 101   CO2 39* 38* 39*   BUN 41.8* 33.3* 22.5   CR 1.13* 1.37* 0.83   ANIONGAP 4* 5* 4*   JASON 9.3 8.5* 9.0   GLC 90 125* 265*       Discharge Medications   Current Discharge Medication List      START taking these medications    Details   albuterol (PROAIR HFA/PROVENTIL HFA/VENTOLIN HFA) 108 (90 Base) MCG/ACT inhaler Inhale 2 puffs into the lungs every 4 hours as needed for wheezing or shortness of breath  Qty: 18 g, Refills: 0     Comments: Pharmacy may dispense brand covered by insurance (Proair, or proventil or ventolin or generic albuterol inhaler)  Associated Diagnoses: Chronic obstructive pulmonary disease with acute exacerbation (H)      azithromycin (ZITHROMAX) 250 MG tablet Take 1 tablet (250 mg) by mouth daily  Qty: 1 tablet, Refills: 0    Associated Diagnoses: Chronic obstructive pulmonary disease with acute exacerbation (H)      fluticasone-salmeterol (ADVAIR) 500-50 MCG/ACT inhaler Inhale 1 puff into the lungs 2 times daily  Qty: 1 each, Refills: 0    Associated Diagnoses: Chronic obstructive pulmonary disease with acute exacerbation (H)      ipratropium (ATROVENT) 0.02 % neb solution Take 2.5 mLs (0.5 mg) by nebulization 4 times daily  Qty: 360 mL, Refills: 0    Associated Diagnoses: Chronic obstructive pulmonary disease with acute exacerbation (H)      levalbuterol (XOPENEX) 0.31 MG/3ML neb solution Take 3 mLs (0.31 mg) by nebulization 4 times daily  Qty: 360 mL, Refills: 0    Associated Diagnoses: Chronic obstructive pulmonary disease with acute exacerbation (H)      predniSONE (DELTASONE) 10 MG tablet Take 4 tablets (40 mg) by mouth daily for 3 days, THEN 3 tablets (30 mg) daily for 3 days, THEN 2 tablets (20 mg) daily for 3 days, THEN 1 tablet (10 mg) daily for 3 days.  Qty: 30 tablet, Refills: 0    Associated Diagnoses: Chronic obstructive pulmonary disease with acute exacerbation (H)         CONTINUE these medications which have NOT CHANGED    Details   apixaban ANTICOAGULANT (ELIQUIS) 5 MG tablet Take 1 tablet (5 mg) by mouth 2 times daily    Associated Diagnoses: Single subsegmental pulmonary embolism without acute cor pulmonale (H)      budesonide (PULMICORT) 0.5 MG/2ML neb solution Take 4 mLs (1 mg) by nebulization 2 times daily    Associated Diagnoses: Chronic obstructive pulmonary disease, unspecified COPD type (H)      ferrous sulfate (FEROSUL) 325 (65 Fe) MG tablet Take 325 mg by mouth 2 times daily      furosemide  (LASIX) 20 MG tablet Take 0.5 tablets (10 mg) by mouth daily as needed (edema)    Associated Diagnoses: Acute heart failure with preserved ejection fraction (HFpEF) (H)      mirtazapine (REMERON) 15 MG tablet Take 1 tablet (15 mg) by mouth At Bedtime    Associated Diagnoses: Depression, unspecified depression type      vitamin B1 (THIAMINE) 50 MG tablet Take 50 mg by mouth daily         STOP taking these medications       ipratropium - albuterol 0.5 mg/2.5 mg/3 mL (DUONEB) 0.5-2.5 (3) MG/3ML neb solution Comments:   Reason for Stopping:             Allergies   No Known Allergies

## 2023-07-16 NOTE — PLAN OF CARE
Problem: Plan of Care - These are the overarching goals to be used throughout the patient stay.    Goal: Optimal Comfort and Wellbeing  Outcome: Progressing     Problem: Gas Exchange Impaired  Goal: Optimal Gas Exchange  Outcome: Progressing  Intervention: Optimize Oxygenation and Ventilation  Recent Flowsheet Documentation  Taken 7/16/2023 0030 by Matthias Nieto RN  Head of Bed (HOB) Positioning: HOB at 20-30 degrees     Problem: Fluid Volume Excess  Goal: Fluid Balance  Outcome: Progressing   Goal Outcome Evaluation:    VSS except HR and BP elevated at start of shift and decreased to baseline with no intervention. No pain reported.  On tele with NSR. On 3.5L O2 via NC with sats staying in 90s with occasional short dips into 70s. Gave PRN neb x1 at pt request.     Matthias Nieto, RN

## 2023-07-16 NOTE — PLAN OF CARE
Occupational Therapy Discharge Summary    Reason for therapy discharge:    Discharged to home.    Progress towards therapy goal(s). See goals on Care Plan in [facility] electronic health record for goal details.  Goals partially met.  Barriers to achieving goals:   discharge from facility.    Therapy recommendation(s):    home care recommended

## 2023-07-16 NOTE — PLAN OF CARE
Physical Therapy Discharge Summary    Reason for therapy discharge:    Discharged to home with home therapy.    Progress towards therapy goal(s). See goals on Care Plan in [facility] electronic health record for goal details.  Goals partially met.  Barriers to achieving goals:   discharge from facility.    Therapy recommendation(s):    Continued therapy is recommended.  Rationale/Recommendations:  Home PT Evaluation.      Tata Enriquez, PT, DPT  7/16/2023

## 2023-07-16 NOTE — PROGRESS NOTES
Care Management Discharge Note    Discharge Date: 07/16/2023       Discharge Disposition: Home, Home Care    Discharge Services: None    Discharge DME: None    Discharge Transportation: family or friend will provide    Private pay costs discussed: Not applicable    Does the patient's insurance plan have a 3 day qualifying hospital stay waiver?  No    PAS Confirmation Code:    Patient/family educated on Medicare website which has current facility and service quality ratings: yes    Education Provided on the Discharge Plan: Yes  Persons Notified of Discharge Plans: Patient   Patient/Family in Agreement with the Plan: yes    Handoff Referral Completed: Yes    Additional Information:  Patient to discharge home, accepted to [facility] for PT services, family transport.     Sammie Goyal RN

## 2023-07-16 NOTE — PLAN OF CARE
Problem: Plan of Care - These are the overarching goals to be used throughout the patient stay.    Goal: Optimal Comfort and Wellbeing  Outcome: Progressing     Problem: Gas Exchange Impaired  Goal: Optimal Gas Exchange  Outcome: Progressing   Goal Outcome Evaluation:       Patient denies pain or discomfort, remains's on 02@3.5l, sats has been in the mid and upper 90's,  no acute respiratory distress, remains on telemetry, read NSR, was up to the commode with SBA, alert and oriented, vitals stable, no anxiety noted this shift, mo complaints.

## 2023-07-16 NOTE — PLAN OF CARE
Problem: Plan of Care - These are the overarching goals to be used throughout the patient stay.    Goal: Optimal Comfort and Wellbeing  Outcome: Progressing     Problem: Gas Exchange Impaired  Goal: Optimal Gas Exchange  Outcome: Progressing     Pt denies pain today.  Pt on 3.5 L O2 today saturations in mid 90%.  Pt up independently up to commode pt steady on feet.     Pt ate 100% of breakfast and lunch meal.   MD saw pt, to to discharge home today.

## 2023-07-16 NOTE — PROGRESS NOTES
Care Management Follow Up    Length of Stay (days): 3    Expected Discharge Date: 07/17/2023     Concerns to be Addressed: all concerns addressed in this encounter     Patient plan of care discussed at interdisciplinary rounds: Yes    Anticipated Discharge Disposition: Home     Anticipated Discharge Services: Homecare   Anticipated Discharge DME:  NA    Patient/family educated on Medicare website which has current facility and service quality ratings: yes  Education Provided on the Discharge Plan:  yes  Patient/Family in Agreement with the Plan: yes    Referrals Placed by CM/SW:  Homecare  Private pay costs discussed: Not applicable    Additional Information:  RNCM met with patient at bedside. Discussed discharge recommendations, patient agrees to Homecare - PT services.     Referral sent.     Social HX: Pt comes from home; lives with spouse, and noted to be independent at baseline.     CM will continue to follow care progression and aide in discharge planning as needed.     Sammie Goyal RN

## 2023-07-16 NOTE — TREATMENT PLAN
RCAT Treatment Plan    Patient Score: 12  Patient Acuity: 3    Clinical Indication for Therapy: history of bronchospasm and home regimen    Therapy Ordered: pulmicort neb BID, albuterol inhaler PRN, breo ellipta dialy, duoneb Q4 PRN, atrovent neb QID, xopenex QID    Assessment Summary: Patient remains on 4 lpm nasal cannula. Dyspnea on exertion with respiratory rate in the mid 20s and moderate accessory muscle use. BBS crackles and slightly coarse. Slight improvement post neb treatment. Weak, nonproductive cough. Patient is using atrovent and xopenex due to tachycardia. Will continue respiratory meds as scheduled per home regimen.     Jacklyn Tan, RT  7/16/2023

## 2023-08-16 NOTE — PATIENT INSTRUCTIONS
- set up your appointment with sleep medicine.     - if you notice increased cough, shortness of breath, or wheeze call the clinic right away.     If you have worsening symptoms, questions, or need to speak with the nurse please call [phone].

## 2023-08-16 NOTE — PROGRESS NOTES
PULMONARY OUTPATIENT FOLLOW UP NOTE   August 16, 2023     Assessment:      GOLD D COPD with recent hospitalization.   Significant tobacco use , emphysematous changes  PFTs showed a very severe obstructive lung disease.   Due to cost of inhalers, patient is using bronchodilators in nebulizer solution   Continue ICS (pulmicort) twice a day  Continue schedule duonebs three times a day and increase to four times a day as needed  Continue O2 supplementation 3 LPM continuously   Unfortunately , patient does not qualify for pulse dose device.   Patient has agreed to continue home PT  Hypoxia with hypercapnic respiratory failure -- Urgent outpatient sleep medicine referral ordered during hospital stay to evaluate candidacy for nocturnal NIV (BiPAP or AVAPs), given significant chronic hypercapnia.  Pulmonary embolism -- on DOAC   Chest CT scan 10/8 showed superior segment left lower lobe pulmonary emboli. Negative DVT.   Follow up CT scan 10/15 showed resolution of superior segment LLL pulmonary emboli but new RLL emboli and peripheral filling defect in the left LL basal segments.   Hypercoagulable state related to COVID19.    Follow up echocardiogram 11/5 showed preserved LV and RV function.   Recommend to continue anticoagulation and complete 6 months treatment   S/p treatment of COVID19 viral infection on October 2022  Pulmonary cachexia   Severe deconditioning      Plan:      O2 supplementation 3 LPM continuously. Titrate to maintain O2 sat 88-92%, avoid hyperoxia.  Pulmicort twice a day. Instructed her to discontinue Advair use.   Albuterol / ipratropium nebs 4 times a day  Continue anticoagulation to complete 6 months treatment. Will re-address at follow up.    Follow up in 3 months with Dr. Sonja Chilel, CNP  Pulmonary Medicine  [facility]   [phone]        CC:     Chief Complaint   Patient presents with    Follow Up     Acute on chronic respiratory failure with hypoxia and hypercapnia  (H)  Active Problems:  Chronic obstructive pulmonary disease with acute exacerbation (H)  History of pulmonary embolism  Metabolic encephalopathy  MENDY (acute kidney injury) (H)         HPI:         Lavonne Talbot is a 68 year old female who presents for follow up appointment after recent hospitalization.   Hospitalized from 7/13-7/16/2023 with respiratory failure with hypoxia and hypercapnia. Patient meet the criteria for intubation in ER, however, patient and family declined intubation. Patient was started on BiPAP, received Solu-Medrol IV and scheduled duoneb.  Her symptoms gradually improved. She has not yet met with sleep medicine. It appears she has two different appointments at [facility] but not until 9/18 and 10/18. She is frustrated with the wait and difficulty obtaining BiPAP at home.   Prior to this she was hospitalized from 10/8/22 to 11/11/22 with diagnosis of acute respiratory failure due to COVID-19 requiring mechanical ventilation, treated with dexamethasone and remdesivir. In addition patient was treated for H influenza pneumonia and pulmonary embolism. Patient was discharged to TCU.  Echocardiogram during hospital stay in 10/2022 showed reduced EF 30-35%, thought to be related to stress induced cardiomyopathy. Follow up echocardiogram 11/5/222 showed normal EF 60-65% and normal RV function.     On O2 supplementation 4 LPM. Scheduled pulmicort and duonebs QID.   She had been using both budesonide nebs and Advair inhaler BID.   Patient has history of severe COPD on home O2.   Reports doing ok, limited activity. Dyspnea after walking over 1/2 block, uses a walker to get around for stability. Wears O2 supplementation 3-4 LPM continuously.   Denies cough, no wheezes.   Denies fever, chills or night sweats.   Denies postnasal drip, headaches, or sinus tenderness.   Denies chest pain, orthopnea, PND, or swelling of LEs.  Denies sleeping problems.   Tobacco user 1ppd for 40 years plus, quit 6 months  ago.       Past Medical History :     Severe COPD on home O2  COVID19 viral infection 2022  Stress induced cardiomyopathy Oct 2022, normalization of EF in follow up echo on 2022  Pulmonary embolism 2022     Medications:     Current Outpatient Medications   Medication    albuterol (PROAIR HFA/PROVENTIL HFA/VENTOLIN HFA) 108 (90 Base) MCG/ACT inhaler    apixaban ANTICOAGULANT (ELIQUIS) 5 MG tablet    azithromycin (ZITHROMAX) 250 MG tablet    budesonide (PULMICORT) 0.5 MG/2ML neb solution    ferrous sulfate (FEROSUL) 325 (65 Fe) MG tablet    fluticasone-salmeterol (ADVAIR) 500-50 MCG/ACT inhaler    furosemide (LASIX) 20 MG tablet    ipratropium (ATROVENT) 0.02 % neb solution    ipratropium - albuterol 0.5 mg/2.5 mg/3 mL (DUONEB) 0.5-2.5 (3) MG/3ML neb solution    levalbuterol (XOPENEX) 0.31 MG/3ML neb solution    mirtazapine (REMERON) 15 MG tablet    Nebulizers (VIOS AEROSOL DELIVERY SYSTEM) MISC    vitamin B1 (THIAMINE) 50 MG tablet     No current facility-administered medications for this visit.        Social History :     Social History     Socioeconomic History    Marital status:      Spouse name: Not on file    Number of children: Not on file    Years of education: Not on file    Highest education level: Not on file   Occupational History    Not on file   Tobacco Use    Smoking status: Former     Packs/day: 0.10     Types: Cigarettes     Quit date: 3/27/2023     Years since quittin.3    Smokeless tobacco: Never   Vaping Use    Vaping Use: Never used   Substance and Sexual Activity    Alcohol use: Not on file    Drug use: Not on file    Sexual activity: Not on file   Other Topics Concern    Not on file   Social History Narrative    Not on file     Social Determinants of Health     Financial Resource Strain: Not on file   Food Insecurity: Not on file   Transportation Needs: Not on file   Physical Activity: Not on file   Stress: Not on file   Social Connections: Not on file   Intimate  Partner Violence: Not on file   Housing Stability: Not on file          Family History :     Family History   Problem Relation Age of Onset    Diabetes Mother        Review of Systems  A 10 point comprehensive review of systems was negative except as noted.      Objective:     /62 (BP Location: Left arm, Patient Position: Sitting, Cuff Size: Adult Regular)   Pulse (!) 123   Wt 53.3 kg (117 lb 6.4 oz)   SpO2 93%   BMI 18.39 kg/m      Physical Exam  Constitutional:       General: She is not in acute distress.     Appearance: She is underweight. She is not ill-appearing or diaphoretic.   Cardiovascular:      Rate and Rhythm: Normal rate and regular rhythm.      Pulses: Normal pulses.      Heart sounds: Normal heart sounds.   Pulmonary:      Effort: Pulmonary effort is normal. No respiratory distress.      Breath sounds: Decreased air movement present. No wheezing or rhonchi.   Musculoskeletal:      Right lower leg: No edema.      Left lower leg: No edema.   Skin:     General: Skin is warm and dry.      Findings: No rash.   Neurological:      Mental Status: She is alert.   Psychiatric:         Behavior: Behavior normal.         Diagnostic tests:       Latest Reference Range & Units 07/13/23 11:28 07/13/23 13:20   pH Arterial 7.37 - 7.44  7.02 (LL) 7.21 (LL)   pCO2 Arterial 35 - 45 mm Hg >98 (HH) 94 (HH)   PO2 Arterial 75 - 85 mm Hg 245 (H) 142 (H)   Bicarbonate Arterial 23 - 29 mmol/L See Comment 38 (H)   Base Excess Art mmol/L See Comment 10.2   FIO2   50   Oxyhemoglobin 95.0 - 96.0 % >98.5 (H) 98.5 (H)   [facility]'s Test  Yes No     PFTs: (2/10/2023)          IMAGES:     XR CHEST PORT 1 VIEW, DATE: 7/13/2023  INDICATION: Shortness of breath  COMPARISON: 11/08/2022                                                             IMPRESSION: Bibasilar predominant interstitial prominence and vascular indistinctness, along with small pleural effusions, suggesting pulmonary edema. Upper normal heart size. Aortic  atherosclerosis. No pneumothorax.    CT CHEST PULMONARY EMBOLISM W CONTRAST, DATE/TIME: 10/13/2022 1:03 PM  INDICATION: Acute respiratory decompensation worsening hypoxemia  COMPARISON: CT pulmonary angiogram 10/8/2022  FINDINGS:  ANGIOGRAM CHEST: The main pulmonary artery remains normal in size. The central pulmonary arteries remain patent. A subsegmental pulmonary embolism in the left lower lobe superior segment 10/8/2022 has resolved (series 4, image 181). However, there is a   new low-attenuation filling defect within proximal subsegmental level pulmonary arteries in the right lower lobe (series 4, image 236) and a peripheral subsegmental filling defect in the posterior basal left lower lobe (series 4, image 276). Normal caliber thoracic aorta. Unchanged mild arch, proximal great vessel and descending aorta atheromatous calcifications. No acute aortic syndrome. No CT evidence of right heart strain.  LUNGS AND PLEURA: Endotracheal tube is in satisfactory position. Central airway wall thickening is unchanged. Debris within the segmental and proximal subsegmental lower lobe airways is slightly improved from 5 days earlier, however peripheral subsegmental airway debris in bilateral lower lobe airways which produce tree-in-bud opacity and all segments of the left lower lobe and the basilar segments of the right lower lobe have developed.. The extent of consolidation in the posterior basal left lower but is mildly increased from 5 days earlier, while inflammation and atelectasis in the left lateral costophrenic sulcus has decreased.. No pleural effusion is present.  MEDIASTINUM: Cardiac chambers remain normal in size. There is no pericardial effusion. No lymphadenopathy. Gastric tube courses through the decompressed esophagus terminating in the stomach body. Right midline catheter terminates in the lateral right subclavian vein and right jugular approach central venous catheter terminates in the SVC.  CORONARY ARTERY  CALCIFICATION: Mild.  UPPER ABDOMEN: No new findings in the imaged upper abdomen.  MUSCULOSKELETAL: Generalized demineralization of the bones. There is subtle superior endplate deformity of L1. There is a paucity of subcutaneous fat. No new bone or chest wall soft tissue abnormalities.                                          IMPRESSION:  1.  Subsegmental pulmonary embolism in the left lower lobe 5 days earlier has resolved. 2 new subsegmental pulmonary emboli are present in the lower lobes as described.  2.  Mild worsening of focal consolidation in the posterior basal left lower lobe and development of peripheral airway debris/tree-in-bud opacity in both lower lobes consistent with bronchopneumonia / cellular bronchiolitis.  3.  Advanced emphysema.    Echocardiogram 11/6/2022  Interpretation Summary  1. Technically difficult study (imaging could only be obtained from the apical window).  2. Limited 2-dimensional echocardiogram.  3. Normal left ventricular size and systolic performance with a visually estimated ejection fraction of 65-70%.  4. Normal right ventricular size and systolic performance.     When compared to the prior real-time echocardiogram dated 9 October 2022, there has been a noticeable improvement in left ventricular and right ventricular systolic performance.  Additional comments: As aforementioned, images could only be obtained from the apical window. Nonetheless, imaging from the apical acoustic window is good. It is felt a reasonable assessment of left ventricular right ventricular systolic function could be offered.

## 2023-08-16 NOTE — PROGRESS NOTES
[facility] Respiratory Intake Consult for Home Sleep Device    I received referral for possible BIPAP Home Device for patient from NP: Sybil Chilel    Patient has a sleep study scheduled but could use BIPAP at home now...    I reviewed EPIC...    I found that the patient would qualify with no additional testing with diagnosis COPD with chronic Respiratory Failure for a home ventilator. We would just need RX and Med Necc Progress Note. We have qualifying ABGs and multiple hospitalizations.     I let NP know: Home ventilator has a battery... Bipap does not have a battery... Home ventilator is more expensive but can do more things... If patient needs TCU again... It is hard to find beds that will take vent patients... It is easier to find Beds for BIPAP patients if it becomes an issue...    If NP wants BIPAP instead of Ventilator, Patient would need Overnight Oximetry Study and have results SPO2<88% for >5 mins with at least 2 hours of recording while patient is on their normal O2. Then we would need a note that Prior to initiating therapy that Obstructive Sleep Apnea (SHEREEN) and treatment with CPAP has been considered and ruled out.... Then you can only get BIPAP with out a set rate. We would need RX and Med Necc Progress note.     Also let NP know that: Patient is currently on O2 at home from other DME.... So you might want to reach out to patient's current DME for O2 to provide more sleep device support... Other wise I am happy to take her for a home sleep device too.... Sometimes patient's like their DME for O2 and want to keep everything together... Others dislike their DME... O2 billing is in 5 year cyles and we are not able to transfer patient to us for O2...    On our end with insurance... either way... [facility] (me) will need to verify insurance and see if a prior auth is needed... if prior auth is needed for insurance it could take unto 72 hours... to hear back from insurance...to see if they will cover  sleep device or not.     Insurance requires EPIC Progress Note to reflect example : .NIV Medical Necessity Example  Example: I attest that I have seen and evaluated ....patient name..... face to face. She/He has a chronic illness of ...... This patient would greatly benefit from a home non-invasive ventilatory (NIV) device for nocturnal and daytime use with portability for recovery breaths. This device would aide in ventilator support to improve pulmonary status and decrease work of breathing. I strongly believe NIV therapy is necessary to improve future outcomes and decreased hospital readmissions.    Tereza Rodgers  Respiratory Therapist   [facility]  [facility] [employer]  [address]  [address]  [place] [address]  [email or web address]  [email or web address]  Office: [phone] Fax: [phone]  Employed by [employer]

## 2023-08-17 NOTE — TELEPHONE ENCOUNTER
Per Sybil Chilel CNP:  That's fine. Can you discontinue the duonebs and order levalbuterol and ipratropium nebs prn QID? Thanks!

## 2023-08-17 NOTE — TELEPHONE ENCOUNTER
Spoke with Lavonne to go over med list. She reports the duonebs should not be prescribed to her. She had a really high heart rate while in the hospital due to the albuterol and the hospitalist told her to take ipratropium and levalbuterol instead. Will send to Sybil Chilel CNP for new orders.

## 2023-08-17 NOTE — TELEPHONE ENCOUNTER
[facility] Call Center    Phone Message    May a detailed message be left on voicemail: yes     Reason for Call: Other: please call patient and go over medication list with her from yesterday's appt, patient states it is wrong     Action Taken: Other: pulm    Travel Screening: Not Applicable

## 2023-11-06 NOTE — PROGRESS NOTES
PULMONARY OUTPATIENT FOLLOW UP NOTE        Assessment:      Chronic respiratory failure   Secondary to COPD , continue O2 supplementation 3 LPM   COPD   Significant tobacco use , emphysematous changes  PFTs showed a very severe obstructive lung disease.   Due to difficulty using hand held inhalers and cost, patient is using nebulizer treatment.    Continue ICS (pulmicort) twice a day  Continue ipratropium and levalbuterol nebs four times a day as needed  Continue O2 supplementation 3 LPM continuously   Continue home PT  Pulmonary embolism   Chest CT scan 10/8/22 showed superior segment left lower lobe pulmonary emboli. Negative DVT.   Follow up CT scan 10/15/22 showed resolution of superior segment LLL pulmonary emboli but new RLL emboli and peripheral filling defect in the left LL basal segments.   Hypercoagulable state related to COVID19.   Follow up echocardiogram 11/5/22 showed preserved LV and RV function.   Patient finished recommended anticoagulation for 6 months  Patient is concerned about having a new pulmonary embolism in view of decrease activity level.   Pros and cons of continue anticoagulation were discussed with patient.   Pulmonary cachexia   Severe deconditioning      Plan:      O2 supplementation 3 LPM continuously  BUDESONIDE NEBS twice a day, rinse your mouth after each use  Ipratropium nebs four times a day as needed  Levalbuterol nebs four times a day as needed  Continue anticoagulation   Home PT  Follow up in 6 months         Fredi Milan  Pulmonary / Critical Care  11/06/23       CC:     Chief Complaint   Patient presents with    Follow Up     Chronic obstructive pulmonary disease, unspecified COPD type (H)  Acute on chronic respiratory failure with hypoxia and hypercapnia (H)  History of pulmonary embolism         HPI:         Lavonne Talbot is a 68 year old female who presents for follow up..   Patient has history of severe COPD on home O2.   Patient was hospitalized from  7/13 to 7/16/2023 with diagnosis of acute hypoxic and hypercarbic respiratory failure due to COPD exacerbation plus volume up status. Required BiPAP treatment.   Discharged home on steroid taper, nebulizer medications.   Reports doing well, limited activity , able to walk 1/4 block, uses a walker to get around for stability. Wears O2 supplementation 3 LPM continuously. Has help from  during shower.   Denies cough, reports occasional wheezes.   Uses pulmicort twice a day and ipratropium, levalbuterol three times a day.   Denies fever, chills or night sweats.   Denies postnasal drip, headaches, or sinus tenderness.   Denies chest pain, orthopnea, PND, or swelling of LEs.  Denies sleeping problems.   Tobacco user 1ppd for 40 years plus, quit 6 months ago.       Past Medical History :     Severe COPD on home O2  COVID19 viral infection October 2022  Stress induced cardiomyopathy Oct 2022, normalization of EF in follow up echo on Nov 2022  Pulmonary embolism October 2022     Medications:     Current Outpatient Medications   Medication    albuterol (PROAIR HFA/PROVENTIL HFA/VENTOLIN HFA) 108 (90 Base) MCG/ACT inhaler    apixaban ANTICOAGULANT (ELIQUIS) 5 MG tablet    azithromycin (ZITHROMAX) 250 MG tablet    budesonide (PULMICORT) 0.5 MG/2ML neb solution    ferrous sulfate (FEROSUL) 325 (65 Fe) MG tablet    furosemide (LASIX) 20 MG tablet    ipratropium (ATROVENT) 0.02 % neb solution    levalbuterol (XOPENEX) 0.31 MG/3ML neb solution    mirtazapine (REMERON) 15 MG tablet    Nebulizers (VIOS AEROSOL DELIVERY SYSTEM) MISC    vitamin B1 (THIAMINE) 50 MG tablet     No current facility-administered medications for this visit.        Social History :     Social History     Socioeconomic History    Marital status:      Spouse name: Not on file    Number of children: Not on file    Years of education: Not on file    Highest education level: Not on file   Occupational History    Not on file   Tobacco Use    Smoking  status: Former     Packs/day: .1     Types: Cigarettes     Quit date: 3/27/2023     Years since quittin.6    Smokeless tobacco: Never   Vaping Use    Vaping Use: Never used   Substance and Sexual Activity    Alcohol use: Not on file    Drug use: Not on file    Sexual activity: Not on file   Other Topics Concern    Not on file   Social History Narrative    Not on file     Social Determinants of Health     Financial Resource Strain: Not on file   Food Insecurity: Not on file   Transportation Needs: Not on file   Physical Activity: Not on file   Stress: Not on file   Social Connections: Not on file   Interpersonal Safety: Not on file   Housing Stability: Not on file          Family History :     Family History   Problem Relation Age of Onset    Diabetes Mother        Review of Systems  A 12 point comprehensive review of systems was negative except as noted.        Objective:     /62 (BP Location: Left arm, Patient Position: Sitting, Cuff Size: Adult Large)   Pulse 114   Wt 56.7 kg (125 lb)   SpO2 97%   BMI 19.58 kg/m      Gen: thin, awake, alert, no distress  HEENT: pink conjunctiva, moist mucosa, Mallampati II/IV  Neck: no thyromegaly, masses or JVD  Lungs: decrease breath sounds both HT  CV: regular, no murmurs or gallops appreciated  Abdomen: soft, NT, BS wnl  Ext: no edema  Neuro: CN II-XII intact, non focal      Diagnostic tests:         PFTs: (2/10/2023)          IMAGES:     CT CHEST PULMONARY EMBOLISM W CONTRAST  LOCATION: [facility]  DATE/TIME: 10/13/2022 1:03 PM  INDICATION: Acute respiratory decompensation worsening hypoxemia  COMPARISON: CT pulmonary angiogram 10/8/2022  FINDINGS:  ANGIOGRAM CHEST: The main pulmonary artery remains normal in size. The central pulmonary arteries remain patent. A subsegmental pulmonary embolism in the left lower lobe superior segment 10/8/2022 has resolved (series 4, image 181). However, there is a   new low-attenuation filling defect  within proximal subsegmental level pulmonary arteries in the right lower lobe (series 4, image 236) and a peripheral subsegmental filling defect in the posterior basal left lower lobe (series 4, image 276). Normal caliber thoracic aorta. Unchanged mild arch, proximal great vessel and descending aorta atheromatous calcifications. No acute aortic syndrome. No CT evidence of right heart strain.  LUNGS AND PLEURA: Endotracheal tube is in satisfactory position. Central airway wall thickening is unchanged. Debris within the segmental and proximal subsegmental lower lobe airways is slightly improved from 5 days earlier, however peripheral subsegmental airway debris in bilateral lower lobe airways which produce tree-in-bud opacity and all segments of the left lower lobe and the basilar segments of the right lower lobe have developed.. The extent of consolidation in the posterior basal left lower but is mildly increased from 5 days earlier, while inflammation and atelectasis in the left lateral costophrenic sulcus has decreased.. No pleural effusion is present.  MEDIASTINUM: Cardiac chambers remain normal in size. There is no pericardial effusion. No lymphadenopathy. Gastric tube courses through the decompressed esophagus terminating in the stomach body. Right midline catheter terminates in the lateral right subclavian vein and right jugular approach central venous catheter terminates in the SVC.  CORONARY ARTERY CALCIFICATION: Mild.  UPPER ABDOMEN: No new findings in the imaged upper abdomen.  MUSCULOSKELETAL: Generalized demineralization of the bones. There is subtle superior endplate deformity of L1. There is a paucity of subcutaneous fat. No new bone or chest wall soft tissue abnormalities.                                          IMPRESSION:  1.  Subsegmental pulmonary embolism in the left lower lobe 5 days earlier has resolved. 2 new subsegmental pulmonary emboli are present in the lower lobes as described.  2.  Mild  worsening of focal consolidation in the posterior basal left lower lobe and development of peripheral airway debris/tree-in-bud opacity in both lower lobes consistent with bronchopneumonia / cellular bronchiolitis.  3.  Advanced emphysema.    Echocardiogram 11/6/2022  Interpretation Summary  1. Technically difficult study (imaging could only be obtained from the apical window).  2. Limited 2-dimensional echocardiogram.  3. Normal left ventricular size and systolic performance with a visually estimated ejection fraction of 65-70%.  4. Normal right ventricular size and systolic performance.     When compared to the prior real-time echocardiogram dated 9 October 2022, there has been a noticeable improvement in left ventricular and right ventricular systolic performance.  Additional comments: As aforementioned, images could only be obtained from the apical window. Nonetheless, imaging from the apical acoustic window is good. It is felt a reasonable assessment of left ventricular right ventricular systolic function could be offered.    XR CHEST PORT 1 VIEW  LOCATION: [facility]  DATE: 7/13/2023   INDICATION: Shortness of breath  COMPARISON: 11/08/2022  IMPRESSION: Bibasilar predominant interstitial prominence and vascular indistinctness, along with small pleural effusions, suggesting pulmonary edema. Upper normal heart size. Aortic atherosclerosis. No pneumothorax.

## 2023-11-06 NOTE — PATIENT INSTRUCTIONS
O2 supplementation 3 LPM continuously  BUDESONIDE NEBS twice a day, rinse your mouth after each use  Ipratropium nebs four times a day as needed  Levalbuterol nebs four times a day as needed  Continue anticoagulation   Home PT  Follow up in 6 months

## 2023-11-22 NOTE — TELEPHONE ENCOUNTER
[facility] Call Center    Phone Message    May a detailed message be left on voicemail: yes     Reason for Call: Other: [facility] called in regarding follow up on an order for medical necessity for her O2 Equipment.     Action Taken: Other: Pulm    Travel Screening: Not Applicable

## 2023-12-08 NOTE — TELEPHONE ENCOUNTER
Forms have been received.  Dr. Casillas will be in clinic next week to sign and then we can fax back to them.

## 2023-12-08 NOTE — TELEPHONE ENCOUNTER
Call back received from Augusto with [facility], trying to follow-up on oxygen recertification forms-these forms were last faxed to our office on 11/28/23, will re-fax these forms today to (f) [phone].   Their office would like a call back if these forms have not been received-they can be reached at [phone].     If these forms have been received, they would like these completed and returned as soon as possible.